# Patient Record
Sex: FEMALE | Race: BLACK OR AFRICAN AMERICAN | URBAN - METROPOLITAN AREA
[De-identification: names, ages, dates, MRNs, and addresses within clinical notes are randomized per-mention and may not be internally consistent; named-entity substitution may affect disease eponyms.]

---

## 2019-08-17 ENCOUNTER — EMERGENCY (EMERGENCY)
Facility: HOSPITAL | Age: 64
LOS: 1 days | Discharge: HOME | End: 2019-08-17
Attending: EMERGENCY MEDICINE | Admitting: EMERGENCY MEDICINE
Payer: COMMERCIAL

## 2019-08-17 VITALS
RESPIRATION RATE: 16 BRPM | OXYGEN SATURATION: 100 % | TEMPERATURE: 98 F | SYSTOLIC BLOOD PRESSURE: 159 MMHG | HEART RATE: 81 BPM | DIASTOLIC BLOOD PRESSURE: 76 MMHG

## 2019-08-17 PROCEDURE — 99283 EMERGENCY DEPT VISIT LOW MDM: CPT

## 2019-08-17 RX ORDER — MECLIZINE HCL 12.5 MG
25 TABLET ORAL ONCE
Refills: 0 | Status: COMPLETED | OUTPATIENT
Start: 2019-08-17 | End: 2019-08-17

## 2019-08-17 RX ORDER — MECLIZINE HCL 12.5 MG
1 TABLET ORAL
Qty: 5 | Refills: 0
Start: 2019-08-17 | End: 2019-08-21

## 2019-08-17 RX ADMIN — Medication 25 MILLIGRAM(S): at 09:19

## 2019-08-17 NOTE — ED PROVIDER NOTE - PHYSICAL EXAMINATION
GEN - NAD; well appearing; A+Ox3   HEAD - NC/AT, No visible Ecchymosis, No Abrasions, No Lacerations/Skin Tears     EYES - EOMI, PERRL, no conjunctival pallor, no scleral icterus  ENT -   (-) Left Tragal Tenderness, (-) Evidence of Discharge/Swelling/Inflammation in Left or Right Ears, (+) Decreased Hearing Acuity in Left Ear.   NECK - Neck supple, No LAD, No Swelling  PULM - CTA B/L,  symmetric breath sounds  COR -  RRR, S1 S2, no murmurs  EXTREMS - 0+ edema, no gross deformity, warm and well perfused    SKIN - no rash or bruising      NEUROLOGIC - alert, without focal neuro deficits, sensation nl, motor 5/5 RUE/LUE/RLE/LLE, Gait Grossly Stable GEN - NAD; well appearing; A+Ox3   HEAD - NC/AT, No visible Ecchymosis, No Abrasions, No Lacerations/Skin Tears     EYES - EOMI, PERRL, no conjunctival pallor, no scleral icterus  ENT -   (-) Left Tragal Tenderness, (-) Evidence of Discharge/Swelling/Inflammation in Left or Right Ears, (+) Decreased Hearing Acuity in Left Ear.   NECK - Neck supple, No LAD, No Swelling  PULM - CTA B/L,  symmetric breath sounds  COR -  RRR, S1 S2, no murmurs  EXTREMS - 0+ edema, no gross deformity, warm and well perfused    SKIN - no rash or bruising      NEUROLOGIC - alert, without focal neuro deficits, sensation nl, motor 5/5 RUE/LUE/RLE/LLE, Gait Grossly Stable; Epley's Maneuver did not re-elicit symptoms.

## 2019-08-17 NOTE — ED PROVIDER NOTE - ATTENDING CONTRIBUTION TO CARE
I performed a history and physical exam of the patient and discussed their management with the resident and /or advanced care provider. I reviewed the resident and /or ACP's note and agree with the documented findings and plan of care. My medical decison making and observations are found above.  No cerumen, has hearing but decreased.

## 2019-08-17 NOTE — ED PROVIDER NOTE - NS ED ROS FT
Constitution: No Fever or chills, No Weight Loss,   Eyes: No visual changes  HEENT: No cough, No Discharge, No Rhinorrhea, No URI symptoms  Ears: No Ear Pain, (+) Left Ear Fullness, (-) Discharge Reported from Left Ear  Cardio: No Chest pain, No Palpitations, No Dyspnea  Resp: No SOB, No Wheezing  GI: No abdominal pain, (+) Nausea, No Vomiting, No Constipation, No Diarrhea  : No burning upon urination, trouble urinating, no foul odor from urine  MSK: No Back pain, No Numbness, No Tingling, No Weakness  Neuro: (+) Dizziness, (+) changes to Hearing on Left Side, Stable Gait, No Headache, No changes to Vision,   Skin: No rashes, No Bruising, No Swelling

## 2019-08-17 NOTE — ED PROVIDER NOTE - NSFOLLOWUPCLINICS_GEN_ALL_ED_FT
[facility]  ENT  [address]  [place] [address]  Phone: [phone]  Fax:   Follow Up Time:

## 2019-08-17 NOTE — ED PROVIDER NOTE - PROGRESS NOTE DETAILS
Patient reports benefit derived from meclizine, gait is stable. Dizziness is resolved. Will provide close ENT follow up, patient to make appointment within 1 week.

## 2019-08-17 NOTE — ED PROVIDER NOTE - OBJECTIVE STATEMENT
63 yof, PMHx: DM, HTN. Presents with cc: dizziness, hearing loss in Left ear, and nausea vomiting. Pt reports on Thursday she developed an acute episode of vomiting, NB/NB, that subsequently resolved. It was associated with a feeling of being "unbalanced." When the feeling of dizziness set on, they last for a hours at a time. There is also an associated feeling of "I have to pop my left ear."     Vomiting, 1 episode NB/NB, since resolved, able to tolerate PO intake without issues.    Pt denies any CP, SOB, Syncope, Head Trauma, LOC. Otherwise ROS is unremarkable.

## 2019-08-17 NOTE — ED ADULT TRIAGE NOTE - CHIEF COMPLAINT QUOTE
Pt arrives to ED c/o dizziness starting Thursday along with N/V.  Vomiting stopped on Friday but dizziness has continued along with the feeling that her left ear is clogged with reduced hearing in affected ear.  Pt states she feels "wobbly" when walking.

## 2019-08-17 NOTE — ED PROVIDER NOTE - CLINICAL SUMMARY MEDICAL DECISION MAKING FREE TEXT BOX
Elderly female, presents with dizziness, nausea, and feeling of left hearing loss. Non-focal neurologic exam, gait presently stable, able to tolerate PO intake. Neuro exam and history non-concerning for any central causes of vertigo. Likely peripheral vertigo, will treat with Meclizine, re-evaluate. Provide close neurology referral, and advise close follow up. Elderly female, presents with dizziness, nausea, and feeling of left hearing loss. Non-focal neurologic exam, gait presently stable, able to tolerate PO intake. Neuro exam and history non-concerning for any central causes of vertigo. Likely peripheral vertigo, will treat with Meclizine, re-evaluate. Provide close neurology referral, and advise close follow up.  Yesi: dizziness with head motion, lt hearing decrease, some vomitting. no bleeding. Patient with +increase in sx with head motion. has some sinus congestion. TM nl BL. + able to hear out of lt ear.

## 2019-08-17 NOTE — ED PROVIDER NOTE - NSFOLLOWUPINSTRUCTIONS_ED_ALL_ED_FT
You were and evaluated in the Emergency Department for your dizziness. You were evaluated clinically to have a peripheral vertigo.     You can continue taking meclizine 25 mg once daily, up to twice a day for your dizziness. Please call to make an appointment with ENT for further evaluation of your symptoms.     Furthermore, we advise you to visit your Primary Care Doctor within 48-72 hours, for a comprehensive evaluation of your health.

## 2019-11-12 ENCOUNTER — APPOINTMENT (OUTPATIENT)
Dept: OTOLARYNGOLOGY | Facility: CLINIC | Age: 64
End: 2019-11-12

## 2021-01-01 ENCOUNTER — INPATIENT (INPATIENT)
Facility: HOSPITAL | Age: 66
LOS: 25 days | End: 2021-04-18
Attending: STUDENT IN AN ORGANIZED HEALTH CARE EDUCATION/TRAINING PROGRAM | Admitting: STUDENT IN AN ORGANIZED HEALTH CARE EDUCATION/TRAINING PROGRAM
Payer: COMMERCIAL

## 2021-01-01 VITALS — OXYGEN SATURATION: 94 %

## 2021-01-01 VITALS
HEART RATE: 89 BPM | WEIGHT: 169.98 LBS | OXYGEN SATURATION: 89 % | SYSTOLIC BLOOD PRESSURE: 169 MMHG | RESPIRATION RATE: 17 BRPM | HEIGHT: 64 IN | TEMPERATURE: 99 F | DIASTOLIC BLOOD PRESSURE: 84 MMHG

## 2021-01-01 DIAGNOSIS — E11.65 TYPE 2 DIABETES MELLITUS WITH HYPERGLYCEMIA: ICD-10-CM

## 2021-01-01 DIAGNOSIS — E08.10 DIABETES MELLITUS DUE TO UNDERLYING CONDITION WITH KETOACIDOSIS WITHOUT COMA: ICD-10-CM

## 2021-01-01 DIAGNOSIS — I10 ESSENTIAL (PRIMARY) HYPERTENSION: ICD-10-CM

## 2021-01-01 DIAGNOSIS — U07.1 COVID-19: ICD-10-CM

## 2021-01-01 DIAGNOSIS — Z29.9 ENCOUNTER FOR PROPHYLACTIC MEASURES, UNSPECIFIED: ICD-10-CM

## 2021-01-01 DIAGNOSIS — R06.03 ACUTE RESPIRATORY DISTRESS: ICD-10-CM

## 2021-01-01 DIAGNOSIS — J96.00 ACUTE RESPIRATORY FAILURE, UNSPECIFIED WHETHER WITH HYPOXIA OR HYPERCAPNIA: ICD-10-CM

## 2021-01-01 DIAGNOSIS — E78.5 HYPERLIPIDEMIA, UNSPECIFIED: ICD-10-CM

## 2021-01-01 DIAGNOSIS — E11.9 TYPE 2 DIABETES MELLITUS WITHOUT COMPLICATIONS: ICD-10-CM

## 2021-01-01 LAB
-  STAPHYLOCOCCUS EPIDERMIDIS, METHICILLIN RESISTANT: SIGNIFICANT CHANGE UP
24R-OH-CALCIDIOL SERPL-MCNC: 26.3 NG/ML — LOW (ref 30–80)
A1C WITH ESTIMATED AVERAGE GLUCOSE RESULT: 12.2 % — HIGH (ref 4–5.6)
A1C WITH ESTIMATED AVERAGE GLUCOSE RESULT: 12.2 % — HIGH (ref 4–5.6)
A1C WITH ESTIMATED AVERAGE GLUCOSE RESULT: 12.5 % — HIGH (ref 4–5.6)
ALBUMIN SERPL ELPH-MCNC: 1 G/DL — LOW (ref 3.3–5)
ALBUMIN SERPL ELPH-MCNC: 1.3 G/DL — LOW (ref 3.3–5)
ALBUMIN SERPL ELPH-MCNC: 1.3 G/DL — LOW (ref 3.3–5)
ALBUMIN SERPL ELPH-MCNC: 1.4 G/DL — LOW (ref 3.3–5)
ALBUMIN SERPL ELPH-MCNC: 1.5 G/DL — LOW (ref 3.3–5)
ALBUMIN SERPL ELPH-MCNC: 1.6 G/DL — LOW (ref 3.3–5)
ALBUMIN SERPL ELPH-MCNC: 1.7 G/DL — LOW (ref 3.3–5)
ALBUMIN SERPL ELPH-MCNC: 1.7 G/DL — LOW (ref 3.3–5)
ALBUMIN SERPL ELPH-MCNC: 1.8 G/DL — LOW (ref 3.3–5)
ALBUMIN SERPL ELPH-MCNC: 2.2 G/DL — LOW (ref 3.3–5)
ALBUMIN SERPL ELPH-MCNC: 2.3 G/DL — LOW (ref 3.3–5)
ALBUMIN SERPL ELPH-MCNC: 2.3 G/DL — LOW (ref 3.3–5)
ALBUMIN SERPL ELPH-MCNC: 2.5 G/DL — LOW (ref 3.3–5)
ALP SERPL-CCNC: 103 U/L — SIGNIFICANT CHANGE UP (ref 40–120)
ALP SERPL-CCNC: 142 U/L — HIGH (ref 40–120)
ALP SERPL-CCNC: 144 U/L — HIGH (ref 40–120)
ALP SERPL-CCNC: 152 U/L — HIGH (ref 40–120)
ALP SERPL-CCNC: 163 U/L — HIGH (ref 40–120)
ALP SERPL-CCNC: 200 U/L — HIGH (ref 40–120)
ALP SERPL-CCNC: 211 U/L — HIGH (ref 40–120)
ALP SERPL-CCNC: 213 U/L — HIGH (ref 40–120)
ALP SERPL-CCNC: 215 U/L — HIGH (ref 40–120)
ALP SERPL-CCNC: 217 U/L — HIGH (ref 40–120)
ALP SERPL-CCNC: 225 U/L — HIGH (ref 40–120)
ALP SERPL-CCNC: 232 U/L — HIGH (ref 40–120)
ALP SERPL-CCNC: 244 U/L — HIGH (ref 40–120)
ALP SERPL-CCNC: 245 U/L — HIGH (ref 40–120)
ALP SERPL-CCNC: 248 U/L — HIGH (ref 40–120)
ALP SERPL-CCNC: 252 U/L — HIGH (ref 40–120)
ALP SERPL-CCNC: 272 U/L — HIGH (ref 40–120)
ALP SERPL-CCNC: 306 U/L — HIGH (ref 40–120)
ALP SERPL-CCNC: 312 U/L — HIGH (ref 40–120)
ALP SERPL-CCNC: 330 U/L — HIGH (ref 40–120)
ALP SERPL-CCNC: 417 U/L — HIGH (ref 40–120)
ALP SERPL-CCNC: 77 U/L — SIGNIFICANT CHANGE UP (ref 40–120)
ALP SERPL-CCNC: 84 U/L — SIGNIFICANT CHANGE UP (ref 40–120)
ALP SERPL-CCNC: 88 U/L — SIGNIFICANT CHANGE UP (ref 40–120)
ALP SERPL-CCNC: 93 U/L — SIGNIFICANT CHANGE UP (ref 40–120)
ALP SERPL-CCNC: 99 U/L — SIGNIFICANT CHANGE UP (ref 40–120)
ALT FLD-CCNC: 109 U/L — HIGH (ref 12–78)
ALT FLD-CCNC: 1259 U/L — HIGH (ref 12–78)
ALT FLD-CCNC: 141 U/L — HIGH (ref 12–78)
ALT FLD-CCNC: 1492 U/L — HIGH (ref 12–78)
ALT FLD-CCNC: 18 U/L — SIGNIFICANT CHANGE UP (ref 12–78)
ALT FLD-CCNC: 199 U/L — HIGH (ref 12–78)
ALT FLD-CCNC: 212 U/L — HIGH (ref 12–78)
ALT FLD-CCNC: 24 U/L — SIGNIFICANT CHANGE UP (ref 12–78)
ALT FLD-CCNC: 25 U/L — SIGNIFICANT CHANGE UP (ref 12–78)
ALT FLD-CCNC: 25 U/L — SIGNIFICANT CHANGE UP (ref 12–78)
ALT FLD-CCNC: 26 U/L — SIGNIFICANT CHANGE UP (ref 12–78)
ALT FLD-CCNC: 26 U/L — SIGNIFICANT CHANGE UP (ref 12–78)
ALT FLD-CCNC: 265 U/L — HIGH (ref 12–78)
ALT FLD-CCNC: 372 U/L — HIGH (ref 12–78)
ALT FLD-CCNC: 501 U/L — HIGH (ref 12–78)
ALT FLD-CCNC: 587 U/L — HIGH (ref 12–78)
ALT FLD-CCNC: 59 U/L — SIGNIFICANT CHANGE UP (ref 12–78)
ALT FLD-CCNC: 638 U/L — HIGH (ref 12–78)
ALT FLD-CCNC: 6711 U/L — HIGH (ref 12–78)
ALT FLD-CCNC: 730 U/L — HIGH (ref 12–78)
ALT FLD-CCNC: 868 U/L — HIGH (ref 12–78)
ALT FLD-CCNC: 88 U/L — HIGH (ref 12–78)
ALT FLD-CCNC: 91 U/L — HIGH (ref 12–78)
ALT FLD-CCNC: 91 U/L — HIGH (ref 12–78)
ALT FLD-CCNC: 930 U/L — HIGH (ref 12–78)
ALT FLD-CCNC: 964 U/L — HIGH (ref 12–78)
ANION GAP SERPL CALC-SCNC: 11 MMOL/L — SIGNIFICANT CHANGE UP (ref 5–17)
ANION GAP SERPL CALC-SCNC: 11 MMOL/L — SIGNIFICANT CHANGE UP (ref 5–17)
ANION GAP SERPL CALC-SCNC: 14 MMOL/L — SIGNIFICANT CHANGE UP (ref 5–17)
ANION GAP SERPL CALC-SCNC: 18 MMOL/L — HIGH (ref 5–17)
ANION GAP SERPL CALC-SCNC: 2 MMOL/L — LOW (ref 5–17)
ANION GAP SERPL CALC-SCNC: 2 MMOL/L — LOW (ref 5–17)
ANION GAP SERPL CALC-SCNC: 26 MMOL/L — HIGH (ref 5–17)
ANION GAP SERPL CALC-SCNC: 29 MMOL/L — HIGH (ref 5–17)
ANION GAP SERPL CALC-SCNC: 3 MMOL/L — LOW (ref 5–17)
ANION GAP SERPL CALC-SCNC: 4 MMOL/L — LOW (ref 5–17)
ANION GAP SERPL CALC-SCNC: 5 MMOL/L — SIGNIFICANT CHANGE UP (ref 5–17)
ANION GAP SERPL CALC-SCNC: 6 MMOL/L — SIGNIFICANT CHANGE UP (ref 5–17)
ANION GAP SERPL CALC-SCNC: 7 MMOL/L — SIGNIFICANT CHANGE UP (ref 5–17)
ANION GAP SERPL CALC-SCNC: 8 MMOL/L — SIGNIFICANT CHANGE UP (ref 5–17)
ANION GAP SERPL CALC-SCNC: 9 MMOL/L — SIGNIFICANT CHANGE UP (ref 5–17)
ANION GAP SERPL CALC-SCNC: 9 MMOL/L — SIGNIFICANT CHANGE UP (ref 5–17)
ANISOCYTOSIS BLD QL: SLIGHT — SIGNIFICANT CHANGE UP
APPEARANCE UR: CLEAR — SIGNIFICANT CHANGE UP
APTT BLD: 27.2 SEC — LOW (ref 27.5–35.5)
APTT BLD: 27.6 SEC — SIGNIFICANT CHANGE UP (ref 27.5–35.5)
APTT BLD: 30.9 SEC — SIGNIFICANT CHANGE UP (ref 27.5–35.5)
APTT BLD: 36.6 SEC — HIGH (ref 27.5–35.5)
AST SERPL-CCNC: 114 U/L — HIGH (ref 15–37)
AST SERPL-CCNC: 1366 U/L — HIGH (ref 15–37)
AST SERPL-CCNC: 1393 U/L — HIGH (ref 15–37)
AST SERPL-CCNC: 145 U/L — HIGH (ref 15–37)
AST SERPL-CCNC: 1955 U/L — HIGH (ref 15–37)
AST SERPL-CCNC: 222 U/L — HIGH (ref 15–37)
AST SERPL-CCNC: 23 U/L — SIGNIFICANT CHANGE UP (ref 15–37)
AST SERPL-CCNC: 273 U/L — HIGH (ref 15–37)
AST SERPL-CCNC: 277 U/L — HIGH (ref 15–37)
AST SERPL-CCNC: 290 U/L — HIGH (ref 15–37)
AST SERPL-CCNC: 34 U/L — SIGNIFICANT CHANGE UP (ref 15–37)
AST SERPL-CCNC: 370 U/L — HIGH (ref 15–37)
AST SERPL-CCNC: 39 U/L — HIGH (ref 15–37)
AST SERPL-CCNC: 41 U/L — HIGH (ref 15–37)
AST SERPL-CCNC: 41 U/L — HIGH (ref 15–37)
AST SERPL-CCNC: 42 U/L — HIGH (ref 15–37)
AST SERPL-CCNC: 43 U/L — HIGH (ref 15–37)
AST SERPL-CCNC: 47 U/L — HIGH (ref 15–37)
AST SERPL-CCNC: 52 U/L — HIGH (ref 15–37)
AST SERPL-CCNC: 56 U/L — HIGH (ref 15–37)
AST SERPL-CCNC: 61 U/L — HIGH (ref 15–37)
AST SERPL-CCNC: 64 U/L — HIGH (ref 15–37)
AST SERPL-CCNC: 65 U/L — HIGH (ref 15–37)
AST SERPL-CCNC: 655 U/L — HIGH (ref 15–37)
AST SERPL-CCNC: 940 U/L — HIGH (ref 15–37)
AST SERPL-CCNC: <3 U/L — LOW (ref 15–37)
B-OH-BUTYR SERPL-SCNC: 0.8 MMOL/L — HIGH
BACTERIA # UR AUTO: ABNORMAL
BASE EXCESS BLDA CALC-SCNC: -0.2 MMOL/L — SIGNIFICANT CHANGE UP (ref -2–2)
BASE EXCESS BLDA CALC-SCNC: -0.3 MMOL/L — SIGNIFICANT CHANGE UP (ref -2–2)
BASE EXCESS BLDA CALC-SCNC: -0.4 MMOL/L — SIGNIFICANT CHANGE UP (ref -2–2)
BASE EXCESS BLDA CALC-SCNC: -0.9 MMOL/L — SIGNIFICANT CHANGE UP (ref -2–2)
BASE EXCESS BLDA CALC-SCNC: -11 MMOL/L — LOW (ref -2–2)
BASE EXCESS BLDA CALC-SCNC: -15.2 MMOL/L — LOW (ref -2–2)
BASE EXCESS BLDA CALC-SCNC: -18.7 MMOL/L — LOW (ref -2–2)
BASE EXCESS BLDA CALC-SCNC: -2.2 MMOL/L — LOW (ref -2–2)
BASE EXCESS BLDA CALC-SCNC: -3 MMOL/L — LOW (ref -2–2)
BASE EXCESS BLDA CALC-SCNC: -8 MMOL/L — LOW (ref -2–2)
BASE EXCESS BLDA CALC-SCNC: 0 MMOL/L — SIGNIFICANT CHANGE UP (ref -2–2)
BASE EXCESS BLDA CALC-SCNC: 0.9 MMOL/L — SIGNIFICANT CHANGE UP (ref -2–2)
BASE EXCESS BLDA CALC-SCNC: 1 MMOL/L — SIGNIFICANT CHANGE UP (ref -2–2)
BASE EXCESS BLDA CALC-SCNC: 1.1 MMOL/L — SIGNIFICANT CHANGE UP (ref -2–2)
BASE EXCESS BLDA CALC-SCNC: 1.3 MMOL/L — SIGNIFICANT CHANGE UP (ref -2–2)
BASE EXCESS BLDA CALC-SCNC: 1.5 MMOL/L — SIGNIFICANT CHANGE UP (ref -2–2)
BASE EXCESS BLDA CALC-SCNC: 1.8 MMOL/L — SIGNIFICANT CHANGE UP (ref -2–2)
BASE EXCESS BLDA CALC-SCNC: 10.1 MMOL/L — HIGH (ref -2–2)
BASE EXCESS BLDA CALC-SCNC: 2.1 MMOL/L — HIGH (ref -2–2)
BASE EXCESS BLDA CALC-SCNC: 2.2 MMOL/L — HIGH (ref -2–2)
BASE EXCESS BLDA CALC-SCNC: 2.4 MMOL/L — HIGH (ref -2–2)
BASE EXCESS BLDA CALC-SCNC: 2.4 MMOL/L — HIGH (ref -2–2)
BASE EXCESS BLDA CALC-SCNC: 3.4 MMOL/L — HIGH (ref -2–2)
BASE EXCESS BLDA CALC-SCNC: 3.7 MMOL/L — HIGH (ref -2–2)
BASE EXCESS BLDA CALC-SCNC: 3.7 MMOL/L — HIGH (ref -2–2)
BASE EXCESS BLDA CALC-SCNC: 6.5 MMOL/L — HIGH (ref -2–2)
BASE EXCESS BLDA CALC-SCNC: 7.2 MMOL/L — HIGH (ref -2–2)
BASE EXCESS BLDA CALC-SCNC: 8.2 MMOL/L — HIGH (ref -2–2)
BASE EXCESS BLDA CALC-SCNC: 9.3 MMOL/L — HIGH (ref -2–2)
BASE EXCESS BLDA CALC-SCNC: SIGNIFICANT CHANGE UP MMOL/L (ref -2–2)
BASOPHILS # BLD AUTO: 0 K/UL — SIGNIFICANT CHANGE UP (ref 0–0.2)
BASOPHILS # BLD AUTO: 0 K/UL — SIGNIFICANT CHANGE UP (ref 0–0.2)
BASOPHILS # BLD AUTO: 0.01 K/UL — SIGNIFICANT CHANGE UP (ref 0–0.2)
BASOPHILS # BLD AUTO: 0.02 K/UL — SIGNIFICANT CHANGE UP (ref 0–0.2)
BASOPHILS # BLD AUTO: 0.05 K/UL — SIGNIFICANT CHANGE UP (ref 0–0.2)
BASOPHILS # BLD AUTO: 0.06 K/UL — SIGNIFICANT CHANGE UP (ref 0–0.2)
BASOPHILS # BLD AUTO: 0.19 K/UL — SIGNIFICANT CHANGE UP (ref 0–0.2)
BASOPHILS NFR BLD AUTO: 0 % — SIGNIFICANT CHANGE UP (ref 0–2)
BASOPHILS NFR BLD AUTO: 0 % — SIGNIFICANT CHANGE UP (ref 0–2)
BASOPHILS NFR BLD AUTO: 0.1 % — SIGNIFICANT CHANGE UP (ref 0–2)
BASOPHILS NFR BLD AUTO: 0.1 % — SIGNIFICANT CHANGE UP (ref 0–2)
BASOPHILS NFR BLD AUTO: 0.2 % — SIGNIFICANT CHANGE UP (ref 0–2)
BASOPHILS NFR BLD AUTO: 0.3 % — SIGNIFICANT CHANGE UP (ref 0–2)
BASOPHILS NFR BLD AUTO: 1.1 % — SIGNIFICANT CHANGE UP (ref 0–2)
BILIRUB DIRECT SERPL-MCNC: 0.28 MG/DL — HIGH (ref 0.05–0.2)
BILIRUB INDIRECT FLD-MCNC: 0.1 MG/DL — LOW (ref 0.2–1)
BILIRUB SERPL-MCNC: 0.3 MG/DL — SIGNIFICANT CHANGE UP (ref 0.2–1.2)
BILIRUB SERPL-MCNC: 0.4 MG/DL — SIGNIFICANT CHANGE UP (ref 0.2–1.2)
BILIRUB SERPL-MCNC: 0.4 MG/DL — SIGNIFICANT CHANGE UP (ref 0.2–1.2)
BILIRUB SERPL-MCNC: 0.5 MG/DL — SIGNIFICANT CHANGE UP (ref 0.2–1.2)
BILIRUB SERPL-MCNC: 0.5 MG/DL — SIGNIFICANT CHANGE UP (ref 0.2–1.2)
BILIRUB SERPL-MCNC: 0.6 MG/DL — SIGNIFICANT CHANGE UP (ref 0.2–1.2)
BILIRUB SERPL-MCNC: 0.7 MG/DL — SIGNIFICANT CHANGE UP (ref 0.2–1.2)
BILIRUB SERPL-MCNC: 0.8 MG/DL — SIGNIFICANT CHANGE UP (ref 0.2–1.2)
BILIRUB SERPL-MCNC: 1 MG/DL — SIGNIFICANT CHANGE UP (ref 0.2–1.2)
BILIRUB SERPL-MCNC: 1.1 MG/DL — SIGNIFICANT CHANGE UP (ref 0.2–1.2)
BILIRUB SERPL-MCNC: 1.3 MG/DL — HIGH (ref 0.2–1.2)
BILIRUB SERPL-MCNC: 1.3 MG/DL — HIGH (ref 0.2–1.2)
BILIRUB SERPL-MCNC: 1.9 MG/DL — HIGH (ref 0.2–1.2)
BILIRUB UR-MCNC: NEGATIVE — SIGNIFICANT CHANGE UP
BLOOD GAS COMMENTS: SIGNIFICANT CHANGE UP
BLOOD GAS SOURCE: SIGNIFICANT CHANGE UP
BUN SERPL-MCNC: 10 MG/DL — SIGNIFICANT CHANGE UP (ref 7–23)
BUN SERPL-MCNC: 11 MG/DL — SIGNIFICANT CHANGE UP (ref 7–23)
BUN SERPL-MCNC: 11 MG/DL — SIGNIFICANT CHANGE UP (ref 7–23)
BUN SERPL-MCNC: 13 MG/DL — SIGNIFICANT CHANGE UP (ref 7–23)
BUN SERPL-MCNC: 16 MG/DL — SIGNIFICANT CHANGE UP (ref 7–23)
BUN SERPL-MCNC: 16 MG/DL — SIGNIFICANT CHANGE UP (ref 7–23)
BUN SERPL-MCNC: 17 MG/DL — SIGNIFICANT CHANGE UP (ref 7–23)
BUN SERPL-MCNC: 18 MG/DL — SIGNIFICANT CHANGE UP (ref 7–23)
BUN SERPL-MCNC: 19 MG/DL — SIGNIFICANT CHANGE UP (ref 7–23)
BUN SERPL-MCNC: 19 MG/DL — SIGNIFICANT CHANGE UP (ref 7–23)
BUN SERPL-MCNC: 23 MG/DL — SIGNIFICANT CHANGE UP (ref 7–23)
BUN SERPL-MCNC: 24 MG/DL — HIGH (ref 7–23)
BUN SERPL-MCNC: 25 MG/DL — HIGH (ref 7–23)
BUN SERPL-MCNC: 26 MG/DL — HIGH (ref 7–23)
BUN SERPL-MCNC: 27 MG/DL — HIGH (ref 7–23)
BUN SERPL-MCNC: 28 MG/DL — HIGH (ref 7–23)
BUN SERPL-MCNC: 29 MG/DL — HIGH (ref 7–23)
BUN SERPL-MCNC: 31 MG/DL — HIGH (ref 7–23)
BUN SERPL-MCNC: 34 MG/DL — HIGH (ref 7–23)
BUN SERPL-MCNC: 35 MG/DL — HIGH (ref 7–23)
BUN SERPL-MCNC: 35 MG/DL — HIGH (ref 7–23)
BUN SERPL-MCNC: 38 MG/DL — HIGH (ref 7–23)
BUN SERPL-MCNC: 42 MG/DL — HIGH (ref 7–23)
CALCIUM SERPL-MCNC: 7.2 MG/DL — LOW (ref 8.5–10.1)
CALCIUM SERPL-MCNC: 7.5 MG/DL — LOW (ref 8.5–10.1)
CALCIUM SERPL-MCNC: 7.6 MG/DL — LOW (ref 8.5–10.1)
CALCIUM SERPL-MCNC: 7.7 MG/DL — LOW (ref 8.5–10.1)
CALCIUM SERPL-MCNC: 7.8 MG/DL — LOW (ref 8.5–10.1)
CALCIUM SERPL-MCNC: 7.9 MG/DL — LOW (ref 8.5–10.1)
CALCIUM SERPL-MCNC: 8 MG/DL — LOW (ref 8.5–10.1)
CALCIUM SERPL-MCNC: 8.1 MG/DL — LOW (ref 8.5–10.1)
CALCIUM SERPL-MCNC: 8.2 MG/DL — LOW (ref 8.5–10.1)
CALCIUM SERPL-MCNC: 8.3 MG/DL — LOW (ref 8.5–10.1)
CALCIUM SERPL-MCNC: 8.4 MG/DL — LOW (ref 8.5–10.1)
CALCIUM SERPL-MCNC: 8.4 MG/DL — LOW (ref 8.5–10.1)
CALCIUM SERPL-MCNC: 8.5 MG/DL — SIGNIFICANT CHANGE UP (ref 8.5–10.1)
CALCIUM SERPL-MCNC: 8.6 MG/DL — SIGNIFICANT CHANGE UP (ref 8.5–10.1)
CALCIUM SERPL-MCNC: 8.7 MG/DL — SIGNIFICANT CHANGE UP (ref 8.5–10.1)
CALCIUM SERPL-MCNC: 8.7 MG/DL — SIGNIFICANT CHANGE UP (ref 8.5–10.1)
CALCIUM SERPL-MCNC: 8.8 MG/DL — SIGNIFICANT CHANGE UP (ref 8.5–10.1)
CALCIUM SERPL-MCNC: 8.8 MG/DL — SIGNIFICANT CHANGE UP (ref 8.5–10.1)
CALCIUM SERPL-MCNC: 8.9 MG/DL — SIGNIFICANT CHANGE UP (ref 8.5–10.1)
CHLORIDE SERPL-SCNC: 101 MMOL/L — SIGNIFICANT CHANGE UP (ref 96–108)
CHLORIDE SERPL-SCNC: 102 MMOL/L — SIGNIFICANT CHANGE UP (ref 96–108)
CHLORIDE SERPL-SCNC: 103 MMOL/L — SIGNIFICANT CHANGE UP (ref 96–108)
CHLORIDE SERPL-SCNC: 104 MMOL/L — SIGNIFICANT CHANGE UP (ref 96–108)
CHLORIDE SERPL-SCNC: 105 MMOL/L — SIGNIFICANT CHANGE UP (ref 96–108)
CHLORIDE SERPL-SCNC: 105 MMOL/L — SIGNIFICANT CHANGE UP (ref 96–108)
CHLORIDE SERPL-SCNC: 106 MMOL/L — SIGNIFICANT CHANGE UP (ref 96–108)
CHLORIDE SERPL-SCNC: 90 MMOL/L — LOW (ref 96–108)
CHLORIDE SERPL-SCNC: 91 MMOL/L — LOW (ref 96–108)
CHLORIDE SERPL-SCNC: 94 MMOL/L — LOW (ref 96–108)
CHLORIDE SERPL-SCNC: 96 MMOL/L — SIGNIFICANT CHANGE UP (ref 96–108)
CHLORIDE SERPL-SCNC: 97 MMOL/L — SIGNIFICANT CHANGE UP (ref 96–108)
CHLORIDE SERPL-SCNC: 98 MMOL/L — SIGNIFICANT CHANGE UP (ref 96–108)
CHLORIDE SERPL-SCNC: 98 MMOL/L — SIGNIFICANT CHANGE UP (ref 96–108)
CHLORIDE SERPL-SCNC: 99 MMOL/L — SIGNIFICANT CHANGE UP (ref 96–108)
CHOLEST SERPL-MCNC: 173 MG/DL — SIGNIFICANT CHANGE UP
CO2 SERPL-SCNC: 10 MMOL/L — CRITICAL LOW (ref 22–31)
CO2 SERPL-SCNC: 13 MMOL/L — LOW (ref 22–31)
CO2 SERPL-SCNC: 17 MMOL/L — LOW (ref 22–31)
CO2 SERPL-SCNC: 19 MMOL/L — LOW (ref 22–31)
CO2 SERPL-SCNC: 22 MMOL/L — SIGNIFICANT CHANGE UP (ref 22–31)
CO2 SERPL-SCNC: 25 MMOL/L — SIGNIFICANT CHANGE UP (ref 22–31)
CO2 SERPL-SCNC: 26 MMOL/L — SIGNIFICANT CHANGE UP (ref 22–31)
CO2 SERPL-SCNC: 26 MMOL/L — SIGNIFICANT CHANGE UP (ref 22–31)
CO2 SERPL-SCNC: 27 MMOL/L — SIGNIFICANT CHANGE UP (ref 22–31)
CO2 SERPL-SCNC: 27 MMOL/L — SIGNIFICANT CHANGE UP (ref 22–31)
CO2 SERPL-SCNC: 28 MMOL/L — SIGNIFICANT CHANGE UP (ref 22–31)
CO2 SERPL-SCNC: 29 MMOL/L — SIGNIFICANT CHANGE UP (ref 22–31)
CO2 SERPL-SCNC: 30 MMOL/L — SIGNIFICANT CHANGE UP (ref 22–31)
CO2 SERPL-SCNC: 31 MMOL/L — SIGNIFICANT CHANGE UP (ref 22–31)
CO2 SERPL-SCNC: 31 MMOL/L — SIGNIFICANT CHANGE UP (ref 22–31)
CO2 SERPL-SCNC: 32 MMOL/L — HIGH (ref 22–31)
CO2 SERPL-SCNC: 33 MMOL/L — HIGH (ref 22–31)
CO2 SERPL-SCNC: 34 MMOL/L — HIGH (ref 22–31)
COLOR SPEC: YELLOW — SIGNIFICANT CHANGE UP
COMMENT - URINE: SIGNIFICANT CHANGE UP
COVID-19 SPIKE DOMAIN AB INTERP: POSITIVE
COVID-19 SPIKE DOMAIN ANTIBODY RESULT: 3.97 U/ML — HIGH
CREAT SERPL-MCNC: 0.31 MG/DL — LOW (ref 0.5–1.3)
CREAT SERPL-MCNC: 0.34 MG/DL — LOW (ref 0.5–1.3)
CREAT SERPL-MCNC: 0.35 MG/DL — LOW (ref 0.5–1.3)
CREAT SERPL-MCNC: 0.4 MG/DL — LOW (ref 0.5–1.3)
CREAT SERPL-MCNC: 0.4 MG/DL — LOW (ref 0.5–1.3)
CREAT SERPL-MCNC: 0.41 MG/DL — LOW (ref 0.5–1.3)
CREAT SERPL-MCNC: 0.43 MG/DL — LOW (ref 0.5–1.3)
CREAT SERPL-MCNC: 0.44 MG/DL — LOW (ref 0.5–1.3)
CREAT SERPL-MCNC: 0.45 MG/DL — LOW (ref 0.5–1.3)
CREAT SERPL-MCNC: 0.45 MG/DL — LOW (ref 0.5–1.3)
CREAT SERPL-MCNC: 0.46 MG/DL — LOW (ref 0.5–1.3)
CREAT SERPL-MCNC: 0.46 MG/DL — LOW (ref 0.5–1.3)
CREAT SERPL-MCNC: 0.49 MG/DL — LOW (ref 0.5–1.3)
CREAT SERPL-MCNC: 0.52 MG/DL — SIGNIFICANT CHANGE UP (ref 0.5–1.3)
CREAT SERPL-MCNC: 0.53 MG/DL — SIGNIFICANT CHANGE UP (ref 0.5–1.3)
CREAT SERPL-MCNC: 0.54 MG/DL — SIGNIFICANT CHANGE UP (ref 0.5–1.3)
CREAT SERPL-MCNC: 0.54 MG/DL — SIGNIFICANT CHANGE UP (ref 0.5–1.3)
CREAT SERPL-MCNC: 0.58 MG/DL — SIGNIFICANT CHANGE UP (ref 0.5–1.3)
CREAT SERPL-MCNC: 0.6 MG/DL — SIGNIFICANT CHANGE UP (ref 0.5–1.3)
CREAT SERPL-MCNC: 0.61 MG/DL — SIGNIFICANT CHANGE UP (ref 0.5–1.3)
CREAT SERPL-MCNC: 0.62 MG/DL — SIGNIFICANT CHANGE UP (ref 0.5–1.3)
CREAT SERPL-MCNC: 0.65 MG/DL — SIGNIFICANT CHANGE UP (ref 0.5–1.3)
CREAT SERPL-MCNC: 0.65 MG/DL — SIGNIFICANT CHANGE UP (ref 0.5–1.3)
CREAT SERPL-MCNC: 0.66 MG/DL — SIGNIFICANT CHANGE UP (ref 0.5–1.3)
CREAT SERPL-MCNC: 0.67 MG/DL — SIGNIFICANT CHANGE UP (ref 0.5–1.3)
CREAT SERPL-MCNC: 0.76 MG/DL — SIGNIFICANT CHANGE UP (ref 0.5–1.3)
CREAT SERPL-MCNC: 0.85 MG/DL — SIGNIFICANT CHANGE UP (ref 0.5–1.3)
CREAT SERPL-MCNC: 0.87 MG/DL — SIGNIFICANT CHANGE UP (ref 0.5–1.3)
CREAT SERPL-MCNC: 0.9 MG/DL — SIGNIFICANT CHANGE UP (ref 0.5–1.3)
CREAT SERPL-MCNC: 0.92 MG/DL — SIGNIFICANT CHANGE UP (ref 0.5–1.3)
CREAT SERPL-MCNC: 1.05 MG/DL — SIGNIFICANT CHANGE UP (ref 0.5–1.3)
CREAT SERPL-MCNC: 1.15 MG/DL — SIGNIFICANT CHANGE UP (ref 0.5–1.3)
CRP SERPL-MCNC: 110 MG/L — HIGH
CRP SERPL-MCNC: 113 MG/L — HIGH
CRP SERPL-MCNC: 114 MG/L — HIGH
CRP SERPL-MCNC: 157 MG/L — HIGH
CRP SERPL-MCNC: 168 MG/L — HIGH
CRP SERPL-MCNC: 184 MG/L — HIGH
CRP SERPL-MCNC: 194 MG/L — HIGH
CRP SERPL-MCNC: <3 MG/L — SIGNIFICANT CHANGE UP
CULTURE RESULTS: NO GROWTH — SIGNIFICANT CHANGE UP
CULTURE RESULTS: SIGNIFICANT CHANGE UP
D DIMER BLD IA.RAPID-MCNC: 1026 NG/ML DDU — HIGH
D DIMER BLD IA.RAPID-MCNC: 1096 NG/ML DDU — HIGH
D DIMER BLD IA.RAPID-MCNC: 1344 NG/ML DDU — HIGH
D DIMER BLD IA.RAPID-MCNC: 1498 NG/ML DDU — HIGH
D DIMER BLD IA.RAPID-MCNC: 2091 NG/ML DDU — HIGH
D DIMER BLD IA.RAPID-MCNC: 734 NG/ML DDU — HIGH
D DIMER BLD IA.RAPID-MCNC: 7615 NG/ML DDU — HIGH
D DIMER BLD IA.RAPID-MCNC: 938 NG/ML DDU — HIGH
DACRYOCYTES BLD QL SMEAR: SLIGHT — SIGNIFICANT CHANGE UP
DIFF PNL FLD: ABNORMAL
EOSINOPHIL # BLD AUTO: 0 K/UL — SIGNIFICANT CHANGE UP (ref 0–0.5)
EOSINOPHIL # BLD AUTO: 0.01 K/UL — SIGNIFICANT CHANGE UP (ref 0–0.5)
EOSINOPHIL # BLD AUTO: 0.01 K/UL — SIGNIFICANT CHANGE UP (ref 0–0.5)
EOSINOPHIL # BLD AUTO: 0.02 K/UL — SIGNIFICANT CHANGE UP (ref 0–0.5)
EOSINOPHIL # BLD AUTO: 0.15 K/UL — SIGNIFICANT CHANGE UP (ref 0–0.5)
EOSINOPHIL # BLD AUTO: 0.17 K/UL — SIGNIFICANT CHANGE UP (ref 0–0.5)
EOSINOPHIL # BLD AUTO: 0.28 K/UL — SIGNIFICANT CHANGE UP (ref 0–0.5)
EOSINOPHIL NFR BLD AUTO: 0 % — SIGNIFICANT CHANGE UP (ref 0–6)
EOSINOPHIL NFR BLD AUTO: 0.1 % — SIGNIFICANT CHANGE UP (ref 0–6)
EOSINOPHIL NFR BLD AUTO: 0.1 % — SIGNIFICANT CHANGE UP (ref 0–6)
EOSINOPHIL NFR BLD AUTO: 1.3 % — SIGNIFICANT CHANGE UP (ref 0–6)
EOSINOPHIL NFR BLD AUTO: 2.1 % — SIGNIFICANT CHANGE UP (ref 0–6)
EOSINOPHIL NFR BLD AUTO: 2.7 % — SIGNIFICANT CHANGE UP (ref 0–6)
EPI CELLS # UR: SIGNIFICANT CHANGE UP
ERYTHROCYTE [SEDIMENTATION RATE] IN BLOOD: 77 MM/HR — HIGH (ref 0–20)
ESTIMATED AVERAGE GLUCOSE: 303 MG/DL — HIGH (ref 68–114)
ESTIMATED AVERAGE GLUCOSE: 303 MG/DL — HIGH (ref 68–114)
ESTIMATED AVERAGE GLUCOSE: 312 MG/DL — HIGH (ref 68–114)
FERRITIN SERPL-MCNC: 1426 NG/ML — HIGH (ref 15–150)
FERRITIN SERPL-MCNC: 1558 NG/ML — HIGH (ref 15–150)
FERRITIN SERPL-MCNC: 1738 NG/ML — HIGH (ref 15–150)
FERRITIN SERPL-MCNC: 2181 NG/ML — HIGH (ref 15–150)
FERRITIN SERPL-MCNC: 2873 NG/ML — HIGH (ref 15–150)
FERRITIN SERPL-MCNC: 5050 NG/ML — HIGH (ref 15–150)
FERRITIN SERPL-MCNC: 7382 NG/ML — HIGH (ref 15–150)
FERRITIN SERPL-MCNC: HIGH NG/ML (ref 15–150)
FERRITIN SERPL-MCNC: HIGH NG/ML (ref 15–150)
FIBRINOGEN PPP-MCNC: 1241 MG/DL — HIGH (ref 290–520)
FIBRINOGEN PPP-MCNC: 959 MG/DL — HIGH (ref 290–520)
FLUAV AG NPH QL: SIGNIFICANT CHANGE UP
FLUBV AG NPH QL: SIGNIFICANT CHANGE UP
GLUCOSE BLDC GLUCOMTR-MCNC: 105 MG/DL — HIGH (ref 70–99)
GLUCOSE BLDC GLUCOMTR-MCNC: 109 MG/DL — HIGH (ref 70–99)
GLUCOSE BLDC GLUCOMTR-MCNC: 114 MG/DL — HIGH (ref 70–99)
GLUCOSE BLDC GLUCOMTR-MCNC: 119 MG/DL — HIGH (ref 70–99)
GLUCOSE BLDC GLUCOMTR-MCNC: 121 MG/DL — HIGH (ref 70–99)
GLUCOSE BLDC GLUCOMTR-MCNC: 121 MG/DL — HIGH (ref 70–99)
GLUCOSE BLDC GLUCOMTR-MCNC: 123 MG/DL — HIGH (ref 70–99)
GLUCOSE BLDC GLUCOMTR-MCNC: 125 MG/DL — HIGH (ref 70–99)
GLUCOSE BLDC GLUCOMTR-MCNC: 126 MG/DL — HIGH (ref 70–99)
GLUCOSE BLDC GLUCOMTR-MCNC: 127 MG/DL — HIGH (ref 70–99)
GLUCOSE BLDC GLUCOMTR-MCNC: 127 MG/DL — HIGH (ref 70–99)
GLUCOSE BLDC GLUCOMTR-MCNC: 129 MG/DL — HIGH (ref 70–99)
GLUCOSE BLDC GLUCOMTR-MCNC: 129 MG/DL — HIGH (ref 70–99)
GLUCOSE BLDC GLUCOMTR-MCNC: 130 MG/DL — HIGH (ref 70–99)
GLUCOSE BLDC GLUCOMTR-MCNC: 130 MG/DL — HIGH (ref 70–99)
GLUCOSE BLDC GLUCOMTR-MCNC: 131 MG/DL — HIGH (ref 70–99)
GLUCOSE BLDC GLUCOMTR-MCNC: 133 MG/DL — HIGH (ref 70–99)
GLUCOSE BLDC GLUCOMTR-MCNC: 134 MG/DL — HIGH (ref 70–99)
GLUCOSE BLDC GLUCOMTR-MCNC: 136 MG/DL — HIGH (ref 70–99)
GLUCOSE BLDC GLUCOMTR-MCNC: 138 MG/DL — HIGH (ref 70–99)
GLUCOSE BLDC GLUCOMTR-MCNC: 138 MG/DL — HIGH (ref 70–99)
GLUCOSE BLDC GLUCOMTR-MCNC: 140 MG/DL — HIGH (ref 70–99)
GLUCOSE BLDC GLUCOMTR-MCNC: 141 MG/DL — HIGH (ref 70–99)
GLUCOSE BLDC GLUCOMTR-MCNC: 144 MG/DL — HIGH (ref 70–99)
GLUCOSE BLDC GLUCOMTR-MCNC: 145 MG/DL — HIGH (ref 70–99)
GLUCOSE BLDC GLUCOMTR-MCNC: 149 MG/DL — HIGH (ref 70–99)
GLUCOSE BLDC GLUCOMTR-MCNC: 150 MG/DL — HIGH (ref 70–99)
GLUCOSE BLDC GLUCOMTR-MCNC: 151 MG/DL — HIGH (ref 70–99)
GLUCOSE BLDC GLUCOMTR-MCNC: 153 MG/DL — HIGH (ref 70–99)
GLUCOSE BLDC GLUCOMTR-MCNC: 154 MG/DL — HIGH (ref 70–99)
GLUCOSE BLDC GLUCOMTR-MCNC: 156 MG/DL — HIGH (ref 70–99)
GLUCOSE BLDC GLUCOMTR-MCNC: 157 MG/DL — HIGH (ref 70–99)
GLUCOSE BLDC GLUCOMTR-MCNC: 158 MG/DL — HIGH (ref 70–99)
GLUCOSE BLDC GLUCOMTR-MCNC: 161 MG/DL — HIGH (ref 70–99)
GLUCOSE BLDC GLUCOMTR-MCNC: 163 MG/DL — HIGH (ref 70–99)
GLUCOSE BLDC GLUCOMTR-MCNC: 164 MG/DL — HIGH (ref 70–99)
GLUCOSE BLDC GLUCOMTR-MCNC: 166 MG/DL — HIGH (ref 70–99)
GLUCOSE BLDC GLUCOMTR-MCNC: 168 MG/DL — HIGH (ref 70–99)
GLUCOSE BLDC GLUCOMTR-MCNC: 169 MG/DL — HIGH (ref 70–99)
GLUCOSE BLDC GLUCOMTR-MCNC: 172 MG/DL — HIGH (ref 70–99)
GLUCOSE BLDC GLUCOMTR-MCNC: 172 MG/DL — HIGH (ref 70–99)
GLUCOSE BLDC GLUCOMTR-MCNC: 177 MG/DL — HIGH (ref 70–99)
GLUCOSE BLDC GLUCOMTR-MCNC: 179 MG/DL — HIGH (ref 70–99)
GLUCOSE BLDC GLUCOMTR-MCNC: 180 MG/DL — HIGH (ref 70–99)
GLUCOSE BLDC GLUCOMTR-MCNC: 181 MG/DL — HIGH (ref 70–99)
GLUCOSE BLDC GLUCOMTR-MCNC: 182 MG/DL — HIGH (ref 70–99)
GLUCOSE BLDC GLUCOMTR-MCNC: 183 MG/DL — HIGH (ref 70–99)
GLUCOSE BLDC GLUCOMTR-MCNC: 184 MG/DL — HIGH (ref 70–99)
GLUCOSE BLDC GLUCOMTR-MCNC: 187 MG/DL — HIGH (ref 70–99)
GLUCOSE BLDC GLUCOMTR-MCNC: 188 MG/DL — HIGH (ref 70–99)
GLUCOSE BLDC GLUCOMTR-MCNC: 188 MG/DL — HIGH (ref 70–99)
GLUCOSE BLDC GLUCOMTR-MCNC: 189 MG/DL — HIGH (ref 70–99)
GLUCOSE BLDC GLUCOMTR-MCNC: 191 MG/DL — HIGH (ref 70–99)
GLUCOSE BLDC GLUCOMTR-MCNC: 194 MG/DL — HIGH (ref 70–99)
GLUCOSE BLDC GLUCOMTR-MCNC: 196 MG/DL — HIGH (ref 70–99)
GLUCOSE BLDC GLUCOMTR-MCNC: 199 MG/DL — HIGH (ref 70–99)
GLUCOSE BLDC GLUCOMTR-MCNC: 199 MG/DL — HIGH (ref 70–99)
GLUCOSE BLDC GLUCOMTR-MCNC: 204 MG/DL — HIGH (ref 70–99)
GLUCOSE BLDC GLUCOMTR-MCNC: 207 MG/DL — HIGH (ref 70–99)
GLUCOSE BLDC GLUCOMTR-MCNC: 208 MG/DL — HIGH (ref 70–99)
GLUCOSE BLDC GLUCOMTR-MCNC: 208 MG/DL — HIGH (ref 70–99)
GLUCOSE BLDC GLUCOMTR-MCNC: 215 MG/DL — HIGH (ref 70–99)
GLUCOSE BLDC GLUCOMTR-MCNC: 215 MG/DL — HIGH (ref 70–99)
GLUCOSE BLDC GLUCOMTR-MCNC: 216 MG/DL — HIGH (ref 70–99)
GLUCOSE BLDC GLUCOMTR-MCNC: 219 MG/DL — HIGH (ref 70–99)
GLUCOSE BLDC GLUCOMTR-MCNC: 221 MG/DL — HIGH (ref 70–99)
GLUCOSE BLDC GLUCOMTR-MCNC: 222 MG/DL — HIGH (ref 70–99)
GLUCOSE BLDC GLUCOMTR-MCNC: 223 MG/DL — HIGH (ref 70–99)
GLUCOSE BLDC GLUCOMTR-MCNC: 224 MG/DL — HIGH (ref 70–99)
GLUCOSE BLDC GLUCOMTR-MCNC: 226 MG/DL — HIGH (ref 70–99)
GLUCOSE BLDC GLUCOMTR-MCNC: 226 MG/DL — HIGH (ref 70–99)
GLUCOSE BLDC GLUCOMTR-MCNC: 227 MG/DL — HIGH (ref 70–99)
GLUCOSE BLDC GLUCOMTR-MCNC: 233 MG/DL — HIGH (ref 70–99)
GLUCOSE BLDC GLUCOMTR-MCNC: 233 MG/DL — HIGH (ref 70–99)
GLUCOSE BLDC GLUCOMTR-MCNC: 235 MG/DL — HIGH (ref 70–99)
GLUCOSE BLDC GLUCOMTR-MCNC: 236 MG/DL — HIGH (ref 70–99)
GLUCOSE BLDC GLUCOMTR-MCNC: 239 MG/DL — HIGH (ref 70–99)
GLUCOSE BLDC GLUCOMTR-MCNC: 247 MG/DL — HIGH (ref 70–99)
GLUCOSE BLDC GLUCOMTR-MCNC: 250 MG/DL — HIGH (ref 70–99)
GLUCOSE BLDC GLUCOMTR-MCNC: 252 MG/DL — HIGH (ref 70–99)
GLUCOSE BLDC GLUCOMTR-MCNC: 253 MG/DL — HIGH (ref 70–99)
GLUCOSE BLDC GLUCOMTR-MCNC: 253 MG/DL — HIGH (ref 70–99)
GLUCOSE BLDC GLUCOMTR-MCNC: 254 MG/DL — HIGH (ref 70–99)
GLUCOSE BLDC GLUCOMTR-MCNC: 256 MG/DL — HIGH (ref 70–99)
GLUCOSE BLDC GLUCOMTR-MCNC: 260 MG/DL — HIGH (ref 70–99)
GLUCOSE BLDC GLUCOMTR-MCNC: 263 MG/DL — HIGH (ref 70–99)
GLUCOSE BLDC GLUCOMTR-MCNC: 264 MG/DL — HIGH (ref 70–99)
GLUCOSE BLDC GLUCOMTR-MCNC: 269 MG/DL — HIGH (ref 70–99)
GLUCOSE BLDC GLUCOMTR-MCNC: 276 MG/DL — HIGH (ref 70–99)
GLUCOSE BLDC GLUCOMTR-MCNC: 283 MG/DL — HIGH (ref 70–99)
GLUCOSE BLDC GLUCOMTR-MCNC: 289 MG/DL — HIGH (ref 70–99)
GLUCOSE BLDC GLUCOMTR-MCNC: 289 MG/DL — HIGH (ref 70–99)
GLUCOSE BLDC GLUCOMTR-MCNC: 294 MG/DL — HIGH (ref 70–99)
GLUCOSE BLDC GLUCOMTR-MCNC: 297 MG/DL — HIGH (ref 70–99)
GLUCOSE BLDC GLUCOMTR-MCNC: 301 MG/DL — HIGH (ref 70–99)
GLUCOSE BLDC GLUCOMTR-MCNC: 303 MG/DL — HIGH (ref 70–99)
GLUCOSE BLDC GLUCOMTR-MCNC: 306 MG/DL — HIGH (ref 70–99)
GLUCOSE BLDC GLUCOMTR-MCNC: 309 MG/DL — HIGH (ref 70–99)
GLUCOSE BLDC GLUCOMTR-MCNC: 311 MG/DL — HIGH (ref 70–99)
GLUCOSE BLDC GLUCOMTR-MCNC: 322 MG/DL — HIGH (ref 70–99)
GLUCOSE BLDC GLUCOMTR-MCNC: 323 MG/DL — HIGH (ref 70–99)
GLUCOSE BLDC GLUCOMTR-MCNC: 329 MG/DL — HIGH (ref 70–99)
GLUCOSE BLDC GLUCOMTR-MCNC: 333 MG/DL — HIGH (ref 70–99)
GLUCOSE BLDC GLUCOMTR-MCNC: 334 MG/DL — HIGH (ref 70–99)
GLUCOSE BLDC GLUCOMTR-MCNC: 349 MG/DL — HIGH (ref 70–99)
GLUCOSE BLDC GLUCOMTR-MCNC: 352 MG/DL — HIGH (ref 70–99)
GLUCOSE BLDC GLUCOMTR-MCNC: 372 MG/DL — HIGH (ref 70–99)
GLUCOSE BLDC GLUCOMTR-MCNC: 404 MG/DL — HIGH (ref 70–99)
GLUCOSE BLDC GLUCOMTR-MCNC: 409 MG/DL — HIGH (ref 70–99)
GLUCOSE BLDC GLUCOMTR-MCNC: 410 MG/DL — HIGH (ref 70–99)
GLUCOSE BLDC GLUCOMTR-MCNC: 415 MG/DL — HIGH (ref 70–99)
GLUCOSE BLDC GLUCOMTR-MCNC: 442 MG/DL — HIGH (ref 70–99)
GLUCOSE BLDC GLUCOMTR-MCNC: 457 MG/DL — CRITICAL HIGH (ref 70–99)
GLUCOSE BLDC GLUCOMTR-MCNC: 472 MG/DL — CRITICAL HIGH (ref 70–99)
GLUCOSE BLDC GLUCOMTR-MCNC: 75 MG/DL — SIGNIFICANT CHANGE UP (ref 70–99)
GLUCOSE BLDC GLUCOMTR-MCNC: 85 MG/DL — SIGNIFICANT CHANGE UP (ref 70–99)
GLUCOSE BLDC GLUCOMTR-MCNC: 85 MG/DL — SIGNIFICANT CHANGE UP (ref 70–99)
GLUCOSE BLDC GLUCOMTR-MCNC: 95 MG/DL — SIGNIFICANT CHANGE UP (ref 70–99)
GLUCOSE BLDC GLUCOMTR-MCNC: 98 MG/DL — SIGNIFICANT CHANGE UP (ref 70–99)
GLUCOSE SERPL-MCNC: 107 MG/DL — HIGH (ref 70–99)
GLUCOSE SERPL-MCNC: 113 MG/DL — HIGH (ref 70–99)
GLUCOSE SERPL-MCNC: 122 MG/DL — HIGH (ref 70–99)
GLUCOSE SERPL-MCNC: 126 MG/DL — HIGH (ref 70–99)
GLUCOSE SERPL-MCNC: 141 MG/DL — HIGH (ref 70–99)
GLUCOSE SERPL-MCNC: 142 MG/DL — HIGH (ref 70–99)
GLUCOSE SERPL-MCNC: 144 MG/DL — HIGH (ref 70–99)
GLUCOSE SERPL-MCNC: 149 MG/DL — HIGH (ref 70–99)
GLUCOSE SERPL-MCNC: 154 MG/DL — HIGH (ref 70–99)
GLUCOSE SERPL-MCNC: 164 MG/DL — HIGH (ref 70–99)
GLUCOSE SERPL-MCNC: 178 MG/DL — HIGH (ref 70–99)
GLUCOSE SERPL-MCNC: 185 MG/DL — HIGH (ref 70–99)
GLUCOSE SERPL-MCNC: 195 MG/DL — HIGH (ref 70–99)
GLUCOSE SERPL-MCNC: 196 MG/DL — HIGH (ref 70–99)
GLUCOSE SERPL-MCNC: 203 MG/DL — HIGH (ref 70–99)
GLUCOSE SERPL-MCNC: 210 MG/DL — HIGH (ref 70–99)
GLUCOSE SERPL-MCNC: 213 MG/DL — HIGH (ref 70–99)
GLUCOSE SERPL-MCNC: 228 MG/DL — HIGH (ref 70–99)
GLUCOSE SERPL-MCNC: 232 MG/DL — HIGH (ref 70–99)
GLUCOSE SERPL-MCNC: 242 MG/DL — HIGH (ref 70–99)
GLUCOSE SERPL-MCNC: 249 MG/DL — HIGH (ref 70–99)
GLUCOSE SERPL-MCNC: 253 MG/DL — HIGH (ref 70–99)
GLUCOSE SERPL-MCNC: 257 MG/DL — HIGH (ref 70–99)
GLUCOSE SERPL-MCNC: 260 MG/DL — HIGH (ref 70–99)
GLUCOSE SERPL-MCNC: 287 MG/DL — HIGH (ref 70–99)
GLUCOSE SERPL-MCNC: 292 MG/DL — HIGH (ref 70–99)
GLUCOSE SERPL-MCNC: 332 MG/DL — HIGH (ref 70–99)
GLUCOSE SERPL-MCNC: 361 MG/DL — HIGH (ref 70–99)
GLUCOSE SERPL-MCNC: 416 MG/DL — HIGH (ref 70–99)
GLUCOSE SERPL-MCNC: 75 MG/DL — SIGNIFICANT CHANGE UP (ref 70–99)
GLUCOSE SERPL-MCNC: 80 MG/DL — SIGNIFICANT CHANGE UP (ref 70–99)
GLUCOSE SERPL-MCNC: 93 MG/DL — SIGNIFICANT CHANGE UP (ref 70–99)
GLUCOSE UR QL: 1000 MG/DL
GRAM STN FLD: SIGNIFICANT CHANGE UP
HCO3 BLDA-SCNC: 14 MMOL/L — LOW (ref 21–29)
HCO3 BLDA-SCNC: 17 MMOL/L — LOW (ref 21–29)
HCO3 BLDA-SCNC: 20 MMOL/L — LOW (ref 21–29)
HCO3 BLDA-SCNC: 24 MMOL/L — SIGNIFICANT CHANGE UP (ref 21–29)
HCO3 BLDA-SCNC: 25 MMOL/L — SIGNIFICANT CHANGE UP (ref 21–29)
HCO3 BLDA-SCNC: 27 MMOL/L — SIGNIFICANT CHANGE UP (ref 21–29)
HCO3 BLDA-SCNC: 28 MMOL/L — SIGNIFICANT CHANGE UP (ref 21–29)
HCO3 BLDA-SCNC: 29 MMOL/L — SIGNIFICANT CHANGE UP (ref 21–29)
HCO3 BLDA-SCNC: 30 MMOL/L — HIGH (ref 21–29)
HCO3 BLDA-SCNC: 30 MMOL/L — HIGH (ref 21–29)
HCO3 BLDA-SCNC: 31 MMOL/L — HIGH (ref 21–29)
HCO3 BLDA-SCNC: 33 MMOL/L — HIGH (ref 21–29)
HCO3 BLDA-SCNC: 34 MMOL/L — HIGH (ref 21–29)
HCO3 BLDA-SCNC: 6 MMOL/L — LOW (ref 21–29)
HCO3 BLDA-SCNC: SIGNIFICANT CHANGE UP MMOL/L (ref 21–29)
HCT VFR BLD CALC: 25.7 % — LOW (ref 34.5–45)
HCT VFR BLD CALC: 26 % — LOW (ref 34.5–45)
HCT VFR BLD CALC: 26.1 % — LOW (ref 34.5–45)
HCT VFR BLD CALC: 26.2 % — LOW (ref 34.5–45)
HCT VFR BLD CALC: 26.4 % — LOW (ref 34.5–45)
HCT VFR BLD CALC: 27.3 % — LOW (ref 34.5–45)
HCT VFR BLD CALC: 27.3 % — LOW (ref 34.5–45)
HCT VFR BLD CALC: 27.5 % — LOW (ref 34.5–45)
HCT VFR BLD CALC: 28 % — LOW (ref 34.5–45)
HCT VFR BLD CALC: 28.2 % — LOW (ref 34.5–45)
HCT VFR BLD CALC: 28.2 % — LOW (ref 34.5–45)
HCT VFR BLD CALC: 28.4 % — LOW (ref 34.5–45)
HCT VFR BLD CALC: 28.4 % — LOW (ref 34.5–45)
HCT VFR BLD CALC: 29.2 % — LOW (ref 34.5–45)
HCT VFR BLD CALC: 29.4 % — LOW (ref 34.5–45)
HCT VFR BLD CALC: 32.1 % — LOW (ref 34.5–45)
HCT VFR BLD CALC: 34.1 % — LOW (ref 34.5–45)
HCT VFR BLD CALC: 34.5 % — SIGNIFICANT CHANGE UP (ref 34.5–45)
HCT VFR BLD CALC: 35.4 % — SIGNIFICANT CHANGE UP (ref 34.5–45)
HCT VFR BLD CALC: 35.5 % — SIGNIFICANT CHANGE UP (ref 34.5–45)
HCT VFR BLD CALC: 35.6 % — SIGNIFICANT CHANGE UP (ref 34.5–45)
HCT VFR BLD CALC: 35.6 % — SIGNIFICANT CHANGE UP (ref 34.5–45)
HCT VFR BLD CALC: 36.6 % — SIGNIFICANT CHANGE UP (ref 34.5–45)
HCT VFR BLD CALC: 37.5 % — SIGNIFICANT CHANGE UP (ref 34.5–45)
HCT VFR BLD CALC: 39.5 % — SIGNIFICANT CHANGE UP (ref 34.5–45)
HCT VFR BLD CALC: 40.5 % — SIGNIFICANT CHANGE UP (ref 34.5–45)
HCT VFR BLD CALC: 47.2 % — HIGH (ref 34.5–45)
HCV AB S/CO SERPL IA: 0.08 S/CO — SIGNIFICANT CHANGE UP (ref 0–0.99)
HCV AB SERPL-IMP: SIGNIFICANT CHANGE UP
HDLC SERPL-MCNC: 43 MG/DL — LOW
HGB BLD-MCNC: 10.1 G/DL — LOW (ref 11.5–15.5)
HGB BLD-MCNC: 10.6 G/DL — LOW (ref 11.5–15.5)
HGB BLD-MCNC: 10.7 G/DL — LOW (ref 11.5–15.5)
HGB BLD-MCNC: 10.9 G/DL — LOW (ref 11.5–15.5)
HGB BLD-MCNC: 11.4 G/DL — LOW (ref 11.5–15.5)
HGB BLD-MCNC: 11.5 G/DL — SIGNIFICANT CHANGE UP (ref 11.5–15.5)
HGB BLD-MCNC: 11.8 G/DL — SIGNIFICANT CHANGE UP (ref 11.5–15.5)
HGB BLD-MCNC: 12 G/DL — SIGNIFICANT CHANGE UP (ref 11.5–15.5)
HGB BLD-MCNC: 12.1 G/DL — SIGNIFICANT CHANGE UP (ref 11.5–15.5)
HGB BLD-MCNC: 12.6 G/DL — SIGNIFICANT CHANGE UP (ref 11.5–15.5)
HGB BLD-MCNC: 14.2 G/DL — SIGNIFICANT CHANGE UP (ref 11.5–15.5)
HGB BLD-MCNC: 7.4 G/DL — LOW (ref 11.5–15.5)
HGB BLD-MCNC: 7.5 G/DL — LOW (ref 11.5–15.5)
HGB BLD-MCNC: 7.5 G/DL — LOW (ref 11.5–15.5)
HGB BLD-MCNC: 7.6 G/DL — LOW (ref 11.5–15.5)
HGB BLD-MCNC: 7.8 G/DL — LOW (ref 11.5–15.5)
HGB BLD-MCNC: 7.9 G/DL — LOW (ref 11.5–15.5)
HGB BLD-MCNC: 8 G/DL — LOW (ref 11.5–15.5)
HGB BLD-MCNC: 8.1 G/DL — LOW (ref 11.5–15.5)
HGB BLD-MCNC: 8.7 G/DL — LOW (ref 11.5–15.5)
HGB BLD-MCNC: 8.7 G/DL — LOW (ref 11.5–15.5)
HGB BLD-MCNC: 8.8 G/DL — LOW (ref 11.5–15.5)
HGB BLD-MCNC: 9.7 G/DL — LOW (ref 11.5–15.5)
HOROWITZ INDEX BLDA+IHG-RTO: 0.1 — SIGNIFICANT CHANGE UP
HOROWITZ INDEX BLDA+IHG-RTO: 0.7 — SIGNIFICANT CHANGE UP
HOROWITZ INDEX BLDA+IHG-RTO: 0.9 — SIGNIFICANT CHANGE UP
HOROWITZ INDEX BLDA+IHG-RTO: 100 — SIGNIFICANT CHANGE UP
HOROWITZ INDEX BLDA+IHG-RTO: 32 — SIGNIFICANT CHANGE UP
HOROWITZ INDEX BLDA+IHG-RTO: 60 — SIGNIFICANT CHANGE UP
HOROWITZ INDEX BLDA+IHG-RTO: 60 — SIGNIFICANT CHANGE UP
HOROWITZ INDEX BLDA+IHG-RTO: 70 — SIGNIFICANT CHANGE UP
HOROWITZ INDEX BLDA+IHG-RTO: 75 — SIGNIFICANT CHANGE UP
HOROWITZ INDEX BLDA+IHG-RTO: 80 — SIGNIFICANT CHANGE UP
HOROWITZ INDEX BLDA+IHG-RTO: 80 — SIGNIFICANT CHANGE UP
IMM GRANULOCYTES NFR BLD AUTO: 1 % — SIGNIFICANT CHANGE UP (ref 0–1.5)
IMM GRANULOCYTES NFR BLD AUTO: 1.4 % — SIGNIFICANT CHANGE UP (ref 0–1.5)
IMM GRANULOCYTES NFR BLD AUTO: 1.6 % — HIGH (ref 0–1.5)
IMM GRANULOCYTES NFR BLD AUTO: 1.6 % — HIGH (ref 0–1.5)
IMM GRANULOCYTES NFR BLD AUTO: 1.7 % — HIGH (ref 0–1.5)
IMM GRANULOCYTES NFR BLD AUTO: 1.7 % — HIGH (ref 0–1.5)
IMM GRANULOCYTES NFR BLD AUTO: 2 % — HIGH (ref 0–1.5)
IMM GRANULOCYTES NFR BLD AUTO: 2.9 % — HIGH (ref 0–1.5)
IMM GRANULOCYTES NFR BLD AUTO: 9.3 % — HIGH (ref 0–1.5)
INR BLD: 1.09 RATIO — SIGNIFICANT CHANGE UP (ref 0.88–1.16)
INR BLD: 1.11 RATIO — SIGNIFICANT CHANGE UP (ref 0.88–1.16)
INR BLD: 1.47 RATIO — HIGH (ref 0.88–1.16)
INR BLD: 1.48 RATIO — HIGH (ref 0.88–1.16)
KETONES UR-MCNC: ABNORMAL
LACTATE SERPL-SCNC: 2 MMOL/L — SIGNIFICANT CHANGE UP (ref 0.7–2)
LACTATE SERPL-SCNC: 3.3 MMOL/L — HIGH (ref 0.7–2)
LACTATE SERPL-SCNC: 5.3 MMOL/L — CRITICAL HIGH (ref 0.7–2)
LACTATE SERPL-SCNC: 6.3 MMOL/L — CRITICAL HIGH (ref 0.7–2)
LDH SERPL L TO P-CCNC: 1151 U/L — HIGH (ref 50–242)
LDH SERPL L TO P-CCNC: 1614 U/L — HIGH (ref 50–242)
LDH SERPL L TO P-CCNC: 1764 U/L — HIGH (ref 50–242)
LDH SERPL L TO P-CCNC: 676 U/L — HIGH (ref 50–242)
LDH SERPL L TO P-CCNC: 718 U/L — HIGH (ref 50–242)
LDH SERPL L TO P-CCNC: 720 U/L — HIGH (ref 50–242)
LDH SERPL L TO P-CCNC: 948 U/L — HIGH (ref 50–242)
LEUKOCYTE ESTERASE UR-ACNC: ABNORMAL
LIPID PNL WITH DIRECT LDL SERPL: 109 MG/DL — HIGH
LMWH PPP CHRO-ACNC: 0.4 IU/ML — LOW (ref 0.5–1.1)
LYMPHOCYTES # BLD AUTO: 1.06 K/UL — SIGNIFICANT CHANGE UP (ref 1–3.3)
LYMPHOCYTES # BLD AUTO: 1.38 K/UL — SIGNIFICANT CHANGE UP (ref 1–3.3)
LYMPHOCYTES # BLD AUTO: 1.54 K/UL — SIGNIFICANT CHANGE UP (ref 1–3.3)
LYMPHOCYTES # BLD AUTO: 1.72 K/UL — SIGNIFICANT CHANGE UP (ref 1–3.3)
LYMPHOCYTES # BLD AUTO: 1.73 K/UL — SIGNIFICANT CHANGE UP (ref 1–3.3)
LYMPHOCYTES # BLD AUTO: 1.85 K/UL — SIGNIFICANT CHANGE UP (ref 1–3.3)
LYMPHOCYTES # BLD AUTO: 1.86 K/UL — SIGNIFICANT CHANGE UP (ref 1–3.3)
LYMPHOCYTES # BLD AUTO: 11.8 % — LOW (ref 13–44)
LYMPHOCYTES # BLD AUTO: 12 % — LOW (ref 13–44)
LYMPHOCYTES # BLD AUTO: 15 % — SIGNIFICANT CHANGE UP (ref 13–44)
LYMPHOCYTES # BLD AUTO: 16 % — SIGNIFICANT CHANGE UP (ref 13–44)
LYMPHOCYTES # BLD AUTO: 17 % — SIGNIFICANT CHANGE UP (ref 13–44)
LYMPHOCYTES # BLD AUTO: 19.7 % — SIGNIFICANT CHANGE UP (ref 13–44)
LYMPHOCYTES # BLD AUTO: 19.8 % — SIGNIFICANT CHANGE UP (ref 13–44)
LYMPHOCYTES # BLD AUTO: 2.08 K/UL — SIGNIFICANT CHANGE UP (ref 1–3.3)
LYMPHOCYTES # BLD AUTO: 2.18 K/UL — SIGNIFICANT CHANGE UP (ref 1–3.3)
LYMPHOCYTES # BLD AUTO: 2.21 K/UL — SIGNIFICANT CHANGE UP (ref 1–3.3)
LYMPHOCYTES # BLD AUTO: 2.35 K/UL — SIGNIFICANT CHANGE UP (ref 1–3.3)
LYMPHOCYTES # BLD AUTO: 20.1 % — SIGNIFICANT CHANGE UP (ref 13–44)
LYMPHOCYTES # BLD AUTO: 5.3 % — LOW (ref 13–44)
LYMPHOCYTES # BLD AUTO: 6.2 % — LOW (ref 13–44)
LYMPHOCYTES # BLD AUTO: 8.8 % — LOW (ref 13–44)
MACROCYTES BLD QL: SLIGHT — SIGNIFICANT CHANGE UP
MAGNESIUM SERPL-MCNC: 2.2 MG/DL — SIGNIFICANT CHANGE UP (ref 1.6–2.6)
MAGNESIUM SERPL-MCNC: 2.3 MG/DL — SIGNIFICANT CHANGE UP (ref 1.6–2.6)
MAGNESIUM SERPL-MCNC: 2.4 MG/DL — SIGNIFICANT CHANGE UP (ref 1.6–2.6)
MAGNESIUM SERPL-MCNC: 2.5 MG/DL — SIGNIFICANT CHANGE UP (ref 1.6–2.6)
MAGNESIUM SERPL-MCNC: 2.6 MG/DL — SIGNIFICANT CHANGE UP (ref 1.6–2.6)
MAGNESIUM SERPL-MCNC: 2.7 MG/DL — HIGH (ref 1.6–2.6)
MAGNESIUM SERPL-MCNC: 2.7 MG/DL — HIGH (ref 1.6–2.6)
MAGNESIUM SERPL-MCNC: 2.8 MG/DL — HIGH (ref 1.6–2.6)
MANUAL SMEAR VERIFICATION: SIGNIFICANT CHANGE UP
MANUAL SMEAR VERIFICATION: YES — SIGNIFICANT CHANGE UP
MCHC RBC-ENTMCNC: 26.2 PG — LOW (ref 27–34)
MCHC RBC-ENTMCNC: 26.4 PG — LOW (ref 27–34)
MCHC RBC-ENTMCNC: 26.5 PG — LOW (ref 27–34)
MCHC RBC-ENTMCNC: 26.6 PG — LOW (ref 27–34)
MCHC RBC-ENTMCNC: 26.6 PG — LOW (ref 27–34)
MCHC RBC-ENTMCNC: 26.7 PG — LOW (ref 27–34)
MCHC RBC-ENTMCNC: 26.8 PG — LOW (ref 27–34)
MCHC RBC-ENTMCNC: 26.9 PG — LOW (ref 27–34)
MCHC RBC-ENTMCNC: 27 PG — SIGNIFICANT CHANGE UP (ref 27–34)
MCHC RBC-ENTMCNC: 27.1 PG — SIGNIFICANT CHANGE UP (ref 27–34)
MCHC RBC-ENTMCNC: 27.2 PG — SIGNIFICANT CHANGE UP (ref 27–34)
MCHC RBC-ENTMCNC: 27.2 PG — SIGNIFICANT CHANGE UP (ref 27–34)
MCHC RBC-ENTMCNC: 27.4 PG — SIGNIFICANT CHANGE UP (ref 27–34)
MCHC RBC-ENTMCNC: 27.5 PG — SIGNIFICANT CHANGE UP (ref 27–34)
MCHC RBC-ENTMCNC: 27.6 PG — SIGNIFICANT CHANGE UP (ref 27–34)
MCHC RBC-ENTMCNC: 27.7 GM/DL — LOW (ref 32–36)
MCHC RBC-ENTMCNC: 27.9 GM/DL — LOW (ref 32–36)
MCHC RBC-ENTMCNC: 28 PG — SIGNIFICANT CHANGE UP (ref 27–34)
MCHC RBC-ENTMCNC: 28.2 GM/DL — LOW (ref 32–36)
MCHC RBC-ENTMCNC: 28.6 GM/DL — LOW (ref 32–36)
MCHC RBC-ENTMCNC: 28.7 GM/DL — LOW (ref 32–36)
MCHC RBC-ENTMCNC: 28.7 GM/DL — LOW (ref 32–36)
MCHC RBC-ENTMCNC: 28.8 GM/DL — LOW (ref 32–36)
MCHC RBC-ENTMCNC: 28.8 GM/DL — LOW (ref 32–36)
MCHC RBC-ENTMCNC: 28.9 GM/DL — LOW (ref 32–36)
MCHC RBC-ENTMCNC: 29.1 GM/DL — LOW (ref 32–36)
MCHC RBC-ENTMCNC: 29.1 PG — SIGNIFICANT CHANGE UP (ref 27–34)
MCHC RBC-ENTMCNC: 29.3 GM/DL — LOW (ref 32–36)
MCHC RBC-ENTMCNC: 29.6 GM/DL — LOW (ref 32–36)
MCHC RBC-ENTMCNC: 29.6 GM/DL — LOW (ref 32–36)
MCHC RBC-ENTMCNC: 29.9 GM/DL — LOW (ref 32–36)
MCHC RBC-ENTMCNC: 29.9 GM/DL — LOW (ref 32–36)
MCHC RBC-ENTMCNC: 30 GM/DL — LOW (ref 32–36)
MCHC RBC-ENTMCNC: 30.1 GM/DL — LOW (ref 32–36)
MCHC RBC-ENTMCNC: 30.1 GM/DL — LOW (ref 32–36)
MCHC RBC-ENTMCNC: 30.2 GM/DL — LOW (ref 32–36)
MCHC RBC-ENTMCNC: 30.6 GM/DL — LOW (ref 32–36)
MCHC RBC-ENTMCNC: 30.7 GM/DL — LOW (ref 32–36)
MCHC RBC-ENTMCNC: 31 GM/DL — LOW (ref 32–36)
MCHC RBC-ENTMCNC: 31.1 GM/DL — LOW (ref 32–36)
MCHC RBC-ENTMCNC: 31.9 GM/DL — LOW (ref 32–36)
MCHC RBC-ENTMCNC: 32 GM/DL — SIGNIFICANT CHANGE UP (ref 32–36)
MCHC RBC-ENTMCNC: 32.3 GM/DL — SIGNIFICANT CHANGE UP (ref 32–36)
MCHC RBC-ENTMCNC: 33.1 GM/DL — SIGNIFICANT CHANGE UP (ref 32–36)
MCV RBC AUTO: 105.2 FL — HIGH (ref 80–100)
MCV RBC AUTO: 81.8 FL — SIGNIFICANT CHANGE UP (ref 80–100)
MCV RBC AUTO: 82.4 FL — SIGNIFICANT CHANGE UP (ref 80–100)
MCV RBC AUTO: 82.6 FL — SIGNIFICANT CHANGE UP (ref 80–100)
MCV RBC AUTO: 83.3 FL — SIGNIFICANT CHANGE UP (ref 80–100)
MCV RBC AUTO: 84.6 FL — SIGNIFICANT CHANGE UP (ref 80–100)
MCV RBC AUTO: 85.9 FL — SIGNIFICANT CHANGE UP (ref 80–100)
MCV RBC AUTO: 86.4 FL — SIGNIFICANT CHANGE UP (ref 80–100)
MCV RBC AUTO: 87.7 FL — SIGNIFICANT CHANGE UP (ref 80–100)
MCV RBC AUTO: 87.7 FL — SIGNIFICANT CHANGE UP (ref 80–100)
MCV RBC AUTO: 87.9 FL — SIGNIFICANT CHANGE UP (ref 80–100)
MCV RBC AUTO: 88.1 FL — SIGNIFICANT CHANGE UP (ref 80–100)
MCV RBC AUTO: 88.8 FL — SIGNIFICANT CHANGE UP (ref 80–100)
MCV RBC AUTO: 90 FL — SIGNIFICANT CHANGE UP (ref 80–100)
MCV RBC AUTO: 90.1 FL — SIGNIFICANT CHANGE UP (ref 80–100)
MCV RBC AUTO: 91.3 FL — SIGNIFICANT CHANGE UP (ref 80–100)
MCV RBC AUTO: 91.6 FL — SIGNIFICANT CHANGE UP (ref 80–100)
MCV RBC AUTO: 92.9 FL — SIGNIFICANT CHANGE UP (ref 80–100)
MCV RBC AUTO: 93.2 FL — SIGNIFICANT CHANGE UP (ref 80–100)
MCV RBC AUTO: 93.8 FL — SIGNIFICANT CHANGE UP (ref 80–100)
MCV RBC AUTO: 94 FL — SIGNIFICANT CHANGE UP (ref 80–100)
MCV RBC AUTO: 94.5 FL — SIGNIFICANT CHANGE UP (ref 80–100)
MCV RBC AUTO: 94.6 FL — SIGNIFICANT CHANGE UP (ref 80–100)
MCV RBC AUTO: 95.3 FL — SIGNIFICANT CHANGE UP (ref 80–100)
MCV RBC AUTO: 96 FL — SIGNIFICANT CHANGE UP (ref 80–100)
MCV RBC AUTO: 96.3 FL — SIGNIFICANT CHANGE UP (ref 80–100)
MCV RBC AUTO: 96.9 FL — SIGNIFICANT CHANGE UP (ref 80–100)
METHOD TYPE: SIGNIFICANT CHANGE UP
MICROCYTES BLD QL: SLIGHT — SIGNIFICANT CHANGE UP
MONOCYTES # BLD AUTO: 0.44 K/UL — SIGNIFICANT CHANGE UP (ref 0–0.9)
MONOCYTES # BLD AUTO: 0.54 K/UL — SIGNIFICANT CHANGE UP (ref 0–0.9)
MONOCYTES # BLD AUTO: 0.6 K/UL — SIGNIFICANT CHANGE UP (ref 0–0.9)
MONOCYTES # BLD AUTO: 0.64 K/UL — SIGNIFICANT CHANGE UP (ref 0–0.9)
MONOCYTES # BLD AUTO: 0.7 K/UL — SIGNIFICANT CHANGE UP (ref 0–0.9)
MONOCYTES # BLD AUTO: 0.81 K/UL — SIGNIFICANT CHANGE UP (ref 0–0.9)
MONOCYTES # BLD AUTO: 0.95 K/UL — HIGH (ref 0–0.9)
MONOCYTES # BLD AUTO: 1.1 K/UL — HIGH (ref 0–0.9)
MONOCYTES # BLD AUTO: 1.12 K/UL — HIGH (ref 0–0.9)
MONOCYTES # BLD AUTO: 1.58 K/UL — HIGH (ref 0–0.9)
MONOCYTES # BLD AUTO: 2.33 K/UL — HIGH (ref 0–0.9)
MONOCYTES NFR BLD AUTO: 11 % — SIGNIFICANT CHANGE UP (ref 2–14)
MONOCYTES NFR BLD AUTO: 13.3 % — SIGNIFICANT CHANGE UP (ref 2–14)
MONOCYTES NFR BLD AUTO: 2 % — SIGNIFICANT CHANGE UP (ref 2–14)
MONOCYTES NFR BLD AUTO: 3.2 % — SIGNIFICANT CHANGE UP (ref 2–14)
MONOCYTES NFR BLD AUTO: 3.7 % — SIGNIFICANT CHANGE UP (ref 2–14)
MONOCYTES NFR BLD AUTO: 5.9 % — SIGNIFICANT CHANGE UP (ref 2–14)
MONOCYTES NFR BLD AUTO: 6 % — SIGNIFICANT CHANGE UP (ref 2–14)
MONOCYTES NFR BLD AUTO: 7.4 % — SIGNIFICANT CHANGE UP (ref 2–14)
MONOCYTES NFR BLD AUTO: 7.7 % — SIGNIFICANT CHANGE UP (ref 2–14)
MONOCYTES NFR BLD AUTO: 8.6 % — SIGNIFICANT CHANGE UP (ref 2–14)
MONOCYTES NFR BLD AUTO: 9.1 % — SIGNIFICANT CHANGE UP (ref 2–14)
MRSA PCR RESULT.: SIGNIFICANT CHANGE UP
NEUTROPHILS # BLD AUTO: 11.06 K/UL — HIGH (ref 1.8–7.4)
NEUTROPHILS # BLD AUTO: 11.84 K/UL — HIGH (ref 1.8–7.4)
NEUTROPHILS # BLD AUTO: 14.91 K/UL — HIGH (ref 1.8–7.4)
NEUTROPHILS # BLD AUTO: 15.21 K/UL — HIGH (ref 1.8–7.4)
NEUTROPHILS # BLD AUTO: 29.54 K/UL — HIGH (ref 1.8–7.4)
NEUTROPHILS # BLD AUTO: 5.82 K/UL — SIGNIFICANT CHANGE UP (ref 1.8–7.4)
NEUTROPHILS # BLD AUTO: 7.15 K/UL — SIGNIFICANT CHANGE UP (ref 1.8–7.4)
NEUTROPHILS # BLD AUTO: 7.52 K/UL — HIGH (ref 1.8–7.4)
NEUTROPHILS # BLD AUTO: 8.87 K/UL — HIGH (ref 1.8–7.4)
NEUTROPHILS # BLD AUTO: 9.09 K/UL — HIGH (ref 1.8–7.4)
NEUTROPHILS # BLD AUTO: 9.14 K/UL — HIGH (ref 1.8–7.4)
NEUTROPHILS NFR BLD AUTO: 67.4 % — SIGNIFICANT CHANGE UP (ref 43–77)
NEUTROPHILS NFR BLD AUTO: 68 % — SIGNIFICANT CHANGE UP (ref 43–77)
NEUTROPHILS NFR BLD AUTO: 68.2 % — SIGNIFICANT CHANGE UP (ref 43–77)
NEUTROPHILS NFR BLD AUTO: 69 % — SIGNIFICANT CHANGE UP (ref 43–77)
NEUTROPHILS NFR BLD AUTO: 71.9 % — SIGNIFICANT CHANGE UP (ref 43–77)
NEUTROPHILS NFR BLD AUTO: 74 % — SIGNIFICANT CHANGE UP (ref 43–77)
NEUTROPHILS NFR BLD AUTO: 74.2 % — SIGNIFICANT CHANGE UP (ref 43–77)
NEUTROPHILS NFR BLD AUTO: 75 % — SIGNIFICANT CHANGE UP (ref 43–77)
NEUTROPHILS NFR BLD AUTO: 80.3 % — HIGH (ref 43–77)
NEUTROPHILS NFR BLD AUTO: 88.8 % — HIGH (ref 43–77)
NEUTROPHILS NFR BLD AUTO: 90.5 % — HIGH (ref 43–77)
NEUTS BAND # BLD: 2 % — SIGNIFICANT CHANGE UP (ref 0–8)
NEUTS BAND # BLD: 9 % — HIGH (ref 0–8)
NITRITE UR-MCNC: NEGATIVE — SIGNIFICANT CHANGE UP
NON HDL CHOLESTEROL: 130 MG/DL — HIGH
NRBC # BLD: 0 /100 WBCS — SIGNIFICANT CHANGE UP (ref 0–0)
NRBC # BLD: 0 /100 — SIGNIFICANT CHANGE UP (ref 0–0)
NRBC # BLD: 0 /100 — SIGNIFICANT CHANGE UP (ref 0–0)
NRBC # BLD: 10 /100 WBCS — HIGH (ref 0–0)
NRBC # BLD: 12 /100 WBCS — HIGH (ref 0–0)
NRBC # BLD: 2 /100 WBCS — HIGH (ref 0–0)
NRBC # BLD: 2 /100 WBCS — HIGH (ref 0–0)
NRBC # BLD: 3 /100 WBCS — HIGH (ref 0–0)
NRBC # BLD: 30 /100 WBCS — HIGH (ref 0–0)
NRBC # BLD: 4 /100 WBCS — HIGH (ref 0–0)
NRBC # BLD: 5 /100 WBCS — HIGH (ref 0–0)
NRBC # BLD: 5 /100 WBCS — HIGH (ref 0–0)
NRBC # BLD: 6 /100 WBCS — HIGH (ref 0–0)
NRBC # BLD: 6 /100 WBCS — HIGH (ref 0–0)
NRBC # BLD: 7 /100 WBCS — HIGH (ref 0–0)
NRBC # BLD: 8 /100 WBCS — HIGH (ref 0–0)
NRBC # BLD: 9 /100 WBCS — HIGH (ref 0–0)
NRBC # BLD: SIGNIFICANT CHANGE UP /100 WBCS (ref 0–0)
NRBC # BLD: SIGNIFICANT CHANGE UP /100 WBCS (ref 0–0)
NT-PROBNP SERPL-SCNC: 84 PG/ML — SIGNIFICANT CHANGE UP (ref 0–125)
ORGANISM # SPEC MICROSCOPIC CNT: SIGNIFICANT CHANGE UP
ORGANISM # SPEC MICROSCOPIC CNT: SIGNIFICANT CHANGE UP
PCO2 BLDA: 14 MMHG — LOW (ref 32–46)
PCO2 BLDA: 31 MMHG — LOW (ref 32–46)
PCO2 BLDA: 33 MMHG — SIGNIFICANT CHANGE UP (ref 32–46)
PCO2 BLDA: 35 MMHG — SIGNIFICANT CHANGE UP (ref 32–46)
PCO2 BLDA: 36 MMHG — SIGNIFICANT CHANGE UP (ref 32–46)
PCO2 BLDA: 39 MMHG — SIGNIFICANT CHANGE UP (ref 32–46)
PCO2 BLDA: 40 MMHG — SIGNIFICANT CHANGE UP (ref 32–46)
PCO2 BLDA: 44 MMHG — SIGNIFICANT CHANGE UP (ref 32–46)
PCO2 BLDA: 46 MMHG — SIGNIFICANT CHANGE UP (ref 32–46)
PCO2 BLDA: 46 MMHG — SIGNIFICANT CHANGE UP (ref 32–46)
PCO2 BLDA: 47 MMHG — HIGH (ref 32–46)
PCO2 BLDA: 47 MMHG — HIGH (ref 32–46)
PCO2 BLDA: 48 MMHG — HIGH (ref 32–46)
PCO2 BLDA: 49 MMHG — HIGH (ref 32–46)
PCO2 BLDA: 49 MMHG — HIGH (ref 32–46)
PCO2 BLDA: 50 MMHG — HIGH (ref 32–46)
PCO2 BLDA: 50 MMHG — HIGH (ref 32–46)
PCO2 BLDA: 51 MMHG — HIGH (ref 32–46)
PCO2 BLDA: 51 MMHG — HIGH (ref 32–46)
PCO2 BLDA: 53 MMHG — HIGH (ref 32–46)
PCO2 BLDA: 54 MMHG — HIGH (ref 32–46)
PCO2 BLDA: 55 MMHG — HIGH (ref 32–46)
PCO2 BLDA: 57 MMHG — HIGH (ref 32–46)
PCO2 BLDA: 59 MMHG — HIGH (ref 32–46)
PCO2 BLDA: 61 MMHG — HIGH (ref 32–46)
PCO2 BLDA: 62 MMHG — HIGH (ref 32–46)
PCO2 BLDA: 62 MMHG — HIGH (ref 32–46)
PCO2 BLDA: 63 MMHG — HIGH (ref 32–46)
PCO2 BLDA: 64 MMHG — HIGH (ref 32–46)
PCO2 BLDA: 81 MMHG — CRITICAL HIGH (ref 32–46)
PCO2 BLDA: 90 MMHG — CRITICAL HIGH (ref 32–46)
PH BLD: 7.08 — CRITICAL LOW (ref 7.35–7.45)
PH BLD: 7.15 — CRITICAL LOW (ref 7.35–7.45)
PH BLD: 7.17 — CRITICAL LOW (ref 7.35–7.45)
PH BLD: 7.17 — CRITICAL LOW (ref 7.35–7.45)
PH BLD: 7.21 — LOW (ref 7.35–7.45)
PH BLD: 7.23 — LOW (ref 7.35–7.45)
PH BLD: 7.27 — LOW (ref 7.35–7.45)
PH BLD: 7.27 — LOW (ref 7.35–7.45)
PH BLD: 7.29 — LOW (ref 7.35–7.45)
PH BLD: 7.29 — LOW (ref 7.35–7.45)
PH BLD: 7.31 — LOW (ref 7.35–7.45)
PH BLD: 7.31 — LOW (ref 7.35–7.45)
PH BLD: 7.33 — LOW (ref 7.35–7.45)
PH BLD: 7.34 — LOW (ref 7.35–7.45)
PH BLD: 7.34 — LOW (ref 7.35–7.45)
PH BLD: 7.35 — SIGNIFICANT CHANGE UP (ref 7.35–7.45)
PH BLD: 7.36 — SIGNIFICANT CHANGE UP (ref 7.35–7.45)
PH BLD: 7.37 — SIGNIFICANT CHANGE UP (ref 7.35–7.45)
PH BLD: 7.38 — SIGNIFICANT CHANGE UP (ref 7.35–7.45)
PH BLD: 7.42 — SIGNIFICANT CHANGE UP (ref 7.35–7.45)
PH BLD: 7.46 — HIGH (ref 7.35–7.45)
PH BLD: 7.48 — HIGH (ref 7.35–7.45)
PH BLD: 7.51 — HIGH (ref 7.35–7.45)
PH BLD: 7.53 — HIGH (ref 7.35–7.45)
PH BLD: 7.54 — HIGH (ref 7.35–7.45)
PH BLD: <6.8 — CRITICAL LOW (ref 7.35–7.45)
PH BLD: <6.82 — SIGNIFICANT CHANGE UP (ref 7.35–7.45)
PH UR: 6 — SIGNIFICANT CHANGE UP (ref 5–8)
PHOSPHATE SERPL-MCNC: 1.7 MG/DL — LOW (ref 2.5–4.5)
PHOSPHATE SERPL-MCNC: 1.8 MG/DL — LOW (ref 2.5–4.5)
PHOSPHATE SERPL-MCNC: 2 MG/DL — LOW (ref 2.5–4.5)
PHOSPHATE SERPL-MCNC: 2.1 MG/DL — LOW (ref 2.5–4.5)
PHOSPHATE SERPL-MCNC: 2.2 MG/DL — LOW (ref 2.5–4.5)
PHOSPHATE SERPL-MCNC: 2.2 MG/DL — LOW (ref 2.5–4.5)
PHOSPHATE SERPL-MCNC: 2.3 MG/DL — LOW (ref 2.5–4.5)
PHOSPHATE SERPL-MCNC: 2.3 MG/DL — LOW (ref 2.5–4.5)
PHOSPHATE SERPL-MCNC: 2.4 MG/DL — LOW (ref 2.5–4.5)
PHOSPHATE SERPL-MCNC: 2.5 MG/DL — SIGNIFICANT CHANGE UP (ref 2.5–4.5)
PHOSPHATE SERPL-MCNC: 2.5 MG/DL — SIGNIFICANT CHANGE UP (ref 2.5–4.5)
PHOSPHATE SERPL-MCNC: 2.6 MG/DL — SIGNIFICANT CHANGE UP (ref 2.5–4.5)
PHOSPHATE SERPL-MCNC: 2.6 MG/DL — SIGNIFICANT CHANGE UP (ref 2.5–4.5)
PHOSPHATE SERPL-MCNC: 2.8 MG/DL — SIGNIFICANT CHANGE UP (ref 2.5–4.5)
PHOSPHATE SERPL-MCNC: 3 MG/DL — SIGNIFICANT CHANGE UP (ref 2.5–4.5)
PHOSPHATE SERPL-MCNC: 3.2 MG/DL — SIGNIFICANT CHANGE UP (ref 2.5–4.5)
PHOSPHATE SERPL-MCNC: 3.2 MG/DL — SIGNIFICANT CHANGE UP (ref 2.5–4.5)
PHOSPHATE SERPL-MCNC: 3.3 MG/DL — SIGNIFICANT CHANGE UP (ref 2.5–4.5)
PHOSPHATE SERPL-MCNC: 3.5 MG/DL — SIGNIFICANT CHANGE UP (ref 2.5–4.5)
PHOSPHATE SERPL-MCNC: 3.9 MG/DL — SIGNIFICANT CHANGE UP (ref 2.5–4.5)
PHOSPHATE SERPL-MCNC: 5.2 MG/DL — HIGH (ref 2.5–4.5)
PHOSPHATE SERPL-MCNC: 5.3 MG/DL — HIGH (ref 2.5–4.5)
PHOSPHATE SERPL-MCNC: 8.4 MG/DL — HIGH (ref 2.5–4.5)
PLAT MORPH BLD: NORMAL — SIGNIFICANT CHANGE UP
PLAT MORPH BLD: NORMAL — SIGNIFICANT CHANGE UP
PLATELET # BLD AUTO: 166 K/UL — SIGNIFICANT CHANGE UP (ref 150–400)
PLATELET # BLD AUTO: 167 K/UL — SIGNIFICANT CHANGE UP (ref 150–400)
PLATELET # BLD AUTO: 167 K/UL — SIGNIFICANT CHANGE UP (ref 150–400)
PLATELET # BLD AUTO: 181 K/UL — SIGNIFICANT CHANGE UP (ref 150–400)
PLATELET # BLD AUTO: 195 K/UL — SIGNIFICANT CHANGE UP (ref 150–400)
PLATELET # BLD AUTO: 199 K/UL — SIGNIFICANT CHANGE UP (ref 150–400)
PLATELET # BLD AUTO: 202 K/UL — SIGNIFICANT CHANGE UP (ref 150–400)
PLATELET # BLD AUTO: 208 K/UL — SIGNIFICANT CHANGE UP (ref 150–400)
PLATELET # BLD AUTO: 210 K/UL — SIGNIFICANT CHANGE UP (ref 150–400)
PLATELET # BLD AUTO: 220 K/UL — SIGNIFICANT CHANGE UP (ref 150–400)
PLATELET # BLD AUTO: 247 K/UL — SIGNIFICANT CHANGE UP (ref 150–400)
PLATELET # BLD AUTO: 250 K/UL — SIGNIFICANT CHANGE UP (ref 150–400)
PLATELET # BLD AUTO: 251 K/UL — SIGNIFICANT CHANGE UP (ref 150–400)
PLATELET # BLD AUTO: 258 K/UL — SIGNIFICANT CHANGE UP (ref 150–400)
PLATELET # BLD AUTO: 263 K/UL — SIGNIFICANT CHANGE UP (ref 150–400)
PLATELET # BLD AUTO: 279 K/UL — SIGNIFICANT CHANGE UP (ref 150–400)
PLATELET # BLD AUTO: 283 K/UL — SIGNIFICANT CHANGE UP (ref 150–400)
PLATELET # BLD AUTO: 287 K/UL — SIGNIFICANT CHANGE UP (ref 150–400)
PLATELET # BLD AUTO: 292 K/UL — SIGNIFICANT CHANGE UP (ref 150–400)
PLATELET # BLD AUTO: 333 K/UL — SIGNIFICANT CHANGE UP (ref 150–400)
PLATELET # BLD AUTO: 338 K/UL — SIGNIFICANT CHANGE UP (ref 150–400)
PLATELET # BLD AUTO: 343 K/UL — SIGNIFICANT CHANGE UP (ref 150–400)
PLATELET # BLD AUTO: 347 K/UL — SIGNIFICANT CHANGE UP (ref 150–400)
PLATELET # BLD AUTO: 348 K/UL — SIGNIFICANT CHANGE UP (ref 150–400)
PLATELET # BLD AUTO: 352 K/UL — SIGNIFICANT CHANGE UP (ref 150–400)
PLATELET # BLD AUTO: 365 K/UL — SIGNIFICANT CHANGE UP (ref 150–400)
PLATELET # BLD AUTO: 378 K/UL — SIGNIFICANT CHANGE UP (ref 150–400)
PO2 BLDA: 106 MMHG — SIGNIFICANT CHANGE UP (ref 74–108)
PO2 BLDA: 106 MMHG — SIGNIFICANT CHANGE UP (ref 74–108)
PO2 BLDA: 48 MMHG — CRITICAL LOW (ref 74–108)
PO2 BLDA: 51 MMHG — LOW (ref 74–108)
PO2 BLDA: 52 MMHG — LOW (ref 74–108)
PO2 BLDA: 52 MMHG — LOW (ref 74–108)
PO2 BLDA: 54 MMHG — LOW (ref 74–108)
PO2 BLDA: 57 MMHG — LOW (ref 74–108)
PO2 BLDA: 58 MMHG — LOW (ref 74–108)
PO2 BLDA: 58 MMHG — LOW (ref 74–108)
PO2 BLDA: 61 MMHG — LOW (ref 74–108)
PO2 BLDA: 65 MMHG — LOW (ref 74–108)
PO2 BLDA: 66 MMHG — LOW (ref 74–108)
PO2 BLDA: 67 MMHG — LOW (ref 74–108)
PO2 BLDA: 70 MMHG — LOW (ref 74–108)
PO2 BLDA: 71 MMHG — LOW (ref 74–108)
PO2 BLDA: 71 MMHG — LOW (ref 74–108)
PO2 BLDA: 73 MMHG — LOW (ref 74–108)
PO2 BLDA: 77 MMHG — SIGNIFICANT CHANGE UP (ref 74–108)
PO2 BLDA: 80 MMHG — SIGNIFICANT CHANGE UP (ref 74–108)
PO2 BLDA: 81 MMHG — SIGNIFICANT CHANGE UP (ref 74–108)
PO2 BLDA: 81 MMHG — SIGNIFICANT CHANGE UP (ref 74–108)
PO2 BLDA: 83 MMHG — SIGNIFICANT CHANGE UP (ref 74–108)
PO2 BLDA: 94 MMHG — SIGNIFICANT CHANGE UP (ref 74–108)
PO2 BLDA: 95 MMHG — SIGNIFICANT CHANGE UP (ref 74–108)
PO2 BLDA: <42 MMHG — CRITICAL LOW (ref 74–108)
PO2 BLDA: <47 MMHG — CRITICAL LOW (ref 74–108)
POTASSIUM SERPL-MCNC: 3.2 MMOL/L — LOW (ref 3.5–5.3)
POTASSIUM SERPL-MCNC: 3.8 MMOL/L — SIGNIFICANT CHANGE UP (ref 3.5–5.3)
POTASSIUM SERPL-MCNC: 3.9 MMOL/L — SIGNIFICANT CHANGE UP (ref 3.5–5.3)
POTASSIUM SERPL-MCNC: 4 MMOL/L — SIGNIFICANT CHANGE UP (ref 3.5–5.3)
POTASSIUM SERPL-MCNC: 4 MMOL/L — SIGNIFICANT CHANGE UP (ref 3.5–5.3)
POTASSIUM SERPL-MCNC: 4.1 MMOL/L — SIGNIFICANT CHANGE UP (ref 3.5–5.3)
POTASSIUM SERPL-MCNC: 4.2 MMOL/L — SIGNIFICANT CHANGE UP (ref 3.5–5.3)
POTASSIUM SERPL-MCNC: 4.2 MMOL/L — SIGNIFICANT CHANGE UP (ref 3.5–5.3)
POTASSIUM SERPL-MCNC: 4.3 MMOL/L — SIGNIFICANT CHANGE UP (ref 3.5–5.3)
POTASSIUM SERPL-MCNC: 4.5 MMOL/L — SIGNIFICANT CHANGE UP (ref 3.5–5.3)
POTASSIUM SERPL-MCNC: 4.7 MMOL/L — SIGNIFICANT CHANGE UP (ref 3.5–5.3)
POTASSIUM SERPL-MCNC: 4.8 MMOL/L — SIGNIFICANT CHANGE UP (ref 3.5–5.3)
POTASSIUM SERPL-MCNC: 4.8 MMOL/L — SIGNIFICANT CHANGE UP (ref 3.5–5.3)
POTASSIUM SERPL-MCNC: 4.9 MMOL/L — SIGNIFICANT CHANGE UP (ref 3.5–5.3)
POTASSIUM SERPL-MCNC: 4.9 MMOL/L — SIGNIFICANT CHANGE UP (ref 3.5–5.3)
POTASSIUM SERPL-MCNC: 5 MMOL/L — SIGNIFICANT CHANGE UP (ref 3.5–5.3)
POTASSIUM SERPL-MCNC: 5 MMOL/L — SIGNIFICANT CHANGE UP (ref 3.5–5.3)
POTASSIUM SERPL-MCNC: 5.1 MMOL/L — SIGNIFICANT CHANGE UP (ref 3.5–5.3)
POTASSIUM SERPL-MCNC: 5.2 MMOL/L — SIGNIFICANT CHANGE UP (ref 3.5–5.3)
POTASSIUM SERPL-MCNC: 5.3 MMOL/L — SIGNIFICANT CHANGE UP (ref 3.5–5.3)
POTASSIUM SERPL-MCNC: 5.5 MMOL/L — HIGH (ref 3.5–5.3)
POTASSIUM SERPL-MCNC: 5.6 MMOL/L — HIGH (ref 3.5–5.3)
POTASSIUM SERPL-MCNC: 5.6 MMOL/L — HIGH (ref 3.5–5.3)
POTASSIUM SERPL-MCNC: 6.3 MMOL/L — CRITICAL HIGH (ref 3.5–5.3)
POTASSIUM SERPL-SCNC: 3.2 MMOL/L — LOW (ref 3.5–5.3)
POTASSIUM SERPL-SCNC: 3.8 MMOL/L — SIGNIFICANT CHANGE UP (ref 3.5–5.3)
POTASSIUM SERPL-SCNC: 3.9 MMOL/L — SIGNIFICANT CHANGE UP (ref 3.5–5.3)
POTASSIUM SERPL-SCNC: 4 MMOL/L — SIGNIFICANT CHANGE UP (ref 3.5–5.3)
POTASSIUM SERPL-SCNC: 4 MMOL/L — SIGNIFICANT CHANGE UP (ref 3.5–5.3)
POTASSIUM SERPL-SCNC: 4.1 MMOL/L — SIGNIFICANT CHANGE UP (ref 3.5–5.3)
POTASSIUM SERPL-SCNC: 4.2 MMOL/L — SIGNIFICANT CHANGE UP (ref 3.5–5.3)
POTASSIUM SERPL-SCNC: 4.2 MMOL/L — SIGNIFICANT CHANGE UP (ref 3.5–5.3)
POTASSIUM SERPL-SCNC: 4.3 MMOL/L — SIGNIFICANT CHANGE UP (ref 3.5–5.3)
POTASSIUM SERPL-SCNC: 4.5 MMOL/L — SIGNIFICANT CHANGE UP (ref 3.5–5.3)
POTASSIUM SERPL-SCNC: 4.7 MMOL/L — SIGNIFICANT CHANGE UP (ref 3.5–5.3)
POTASSIUM SERPL-SCNC: 4.8 MMOL/L — SIGNIFICANT CHANGE UP (ref 3.5–5.3)
POTASSIUM SERPL-SCNC: 4.8 MMOL/L — SIGNIFICANT CHANGE UP (ref 3.5–5.3)
POTASSIUM SERPL-SCNC: 4.9 MMOL/L — SIGNIFICANT CHANGE UP (ref 3.5–5.3)
POTASSIUM SERPL-SCNC: 4.9 MMOL/L — SIGNIFICANT CHANGE UP (ref 3.5–5.3)
POTASSIUM SERPL-SCNC: 5 MMOL/L — SIGNIFICANT CHANGE UP (ref 3.5–5.3)
POTASSIUM SERPL-SCNC: 5 MMOL/L — SIGNIFICANT CHANGE UP (ref 3.5–5.3)
POTASSIUM SERPL-SCNC: 5.1 MMOL/L — SIGNIFICANT CHANGE UP (ref 3.5–5.3)
POTASSIUM SERPL-SCNC: 5.2 MMOL/L — SIGNIFICANT CHANGE UP (ref 3.5–5.3)
POTASSIUM SERPL-SCNC: 5.3 MMOL/L — SIGNIFICANT CHANGE UP (ref 3.5–5.3)
POTASSIUM SERPL-SCNC: 5.5 MMOL/L — HIGH (ref 3.5–5.3)
POTASSIUM SERPL-SCNC: 5.6 MMOL/L — HIGH (ref 3.5–5.3)
POTASSIUM SERPL-SCNC: 5.6 MMOL/L — HIGH (ref 3.5–5.3)
POTASSIUM SERPL-SCNC: 6.3 MMOL/L — CRITICAL HIGH (ref 3.5–5.3)
PROCALCITONIN SERPL-MCNC: 0.16 NG/ML — HIGH (ref 0.02–0.1)
PROCALCITONIN SERPL-MCNC: 0.25 NG/ML — HIGH (ref 0.02–0.1)
PROCALCITONIN SERPL-MCNC: 0.96 NG/ML — HIGH (ref 0.02–0.1)
PROT SERPL-MCNC: 5 GM/DL — LOW (ref 6–8.3)
PROT SERPL-MCNC: 5.3 GM/DL — LOW (ref 6–8.3)
PROT SERPL-MCNC: 5.4 GM/DL — LOW (ref 6–8.3)
PROT SERPL-MCNC: 5.6 GM/DL — LOW (ref 6–8.3)
PROT SERPL-MCNC: 5.6 GM/DL — LOW (ref 6–8.3)
PROT SERPL-MCNC: 5.7 GM/DL — LOW (ref 6–8.3)
PROT SERPL-MCNC: 5.8 GM/DL — LOW (ref 6–8.3)
PROT SERPL-MCNC: 5.8 GM/DL — LOW (ref 6–8.3)
PROT SERPL-MCNC: 5.9 GM/DL — LOW (ref 6–8.3)
PROT SERPL-MCNC: 6 GM/DL — SIGNIFICANT CHANGE UP (ref 6–8.3)
PROT SERPL-MCNC: 6 GM/DL — SIGNIFICANT CHANGE UP (ref 6–8.3)
PROT SERPL-MCNC: 6.1 GM/DL — SIGNIFICANT CHANGE UP (ref 6–8.3)
PROT SERPL-MCNC: 6.1 GM/DL — SIGNIFICANT CHANGE UP (ref 6–8.3)
PROT SERPL-MCNC: 6.2 GM/DL — SIGNIFICANT CHANGE UP (ref 6–8.3)
PROT SERPL-MCNC: 6.2 GM/DL — SIGNIFICANT CHANGE UP (ref 6–8.3)
PROT SERPL-MCNC: 6.3 GM/DL — SIGNIFICANT CHANGE UP (ref 6–8.3)
PROT SERPL-MCNC: 6.4 GM/DL — SIGNIFICANT CHANGE UP (ref 6–8.3)
PROT SERPL-MCNC: 6.4 GM/DL — SIGNIFICANT CHANGE UP (ref 6–8.3)
PROT SERPL-MCNC: 6.6 GM/DL — SIGNIFICANT CHANGE UP (ref 6–8.3)
PROT SERPL-MCNC: 7 GM/DL — SIGNIFICANT CHANGE UP (ref 6–8.3)
PROT SERPL-MCNC: 7 GM/DL — SIGNIFICANT CHANGE UP (ref 6–8.3)
PROT SERPL-MCNC: 7.1 GM/DL — SIGNIFICANT CHANGE UP (ref 6–8.3)
PROT UR-MCNC: 100 MG/DL
PROTHROM AB SERPL-ACNC: 12.6 SEC — SIGNIFICANT CHANGE UP (ref 10.6–13.6)
PROTHROM AB SERPL-ACNC: 12.8 SEC — SIGNIFICANT CHANGE UP (ref 10.6–13.6)
PROTHROM AB SERPL-ACNC: 16.7 SEC — HIGH (ref 10.6–13.6)
PROTHROM AB SERPL-ACNC: 16.9 SEC — HIGH (ref 10.6–13.6)
RBC # BLD: 2.68 M/UL — LOW (ref 3.8–5.2)
RBC # BLD: 2.72 M/UL — LOW (ref 3.8–5.2)
RBC # BLD: 2.74 M/UL — LOW (ref 3.8–5.2)
RBC # BLD: 2.75 M/UL — LOW (ref 3.8–5.2)
RBC # BLD: 2.77 M/UL — LOW (ref 3.8–5.2)
RBC # BLD: 2.79 M/UL — LOW (ref 3.8–5.2)
RBC # BLD: 2.89 M/UL — LOW (ref 3.8–5.2)
RBC # BLD: 2.91 M/UL — LOW (ref 3.8–5.2)
RBC # BLD: 2.95 M/UL — LOW (ref 3.8–5.2)
RBC # BLD: 2.96 M/UL — LOW (ref 3.8–5.2)
RBC # BLD: 2.98 M/UL — LOW (ref 3.8–5.2)
RBC # BLD: 3 M/UL — LOW (ref 3.8–5.2)
RBC # BLD: 3.22 M/UL — LOW (ref 3.8–5.2)
RBC # BLD: 3.23 M/UL — LOW (ref 3.8–5.2)
RBC # BLD: 3.24 M/UL — LOW (ref 3.8–5.2)
RBC # BLD: 3.66 M/UL — LOW (ref 3.8–5.2)
RBC # BLD: 3.79 M/UL — LOW (ref 3.8–5.2)
RBC # BLD: 4.02 M/UL — SIGNIFICANT CHANGE UP (ref 3.8–5.2)
RBC # BLD: 4.08 M/UL — SIGNIFICANT CHANGE UP (ref 3.8–5.2)
RBC # BLD: 4.11 M/UL — SIGNIFICANT CHANGE UP (ref 3.8–5.2)
RBC # BLD: 4.26 M/UL — SIGNIFICANT CHANGE UP (ref 3.8–5.2)
RBC # BLD: 4.31 M/UL — SIGNIFICANT CHANGE UP (ref 3.8–5.2)
RBC # BLD: 4.35 M/UL — SIGNIFICANT CHANGE UP (ref 3.8–5.2)
RBC # BLD: 4.55 M/UL — SIGNIFICANT CHANGE UP (ref 3.8–5.2)
RBC # BLD: 4.56 M/UL — SIGNIFICANT CHANGE UP (ref 3.8–5.2)
RBC # BLD: 4.74 M/UL — SIGNIFICANT CHANGE UP (ref 3.8–5.2)
RBC # BLD: 5.38 M/UL — HIGH (ref 3.8–5.2)
RBC # FLD: 13.6 % — SIGNIFICANT CHANGE UP (ref 10.3–14.5)
RBC # FLD: 13.8 % — SIGNIFICANT CHANGE UP (ref 10.3–14.5)
RBC # FLD: 13.8 % — SIGNIFICANT CHANGE UP (ref 10.3–14.5)
RBC # FLD: 13.9 % — SIGNIFICANT CHANGE UP (ref 10.3–14.5)
RBC # FLD: 14 % — SIGNIFICANT CHANGE UP (ref 10.3–14.5)
RBC # FLD: 14.3 % — SIGNIFICANT CHANGE UP (ref 10.3–14.5)
RBC # FLD: 14.4 % — SIGNIFICANT CHANGE UP (ref 10.3–14.5)
RBC # FLD: 14.4 % — SIGNIFICANT CHANGE UP (ref 10.3–14.5)
RBC # FLD: 14.5 % — SIGNIFICANT CHANGE UP (ref 10.3–14.5)
RBC # FLD: 14.5 % — SIGNIFICANT CHANGE UP (ref 10.3–14.5)
RBC # FLD: 14.7 % — HIGH (ref 10.3–14.5)
RBC # FLD: 14.8 % — HIGH (ref 10.3–14.5)
RBC # FLD: 14.9 % — HIGH (ref 10.3–14.5)
RBC # FLD: 15.7 % — HIGH (ref 10.3–14.5)
RBC # FLD: 15.9 % — HIGH (ref 10.3–14.5)
RBC # FLD: 16.4 % — HIGH (ref 10.3–14.5)
RBC # FLD: 17.1 % — HIGH (ref 10.3–14.5)
RBC # FLD: 17.5 % — HIGH (ref 10.3–14.5)
RBC # FLD: 18.5 % — HIGH (ref 10.3–14.5)
RBC # FLD: 19.3 % — HIGH (ref 10.3–14.5)
RBC # FLD: 19.7 % — HIGH (ref 10.3–14.5)
RBC # FLD: 19.9 % — HIGH (ref 10.3–14.5)
RBC # FLD: 20.1 % — HIGH (ref 10.3–14.5)
RBC # FLD: 20.3 % — HIGH (ref 10.3–14.5)
RBC # FLD: 20.3 % — HIGH (ref 10.3–14.5)
RBC # FLD: 20.4 % — HIGH (ref 10.3–14.5)
RBC # FLD: 20.4 % — HIGH (ref 10.3–14.5)
RBC BLD AUTO: ABNORMAL
RBC BLD AUTO: NORMAL — SIGNIFICANT CHANGE UP
RBC CASTS # UR COMP ASSIST: SIGNIFICANT CHANGE UP /HPF (ref 0–4)
S AUREUS DNA NOSE QL NAA+PROBE: SIGNIFICANT CHANGE UP
SAO2 % BLDA: 40 % — LOW (ref 92–96)
SAO2 % BLDA: 42 % — LOW (ref 92–96)
SAO2 % BLDA: 58 % — LOW (ref 92–96)
SAO2 % BLDA: 63 % — LOW (ref 92–96)
SAO2 % BLDA: 66 % — LOW (ref 92–96)
SAO2 % BLDA: 79 % — LOW (ref 92–96)
SAO2 % BLDA: 79 % — LOW (ref 92–96)
SAO2 % BLDA: 81 % — LOW (ref 92–96)
SAO2 % BLDA: 82 % — LOW (ref 92–96)
SAO2 % BLDA: 82 % — LOW (ref 92–96)
SAO2 % BLDA: 85 % — LOW (ref 92–96)
SAO2 % BLDA: 86 % — LOW (ref 92–96)
SAO2 % BLDA: 87 % — LOW (ref 92–96)
SAO2 % BLDA: 87 % — LOW (ref 92–96)
SAO2 % BLDA: 88 % — LOW (ref 92–96)
SAO2 % BLDA: 88 % — LOW (ref 92–96)
SAO2 % BLDA: 92 % — SIGNIFICANT CHANGE UP (ref 92–96)
SAO2 % BLDA: 93 % — SIGNIFICANT CHANGE UP (ref 92–96)
SAO2 % BLDA: 94 % — SIGNIFICANT CHANGE UP (ref 92–96)
SAO2 % BLDA: 95 % — SIGNIFICANT CHANGE UP (ref 92–96)
SAO2 % BLDA: 96 % — SIGNIFICANT CHANGE UP (ref 92–96)
SAO2 % BLDA: 97 % — HIGH (ref 92–96)
SARS-COV-2 IGG+IGM SERPL QL IA: 3.97 U/ML — HIGH
SARS-COV-2 IGG+IGM SERPL QL IA: POSITIVE
SARS-COV-2 RNA SPEC QL NAA+PROBE: DETECTED
SCHISTOCYTES BLD QL AUTO: SLIGHT — SIGNIFICANT CHANGE UP
SMUDGE CELLS # BLD: PRESENT — SIGNIFICANT CHANGE UP
SODIUM SERPL-SCNC: 129 MMOL/L — LOW (ref 135–145)
SODIUM SERPL-SCNC: 129 MMOL/L — LOW (ref 135–145)
SODIUM SERPL-SCNC: 130 MMOL/L — LOW (ref 135–145)
SODIUM SERPL-SCNC: 131 MMOL/L — LOW (ref 135–145)
SODIUM SERPL-SCNC: 131 MMOL/L — LOW (ref 135–145)
SODIUM SERPL-SCNC: 132 MMOL/L — LOW (ref 135–145)
SODIUM SERPL-SCNC: 134 MMOL/L — LOW (ref 135–145)
SODIUM SERPL-SCNC: 135 MMOL/L — SIGNIFICANT CHANGE UP (ref 135–145)
SODIUM SERPL-SCNC: 136 MMOL/L — SIGNIFICANT CHANGE UP (ref 135–145)
SODIUM SERPL-SCNC: 137 MMOL/L — SIGNIFICANT CHANGE UP (ref 135–145)
SODIUM SERPL-SCNC: 138 MMOL/L — SIGNIFICANT CHANGE UP (ref 135–145)
SODIUM SERPL-SCNC: 139 MMOL/L — SIGNIFICANT CHANGE UP (ref 135–145)
SODIUM SERPL-SCNC: 140 MMOL/L — SIGNIFICANT CHANGE UP (ref 135–145)
SODIUM SERPL-SCNC: 140 MMOL/L — SIGNIFICANT CHANGE UP (ref 135–145)
SODIUM SERPL-SCNC: 141 MMOL/L — SIGNIFICANT CHANGE UP (ref 135–145)
SODIUM SERPL-SCNC: 142 MMOL/L — SIGNIFICANT CHANGE UP (ref 135–145)
SP GR SPEC: 1.01 — SIGNIFICANT CHANGE UP (ref 1.01–1.02)
SPECIMEN SOURCE: SIGNIFICANT CHANGE UP
TARGETS BLD QL SMEAR: SLIGHT — SIGNIFICANT CHANGE UP
TOXIC GRANULES BLD QL SMEAR: PRESENT — SIGNIFICANT CHANGE UP
TRIGL SERPL-MCNC: 104 MG/DL — SIGNIFICANT CHANGE UP
TROPONIN I SERPL-MCNC: 0.19 NG/ML — HIGH (ref 0.01–0.04)
TROPONIN I SERPL-MCNC: <.015 NG/ML — SIGNIFICANT CHANGE UP (ref 0.01–0.04)
TSH SERPL-MCNC: 2.77 UIU/ML — SIGNIFICANT CHANGE UP (ref 0.36–3.74)
UROBILINOGEN FLD QL: 4 MG/DL
VANCOMYCIN FLD-MCNC: 15.1 UG/ML — SIGNIFICANT CHANGE UP
VANCOMYCIN TROUGH SERPL-MCNC: 14 UG/ML — SIGNIFICANT CHANGE UP (ref 10–20)
VANCOMYCIN TROUGH SERPL-MCNC: 18.6 UG/ML — SIGNIFICANT CHANGE UP (ref 10–20)
VANCOMYCIN TROUGH SERPL-MCNC: 5.7 UG/ML — LOW (ref 10–20)
WBC # BLD: 10.28 K/UL — SIGNIFICANT CHANGE UP (ref 3.8–10.5)
WBC # BLD: 10.35 K/UL — SIGNIFICANT CHANGE UP (ref 3.8–10.5)
WBC # BLD: 10.51 K/UL — HIGH (ref 3.8–10.5)
WBC # BLD: 10.6 K/UL — HIGH (ref 3.8–10.5)
WBC # BLD: 11.02 K/UL — HIGH (ref 3.8–10.5)
WBC # BLD: 11.05 K/UL — HIGH (ref 3.8–10.5)
WBC # BLD: 11.3 K/UL — HIGH (ref 3.8–10.5)
WBC # BLD: 11.65 K/UL — HIGH (ref 3.8–10.5)
WBC # BLD: 11.94 K/UL — HIGH (ref 3.8–10.5)
WBC # BLD: 12.17 K/UL — HIGH (ref 3.8–10.5)
WBC # BLD: 12.34 K/UL — HIGH (ref 3.8–10.5)
WBC # BLD: 12.52 K/UL — HIGH (ref 3.8–10.5)
WBC # BLD: 13.47 K/UL — HIGH (ref 3.8–10.5)
WBC # BLD: 14.36 K/UL — HIGH (ref 3.8–10.5)
WBC # BLD: 14.9 K/UL — HIGH (ref 3.8–10.5)
WBC # BLD: 17.12 K/UL — HIGH (ref 3.8–10.5)
WBC # BLD: 17.54 K/UL — HIGH (ref 3.8–10.5)
WBC # BLD: 17.82 K/UL — HIGH (ref 3.8–10.5)
WBC # BLD: 18.47 K/UL — HIGH (ref 3.8–10.5)
WBC # BLD: 18.55 K/UL — HIGH (ref 3.8–10.5)
WBC # BLD: 19.66 K/UL — HIGH (ref 3.8–10.5)
WBC # BLD: 32.16 K/UL — HIGH (ref 3.8–10.5)
WBC # BLD: 7.08 K/UL — SIGNIFICANT CHANGE UP (ref 3.8–10.5)
WBC # BLD: 7.57 K/UL — SIGNIFICANT CHANGE UP (ref 3.8–10.5)
WBC # BLD: 8.1 K/UL — SIGNIFICANT CHANGE UP (ref 3.8–10.5)
WBC # BLD: 8.72 K/UL — SIGNIFICANT CHANGE UP (ref 3.8–10.5)
WBC # BLD: 9.06 K/UL — SIGNIFICANT CHANGE UP (ref 3.8–10.5)
WBC # FLD AUTO: 10.28 K/UL — SIGNIFICANT CHANGE UP (ref 3.8–10.5)
WBC # FLD AUTO: 10.35 K/UL — SIGNIFICANT CHANGE UP (ref 3.8–10.5)
WBC # FLD AUTO: 10.51 K/UL — HIGH (ref 3.8–10.5)
WBC # FLD AUTO: 10.6 K/UL — HIGH (ref 3.8–10.5)
WBC # FLD AUTO: 11.02 K/UL — HIGH (ref 3.8–10.5)
WBC # FLD AUTO: 11.05 K/UL — HIGH (ref 3.8–10.5)
WBC # FLD AUTO: 11.3 K/UL — HIGH (ref 3.8–10.5)
WBC # FLD AUTO: 11.65 K/UL — HIGH (ref 3.8–10.5)
WBC # FLD AUTO: 11.94 K/UL — HIGH (ref 3.8–10.5)
WBC # FLD AUTO: 12.17 K/UL — HIGH (ref 3.8–10.5)
WBC # FLD AUTO: 12.34 K/UL — HIGH (ref 3.8–10.5)
WBC # FLD AUTO: 12.52 K/UL — HIGH (ref 3.8–10.5)
WBC # FLD AUTO: 13.47 K/UL — HIGH (ref 3.8–10.5)
WBC # FLD AUTO: 14.36 K/UL — HIGH (ref 3.8–10.5)
WBC # FLD AUTO: 14.9 K/UL — HIGH (ref 3.8–10.5)
WBC # FLD AUTO: 17.12 K/UL — HIGH (ref 3.8–10.5)
WBC # FLD AUTO: 17.54 K/UL — HIGH (ref 3.8–10.5)
WBC # FLD AUTO: 17.82 K/UL — HIGH (ref 3.8–10.5)
WBC # FLD AUTO: 18.47 K/UL — HIGH (ref 3.8–10.5)
WBC # FLD AUTO: 18.55 K/UL — HIGH (ref 3.8–10.5)
WBC # FLD AUTO: 19.66 K/UL — HIGH (ref 3.8–10.5)
WBC # FLD AUTO: 32.16 K/UL — HIGH (ref 3.8–10.5)
WBC # FLD AUTO: 7.08 K/UL — SIGNIFICANT CHANGE UP (ref 3.8–10.5)
WBC # FLD AUTO: 7.57 K/UL — SIGNIFICANT CHANGE UP (ref 3.8–10.5)
WBC # FLD AUTO: 8.1 K/UL — SIGNIFICANT CHANGE UP (ref 3.8–10.5)
WBC # FLD AUTO: 8.72 K/UL — SIGNIFICANT CHANGE UP (ref 3.8–10.5)
WBC # FLD AUTO: 9.06 K/UL — SIGNIFICANT CHANGE UP (ref 3.8–10.5)
WBC UR QL: SIGNIFICANT CHANGE UP

## 2021-01-01 PROCEDURE — 99291 CRITICAL CARE FIRST HOUR: CPT

## 2021-01-01 PROCEDURE — 36556 INSERT NON-TUNNEL CV CATH: CPT

## 2021-01-01 PROCEDURE — 99232 SBSQ HOSP IP/OBS MODERATE 35: CPT

## 2021-01-01 PROCEDURE — 76700 US EXAM ABDOM COMPLETE: CPT | Mod: 26

## 2021-01-01 PROCEDURE — 99233 SBSQ HOSP IP/OBS HIGH 50: CPT

## 2021-01-01 PROCEDURE — 71045 X-RAY EXAM CHEST 1 VIEW: CPT | Mod: 26,77

## 2021-01-01 PROCEDURE — 71045 X-RAY EXAM CHEST 1 VIEW: CPT | Mod: 26

## 2021-01-01 PROCEDURE — 99497 ADVNCD CARE PLAN 30 MIN: CPT | Mod: 25

## 2021-01-01 PROCEDURE — 36620 INSERTION CATHETER ARTERY: CPT

## 2021-01-01 PROCEDURE — 99291 CRITICAL CARE FIRST HOUR: CPT | Mod: 25

## 2021-01-01 PROCEDURE — 99292 CRITICAL CARE ADDL 30 MIN: CPT | Mod: 25

## 2021-01-01 PROCEDURE — 93970 EXTREMITY STUDY: CPT | Mod: 26

## 2021-01-01 PROCEDURE — 36000 PLACE NEEDLE IN VEIN: CPT | Mod: 59

## 2021-01-01 PROCEDURE — 71275 CT ANGIOGRAPHY CHEST: CPT | Mod: 26,MA

## 2021-01-01 PROCEDURE — 99223 1ST HOSP IP/OBS HIGH 75: CPT

## 2021-01-01 PROCEDURE — 31500 INSERT EMERGENCY AIRWAY: CPT

## 2021-01-01 PROCEDURE — 93010 ELECTROCARDIOGRAM REPORT: CPT

## 2021-01-01 PROCEDURE — 76705 ECHO EXAM OF ABDOMEN: CPT | Mod: 26

## 2021-01-01 PROCEDURE — 76937 US GUIDE VASCULAR ACCESS: CPT | Mod: 26

## 2021-01-01 PROCEDURE — 99222 1ST HOSP IP/OBS MODERATE 55: CPT

## 2021-01-01 RX ORDER — MIDODRINE HYDROCHLORIDE 2.5 MG/1
20 TABLET ORAL EVERY 8 HOURS
Refills: 0 | Status: DISCONTINUED | OUTPATIENT
Start: 2021-01-01 | End: 2021-01-01

## 2021-01-01 RX ORDER — POTASSIUM PHOSPHATE, MONOBASIC POTASSIUM PHOSPHATE, DIBASIC 236; 224 MG/ML; MG/ML
15 INJECTION, SOLUTION INTRAVENOUS ONCE
Refills: 0 | Status: COMPLETED | OUTPATIENT
Start: 2021-01-01 | End: 2021-01-01

## 2021-01-01 RX ORDER — FUROSEMIDE 40 MG
20 TABLET ORAL ONCE
Refills: 0 | Status: COMPLETED | OUTPATIENT
Start: 2021-01-01 | End: 2021-01-01

## 2021-01-01 RX ORDER — MEROPENEM 1 G/30ML
1000 INJECTION INTRAVENOUS EVERY 8 HOURS
Refills: 0 | Status: DISCONTINUED | OUTPATIENT
Start: 2021-01-01 | End: 2021-01-01

## 2021-01-01 RX ORDER — SODIUM CHLORIDE 9 MG/ML
1000 INJECTION, SOLUTION INTRAVENOUS ONCE
Refills: 0 | Status: COMPLETED | OUTPATIENT
Start: 2021-01-01 | End: 2021-01-01

## 2021-01-01 RX ORDER — INSULIN LISPRO 100/ML
VIAL (ML) SUBCUTANEOUS
Refills: 0 | Status: DISCONTINUED | OUTPATIENT
Start: 2021-01-01 | End: 2021-01-01

## 2021-01-01 RX ORDER — KETAMINE HYDROCHLORIDE 100 MG/ML
50 INJECTION INTRAMUSCULAR; INTRAVENOUS ONCE
Refills: 0 | Status: DISCONTINUED | OUTPATIENT
Start: 2021-01-01 | End: 2021-01-01

## 2021-01-01 RX ORDER — DEXMEDETOMIDINE HYDROCHLORIDE IN 0.9% SODIUM CHLORIDE 4 UG/ML
0.2 INJECTION INTRAVENOUS
Qty: 200 | Refills: 0 | Status: DISCONTINUED | OUTPATIENT
Start: 2021-01-01 | End: 2021-01-01

## 2021-01-01 RX ORDER — INSULIN HUMAN 100 [IU]/ML
10 INJECTION, SOLUTION SUBCUTANEOUS ONCE
Refills: 0 | Status: COMPLETED | OUTPATIENT
Start: 2021-01-01 | End: 2021-01-01

## 2021-01-01 RX ORDER — NOREPINEPHRINE BITARTRATE/D5W 8 MG/250ML
0.05 PLASTIC BAG, INJECTION (ML) INTRAVENOUS
Qty: 16 | Refills: 0 | Status: DISCONTINUED | OUTPATIENT
Start: 2021-01-01 | End: 2021-01-01

## 2021-01-01 RX ORDER — SODIUM CHLORIDE 9 MG/ML
1000 INJECTION, SOLUTION INTRAVENOUS
Refills: 0 | Status: DISCONTINUED | OUTPATIENT
Start: 2021-01-01 | End: 2021-01-01

## 2021-01-01 RX ORDER — INSULIN GLARGINE 100 [IU]/ML
8 INJECTION, SOLUTION SUBCUTANEOUS AT BEDTIME
Refills: 0 | Status: DISCONTINUED | OUTPATIENT
Start: 2021-01-01 | End: 2021-01-01

## 2021-01-01 RX ORDER — REMDESIVIR 5 MG/ML
100 INJECTION INTRAVENOUS EVERY 24 HOURS
Refills: 0 | Status: COMPLETED | OUTPATIENT
Start: 2021-01-01 | End: 2021-01-01

## 2021-01-01 RX ORDER — SODIUM BICARBONATE 1 MEQ/ML
100 SYRINGE (ML) INTRAVENOUS ONCE
Refills: 0 | Status: COMPLETED | OUTPATIENT
Start: 2021-01-01 | End: 2021-01-01

## 2021-01-01 RX ORDER — POTASSIUM CHLORIDE 20 MEQ
10 PACKET (EA) ORAL
Refills: 0 | Status: DISCONTINUED | OUTPATIENT
Start: 2021-01-01 | End: 2021-01-01

## 2021-01-01 RX ORDER — INSULIN LISPRO 100/ML
VIAL (ML) SUBCUTANEOUS EVERY 6 HOURS
Refills: 0 | Status: DISCONTINUED | OUTPATIENT
Start: 2021-01-01 | End: 2021-01-01

## 2021-01-01 RX ORDER — CISATRACURIUM BESYLATE 2 MG/ML
3 INJECTION INTRAVENOUS
Qty: 200 | Refills: 0 | Status: DISCONTINUED | OUTPATIENT
Start: 2021-01-01 | End: 2021-01-01

## 2021-01-01 RX ORDER — ENOXAPARIN SODIUM 100 MG/ML
40 INJECTION SUBCUTANEOUS
Refills: 0 | Status: DISCONTINUED | OUTPATIENT
Start: 2021-01-01 | End: 2021-01-01

## 2021-01-01 RX ORDER — INSULIN HUMAN 100 [IU]/ML
6 INJECTION, SOLUTION SUBCUTANEOUS EVERY 6 HOURS
Refills: 0 | Status: DISCONTINUED | OUTPATIENT
Start: 2021-01-01 | End: 2021-01-01

## 2021-01-01 RX ORDER — NOREPINEPHRINE BITARTRATE/D5W 8 MG/250ML
0.05 PLASTIC BAG, INJECTION (ML) INTRAVENOUS
Qty: 8 | Refills: 0 | Status: DISCONTINUED | OUTPATIENT
Start: 2021-01-01 | End: 2021-01-01

## 2021-01-01 RX ORDER — REMDESIVIR 5 MG/ML
INJECTION INTRAVENOUS
Refills: 0 | Status: COMPLETED | OUTPATIENT
Start: 2021-01-01 | End: 2021-01-01

## 2021-01-01 RX ORDER — GLUCAGON INJECTION, SOLUTION 0.5 MG/.1ML
1 INJECTION, SOLUTION SUBCUTANEOUS ONCE
Refills: 0 | Status: DISCONTINUED | OUTPATIENT
Start: 2021-01-01 | End: 2021-01-01

## 2021-01-01 RX ORDER — LOSARTAN POTASSIUM 100 MG/1
1 TABLET, FILM COATED ORAL
Qty: 0 | Refills: 0 | DISCHARGE

## 2021-01-01 RX ORDER — CHLORHEXIDINE GLUCONATE 213 G/1000ML
1 SOLUTION TOPICAL DAILY
Refills: 0 | Status: DISCONTINUED | OUTPATIENT
Start: 2021-01-01 | End: 2021-01-01

## 2021-01-01 RX ORDER — TOCILIZUMAB 20 MG/ML
600 INJECTION, SOLUTION, CONCENTRATE INTRAVENOUS ONCE
Refills: 0 | Status: DISCONTINUED | OUTPATIENT
Start: 2021-01-01 | End: 2021-01-01

## 2021-01-01 RX ORDER — ENOXAPARIN SODIUM 100 MG/ML
40 INJECTION SUBCUTANEOUS DAILY
Refills: 0 | Status: DISCONTINUED | OUTPATIENT
Start: 2021-01-01 | End: 2021-01-01

## 2021-01-01 RX ORDER — KETAMINE HYDROCHLORIDE 100 MG/ML
0.25 INJECTION INTRAMUSCULAR; INTRAVENOUS
Qty: 1000 | Refills: 0 | Status: DISCONTINUED | OUTPATIENT
Start: 2021-01-01 | End: 2021-01-01

## 2021-01-01 RX ORDER — CALCIUM GLUCONATE 100 MG/ML
2 VIAL (ML) INTRAVENOUS ONCE
Refills: 0 | Status: COMPLETED | OUTPATIENT
Start: 2021-01-01 | End: 2021-01-01

## 2021-01-01 RX ORDER — INSULIN GLARGINE 100 [IU]/ML
25 INJECTION, SOLUTION SUBCUTANEOUS AT BEDTIME
Refills: 0 | Status: DISCONTINUED | OUTPATIENT
Start: 2021-01-01 | End: 2021-01-01

## 2021-01-01 RX ORDER — DEXTROSE 50 % IN WATER 50 %
25 SYRINGE (ML) INTRAVENOUS ONCE
Refills: 0 | Status: COMPLETED | OUTPATIENT
Start: 2021-01-01 | End: 2021-01-01

## 2021-01-01 RX ORDER — SODIUM ZIRCONIUM CYCLOSILICATE 10 G/10G
10 POWDER, FOR SUSPENSION ORAL EVERY 8 HOURS
Refills: 0 | Status: COMPLETED | OUTPATIENT
Start: 2021-01-01 | End: 2021-01-01

## 2021-01-01 RX ORDER — PANTOPRAZOLE SODIUM 20 MG/1
40 TABLET, DELAYED RELEASE ORAL DAILY
Refills: 0 | Status: DISCONTINUED | OUTPATIENT
Start: 2021-01-01 | End: 2021-01-01

## 2021-01-01 RX ORDER — METOPROLOL TARTRATE 50 MG
1 TABLET ORAL
Qty: 0 | Refills: 0 | DISCHARGE

## 2021-01-01 RX ORDER — ACETAMINOPHEN 500 MG
1000 TABLET ORAL ONCE
Refills: 0 | Status: COMPLETED | OUTPATIENT
Start: 2021-01-01 | End: 2021-01-01

## 2021-01-01 RX ORDER — VANCOMYCIN HCL 1 G
1000 VIAL (EA) INTRAVENOUS ONCE
Refills: 0 | Status: COMPLETED | OUTPATIENT
Start: 2021-01-01 | End: 2021-01-01

## 2021-01-01 RX ORDER — MEROPENEM 1 G/30ML
INJECTION INTRAVENOUS
Refills: 0 | Status: DISCONTINUED | OUTPATIENT
Start: 2021-01-01 | End: 2021-01-01

## 2021-01-01 RX ORDER — POTASSIUM PHOSPHATE, MONOBASIC POTASSIUM PHOSPHATE, DIBASIC 236; 224 MG/ML; MG/ML
30 INJECTION, SOLUTION INTRAVENOUS ONCE
Refills: 0 | Status: COMPLETED | OUTPATIENT
Start: 2021-01-01 | End: 2021-01-01

## 2021-01-01 RX ORDER — SODIUM BICARBONATE 1 MEQ/ML
50 SYRINGE (ML) INTRAVENOUS ONCE
Refills: 0 | Status: COMPLETED | OUTPATIENT
Start: 2021-01-01 | End: 2021-01-01

## 2021-01-01 RX ORDER — INSULIN GLARGINE 100 [IU]/ML
16 INJECTION, SOLUTION SUBCUTANEOUS AT BEDTIME
Refills: 0 | Status: DISCONTINUED | OUTPATIENT
Start: 2021-01-01 | End: 2021-01-01

## 2021-01-01 RX ORDER — INSULIN GLARGINE 100 [IU]/ML
20 INJECTION, SOLUTION SUBCUTANEOUS AT BEDTIME
Refills: 0 | Status: DISCONTINUED | OUTPATIENT
Start: 2021-01-01 | End: 2021-01-01

## 2021-01-01 RX ORDER — AZITHROMYCIN 500 MG/1
TABLET, FILM COATED ORAL
Refills: 0 | Status: COMPLETED | OUTPATIENT
Start: 2021-01-01 | End: 2021-01-01

## 2021-01-01 RX ORDER — FENTANYL CITRATE 50 UG/ML
0.5 INJECTION INTRAVENOUS
Qty: 2500 | Refills: 0 | Status: DISCONTINUED | OUTPATIENT
Start: 2021-01-01 | End: 2021-01-01

## 2021-01-01 RX ORDER — ACETAMINOPHEN 500 MG
650 TABLET ORAL ONCE
Refills: 0 | Status: COMPLETED | OUTPATIENT
Start: 2021-01-01 | End: 2021-01-01

## 2021-01-01 RX ORDER — SODIUM ZIRCONIUM CYCLOSILICATE 10 G/10G
5 POWDER, FOR SUSPENSION ORAL ONCE
Refills: 0 | Status: COMPLETED | OUTPATIENT
Start: 2021-01-01 | End: 2021-01-01

## 2021-01-01 RX ORDER — IBUPROFEN 200 MG
400 TABLET ORAL ONCE
Refills: 0 | Status: COMPLETED | OUTPATIENT
Start: 2021-01-01 | End: 2021-01-01

## 2021-01-01 RX ORDER — POTASSIUM CHLORIDE 20 MEQ
40 PACKET (EA) ORAL ONCE
Refills: 0 | Status: COMPLETED | OUTPATIENT
Start: 2021-01-01 | End: 2021-01-01

## 2021-01-01 RX ORDER — ERGOCALCIFEROL 1.25 MG/1
50000 CAPSULE ORAL
Refills: 0 | Status: DISCONTINUED | OUTPATIENT
Start: 2021-01-01 | End: 2021-01-01

## 2021-01-01 RX ORDER — VASOPRESSIN 20 [USP'U]/ML
0.04 INJECTION INTRAVENOUS
Qty: 50 | Refills: 0 | Status: DISCONTINUED | OUTPATIENT
Start: 2021-01-01 | End: 2021-01-01

## 2021-01-01 RX ORDER — DEXTROSE 50 % IN WATER 50 %
50 SYRINGE (ML) INTRAVENOUS ONCE
Refills: 0 | Status: COMPLETED | OUTPATIENT
Start: 2021-01-01 | End: 2021-01-01

## 2021-01-01 RX ORDER — VANCOMYCIN HCL 1 G
1000 VIAL (EA) INTRAVENOUS EVERY 12 HOURS
Refills: 0 | Status: DISCONTINUED | OUTPATIENT
Start: 2021-01-01 | End: 2021-01-01

## 2021-01-01 RX ORDER — SODIUM BICARBONATE 1 MEQ/ML
0.29 SYRINGE (ML) INTRAVENOUS
Qty: 150 | Refills: 0 | Status: DISCONTINUED | OUTPATIENT
Start: 2021-01-01 | End: 2021-01-01

## 2021-01-01 RX ORDER — CEFTRIAXONE 500 MG/1
2000 INJECTION, POWDER, FOR SOLUTION INTRAMUSCULAR; INTRAVENOUS ONCE
Refills: 0 | Status: COMPLETED | OUTPATIENT
Start: 2021-01-01 | End: 2021-01-01

## 2021-01-01 RX ORDER — MIDAZOLAM HYDROCHLORIDE 1 MG/ML
4 INJECTION, SOLUTION INTRAMUSCULAR; INTRAVENOUS ONCE
Refills: 0 | Status: DISCONTINUED | OUTPATIENT
Start: 2021-01-01 | End: 2021-01-01

## 2021-01-01 RX ORDER — IBUPROFEN 200 MG
600 TABLET ORAL ONCE
Refills: 0 | Status: COMPLETED | OUTPATIENT
Start: 2021-01-01 | End: 2021-01-01

## 2021-01-01 RX ORDER — POTASSIUM CHLORIDE 20 MEQ
10 PACKET (EA) ORAL ONCE
Refills: 0 | Status: COMPLETED | OUTPATIENT
Start: 2021-01-01 | End: 2021-01-01

## 2021-01-01 RX ORDER — SODIUM CHLORIDE 9 MG/ML
2000 INJECTION, SOLUTION INTRAVENOUS ONCE
Refills: 0 | Status: COMPLETED | OUTPATIENT
Start: 2021-01-01 | End: 2021-01-01

## 2021-01-01 RX ORDER — MIDAZOLAM HYDROCHLORIDE 1 MG/ML
2 INJECTION, SOLUTION INTRAMUSCULAR; INTRAVENOUS ONCE
Refills: 0 | Status: DISCONTINUED | OUTPATIENT
Start: 2021-01-01 | End: 2021-01-01

## 2021-01-01 RX ORDER — INSULIN HUMAN 100 [IU]/ML
10 INJECTION, SOLUTION SUBCUTANEOUS
Qty: 100 | Refills: 0 | Status: DISCONTINUED | OUTPATIENT
Start: 2021-01-01 | End: 2021-01-01

## 2021-01-01 RX ORDER — REMDESIVIR 5 MG/ML
200 INJECTION INTRAVENOUS EVERY 24 HOURS
Refills: 0 | Status: COMPLETED | OUTPATIENT
Start: 2021-01-01 | End: 2021-01-01

## 2021-01-01 RX ORDER — DEXAMETHASONE 0.5 MG/5ML
6 ELIXIR ORAL DAILY
Refills: 0 | Status: COMPLETED | OUTPATIENT
Start: 2021-01-01 | End: 2021-01-01

## 2021-01-01 RX ORDER — SODIUM ZIRCONIUM CYCLOSILICATE 10 G/10G
10 POWDER, FOR SUSPENSION ORAL DAILY
Refills: 0 | Status: DISCONTINUED | OUTPATIENT
Start: 2021-01-01 | End: 2021-01-01

## 2021-01-01 RX ORDER — SODIUM,POTASSIUM PHOSPHATES 278-250MG
1 POWDER IN PACKET (EA) ORAL THREE TIMES A DAY
Refills: 0 | Status: COMPLETED | OUTPATIENT
Start: 2021-01-01 | End: 2021-01-01

## 2021-01-01 RX ORDER — MIDODRINE HYDROCHLORIDE 2.5 MG/1
10 TABLET ORAL EVERY 8 HOURS
Refills: 0 | Status: DISCONTINUED | OUTPATIENT
Start: 2021-01-01 | End: 2021-01-01

## 2021-01-01 RX ORDER — LOSARTAN POTASSIUM 100 MG/1
25 TABLET, FILM COATED ORAL DAILY
Refills: 0 | Status: DISCONTINUED | OUTPATIENT
Start: 2021-01-01 | End: 2021-01-01

## 2021-01-01 RX ORDER — DEXAMETHASONE 0.5 MG/5ML
6 ELIXIR ORAL ONCE
Refills: 0 | Status: COMPLETED | OUTPATIENT
Start: 2021-01-01 | End: 2021-01-01

## 2021-01-01 RX ORDER — BUDESONIDE AND FORMOTEROL FUMARATE DIHYDRATE 160; 4.5 UG/1; UG/1
2 AEROSOL RESPIRATORY (INHALATION)
Qty: 0 | Refills: 0 | DISCHARGE

## 2021-01-01 RX ORDER — METOPROLOL TARTRATE 50 MG
12.5 TABLET ORAL
Refills: 0 | Status: DISCONTINUED | OUTPATIENT
Start: 2021-01-01 | End: 2021-01-01

## 2021-01-01 RX ORDER — CEFTRIAXONE 500 MG/1
1000 INJECTION, POWDER, FOR SOLUTION INTRAMUSCULAR; INTRAVENOUS EVERY 24 HOURS
Refills: 0 | Status: DISCONTINUED | OUTPATIENT
Start: 2021-01-01 | End: 2021-01-01

## 2021-01-01 RX ORDER — HYDRALAZINE HCL 50 MG
10 TABLET ORAL ONCE
Refills: 0 | Status: COMPLETED | OUTPATIENT
Start: 2021-01-01 | End: 2021-01-01

## 2021-01-01 RX ORDER — FUROSEMIDE 40 MG
40 TABLET ORAL ONCE
Refills: 0 | Status: COMPLETED | OUTPATIENT
Start: 2021-01-01 | End: 2021-01-01

## 2021-01-01 RX ORDER — METFORMIN HYDROCHLORIDE 850 MG/1
0 TABLET ORAL
Qty: 0 | Refills: 0 | DISCHARGE

## 2021-01-01 RX ORDER — PROPOFOL 10 MG/ML
10 INJECTION, EMULSION INTRAVENOUS
Qty: 1000 | Refills: 0 | Status: DISCONTINUED | OUTPATIENT
Start: 2021-01-01 | End: 2021-01-01

## 2021-01-01 RX ORDER — CHLORHEXIDINE GLUCONATE 213 G/1000ML
15 SOLUTION TOPICAL
Refills: 0 | Status: DISCONTINUED | OUTPATIENT
Start: 2021-01-01 | End: 2021-01-01

## 2021-01-01 RX ORDER — AZITHROMYCIN 500 MG/1
500 TABLET, FILM COATED ORAL ONCE
Refills: 0 | Status: COMPLETED | OUTPATIENT
Start: 2021-01-01 | End: 2021-01-01

## 2021-01-01 RX ORDER — ZINC OXIDE 200 MG/G
1 OINTMENT TOPICAL THREE TIMES A DAY
Refills: 0 | Status: DISCONTINUED | OUTPATIENT
Start: 2021-01-01 | End: 2021-01-01

## 2021-01-01 RX ORDER — SODIUM CHLORIDE 9 MG/ML
1000 INJECTION INTRAMUSCULAR; INTRAVENOUS; SUBCUTANEOUS ONCE
Refills: 0 | Status: COMPLETED | OUTPATIENT
Start: 2021-01-01 | End: 2021-01-01

## 2021-01-01 RX ORDER — CHLORHEXIDINE GLUCONATE 213 G/1000ML
15 SOLUTION TOPICAL EVERY 12 HOURS
Refills: 0 | Status: DISCONTINUED | OUTPATIENT
Start: 2021-01-01 | End: 2021-01-01

## 2021-01-01 RX ORDER — PROPOFOL 10 MG/ML
10.01 INJECTION, EMULSION INTRAVENOUS
Qty: 1000 | Refills: 0 | Status: DISCONTINUED | OUTPATIENT
Start: 2021-01-01 | End: 2021-01-01

## 2021-01-01 RX ORDER — INSULIN GLARGINE 100 [IU]/ML
15 INJECTION, SOLUTION SUBCUTANEOUS AT BEDTIME
Refills: 0 | Status: DISCONTINUED | OUTPATIENT
Start: 2021-01-01 | End: 2021-01-01

## 2021-01-01 RX ORDER — LOSARTAN POTASSIUM 100 MG/1
0 TABLET, FILM COATED ORAL
Qty: 0 | Refills: 0 | DISCHARGE

## 2021-01-01 RX ORDER — LANOLIN ALCOHOL/MO/W.PET/CERES
3 CREAM (GRAM) TOPICAL ONCE
Refills: 0 | Status: COMPLETED | OUTPATIENT
Start: 2021-01-01 | End: 2021-01-01

## 2021-01-01 RX ORDER — METOPROLOL TARTRATE 50 MG
5 TABLET ORAL ONCE
Refills: 0 | Status: COMPLETED | OUTPATIENT
Start: 2021-01-01 | End: 2021-01-01

## 2021-01-01 RX ORDER — INSULIN HUMAN 100 [IU]/ML
10 INJECTION, SOLUTION SUBCUTANEOUS ONCE
Refills: 0 | Status: DISCONTINUED | OUTPATIENT
Start: 2021-01-01 | End: 2021-01-01

## 2021-01-01 RX ORDER — SENNA PLUS 8.6 MG/1
10 TABLET ORAL AT BEDTIME
Refills: 0 | Status: DISCONTINUED | OUTPATIENT
Start: 2021-01-01 | End: 2021-01-01

## 2021-01-01 RX ORDER — POTASSIUM PHOSPHATE, MONOBASIC POTASSIUM PHOSPHATE, DIBASIC 236; 224 MG/ML; MG/ML
15 INJECTION, SOLUTION INTRAVENOUS ONCE
Refills: 0 | Status: DISCONTINUED | OUTPATIENT
Start: 2021-01-01 | End: 2021-01-01

## 2021-01-01 RX ORDER — CHLORHEXIDINE GLUCONATE 213 G/1000ML
1 SOLUTION TOPICAL
Refills: 0 | Status: DISCONTINUED | OUTPATIENT
Start: 2021-01-01 | End: 2021-01-01

## 2021-01-01 RX ORDER — DEXTROSE 50 % IN WATER 50 %
15 SYRINGE (ML) INTRAVENOUS ONCE
Refills: 0 | Status: DISCONTINUED | OUTPATIENT
Start: 2021-01-01 | End: 2021-01-01

## 2021-01-01 RX ORDER — DEXTROSE 50 % IN WATER 50 %
25 SYRINGE (ML) INTRAVENOUS ONCE
Refills: 0 | Status: DISCONTINUED | OUTPATIENT
Start: 2021-01-01 | End: 2021-01-01

## 2021-01-01 RX ORDER — CISATRACURIUM BESYLATE 2 MG/ML
20 INJECTION INTRAVENOUS ONCE
Refills: 0 | Status: COMPLETED | OUTPATIENT
Start: 2021-01-01 | End: 2021-01-01

## 2021-01-01 RX ORDER — MIDODRINE HYDROCHLORIDE 2.5 MG/1
5 TABLET ORAL EVERY 8 HOURS
Refills: 0 | Status: DISCONTINUED | OUTPATIENT
Start: 2021-01-01 | End: 2021-01-01

## 2021-01-01 RX ORDER — AZITHROMYCIN 500 MG/1
500 TABLET, FILM COATED ORAL EVERY 24 HOURS
Refills: 0 | Status: COMPLETED | OUTPATIENT
Start: 2021-01-01 | End: 2021-01-01

## 2021-01-01 RX ORDER — INSULIN HUMAN 100 [IU]/ML
8 INJECTION, SOLUTION SUBCUTANEOUS ONCE
Refills: 0 | Status: COMPLETED | OUTPATIENT
Start: 2021-01-01 | End: 2021-01-01

## 2021-01-01 RX ORDER — SODIUM CHLORIDE 9 MG/ML
10 INJECTION INTRAMUSCULAR; INTRAVENOUS; SUBCUTANEOUS
Refills: 0 | Status: DISCONTINUED | OUTPATIENT
Start: 2021-01-01 | End: 2021-01-01

## 2021-01-01 RX ORDER — MEROPENEM 1 G/30ML
1000 INJECTION INTRAVENOUS ONCE
Refills: 0 | Status: COMPLETED | OUTPATIENT
Start: 2021-01-01 | End: 2021-01-01

## 2021-01-01 RX ORDER — CALCIUM ACETATE 667 MG
667 TABLET ORAL EVERY 8 HOURS
Refills: 0 | Status: DISCONTINUED | OUTPATIENT
Start: 2021-01-01 | End: 2021-01-01

## 2021-01-01 RX ORDER — DEXTROSE MONOHYDRATE, SODIUM CHLORIDE, AND POTASSIUM CHLORIDE 50; .745; 4.5 G/1000ML; G/1000ML; G/1000ML
1000 INJECTION, SOLUTION INTRAVENOUS
Refills: 0 | Status: DISCONTINUED | OUTPATIENT
Start: 2021-01-01 | End: 2021-01-01

## 2021-01-01 RX ORDER — POTASSIUM CHLORIDE 20 MEQ
10 PACKET (EA) ORAL
Refills: 0 | Status: COMPLETED | OUTPATIENT
Start: 2021-01-01 | End: 2021-01-01

## 2021-01-01 RX ORDER — INSULIN GLARGINE 100 [IU]/ML
10 INJECTION, SOLUTION SUBCUTANEOUS ONCE
Refills: 0 | Status: COMPLETED | OUTPATIENT
Start: 2021-01-01 | End: 2021-01-01

## 2021-01-01 RX ORDER — EMPAGLIFLOZIN, METFORMIN HYDROCHLORIDE 10; 1000 MG/1; MG/1
1 TABLET, EXTENDED RELEASE ORAL
Qty: 0 | Refills: 0 | DISCHARGE

## 2021-01-01 RX ORDER — INSULIN GLARGINE 100 [IU]/ML
30 INJECTION, SOLUTION SUBCUTANEOUS AT BEDTIME
Refills: 0 | Status: DISCONTINUED | OUTPATIENT
Start: 2021-01-01 | End: 2021-01-01

## 2021-01-01 RX ORDER — INSULIN GLARGINE 100 [IU]/ML
12 INJECTION, SOLUTION SUBCUTANEOUS AT BEDTIME
Refills: 0 | Status: DISCONTINUED | OUTPATIENT
Start: 2021-01-01 | End: 2021-01-01

## 2021-01-01 RX ORDER — FLUTICASONE PROPIONATE 50 MCG
1 SPRAY, SUSPENSION NASAL
Qty: 0 | Refills: 0 | DISCHARGE

## 2021-01-01 RX ORDER — SODIUM CHLORIDE 9 MG/ML
1000 INJECTION INTRAMUSCULAR; INTRAVENOUS; SUBCUTANEOUS
Refills: 0 | Status: DISCONTINUED | OUTPATIENT
Start: 2021-01-01 | End: 2021-01-01

## 2021-01-01 RX ORDER — ALBUTEROL 90 UG/1
2 AEROSOL, METERED ORAL
Qty: 0 | Refills: 0 | DISCHARGE

## 2021-01-01 RX ORDER — POLYETHYLENE GLYCOL 3350 17 G/17G
17 POWDER, FOR SOLUTION ORAL DAILY
Refills: 0 | Status: DISCONTINUED | OUTPATIENT
Start: 2021-01-01 | End: 2021-01-01

## 2021-01-01 RX ORDER — VANCOMYCIN HCL 1 G
VIAL (EA) INTRAVENOUS
Refills: 0 | Status: DISCONTINUED | OUTPATIENT
Start: 2021-01-01 | End: 2021-01-01

## 2021-01-01 RX ORDER — DEXTROSE 50 % IN WATER 50 %
12.5 SYRINGE (ML) INTRAVENOUS ONCE
Refills: 0 | Status: DISCONTINUED | OUTPATIENT
Start: 2021-01-01 | End: 2021-01-01

## 2021-01-01 RX ORDER — PHENYLEPHRINE HYDROCHLORIDE 10 MG/ML
0.2 INJECTION INTRAVENOUS
Qty: 160 | Refills: 0 | Status: DISCONTINUED | OUTPATIENT
Start: 2021-01-01 | End: 2021-01-01

## 2021-01-01 RX ORDER — AZITHROMYCIN 500 MG/1
0 TABLET, FILM COATED ORAL
Qty: 0 | Refills: 0 | DISCHARGE
Start: 2021-01-01 | End: 2021-01-01

## 2021-01-01 RX ORDER — HEPARIN SODIUM 5000 [USP'U]/ML
5000 INJECTION INTRAVENOUS; SUBCUTANEOUS EVERY 8 HOURS
Refills: 0 | Status: DISCONTINUED | OUTPATIENT
Start: 2021-01-01 | End: 2021-01-01

## 2021-01-01 RX ADMIN — KETAMINE HYDROCHLORIDE 1.93 MG/KG/HR: 100 INJECTION INTRAMUSCULAR; INTRAVENOUS at 07:00

## 2021-01-01 RX ADMIN — INSULIN HUMAN 6 UNIT(S): 100 INJECTION, SOLUTION SUBCUTANEOUS at 17:48

## 2021-01-01 RX ADMIN — ZINC OXIDE 1 APPLICATION(S): 200 OINTMENT TOPICAL at 22:04

## 2021-01-01 RX ADMIN — MIDODRINE HYDROCHLORIDE 20 MILLIGRAM(S): 2.5 TABLET ORAL at 13:22

## 2021-01-01 RX ADMIN — MIDODRINE HYDROCHLORIDE 5 MILLIGRAM(S): 2.5 TABLET ORAL at 05:18

## 2021-01-01 RX ADMIN — HEPARIN SODIUM 5000 UNIT(S): 5000 INJECTION INTRAVENOUS; SUBCUTANEOUS at 21:46

## 2021-01-01 RX ADMIN — CHLORHEXIDINE GLUCONATE 15 MILLILITER(S): 213 SOLUTION TOPICAL at 17:28

## 2021-01-01 RX ADMIN — KETAMINE HYDROCHLORIDE 1.93 MG/KG/HR: 100 INJECTION INTRAMUSCULAR; INTRAVENOUS at 13:30

## 2021-01-01 RX ADMIN — SODIUM CHLORIDE 1000 MILLILITER(S): 9 INJECTION, SOLUTION INTRAVENOUS at 11:04

## 2021-01-01 RX ADMIN — PROPOFOL 4.63 MICROGRAM(S)/KG/MIN: 10 INJECTION, EMULSION INTRAVENOUS at 04:47

## 2021-01-01 RX ADMIN — CHLORHEXIDINE GLUCONATE 15 MILLILITER(S): 213 SOLUTION TOPICAL at 06:09

## 2021-01-01 RX ADMIN — Medication 8: at 23:44

## 2021-01-01 RX ADMIN — INSULIN HUMAN 6 UNIT(S): 100 INJECTION, SOLUTION SUBCUTANEOUS at 05:23

## 2021-01-01 RX ADMIN — MIDODRINE HYDROCHLORIDE 20 MILLIGRAM(S): 2.5 TABLET ORAL at 22:08

## 2021-01-01 RX ADMIN — Medication 6 MILLIGRAM(S): at 05:19

## 2021-01-01 RX ADMIN — HEPARIN SODIUM 5000 UNIT(S): 5000 INJECTION INTRAVENOUS; SUBCUTANEOUS at 21:28

## 2021-01-01 RX ADMIN — Medication 650 MILLIGRAM(S): at 23:44

## 2021-01-01 RX ADMIN — Medication 250 MILLIGRAM(S): at 00:45

## 2021-01-01 RX ADMIN — SENNA PLUS 10 MILLILITER(S): 8.6 TABLET ORAL at 22:15

## 2021-01-01 RX ADMIN — POTASSIUM PHOSPHATE, MONOBASIC POTASSIUM PHOSPHATE, DIBASIC 83.33 MILLIMOLE(S): 236; 224 INJECTION, SOLUTION INTRAVENOUS at 05:35

## 2021-01-01 RX ADMIN — Medication 2: at 00:14

## 2021-01-01 RX ADMIN — KETAMINE HYDROCHLORIDE 1.93 MG/KG/HR: 100 INJECTION INTRAMUSCULAR; INTRAVENOUS at 13:45

## 2021-01-01 RX ADMIN — PROPOFOL 4.63 MICROGRAM(S)/KG/MIN: 10 INJECTION, EMULSION INTRAVENOUS at 22:14

## 2021-01-01 RX ADMIN — Medication 400 MILLIGRAM(S): at 05:06

## 2021-01-01 RX ADMIN — PHENYLEPHRINE HYDROCHLORIDE 2.89 MICROGRAM(S)/KG/MIN: 10 INJECTION INTRAVENOUS at 20:42

## 2021-01-01 RX ADMIN — CHLORHEXIDINE GLUCONATE 15 MILLILITER(S): 213 SOLUTION TOPICAL at 05:03

## 2021-01-01 RX ADMIN — MIDODRINE HYDROCHLORIDE 5 MILLIGRAM(S): 2.5 TABLET ORAL at 21:10

## 2021-01-01 RX ADMIN — Medication 100 MILLIEQUIVALENT(S): at 11:00

## 2021-01-01 RX ADMIN — KETAMINE HYDROCHLORIDE 1.93 MG/KG/HR: 100 INJECTION INTRAMUSCULAR; INTRAVENOUS at 03:14

## 2021-01-01 RX ADMIN — Medication 4: at 17:41

## 2021-01-01 RX ADMIN — HEPARIN SODIUM 5000 UNIT(S): 5000 INJECTION INTRAVENOUS; SUBCUTANEOUS at 16:40

## 2021-01-01 RX ADMIN — CISATRACURIUM BESYLATE 13.9 MICROGRAM(S)/KG/MIN: 2 INJECTION INTRAVENOUS at 04:40

## 2021-01-01 RX ADMIN — FENTANYL CITRATE 3.86 MICROGRAM(S)/KG/HR: 50 INJECTION INTRAVENOUS at 11:55

## 2021-01-01 RX ADMIN — PROPOFOL 4.63 MICROGRAM(S)/KG/MIN: 10 INJECTION, EMULSION INTRAVENOUS at 08:16

## 2021-01-01 RX ADMIN — KETAMINE HYDROCHLORIDE 1.93 MG/KG/HR: 100 INJECTION INTRAMUSCULAR; INTRAVENOUS at 17:24

## 2021-01-01 RX ADMIN — FENTANYL CITRATE 3.86 MICROGRAM(S)/KG/HR: 50 INJECTION INTRAVENOUS at 19:37

## 2021-01-01 RX ADMIN — CHLORHEXIDINE GLUCONATE 1 APPLICATION(S): 213 SOLUTION TOPICAL at 05:09

## 2021-01-01 RX ADMIN — PANTOPRAZOLE SODIUM 40 MILLIGRAM(S): 20 TABLET, DELAYED RELEASE ORAL at 11:02

## 2021-01-01 RX ADMIN — DEXMEDETOMIDINE HYDROCHLORIDE IN 0.9% SODIUM CHLORIDE 3.86 MICROGRAM(S)/KG/HR: 4 INJECTION INTRAVENOUS at 00:20

## 2021-01-01 RX ADMIN — CHLORHEXIDINE GLUCONATE 15 MILLILITER(S): 213 SOLUTION TOPICAL at 05:04

## 2021-01-01 RX ADMIN — FENTANYL CITRATE 3.86 MICROGRAM(S)/KG/HR: 50 INJECTION INTRAVENOUS at 14:29

## 2021-01-01 RX ADMIN — Medication 400 MILLIGRAM(S): at 16:17

## 2021-01-01 RX ADMIN — INSULIN HUMAN 6 UNIT(S): 100 INJECTION, SOLUTION SUBCUTANEOUS at 23:25

## 2021-01-01 RX ADMIN — Medication 4: at 11:02

## 2021-01-01 RX ADMIN — SODIUM ZIRCONIUM CYCLOSILICATE 10 GRAM(S): 10 POWDER, FOR SUSPENSION ORAL at 13:14

## 2021-01-01 RX ADMIN — Medication 1 PACKET(S): at 05:30

## 2021-01-01 RX ADMIN — FENTANYL CITRATE 3.86 MICROGRAM(S)/KG/HR: 50 INJECTION INTRAVENOUS at 07:23

## 2021-01-01 RX ADMIN — PROPOFOL 4.63 MICROGRAM(S)/KG/MIN: 10 INJECTION, EMULSION INTRAVENOUS at 15:28

## 2021-01-01 RX ADMIN — HEPARIN SODIUM 5000 UNIT(S): 5000 INJECTION INTRAVENOUS; SUBCUTANEOUS at 13:08

## 2021-01-01 RX ADMIN — HEPARIN SODIUM 5000 UNIT(S): 5000 INJECTION INTRAVENOUS; SUBCUTANEOUS at 05:01

## 2021-01-01 RX ADMIN — MIDODRINE HYDROCHLORIDE 5 MILLIGRAM(S): 2.5 TABLET ORAL at 17:34

## 2021-01-01 RX ADMIN — Medication 2: at 17:07

## 2021-01-01 RX ADMIN — DEXMEDETOMIDINE HYDROCHLORIDE IN 0.9% SODIUM CHLORIDE 3.86 MICROGRAM(S)/KG/HR: 4 INJECTION INTRAVENOUS at 04:46

## 2021-01-01 RX ADMIN — HEPARIN SODIUM 5000 UNIT(S): 5000 INJECTION INTRAVENOUS; SUBCUTANEOUS at 13:54

## 2021-01-01 RX ADMIN — Medication 10 MILLIGRAM(S): at 03:05

## 2021-01-01 RX ADMIN — FENTANYL CITRATE 3.86 MICROGRAM(S)/KG/HR: 50 INJECTION INTRAVENOUS at 21:03

## 2021-01-01 RX ADMIN — INSULIN GLARGINE 30 UNIT(S): 100 INJECTION, SOLUTION SUBCUTANEOUS at 21:55

## 2021-01-01 RX ADMIN — ZINC OXIDE 1 APPLICATION(S): 200 OINTMENT TOPICAL at 05:51

## 2021-01-01 RX ADMIN — Medication 6: at 16:29

## 2021-01-01 RX ADMIN — SODIUM ZIRCONIUM CYCLOSILICATE 5 GRAM(S): 10 POWDER, FOR SUSPENSION ORAL at 06:13

## 2021-01-01 RX ADMIN — PANTOPRAZOLE SODIUM 40 MILLIGRAM(S): 20 TABLET, DELAYED RELEASE ORAL at 11:20

## 2021-01-01 RX ADMIN — DEXMEDETOMIDINE HYDROCHLORIDE IN 0.9% SODIUM CHLORIDE 3.86 MICROGRAM(S)/KG/HR: 4 INJECTION INTRAVENOUS at 10:30

## 2021-01-01 RX ADMIN — ENOXAPARIN SODIUM 40 MILLIGRAM(S): 100 INJECTION SUBCUTANEOUS at 17:21

## 2021-01-01 RX ADMIN — Medication 4: at 00:20

## 2021-01-01 RX ADMIN — MIDODRINE HYDROCHLORIDE 20 MILLIGRAM(S): 2.5 TABLET ORAL at 05:11

## 2021-01-01 RX ADMIN — CHLORHEXIDINE GLUCONATE 15 MILLILITER(S): 213 SOLUTION TOPICAL at 17:14

## 2021-01-01 RX ADMIN — INSULIN HUMAN 6 UNIT(S): 100 INJECTION, SOLUTION SUBCUTANEOUS at 11:14

## 2021-01-01 RX ADMIN — DEXMEDETOMIDINE HYDROCHLORIDE IN 0.9% SODIUM CHLORIDE 3.86 MICROGRAM(S)/KG/HR: 4 INJECTION INTRAVENOUS at 05:07

## 2021-01-01 RX ADMIN — Medication 2: at 06:13

## 2021-01-01 RX ADMIN — Medication 6: at 05:38

## 2021-01-01 RX ADMIN — REMDESIVIR 500 MILLIGRAM(S): 5 INJECTION INTRAVENOUS at 08:44

## 2021-01-01 RX ADMIN — Medication 2: at 17:51

## 2021-01-01 RX ADMIN — ZINC OXIDE 1 APPLICATION(S): 200 OINTMENT TOPICAL at 05:46

## 2021-01-01 RX ADMIN — DEXMEDETOMIDINE HYDROCHLORIDE IN 0.9% SODIUM CHLORIDE 3.86 MICROGRAM(S)/KG/HR: 4 INJECTION INTRAVENOUS at 01:55

## 2021-01-01 RX ADMIN — CISATRACURIUM BESYLATE 13.9 MICROGRAM(S)/KG/MIN: 2 INJECTION INTRAVENOUS at 05:13

## 2021-01-01 RX ADMIN — INSULIN GLARGINE 12 UNIT(S): 100 INJECTION, SOLUTION SUBCUTANEOUS at 21:37

## 2021-01-01 RX ADMIN — FENTANYL CITRATE 3.86 MICROGRAM(S)/KG/HR: 50 INJECTION INTRAVENOUS at 21:28

## 2021-01-01 RX ADMIN — PROPOFOL 4.63 MICROGRAM(S)/KG/MIN: 10 INJECTION, EMULSION INTRAVENOUS at 01:10

## 2021-01-01 RX ADMIN — PROPOFOL 4.63 MICROGRAM(S)/KG/MIN: 10 INJECTION, EMULSION INTRAVENOUS at 13:20

## 2021-01-01 RX ADMIN — PROPOFOL 4.63 MICROGRAM(S)/KG/MIN: 10 INJECTION, EMULSION INTRAVENOUS at 18:11

## 2021-01-01 RX ADMIN — PANTOPRAZOLE SODIUM 40 MILLIGRAM(S): 20 TABLET, DELAYED RELEASE ORAL at 12:26

## 2021-01-01 RX ADMIN — FENTANYL CITRATE 3.86 MICROGRAM(S)/KG/HR: 50 INJECTION INTRAVENOUS at 05:20

## 2021-01-01 RX ADMIN — Medication 40 MILLIEQUIVALENT(S): at 07:26

## 2021-01-01 RX ADMIN — CISATRACURIUM BESYLATE 13.9 MICROGRAM(S)/KG/MIN: 2 INJECTION INTRAVENOUS at 12:23

## 2021-01-01 RX ADMIN — ZINC OXIDE 1 APPLICATION(S): 200 OINTMENT TOPICAL at 21:13

## 2021-01-01 RX ADMIN — Medication 50 MILLIEQUIVALENT(S): at 03:53

## 2021-01-01 RX ADMIN — Medication 6: at 06:13

## 2021-01-01 RX ADMIN — MIDODRINE HYDROCHLORIDE 20 MILLIGRAM(S): 2.5 TABLET ORAL at 05:29

## 2021-01-01 RX ADMIN — ERGOCALCIFEROL 50000 UNIT(S): 1.25 CAPSULE ORAL at 08:55

## 2021-01-01 RX ADMIN — CISATRACURIUM BESYLATE 13.9 MICROGRAM(S)/KG/MIN: 2 INJECTION INTRAVENOUS at 08:00

## 2021-01-01 RX ADMIN — PANTOPRAZOLE SODIUM 40 MILLIGRAM(S): 20 TABLET, DELAYED RELEASE ORAL at 12:01

## 2021-01-01 RX ADMIN — Medication 667 MILLIGRAM(S): at 06:12

## 2021-01-01 RX ADMIN — MIDODRINE HYDROCHLORIDE 20 MILLIGRAM(S): 2.5 TABLET ORAL at 13:06

## 2021-01-01 RX ADMIN — MIDODRINE HYDROCHLORIDE 10 MILLIGRAM(S): 2.5 TABLET ORAL at 13:30

## 2021-01-01 RX ADMIN — Medication 6 MILLIGRAM(S): at 05:34

## 2021-01-01 RX ADMIN — CHLORHEXIDINE GLUCONATE 1 APPLICATION(S): 213 SOLUTION TOPICAL at 05:30

## 2021-01-01 RX ADMIN — PROPOFOL 4.63 MICROGRAM(S)/KG/MIN: 10 INJECTION, EMULSION INTRAVENOUS at 02:35

## 2021-01-01 RX ADMIN — FENTANYL CITRATE 3.86 MICROGRAM(S)/KG/HR: 50 INJECTION INTRAVENOUS at 16:05

## 2021-01-01 RX ADMIN — PROPOFOL 4.63 MICROGRAM(S)/KG/MIN: 10 INJECTION, EMULSION INTRAVENOUS at 05:58

## 2021-01-01 RX ADMIN — PROPOFOL 4.63 MICROGRAM(S)/KG/MIN: 10 INJECTION, EMULSION INTRAVENOUS at 12:04

## 2021-01-01 RX ADMIN — INSULIN GLARGINE 8 UNIT(S): 100 INJECTION, SOLUTION SUBCUTANEOUS at 23:44

## 2021-01-01 RX ADMIN — Medication 3.61 MICROGRAM(S)/KG/MIN: at 04:59

## 2021-01-01 RX ADMIN — CHLORHEXIDINE GLUCONATE 15 MILLILITER(S): 213 SOLUTION TOPICAL at 17:56

## 2021-01-01 RX ADMIN — PROPOFOL 4.63 MICROGRAM(S)/KG/MIN: 10 INJECTION, EMULSION INTRAVENOUS at 03:27

## 2021-01-01 RX ADMIN — INSULIN HUMAN 6 UNIT(S): 100 INJECTION, SOLUTION SUBCUTANEOUS at 11:52

## 2021-01-01 RX ADMIN — Medication 4: at 16:42

## 2021-01-01 RX ADMIN — MEROPENEM 100 MILLIGRAM(S): 1 INJECTION INTRAVENOUS at 05:13

## 2021-01-01 RX ADMIN — ZINC OXIDE 1 APPLICATION(S): 200 OINTMENT TOPICAL at 21:27

## 2021-01-01 RX ADMIN — Medication 2: at 05:19

## 2021-01-01 RX ADMIN — ZINC OXIDE 1 APPLICATION(S): 200 OINTMENT TOPICAL at 14:03

## 2021-01-01 RX ADMIN — CISATRACURIUM BESYLATE 13.9 MICROGRAM(S)/KG/MIN: 2 INJECTION INTRAVENOUS at 08:17

## 2021-01-01 RX ADMIN — PROPOFOL 4.63 MICROGRAM(S)/KG/MIN: 10 INJECTION, EMULSION INTRAVENOUS at 08:00

## 2021-01-01 RX ADMIN — REMDESIVIR 500 MILLIGRAM(S): 5 INJECTION INTRAVENOUS at 08:08

## 2021-01-01 RX ADMIN — Medication 3.61 MICROGRAM(S)/KG/MIN: at 07:47

## 2021-01-01 RX ADMIN — CHLORHEXIDINE GLUCONATE 15 MILLILITER(S): 213 SOLUTION TOPICAL at 17:49

## 2021-01-01 RX ADMIN — PROPOFOL 4.63 MICROGRAM(S)/KG/MIN: 10 INJECTION, EMULSION INTRAVENOUS at 17:50

## 2021-01-01 RX ADMIN — KETAMINE HYDROCHLORIDE 1.93 MG/KG/HR: 100 INJECTION INTRAMUSCULAR; INTRAVENOUS at 16:44

## 2021-01-01 RX ADMIN — ENOXAPARIN SODIUM 40 MILLIGRAM(S): 100 INJECTION SUBCUTANEOUS at 06:09

## 2021-01-01 RX ADMIN — Medication 8: at 06:37

## 2021-01-01 RX ADMIN — MIDODRINE HYDROCHLORIDE 20 MILLIGRAM(S): 2.5 TABLET ORAL at 14:43

## 2021-01-01 RX ADMIN — Medication 400 MILLIGRAM(S): at 08:31

## 2021-01-01 RX ADMIN — CHLORHEXIDINE GLUCONATE 15 MILLILITER(S): 213 SOLUTION TOPICAL at 17:33

## 2021-01-01 RX ADMIN — Medication 3.61 MICROGRAM(S)/KG/MIN: at 08:00

## 2021-01-01 RX ADMIN — PANTOPRAZOLE SODIUM 40 MILLIGRAM(S): 20 TABLET, DELAYED RELEASE ORAL at 11:51

## 2021-01-01 RX ADMIN — DEXMEDETOMIDINE HYDROCHLORIDE IN 0.9% SODIUM CHLORIDE 3.86 MICROGRAM(S)/KG/HR: 4 INJECTION INTRAVENOUS at 09:51

## 2021-01-01 RX ADMIN — CHLORHEXIDINE GLUCONATE 15 MILLILITER(S): 213 SOLUTION TOPICAL at 06:21

## 2021-01-01 RX ADMIN — MIDODRINE HYDROCHLORIDE 5 MILLIGRAM(S): 2.5 TABLET ORAL at 22:46

## 2021-01-01 RX ADMIN — INSULIN GLARGINE 30 UNIT(S): 100 INJECTION, SOLUTION SUBCUTANEOUS at 21:14

## 2021-01-01 RX ADMIN — Medication 3.61 MICROGRAM(S)/KG/MIN: at 06:04

## 2021-01-01 RX ADMIN — CHLORHEXIDINE GLUCONATE 1 APPLICATION(S): 213 SOLUTION TOPICAL at 05:36

## 2021-01-01 RX ADMIN — Medication 5 MILLIGRAM(S): at 20:16

## 2021-01-01 RX ADMIN — Medication 4: at 23:26

## 2021-01-01 RX ADMIN — Medication 2: at 06:08

## 2021-01-01 RX ADMIN — FENTANYL CITRATE 3.86 MICROGRAM(S)/KG/HR: 50 INJECTION INTRAVENOUS at 11:02

## 2021-01-01 RX ADMIN — MIDODRINE HYDROCHLORIDE 5 MILLIGRAM(S): 2.5 TABLET ORAL at 13:25

## 2021-01-01 RX ADMIN — MIDODRINE HYDROCHLORIDE 20 MILLIGRAM(S): 2.5 TABLET ORAL at 06:09

## 2021-01-01 RX ADMIN — ENOXAPARIN SODIUM 40 MILLIGRAM(S): 100 INJECTION SUBCUTANEOUS at 05:30

## 2021-01-01 RX ADMIN — INSULIN HUMAN 6 UNIT(S): 100 INJECTION, SOLUTION SUBCUTANEOUS at 05:10

## 2021-01-01 RX ADMIN — HEPARIN SODIUM 5000 UNIT(S): 5000 INJECTION INTRAVENOUS; SUBCUTANEOUS at 05:23

## 2021-01-01 RX ADMIN — POTASSIUM PHOSPHATE, MONOBASIC POTASSIUM PHOSPHATE, DIBASIC 62.5 MILLIMOLE(S): 236; 224 INJECTION, SOLUTION INTRAVENOUS at 07:47

## 2021-01-01 RX ADMIN — PROPOFOL 4.63 MICROGRAM(S)/KG/MIN: 10 INJECTION, EMULSION INTRAVENOUS at 02:33

## 2021-01-01 RX ADMIN — CHLORHEXIDINE GLUCONATE 15 MILLILITER(S): 213 SOLUTION TOPICAL at 05:13

## 2021-01-01 RX ADMIN — Medication 20 MILLIGRAM(S): at 17:58

## 2021-01-01 RX ADMIN — MIDODRINE HYDROCHLORIDE 5 MILLIGRAM(S): 2.5 TABLET ORAL at 14:32

## 2021-01-01 RX ADMIN — KETAMINE HYDROCHLORIDE 1.93 MG/KG/HR: 100 INJECTION INTRAMUSCULAR; INTRAVENOUS at 09:30

## 2021-01-01 RX ADMIN — ENOXAPARIN SODIUM 40 MILLIGRAM(S): 100 INJECTION SUBCUTANEOUS at 17:33

## 2021-01-01 RX ADMIN — INSULIN HUMAN 10 UNIT(S): 100 INJECTION, SOLUTION SUBCUTANEOUS at 04:28

## 2021-01-01 RX ADMIN — CHLORHEXIDINE GLUCONATE 15 MILLILITER(S): 213 SOLUTION TOPICAL at 05:18

## 2021-01-01 RX ADMIN — Medication 1000 MILLIGRAM(S): at 09:00

## 2021-01-01 RX ADMIN — PANTOPRAZOLE SODIUM 40 MILLIGRAM(S): 20 TABLET, DELAYED RELEASE ORAL at 12:14

## 2021-01-01 RX ADMIN — POLYETHYLENE GLYCOL 3350 17 GRAM(S): 17 POWDER, FOR SOLUTION ORAL at 11:17

## 2021-01-01 RX ADMIN — CHLORHEXIDINE GLUCONATE 15 MILLILITER(S): 213 SOLUTION TOPICAL at 17:04

## 2021-01-01 RX ADMIN — SODIUM ZIRCONIUM CYCLOSILICATE 10 GRAM(S): 10 POWDER, FOR SUSPENSION ORAL at 21:15

## 2021-01-01 RX ADMIN — MEROPENEM 100 MILLIGRAM(S): 1 INJECTION INTRAVENOUS at 21:26

## 2021-01-01 RX ADMIN — MIDODRINE HYDROCHLORIDE 10 MILLIGRAM(S): 2.5 TABLET ORAL at 21:42

## 2021-01-01 RX ADMIN — MIDODRINE HYDROCHLORIDE 20 MILLIGRAM(S): 2.5 TABLET ORAL at 22:03

## 2021-01-01 RX ADMIN — ENOXAPARIN SODIUM 40 MILLIGRAM(S): 100 INJECTION SUBCUTANEOUS at 17:15

## 2021-01-01 RX ADMIN — POLYETHYLENE GLYCOL 3350 17 GRAM(S): 17 POWDER, FOR SOLUTION ORAL at 12:24

## 2021-01-01 RX ADMIN — FENTANYL CITRATE 3.86 MICROGRAM(S)/KG/HR: 50 INJECTION INTRAVENOUS at 18:00

## 2021-01-01 RX ADMIN — MIDODRINE HYDROCHLORIDE 10 MILLIGRAM(S): 2.5 TABLET ORAL at 13:20

## 2021-01-01 RX ADMIN — PROPOFOL 4.63 MICROGRAM(S)/KG/MIN: 10 INJECTION, EMULSION INTRAVENOUS at 09:54

## 2021-01-01 RX ADMIN — ENOXAPARIN SODIUM 40 MILLIGRAM(S): 100 INJECTION SUBCUTANEOUS at 17:24

## 2021-01-01 RX ADMIN — PROPOFOL 4.63 MICROGRAM(S)/KG/MIN: 10 INJECTION, EMULSION INTRAVENOUS at 19:37

## 2021-01-01 RX ADMIN — Medication 250 MILLIGRAM(S): at 05:29

## 2021-01-01 RX ADMIN — ZINC OXIDE 1 APPLICATION(S): 200 OINTMENT TOPICAL at 22:38

## 2021-01-01 RX ADMIN — ENOXAPARIN SODIUM 40 MILLIGRAM(S): 100 INJECTION SUBCUTANEOUS at 17:49

## 2021-01-01 RX ADMIN — INSULIN GLARGINE 12 UNIT(S): 100 INJECTION, SOLUTION SUBCUTANEOUS at 21:19

## 2021-01-01 RX ADMIN — ENOXAPARIN SODIUM 40 MILLIGRAM(S): 100 INJECTION SUBCUTANEOUS at 05:18

## 2021-01-01 RX ADMIN — Medication 200 GRAM(S): at 03:53

## 2021-01-01 RX ADMIN — DEXMEDETOMIDINE HYDROCHLORIDE IN 0.9% SODIUM CHLORIDE 3.86 MICROGRAM(S)/KG/HR: 4 INJECTION INTRAVENOUS at 22:36

## 2021-01-01 RX ADMIN — Medication 250 MILLIGRAM(S): at 05:21

## 2021-01-01 RX ADMIN — PROPOFOL 4.63 MICROGRAM(S)/KG/MIN: 10 INJECTION, EMULSION INTRAVENOUS at 21:57

## 2021-01-01 RX ADMIN — Medication 4: at 12:22

## 2021-01-01 RX ADMIN — LOSARTAN POTASSIUM 25 MILLIGRAM(S): 100 TABLET, FILM COATED ORAL at 08:38

## 2021-01-01 RX ADMIN — Medication 6 MILLIGRAM(S): at 05:30

## 2021-01-01 RX ADMIN — INSULIN GLARGINE 8 UNIT(S): 100 INJECTION, SOLUTION SUBCUTANEOUS at 21:33

## 2021-01-01 RX ADMIN — KETAMINE HYDROCHLORIDE 1.93 MG/KG/HR: 100 INJECTION INTRAMUSCULAR; INTRAVENOUS at 08:17

## 2021-01-01 RX ADMIN — MIDODRINE HYDROCHLORIDE 10 MILLIGRAM(S): 2.5 TABLET ORAL at 21:55

## 2021-01-01 RX ADMIN — Medication 3 MILLIGRAM(S): at 01:02

## 2021-01-01 RX ADMIN — ENOXAPARIN SODIUM 40 MILLIGRAM(S): 100 INJECTION SUBCUTANEOUS at 17:56

## 2021-01-01 RX ADMIN — Medication 1 PACKET(S): at 05:01

## 2021-01-01 RX ADMIN — FENTANYL CITRATE 3.86 MICROGRAM(S)/KG/HR: 50 INJECTION INTRAVENOUS at 01:03

## 2021-01-01 RX ADMIN — PROPOFOL 4.63 MICROGRAM(S)/KG/MIN: 10 INJECTION, EMULSION INTRAVENOUS at 21:55

## 2021-01-01 RX ADMIN — PANTOPRAZOLE SODIUM 40 MILLIGRAM(S): 20 TABLET, DELAYED RELEASE ORAL at 12:41

## 2021-01-01 RX ADMIN — FENTANYL CITRATE 3.86 MICROGRAM(S)/KG/HR: 50 INJECTION INTRAVENOUS at 09:16

## 2021-01-01 RX ADMIN — MIDODRINE HYDROCHLORIDE 10 MILLIGRAM(S): 2.5 TABLET ORAL at 05:58

## 2021-01-01 RX ADMIN — ERGOCALCIFEROL 50000 UNIT(S): 1.25 CAPSULE ORAL at 10:06

## 2021-01-01 RX ADMIN — Medication 1 PACKET(S): at 13:50

## 2021-01-01 RX ADMIN — FENTANYL CITRATE 3.86 MICROGRAM(S)/KG/HR: 50 INJECTION INTRAVENOUS at 03:11

## 2021-01-01 RX ADMIN — KETAMINE HYDROCHLORIDE 1.93 MG/KG/HR: 100 INJECTION INTRAMUSCULAR; INTRAVENOUS at 03:12

## 2021-01-01 RX ADMIN — FENTANYL CITRATE 3.86 MICROGRAM(S)/KG/HR: 50 INJECTION INTRAVENOUS at 03:19

## 2021-01-01 RX ADMIN — Medication 10: at 12:36

## 2021-01-01 RX ADMIN — ENOXAPARIN SODIUM 40 MILLIGRAM(S): 100 INJECTION SUBCUTANEOUS at 05:21

## 2021-01-01 RX ADMIN — PROPOFOL 4.63 MICROGRAM(S)/KG/MIN: 10 INJECTION, EMULSION INTRAVENOUS at 04:48

## 2021-01-01 RX ADMIN — PROPOFOL 4.63 MICROGRAM(S)/KG/MIN: 10 INJECTION, EMULSION INTRAVENOUS at 01:54

## 2021-01-01 RX ADMIN — CISATRACURIUM BESYLATE 13.9 MICROGRAM(S)/KG/MIN: 2 INJECTION INTRAVENOUS at 04:47

## 2021-01-01 RX ADMIN — Medication 4: at 17:12

## 2021-01-01 RX ADMIN — PROPOFOL 4.63 MICROGRAM(S)/KG/MIN: 10 INJECTION, EMULSION INTRAVENOUS at 22:03

## 2021-01-01 RX ADMIN — FENTANYL CITRATE 3.86 MICROGRAM(S)/KG/HR: 50 INJECTION INTRAVENOUS at 18:47

## 2021-01-01 RX ADMIN — DEXMEDETOMIDINE HYDROCHLORIDE IN 0.9% SODIUM CHLORIDE 3.86 MICROGRAM(S)/KG/HR: 4 INJECTION INTRAVENOUS at 06:33

## 2021-01-01 RX ADMIN — CHLORHEXIDINE GLUCONATE 1 APPLICATION(S): 213 SOLUTION TOPICAL at 11:30

## 2021-01-01 RX ADMIN — Medication 250 MILLIGRAM(S): at 17:14

## 2021-01-01 RX ADMIN — MIDODRINE HYDROCHLORIDE 10 MILLIGRAM(S): 2.5 TABLET ORAL at 05:10

## 2021-01-01 RX ADMIN — KETAMINE HYDROCHLORIDE 1.93 MG/KG/HR: 100 INJECTION INTRAMUSCULAR; INTRAVENOUS at 18:29

## 2021-01-01 RX ADMIN — HEPARIN SODIUM 5000 UNIT(S): 5000 INJECTION INTRAVENOUS; SUBCUTANEOUS at 21:37

## 2021-01-01 RX ADMIN — INSULIN HUMAN 6 UNIT(S): 100 INJECTION, SOLUTION SUBCUTANEOUS at 17:58

## 2021-01-01 RX ADMIN — SODIUM CHLORIDE 100 MILLILITER(S): 9 INJECTION, SOLUTION INTRAVENOUS at 10:29

## 2021-01-01 RX ADMIN — Medication 4: at 00:33

## 2021-01-01 RX ADMIN — Medication 667 MILLIGRAM(S): at 21:26

## 2021-01-01 RX ADMIN — Medication 4: at 11:53

## 2021-01-01 RX ADMIN — Medication 6 MILLIGRAM(S): at 05:01

## 2021-01-01 RX ADMIN — INSULIN HUMAN 6 UNIT(S): 100 INJECTION, SOLUTION SUBCUTANEOUS at 05:19

## 2021-01-01 RX ADMIN — Medication 6: at 23:12

## 2021-01-01 RX ADMIN — MIDODRINE HYDROCHLORIDE 20 MILLIGRAM(S): 2.5 TABLET ORAL at 22:38

## 2021-01-01 RX ADMIN — PROPOFOL 4.63 MICROGRAM(S)/KG/MIN: 10 INJECTION, EMULSION INTRAVENOUS at 10:45

## 2021-01-01 RX ADMIN — INSULIN GLARGINE 25 UNIT(S): 100 INJECTION, SOLUTION SUBCUTANEOUS at 21:30

## 2021-01-01 RX ADMIN — CHLORHEXIDINE GLUCONATE 1 APPLICATION(S): 213 SOLUTION TOPICAL at 05:58

## 2021-01-01 RX ADMIN — PROPOFOL 4.63 MICROGRAM(S)/KG/MIN: 10 INJECTION, EMULSION INTRAVENOUS at 16:44

## 2021-01-01 RX ADMIN — CHLORHEXIDINE GLUCONATE 15 MILLILITER(S): 213 SOLUTION TOPICAL at 05:45

## 2021-01-01 RX ADMIN — ZINC OXIDE 1 APPLICATION(S): 200 OINTMENT TOPICAL at 21:30

## 2021-01-01 RX ADMIN — CISATRACURIUM BESYLATE 13.9 MICROGRAM(S)/KG/MIN: 2 INJECTION INTRAVENOUS at 05:07

## 2021-01-01 RX ADMIN — KETAMINE HYDROCHLORIDE 1.93 MG/KG/HR: 100 INJECTION INTRAMUSCULAR; INTRAVENOUS at 00:10

## 2021-01-01 RX ADMIN — FENTANYL CITRATE 3.86 MICROGRAM(S)/KG/HR: 50 INJECTION INTRAVENOUS at 16:10

## 2021-01-01 RX ADMIN — HEPARIN SODIUM 5000 UNIT(S): 5000 INJECTION INTRAVENOUS; SUBCUTANEOUS at 05:29

## 2021-01-01 RX ADMIN — PROPOFOL 4.63 MICROGRAM(S)/KG/MIN: 10 INJECTION, EMULSION INTRAVENOUS at 19:17

## 2021-01-01 RX ADMIN — Medication 250 MILLIGRAM(S): at 14:32

## 2021-01-01 RX ADMIN — CISATRACURIUM BESYLATE 13.9 MICROGRAM(S)/KG/MIN: 2 INJECTION INTRAVENOUS at 08:57

## 2021-01-01 RX ADMIN — MIDODRINE HYDROCHLORIDE 10 MILLIGRAM(S): 2.5 TABLET ORAL at 21:03

## 2021-01-01 RX ADMIN — DEXMEDETOMIDINE HYDROCHLORIDE IN 0.9% SODIUM CHLORIDE 3.86 MICROGRAM(S)/KG/HR: 4 INJECTION INTRAVENOUS at 01:53

## 2021-01-01 RX ADMIN — DEXMEDETOMIDINE HYDROCHLORIDE IN 0.9% SODIUM CHLORIDE 3.86 MICROGRAM(S)/KG/HR: 4 INJECTION INTRAVENOUS at 08:31

## 2021-01-01 RX ADMIN — PROPOFOL 4.63 MICROGRAM(S)/KG/MIN: 10 INJECTION, EMULSION INTRAVENOUS at 18:10

## 2021-01-01 RX ADMIN — INSULIN HUMAN 10 UNIT(S): 100 INJECTION, SOLUTION SUBCUTANEOUS at 05:22

## 2021-01-01 RX ADMIN — ENOXAPARIN SODIUM 40 MILLIGRAM(S): 100 INJECTION SUBCUTANEOUS at 17:10

## 2021-01-01 RX ADMIN — MIDODRINE HYDROCHLORIDE 10 MILLIGRAM(S): 2.5 TABLET ORAL at 21:12

## 2021-01-01 RX ADMIN — Medication 250 MILLIGRAM(S): at 13:27

## 2021-01-01 RX ADMIN — KETAMINE HYDROCHLORIDE 1.93 MG/KG/HR: 100 INJECTION INTRAMUSCULAR; INTRAVENOUS at 12:04

## 2021-01-01 RX ADMIN — PANTOPRAZOLE SODIUM 40 MILLIGRAM(S): 20 TABLET, DELAYED RELEASE ORAL at 12:24

## 2021-01-01 RX ADMIN — PROPOFOL 4.63 MICROGRAM(S)/KG/MIN: 10 INJECTION, EMULSION INTRAVENOUS at 11:17

## 2021-01-01 RX ADMIN — Medication 6: at 11:34

## 2021-01-01 RX ADMIN — CISATRACURIUM BESYLATE 13.9 MICROGRAM(S)/KG/MIN: 2 INJECTION INTRAVENOUS at 17:03

## 2021-01-01 RX ADMIN — Medication 8: at 00:04

## 2021-01-01 RX ADMIN — LOSARTAN POTASSIUM 25 MILLIGRAM(S): 100 TABLET, FILM COATED ORAL at 06:52

## 2021-01-01 RX ADMIN — ENOXAPARIN SODIUM 40 MILLIGRAM(S): 100 INJECTION SUBCUTANEOUS at 17:07

## 2021-01-01 RX ADMIN — INSULIN HUMAN 6 UNIT(S): 100 INJECTION, SOLUTION SUBCUTANEOUS at 23:34

## 2021-01-01 RX ADMIN — ENOXAPARIN SODIUM 40 MILLIGRAM(S): 100 INJECTION SUBCUTANEOUS at 05:58

## 2021-01-01 RX ADMIN — ENOXAPARIN SODIUM 40 MILLIGRAM(S): 100 INJECTION SUBCUTANEOUS at 06:30

## 2021-01-01 RX ADMIN — FENTANYL CITRATE 3.86 MICROGRAM(S)/KG/HR: 50 INJECTION INTRAVENOUS at 02:32

## 2021-01-01 RX ADMIN — CISATRACURIUM BESYLATE 13.9 MICROGRAM(S)/KG/MIN: 2 INJECTION INTRAVENOUS at 14:56

## 2021-01-01 RX ADMIN — INSULIN GLARGINE 8 UNIT(S): 100 INJECTION, SOLUTION SUBCUTANEOUS at 22:38

## 2021-01-01 RX ADMIN — PANTOPRAZOLE SODIUM 40 MILLIGRAM(S): 20 TABLET, DELAYED RELEASE ORAL at 11:14

## 2021-01-01 RX ADMIN — PROPOFOL 4.63 MICROGRAM(S)/KG/MIN: 10 INJECTION, EMULSION INTRAVENOUS at 23:39

## 2021-01-01 RX ADMIN — FENTANYL CITRATE 3.86 MICROGRAM(S)/KG/HR: 50 INJECTION INTRAVENOUS at 08:17

## 2021-01-01 RX ADMIN — PROPOFOL 4.63 MICROGRAM(S)/KG/MIN: 10 INJECTION, EMULSION INTRAVENOUS at 17:14

## 2021-01-01 RX ADMIN — INSULIN GLARGINE 8 UNIT(S): 100 INJECTION, SOLUTION SUBCUTANEOUS at 21:04

## 2021-01-01 RX ADMIN — CHLORHEXIDINE GLUCONATE 1 APPLICATION(S): 213 SOLUTION TOPICAL at 05:10

## 2021-01-01 RX ADMIN — Medication 3.61 MICROGRAM(S)/KG/MIN: at 00:25

## 2021-01-01 RX ADMIN — SODIUM CHLORIDE 1000 MILLILITER(S): 9 INJECTION, SOLUTION INTRAVENOUS at 06:21

## 2021-01-01 RX ADMIN — FENTANYL CITRATE 3.86 MICROGRAM(S)/KG/HR: 50 INJECTION INTRAVENOUS at 07:47

## 2021-01-01 RX ADMIN — CHLORHEXIDINE GLUCONATE 15 MILLILITER(S): 213 SOLUTION TOPICAL at 17:03

## 2021-01-01 RX ADMIN — PROPOFOL 4.63 MICROGRAM(S)/KG/MIN: 10 INJECTION, EMULSION INTRAVENOUS at 01:43

## 2021-01-01 RX ADMIN — Medication 6 MILLIGRAM(S): at 06:52

## 2021-01-01 RX ADMIN — Medication 1 PACKET(S): at 21:40

## 2021-01-01 RX ADMIN — DEXMEDETOMIDINE HYDROCHLORIDE IN 0.9% SODIUM CHLORIDE 3.86 MICROGRAM(S)/KG/HR: 4 INJECTION INTRAVENOUS at 10:16

## 2021-01-01 RX ADMIN — HEPARIN SODIUM 5000 UNIT(S): 5000 INJECTION INTRAVENOUS; SUBCUTANEOUS at 21:24

## 2021-01-01 RX ADMIN — INSULIN GLARGINE 15 UNIT(S): 100 INJECTION, SOLUTION SUBCUTANEOUS at 18:18

## 2021-01-01 RX ADMIN — INSULIN HUMAN 6 UNIT(S): 100 INJECTION, SOLUTION SUBCUTANEOUS at 05:54

## 2021-01-01 RX ADMIN — Medication 2: at 06:16

## 2021-01-01 RX ADMIN — VASOPRESSIN 2.4 UNIT(S)/MIN: 20 INJECTION INTRAVENOUS at 08:17

## 2021-01-01 RX ADMIN — ENOXAPARIN SODIUM 40 MILLIGRAM(S): 100 INJECTION SUBCUTANEOUS at 11:52

## 2021-01-01 RX ADMIN — ENOXAPARIN SODIUM 40 MILLIGRAM(S): 100 INJECTION SUBCUTANEOUS at 05:11

## 2021-01-01 RX ADMIN — INSULIN HUMAN 8 UNIT(S): 100 INJECTION, SOLUTION SUBCUTANEOUS at 03:37

## 2021-01-01 RX ADMIN — Medication 400 MILLIGRAM(S): at 09:00

## 2021-01-01 RX ADMIN — KETAMINE HYDROCHLORIDE 1.93 MG/KG/HR: 100 INJECTION INTRAMUSCULAR; INTRAVENOUS at 19:15

## 2021-01-01 RX ADMIN — ENOXAPARIN SODIUM 40 MILLIGRAM(S): 100 INJECTION SUBCUTANEOUS at 05:07

## 2021-01-01 RX ADMIN — PROPOFOL 4.63 MICROGRAM(S)/KG/MIN: 10 INJECTION, EMULSION INTRAVENOUS at 23:18

## 2021-01-01 RX ADMIN — PANTOPRAZOLE SODIUM 40 MILLIGRAM(S): 20 TABLET, DELAYED RELEASE ORAL at 12:35

## 2021-01-01 RX ADMIN — CISATRACURIUM BESYLATE 13.9 MICROGRAM(S)/KG/MIN: 2 INJECTION INTRAVENOUS at 13:30

## 2021-01-01 RX ADMIN — MIDODRINE HYDROCHLORIDE 10 MILLIGRAM(S): 2.5 TABLET ORAL at 13:41

## 2021-01-01 RX ADMIN — SODIUM ZIRCONIUM CYCLOSILICATE 10 GRAM(S): 10 POWDER, FOR SUSPENSION ORAL at 13:56

## 2021-01-01 RX ADMIN — MEROPENEM 100 MILLIGRAM(S): 1 INJECTION INTRAVENOUS at 22:47

## 2021-01-01 RX ADMIN — ZINC OXIDE 1 APPLICATION(S): 200 OINTMENT TOPICAL at 08:07

## 2021-01-01 RX ADMIN — Medication 50 MILLILITER(S): at 03:53

## 2021-01-01 RX ADMIN — VASOPRESSIN 2.4 UNIT(S)/MIN: 20 INJECTION INTRAVENOUS at 21:57

## 2021-01-01 RX ADMIN — INSULIN GLARGINE 8 UNIT(S): 100 INJECTION, SOLUTION SUBCUTANEOUS at 22:08

## 2021-01-01 RX ADMIN — PANTOPRAZOLE SODIUM 40 MILLIGRAM(S): 20 TABLET, DELAYED RELEASE ORAL at 11:05

## 2021-01-01 RX ADMIN — POLYETHYLENE GLYCOL 3350 17 GRAM(S): 17 POWDER, FOR SOLUTION ORAL at 11:52

## 2021-01-01 RX ADMIN — DEXMEDETOMIDINE HYDROCHLORIDE IN 0.9% SODIUM CHLORIDE 3.86 MICROGRAM(S)/KG/HR: 4 INJECTION INTRAVENOUS at 16:10

## 2021-01-01 RX ADMIN — ZINC OXIDE 1 APPLICATION(S): 200 OINTMENT TOPICAL at 05:24

## 2021-01-01 RX ADMIN — SODIUM ZIRCONIUM CYCLOSILICATE 10 GRAM(S): 10 POWDER, FOR SUSPENSION ORAL at 05:45

## 2021-01-01 RX ADMIN — Medication 8: at 23:21

## 2021-01-01 RX ADMIN — FENTANYL CITRATE 3.86 MICROGRAM(S)/KG/HR: 50 INJECTION INTRAVENOUS at 20:06

## 2021-01-01 RX ADMIN — SENNA PLUS 10 MILLILITER(S): 8.6 TABLET ORAL at 21:18

## 2021-01-01 RX ADMIN — HEPARIN SODIUM 5000 UNIT(S): 5000 INJECTION INTRAVENOUS; SUBCUTANEOUS at 13:50

## 2021-01-01 RX ADMIN — MIDODRINE HYDROCHLORIDE 20 MILLIGRAM(S): 2.5 TABLET ORAL at 05:50

## 2021-01-01 RX ADMIN — Medication 250 MILLIGRAM(S): at 17:13

## 2021-01-01 RX ADMIN — Medication 8: at 05:10

## 2021-01-01 RX ADMIN — MIDODRINE HYDROCHLORIDE 5 MILLIGRAM(S): 2.5 TABLET ORAL at 21:55

## 2021-01-01 RX ADMIN — Medication 2: at 00:20

## 2021-01-01 RX ADMIN — Medication 250 MILLIGRAM(S): at 05:10

## 2021-01-01 RX ADMIN — Medication 6 MILLIGRAM(S): at 05:25

## 2021-01-01 RX ADMIN — MIDODRINE HYDROCHLORIDE 5 MILLIGRAM(S): 2.5 TABLET ORAL at 21:14

## 2021-01-01 RX ADMIN — CHLORHEXIDINE GLUCONATE 1 APPLICATION(S): 213 SOLUTION TOPICAL at 05:23

## 2021-01-01 RX ADMIN — INSULIN GLARGINE 25 UNIT(S): 100 INJECTION, SOLUTION SUBCUTANEOUS at 21:40

## 2021-01-01 RX ADMIN — INSULIN HUMAN 6 UNIT(S): 100 INJECTION, SOLUTION SUBCUTANEOUS at 17:07

## 2021-01-01 RX ADMIN — KETAMINE HYDROCHLORIDE 1.93 MG/KG/HR: 100 INJECTION INTRAMUSCULAR; INTRAVENOUS at 14:56

## 2021-01-01 RX ADMIN — CISATRACURIUM BESYLATE 13.9 MICROGRAM(S)/KG/MIN: 2 INJECTION INTRAVENOUS at 00:21

## 2021-01-01 RX ADMIN — ZINC OXIDE 1 APPLICATION(S): 200 OINTMENT TOPICAL at 05:14

## 2021-01-01 RX ADMIN — CISATRACURIUM BESYLATE 13.9 MICROGRAM(S)/KG/MIN: 2 INJECTION INTRAVENOUS at 04:59

## 2021-01-01 RX ADMIN — Medication 20 MILLIGRAM(S): at 10:29

## 2021-01-01 RX ADMIN — KETAMINE HYDROCHLORIDE 1.93 MG/KG/HR: 100 INJECTION INTRAMUSCULAR; INTRAVENOUS at 19:51

## 2021-01-01 RX ADMIN — Medication 3.61 MICROGRAM(S)/KG/MIN: at 08:32

## 2021-01-01 RX ADMIN — INSULIN GLARGINE 30 UNIT(S): 100 INJECTION, SOLUTION SUBCUTANEOUS at 21:26

## 2021-01-01 RX ADMIN — MEROPENEM 100 MILLIGRAM(S): 1 INJECTION INTRAVENOUS at 14:32

## 2021-01-01 RX ADMIN — ZINC OXIDE 1 APPLICATION(S): 200 OINTMENT TOPICAL at 13:56

## 2021-01-01 RX ADMIN — KETAMINE HYDROCHLORIDE 1.93 MG/KG/HR: 100 INJECTION INTRAMUSCULAR; INTRAVENOUS at 23:43

## 2021-01-01 RX ADMIN — DEXMEDETOMIDINE HYDROCHLORIDE IN 0.9% SODIUM CHLORIDE 3.86 MICROGRAM(S)/KG/HR: 4 INJECTION INTRAVENOUS at 19:22

## 2021-01-01 RX ADMIN — DEXMEDETOMIDINE HYDROCHLORIDE IN 0.9% SODIUM CHLORIDE 3.86 MICROGRAM(S)/KG/HR: 4 INJECTION INTRAVENOUS at 23:03

## 2021-01-01 RX ADMIN — DEXMEDETOMIDINE HYDROCHLORIDE IN 0.9% SODIUM CHLORIDE 3.86 MICROGRAM(S)/KG/HR: 4 INJECTION INTRAVENOUS at 19:17

## 2021-01-01 RX ADMIN — LOSARTAN POTASSIUM 25 MILLIGRAM(S): 100 TABLET, FILM COATED ORAL at 18:02

## 2021-01-01 RX ADMIN — SODIUM CHLORIDE 1000 MILLILITER(S): 9 INJECTION INTRAMUSCULAR; INTRAVENOUS; SUBCUTANEOUS at 03:53

## 2021-01-01 RX ADMIN — Medication 6: at 05:10

## 2021-01-01 RX ADMIN — ENOXAPARIN SODIUM 40 MILLIGRAM(S): 100 INJECTION SUBCUTANEOUS at 05:10

## 2021-01-01 RX ADMIN — Medication 150 MEQ/KG/HR: at 05:14

## 2021-01-01 RX ADMIN — Medication 25 MILLILITER(S): at 05:22

## 2021-01-01 RX ADMIN — POLYETHYLENE GLYCOL 3350 17 GRAM(S): 17 POWDER, FOR SOLUTION ORAL at 11:13

## 2021-01-01 RX ADMIN — CISATRACURIUM BESYLATE 13.9 MICROGRAM(S)/KG/MIN: 2 INJECTION INTRAVENOUS at 04:52

## 2021-01-01 RX ADMIN — Medication 20 MILLIGRAM(S): at 04:28

## 2021-01-01 RX ADMIN — HEPARIN SODIUM 5000 UNIT(S): 5000 INJECTION INTRAVENOUS; SUBCUTANEOUS at 14:39

## 2021-01-01 RX ADMIN — Medication 600 MILLIGRAM(S): at 06:00

## 2021-01-01 RX ADMIN — FENTANYL CITRATE 3.86 MICROGRAM(S)/KG/HR: 50 INJECTION INTRAVENOUS at 23:28

## 2021-01-01 RX ADMIN — SODIUM CHLORIDE 30 MILLILITER(S): 9 INJECTION, SOLUTION INTRAVENOUS at 05:37

## 2021-01-01 RX ADMIN — CISATRACURIUM BESYLATE 13.9 MICROGRAM(S)/KG/MIN: 2 INJECTION INTRAVENOUS at 16:10

## 2021-01-01 RX ADMIN — Medication 250 MILLIGRAM(S): at 17:49

## 2021-01-01 RX ADMIN — FENTANYL CITRATE 3.86 MICROGRAM(S)/KG/HR: 50 INJECTION INTRAVENOUS at 08:00

## 2021-01-01 RX ADMIN — CHLORHEXIDINE GLUCONATE 1 APPLICATION(S): 213 SOLUTION TOPICAL at 05:04

## 2021-01-01 RX ADMIN — Medication 400 MILLIGRAM(S): at 21:09

## 2021-01-01 RX ADMIN — MIDODRINE HYDROCHLORIDE 5 MILLIGRAM(S): 2.5 TABLET ORAL at 05:24

## 2021-01-01 RX ADMIN — Medication 2: at 11:56

## 2021-01-01 RX ADMIN — VASOPRESSIN 2.4 UNIT(S)/MIN: 20 INJECTION INTRAVENOUS at 06:09

## 2021-01-01 RX ADMIN — Medication 6: at 11:23

## 2021-01-01 RX ADMIN — Medication 12.5 MILLIGRAM(S): at 05:34

## 2021-01-01 RX ADMIN — MEROPENEM 100 MILLIGRAM(S): 1 INJECTION INTRAVENOUS at 13:25

## 2021-01-01 RX ADMIN — CHLORHEXIDINE GLUCONATE 15 MILLILITER(S): 213 SOLUTION TOPICAL at 05:36

## 2021-01-01 RX ADMIN — Medication 3.61 MICROGRAM(S)/KG/MIN: at 21:28

## 2021-01-01 RX ADMIN — PROPOFOL 4.63 MICROGRAM(S)/KG/MIN: 10 INJECTION, EMULSION INTRAVENOUS at 04:38

## 2021-01-01 RX ADMIN — INSULIN GLARGINE 25 UNIT(S): 100 INJECTION, SOLUTION SUBCUTANEOUS at 21:51

## 2021-01-01 RX ADMIN — Medication 100 MILLIEQUIVALENT(S): at 07:26

## 2021-01-01 RX ADMIN — CISATRACURIUM BESYLATE 13.9 MICROGRAM(S)/KG/MIN: 2 INJECTION INTRAVENOUS at 18:10

## 2021-01-01 RX ADMIN — Medication 50 MILLIEQUIVALENT(S): at 19:03

## 2021-01-01 RX ADMIN — INSULIN GLARGINE 10 UNIT(S): 100 INJECTION, SOLUTION SUBCUTANEOUS at 13:32

## 2021-01-01 RX ADMIN — ENOXAPARIN SODIUM 40 MILLIGRAM(S): 100 INJECTION SUBCUTANEOUS at 05:29

## 2021-01-01 RX ADMIN — CHLORHEXIDINE GLUCONATE 15 MILLILITER(S): 213 SOLUTION TOPICAL at 05:30

## 2021-01-01 RX ADMIN — MIDODRINE HYDROCHLORIDE 10 MILLIGRAM(S): 2.5 TABLET ORAL at 15:58

## 2021-01-01 RX ADMIN — ENOXAPARIN SODIUM 40 MILLIGRAM(S): 100 INJECTION SUBCUTANEOUS at 05:03

## 2021-01-01 RX ADMIN — ZINC OXIDE 1 APPLICATION(S): 200 OINTMENT TOPICAL at 13:28

## 2021-01-01 RX ADMIN — FENTANYL CITRATE 3.86 MICROGRAM(S)/KG/HR: 50 INJECTION INTRAVENOUS at 11:03

## 2021-01-01 RX ADMIN — CHLORHEXIDINE GLUCONATE 1 APPLICATION(S): 213 SOLUTION TOPICAL at 05:46

## 2021-01-01 RX ADMIN — Medication 3.61 MICROGRAM(S)/KG/MIN: at 20:11

## 2021-01-01 RX ADMIN — INSULIN HUMAN 6 UNIT(S): 100 INJECTION, SOLUTION SUBCUTANEOUS at 05:36

## 2021-01-01 RX ADMIN — SENNA PLUS 10 MILLILITER(S): 8.6 TABLET ORAL at 21:14

## 2021-01-01 RX ADMIN — Medication 4: at 07:33

## 2021-01-01 RX ADMIN — Medication 7.23 MICROGRAM(S)/KG/MIN: at 23:43

## 2021-01-01 RX ADMIN — DEXMEDETOMIDINE HYDROCHLORIDE IN 0.9% SODIUM CHLORIDE 3.86 MICROGRAM(S)/KG/HR: 4 INJECTION INTRAVENOUS at 18:14

## 2021-01-01 RX ADMIN — SODIUM CHLORIDE 2000 MILLILITER(S): 9 INJECTION, SOLUTION INTRAVENOUS at 03:35

## 2021-01-01 RX ADMIN — MIDODRINE HYDROCHLORIDE 5 MILLIGRAM(S): 2.5 TABLET ORAL at 21:26

## 2021-01-01 RX ADMIN — Medication 40 MILLIGRAM(S): at 05:30

## 2021-01-01 RX ADMIN — Medication 20 MILLIGRAM(S): at 11:05

## 2021-01-01 RX ADMIN — Medication 2: at 17:49

## 2021-01-01 RX ADMIN — VASOPRESSIN 2.4 UNIT(S)/MIN: 20 INJECTION INTRAVENOUS at 20:41

## 2021-01-01 RX ADMIN — ENOXAPARIN SODIUM 40 MILLIGRAM(S): 100 INJECTION SUBCUTANEOUS at 17:28

## 2021-01-01 RX ADMIN — POTASSIUM PHOSPHATE, MONOBASIC POTASSIUM PHOSPHATE, DIBASIC 62.5 MILLIMOLE(S): 236; 224 INJECTION, SOLUTION INTRAVENOUS at 08:20

## 2021-01-01 RX ADMIN — CISATRACURIUM BESYLATE 13.9 MICROGRAM(S)/KG/MIN: 2 INJECTION INTRAVENOUS at 19:41

## 2021-01-01 RX ADMIN — ZINC OXIDE 1 APPLICATION(S): 200 OINTMENT TOPICAL at 05:04

## 2021-01-01 RX ADMIN — Medication 3.61 MICROGRAM(S)/KG/MIN: at 13:19

## 2021-01-01 RX ADMIN — MEROPENEM 100 MILLIGRAM(S): 1 INJECTION INTRAVENOUS at 05:23

## 2021-01-01 RX ADMIN — ZINC OXIDE 1 APPLICATION(S): 200 OINTMENT TOPICAL at 05:10

## 2021-01-01 RX ADMIN — INSULIN HUMAN 6 UNIT(S): 100 INJECTION, SOLUTION SUBCUTANEOUS at 17:31

## 2021-01-01 RX ADMIN — PROPOFOL 4.63 MICROGRAM(S)/KG/MIN: 10 INJECTION, EMULSION INTRAVENOUS at 16:20

## 2021-01-01 RX ADMIN — PROPOFOL 4.63 MICROGRAM(S)/KG/MIN: 10 INJECTION, EMULSION INTRAVENOUS at 00:24

## 2021-01-01 RX ADMIN — CHLORHEXIDINE GLUCONATE 15 MILLILITER(S): 213 SOLUTION TOPICAL at 05:50

## 2021-01-01 RX ADMIN — Medication 2: at 17:32

## 2021-01-01 RX ADMIN — POLYETHYLENE GLYCOL 3350 17 GRAM(S): 17 POWDER, FOR SOLUTION ORAL at 12:41

## 2021-01-01 RX ADMIN — CHLORHEXIDINE GLUCONATE 15 MILLILITER(S): 213 SOLUTION TOPICAL at 05:09

## 2021-01-01 RX ADMIN — Medication 6 MILLIGRAM(S): at 08:41

## 2021-01-01 RX ADMIN — PROPOFOL 4.63 MICROGRAM(S)/KG/MIN: 10 INJECTION, EMULSION INTRAVENOUS at 06:05

## 2021-01-01 RX ADMIN — CISATRACURIUM BESYLATE 20 MILLIGRAM(S): 2 INJECTION INTRAVENOUS at 18:20

## 2021-01-01 RX ADMIN — Medication 6 MILLIGRAM(S): at 05:23

## 2021-01-01 RX ADMIN — PROPOFOL 4.63 MICROGRAM(S)/KG/MIN: 10 INJECTION, EMULSION INTRAVENOUS at 20:14

## 2021-01-01 RX ADMIN — CISATRACURIUM BESYLATE 13.9 MICROGRAM(S)/KG/MIN: 2 INJECTION INTRAVENOUS at 09:50

## 2021-01-01 RX ADMIN — KETAMINE HYDROCHLORIDE 1.93 MG/KG/HR: 100 INJECTION INTRAMUSCULAR; INTRAVENOUS at 12:00

## 2021-01-01 RX ADMIN — Medication 25 GRAM(S): at 05:30

## 2021-01-01 RX ADMIN — KETAMINE HYDROCHLORIDE 1.93 MG/KG/HR: 100 INJECTION INTRAMUSCULAR; INTRAVENOUS at 11:00

## 2021-01-01 RX ADMIN — ZINC OXIDE 1 APPLICATION(S): 200 OINTMENT TOPICAL at 14:33

## 2021-01-01 RX ADMIN — PROPOFOL 4.63 MICROGRAM(S)/KG/MIN: 10 INJECTION, EMULSION INTRAVENOUS at 11:00

## 2021-01-01 RX ADMIN — CHLORHEXIDINE GLUCONATE 1 APPLICATION(S): 213 SOLUTION TOPICAL at 11:49

## 2021-01-01 RX ADMIN — MIDODRINE HYDROCHLORIDE 5 MILLIGRAM(S): 2.5 TABLET ORAL at 13:55

## 2021-01-01 RX ADMIN — DEXMEDETOMIDINE HYDROCHLORIDE IN 0.9% SODIUM CHLORIDE 3.86 MICROGRAM(S)/KG/HR: 4 INJECTION INTRAVENOUS at 03:27

## 2021-01-01 RX ADMIN — CHLORHEXIDINE GLUCONATE 15 MILLILITER(S): 213 SOLUTION TOPICAL at 05:00

## 2021-01-01 RX ADMIN — CHLORHEXIDINE GLUCONATE 1 APPLICATION(S): 213 SOLUTION TOPICAL at 05:15

## 2021-01-01 RX ADMIN — INSULIN GLARGINE 30 UNIT(S): 100 INJECTION, SOLUTION SUBCUTANEOUS at 21:10

## 2021-01-01 RX ADMIN — PANTOPRAZOLE SODIUM 40 MILLIGRAM(S): 20 TABLET, DELAYED RELEASE ORAL at 12:23

## 2021-01-01 RX ADMIN — MIDODRINE HYDROCHLORIDE 20 MILLIGRAM(S): 2.5 TABLET ORAL at 21:29

## 2021-01-01 RX ADMIN — CHLORHEXIDINE GLUCONATE 1 APPLICATION(S): 213 SOLUTION TOPICAL at 05:55

## 2021-01-01 RX ADMIN — MIDODRINE HYDROCHLORIDE 20 MILLIGRAM(S): 2.5 TABLET ORAL at 21:57

## 2021-01-01 RX ADMIN — PANTOPRAZOLE SODIUM 40 MILLIGRAM(S): 20 TABLET, DELAYED RELEASE ORAL at 11:17

## 2021-01-01 RX ADMIN — INSULIN GLARGINE 12 UNIT(S): 100 INJECTION, SOLUTION SUBCUTANEOUS at 21:36

## 2021-01-01 RX ADMIN — KETAMINE HYDROCHLORIDE 1.93 MG/KG/HR: 100 INJECTION INTRAMUSCULAR; INTRAVENOUS at 21:01

## 2021-01-01 RX ADMIN — FENTANYL CITRATE 3.86 MICROGRAM(S)/KG/HR: 50 INJECTION INTRAVENOUS at 23:03

## 2021-01-01 RX ADMIN — Medication 6: at 12:01

## 2021-01-01 RX ADMIN — DEXMEDETOMIDINE HYDROCHLORIDE IN 0.9% SODIUM CHLORIDE 3.86 MICROGRAM(S)/KG/HR: 4 INJECTION INTRAVENOUS at 23:20

## 2021-01-01 RX ADMIN — Medication 667 MILLIGRAM(S): at 13:25

## 2021-01-01 RX ADMIN — Medication 62.5 MILLIMOLE(S): at 04:52

## 2021-01-01 RX ADMIN — INSULIN GLARGINE 30 UNIT(S): 100 INJECTION, SOLUTION SUBCUTANEOUS at 21:47

## 2021-01-01 RX ADMIN — INSULIN HUMAN 6 UNIT(S): 100 INJECTION, SOLUTION SUBCUTANEOUS at 12:21

## 2021-01-01 RX ADMIN — Medication 400 MILLIGRAM(S): at 18:09

## 2021-01-01 RX ADMIN — Medication 12.5 MILLIGRAM(S): at 17:51

## 2021-01-01 RX ADMIN — Medication 250 MILLIGRAM(S): at 17:31

## 2021-01-01 RX ADMIN — INSULIN GLARGINE 20 UNIT(S): 100 INJECTION, SOLUTION SUBCUTANEOUS at 22:01

## 2021-01-01 RX ADMIN — Medication 75 MEQ/KG/HR: at 11:44

## 2021-01-01 RX ADMIN — INSULIN HUMAN 6 UNIT(S): 100 INJECTION, SOLUTION SUBCUTANEOUS at 11:13

## 2021-01-01 RX ADMIN — PROPOFOL 4.63 MICROGRAM(S)/KG/MIN: 10 INJECTION, EMULSION INTRAVENOUS at 06:50

## 2021-01-01 RX ADMIN — Medication 10: at 11:06

## 2021-01-01 RX ADMIN — ENOXAPARIN SODIUM 40 MILLIGRAM(S): 100 INJECTION SUBCUTANEOUS at 05:13

## 2021-01-01 RX ADMIN — CHLORHEXIDINE GLUCONATE 15 MILLILITER(S): 213 SOLUTION TOPICAL at 05:21

## 2021-01-01 RX ADMIN — Medication 250 MILLIGRAM(S): at 06:13

## 2021-01-01 RX ADMIN — Medication 250 MILLIGRAM(S): at 15:51

## 2021-01-01 RX ADMIN — Medication 100 MILLIEQUIVALENT(S): at 06:21

## 2021-01-01 RX ADMIN — ENOXAPARIN SODIUM 40 MILLIGRAM(S): 100 INJECTION SUBCUTANEOUS at 06:46

## 2021-01-01 RX ADMIN — PANTOPRAZOLE SODIUM 40 MILLIGRAM(S): 20 TABLET, DELAYED RELEASE ORAL at 12:16

## 2021-01-01 RX ADMIN — PHENYLEPHRINE HYDROCHLORIDE 2.89 MICROGRAM(S)/KG/MIN: 10 INJECTION INTRAVENOUS at 16:40

## 2021-01-01 RX ADMIN — Medication 650 MILLIGRAM(S): at 00:44

## 2021-01-01 RX ADMIN — PHENYLEPHRINE HYDROCHLORIDE 2.89 MICROGRAM(S)/KG/MIN: 10 INJECTION INTRAVENOUS at 03:17

## 2021-01-01 RX ADMIN — Medication 2: at 12:16

## 2021-01-01 RX ADMIN — PROPOFOL 4.63 MICROGRAM(S)/KG/MIN: 10 INJECTION, EMULSION INTRAVENOUS at 09:52

## 2021-01-01 RX ADMIN — HEPARIN SODIUM 5000 UNIT(S): 5000 INJECTION INTRAVENOUS; SUBCUTANEOUS at 05:31

## 2021-01-01 RX ADMIN — PANTOPRAZOLE SODIUM 40 MILLIGRAM(S): 20 TABLET, DELAYED RELEASE ORAL at 12:36

## 2021-01-01 RX ADMIN — CISATRACURIUM BESYLATE 13.9 MICROGRAM(S)/KG/MIN: 2 INJECTION INTRAVENOUS at 03:17

## 2021-01-01 RX ADMIN — Medication 12: at 17:25

## 2021-01-01 RX ADMIN — AZITHROMYCIN 255 MILLIGRAM(S): 500 TABLET, FILM COATED ORAL at 06:15

## 2021-01-01 RX ADMIN — HEPARIN SODIUM 5000 UNIT(S): 5000 INJECTION INTRAVENOUS; SUBCUTANEOUS at 13:51

## 2021-01-01 RX ADMIN — PANTOPRAZOLE SODIUM 40 MILLIGRAM(S): 20 TABLET, DELAYED RELEASE ORAL at 12:04

## 2021-01-01 RX ADMIN — ZINC OXIDE 1 APPLICATION(S): 200 OINTMENT TOPICAL at 06:33

## 2021-01-01 RX ADMIN — KETAMINE HYDROCHLORIDE 1.93 MG/KG/HR: 100 INJECTION INTRAMUSCULAR; INTRAVENOUS at 06:50

## 2021-01-01 RX ADMIN — MIDAZOLAM HYDROCHLORIDE 2 MILLIGRAM(S): 1 INJECTION, SOLUTION INTRAMUSCULAR; INTRAVENOUS at 07:38

## 2021-01-01 RX ADMIN — Medication 4: at 12:21

## 2021-01-01 RX ADMIN — PROPOFOL 4.63 MICROGRAM(S)/KG/MIN: 10 INJECTION, EMULSION INTRAVENOUS at 10:50

## 2021-01-01 RX ADMIN — CISATRACURIUM BESYLATE 13.9 MICROGRAM(S)/KG/MIN: 2 INJECTION INTRAVENOUS at 13:20

## 2021-01-01 RX ADMIN — CISATRACURIUM BESYLATE 13.9 MICROGRAM(S)/KG/MIN: 2 INJECTION INTRAVENOUS at 17:23

## 2021-01-01 RX ADMIN — ZINC OXIDE 1 APPLICATION(S): 200 OINTMENT TOPICAL at 05:20

## 2021-01-01 RX ADMIN — ZINC OXIDE 1 APPLICATION(S): 200 OINTMENT TOPICAL at 13:14

## 2021-01-01 RX ADMIN — PROPOFOL 4.63 MICROGRAM(S)/KG/MIN: 10 INJECTION, EMULSION INTRAVENOUS at 21:14

## 2021-01-01 RX ADMIN — SENNA PLUS 10 MILLILITER(S): 8.6 TABLET ORAL at 22:04

## 2021-01-01 RX ADMIN — SODIUM ZIRCONIUM CYCLOSILICATE 10 GRAM(S): 10 POWDER, FOR SUSPENSION ORAL at 22:08

## 2021-01-01 RX ADMIN — Medication 6: at 05:27

## 2021-01-01 RX ADMIN — CHLORHEXIDINE GLUCONATE 15 MILLILITER(S): 213 SOLUTION TOPICAL at 17:21

## 2021-01-01 RX ADMIN — CHLORHEXIDINE GLUCONATE 15 MILLILITER(S): 213 SOLUTION TOPICAL at 05:10

## 2021-01-01 RX ADMIN — MIDODRINE HYDROCHLORIDE 20 MILLIGRAM(S): 2.5 TABLET ORAL at 14:00

## 2021-01-01 RX ADMIN — PANTOPRAZOLE SODIUM 40 MILLIGRAM(S): 20 TABLET, DELAYED RELEASE ORAL at 11:49

## 2021-01-01 RX ADMIN — KETAMINE HYDROCHLORIDE 1.93 MG/KG/HR: 100 INJECTION INTRAMUSCULAR; INTRAVENOUS at 05:03

## 2021-01-01 RX ADMIN — Medication 20 MILLIGRAM(S): at 16:24

## 2021-01-01 RX ADMIN — SENNA PLUS 10 MILLILITER(S): 8.6 TABLET ORAL at 21:55

## 2021-01-01 RX ADMIN — MIDODRINE HYDROCHLORIDE 20 MILLIGRAM(S): 2.5 TABLET ORAL at 06:46

## 2021-01-01 RX ADMIN — PROPOFOL 4.63 MICROGRAM(S)/KG/MIN: 10 INJECTION, EMULSION INTRAVENOUS at 17:13

## 2021-01-01 RX ADMIN — Medication 250 MILLIGRAM(S): at 18:29

## 2021-01-01 RX ADMIN — Medication 62.5 MILLIMOLE(S): at 06:43

## 2021-01-01 RX ADMIN — CEFTRIAXONE 100 MILLIGRAM(S): 500 INJECTION, POWDER, FOR SOLUTION INTRAMUSCULAR; INTRAVENOUS at 04:13

## 2021-01-01 RX ADMIN — PROPOFOL 4.63 MICROGRAM(S)/KG/MIN: 10 INJECTION, EMULSION INTRAVENOUS at 09:41

## 2021-01-01 RX ADMIN — CISATRACURIUM BESYLATE 13.9 MICROGRAM(S)/KG/MIN: 2 INJECTION INTRAVENOUS at 22:08

## 2021-01-01 RX ADMIN — Medication 400 MILLIGRAM(S): at 08:08

## 2021-01-01 RX ADMIN — CHLORHEXIDINE GLUCONATE 15 MILLILITER(S): 213 SOLUTION TOPICAL at 06:46

## 2021-01-01 RX ADMIN — HEPARIN SODIUM 5000 UNIT(S): 5000 INJECTION INTRAVENOUS; SUBCUTANEOUS at 06:51

## 2021-01-01 RX ADMIN — Medication 250 MILLIGRAM(S): at 05:58

## 2021-01-01 RX ADMIN — INSULIN HUMAN 10 UNIT(S): 100 INJECTION, SOLUTION SUBCUTANEOUS at 03:53

## 2021-01-01 RX ADMIN — INSULIN HUMAN 6 UNIT(S): 100 INJECTION, SOLUTION SUBCUTANEOUS at 05:27

## 2021-01-01 RX ADMIN — CHLORHEXIDINE GLUCONATE 15 MILLILITER(S): 213 SOLUTION TOPICAL at 17:12

## 2021-01-01 RX ADMIN — ZINC OXIDE 1 APPLICATION(S): 200 OINTMENT TOPICAL at 22:00

## 2021-01-01 RX ADMIN — PROPOFOL 4.63 MICROGRAM(S)/KG/MIN: 10 INJECTION, EMULSION INTRAVENOUS at 21:01

## 2021-01-01 RX ADMIN — AZITHROMYCIN 255 MILLIGRAM(S): 500 TABLET, FILM COATED ORAL at 05:23

## 2021-01-01 RX ADMIN — Medication 8: at 17:31

## 2021-01-01 RX ADMIN — PROPOFOL 4.63 MICROGRAM(S)/KG/MIN: 10 INJECTION, EMULSION INTRAVENOUS at 11:59

## 2021-01-01 RX ADMIN — CHLORHEXIDINE GLUCONATE 1 APPLICATION(S): 213 SOLUTION TOPICAL at 11:33

## 2021-01-01 RX ADMIN — FENTANYL CITRATE 3.86 MICROGRAM(S)/KG/HR: 50 INJECTION INTRAVENOUS at 07:37

## 2021-01-01 RX ADMIN — CISATRACURIUM BESYLATE 13.9 MICROGRAM(S)/KG/MIN: 2 INJECTION INTRAVENOUS at 14:08

## 2021-01-01 RX ADMIN — Medication 2: at 16:48

## 2021-01-01 RX ADMIN — KETAMINE HYDROCHLORIDE 1.93 MG/KG/HR: 100 INJECTION INTRAMUSCULAR; INTRAVENOUS at 19:31

## 2021-01-01 RX ADMIN — Medication 12.5 MILLIGRAM(S): at 20:26

## 2021-01-01 RX ADMIN — PANTOPRAZOLE SODIUM 40 MILLIGRAM(S): 20 TABLET, DELAYED RELEASE ORAL at 13:32

## 2021-01-01 RX ADMIN — Medication 2: at 11:50

## 2021-01-01 RX ADMIN — ENOXAPARIN SODIUM 40 MILLIGRAM(S): 100 INJECTION SUBCUTANEOUS at 17:06

## 2021-01-01 RX ADMIN — Medication 2: at 05:37

## 2021-01-01 RX ADMIN — HEPARIN SODIUM 5000 UNIT(S): 5000 INJECTION INTRAVENOUS; SUBCUTANEOUS at 21:40

## 2021-01-01 RX ADMIN — Medication 6: at 18:42

## 2021-01-01 RX ADMIN — FENTANYL CITRATE 3.86 MICROGRAM(S)/KG/HR: 50 INJECTION INTRAVENOUS at 20:54

## 2021-01-01 RX ADMIN — MIDODRINE HYDROCHLORIDE 10 MILLIGRAM(S): 2.5 TABLET ORAL at 05:05

## 2021-01-01 RX ADMIN — CHLORHEXIDINE GLUCONATE 1 APPLICATION(S): 213 SOLUTION TOPICAL at 05:18

## 2021-01-01 RX ADMIN — DEXMEDETOMIDINE HYDROCHLORIDE IN 0.9% SODIUM CHLORIDE 3.86 MICROGRAM(S)/KG/HR: 4 INJECTION INTRAVENOUS at 11:03

## 2021-01-01 RX ADMIN — CHLORHEXIDINE GLUCONATE 15 MILLILITER(S): 213 SOLUTION TOPICAL at 17:06

## 2021-01-01 RX ADMIN — FENTANYL CITRATE 3.86 MICROGRAM(S)/KG/HR: 50 INJECTION INTRAVENOUS at 11:07

## 2021-01-01 RX ADMIN — KETAMINE HYDROCHLORIDE 1.93 MG/KG/HR: 100 INJECTION INTRAMUSCULAR; INTRAVENOUS at 02:50

## 2021-01-01 RX ADMIN — SODIUM ZIRCONIUM CYCLOSILICATE 10 GRAM(S): 10 POWDER, FOR SUSPENSION ORAL at 03:54

## 2021-01-01 RX ADMIN — INSULIN HUMAN 10 UNIT(S)/HR: 100 INJECTION, SOLUTION SUBCUTANEOUS at 09:14

## 2021-01-01 RX ADMIN — FENTANYL CITRATE 3.86 MICROGRAM(S)/KG/HR: 50 INJECTION INTRAVENOUS at 20:32

## 2021-01-01 RX ADMIN — Medication 8: at 11:49

## 2021-01-01 RX ADMIN — Medication 250 MILLIGRAM(S): at 06:43

## 2021-01-01 RX ADMIN — Medication 250 MILLIGRAM(S): at 17:58

## 2021-01-01 RX ADMIN — Medication 4: at 12:13

## 2021-01-01 RX ADMIN — Medication 4: at 13:31

## 2021-01-01 RX ADMIN — SENNA PLUS 10 MILLILITER(S): 8.6 TABLET ORAL at 23:27

## 2021-01-01 RX ADMIN — SENNA PLUS 10 MILLILITER(S): 8.6 TABLET ORAL at 22:08

## 2021-01-01 RX ADMIN — REMDESIVIR 500 MILLIGRAM(S): 5 INJECTION INTRAVENOUS at 08:42

## 2021-01-01 RX ADMIN — Medication 250 MILLIGRAM(S): at 01:35

## 2021-01-01 RX ADMIN — Medication 85 MILLIMOLE(S): at 12:26

## 2021-01-01 RX ADMIN — Medication 6 MILLIGRAM(S): at 06:30

## 2021-01-01 RX ADMIN — CISATRACURIUM BESYLATE 13.9 MICROGRAM(S)/KG/MIN: 2 INJECTION INTRAVENOUS at 23:02

## 2021-01-01 RX ADMIN — CEFTRIAXONE 100 MILLIGRAM(S): 500 INJECTION, POWDER, FOR SOLUTION INTRAMUSCULAR; INTRAVENOUS at 07:36

## 2021-01-01 RX ADMIN — CHLORHEXIDINE GLUCONATE 15 MILLILITER(S): 213 SOLUTION TOPICAL at 06:08

## 2021-01-01 RX ADMIN — Medication 6: at 23:54

## 2021-01-01 RX ADMIN — ZINC OXIDE 1 APPLICATION(S): 200 OINTMENT TOPICAL at 21:57

## 2021-01-01 RX ADMIN — Medication 50 MILLILITER(S): at 04:29

## 2021-01-01 RX ADMIN — Medication 1 PACKET(S): at 21:28

## 2021-01-01 RX ADMIN — CISATRACURIUM BESYLATE 13.9 MICROGRAM(S)/KG/MIN: 2 INJECTION INTRAVENOUS at 13:02

## 2021-01-01 RX ADMIN — Medication 2: at 16:54

## 2021-01-01 RX ADMIN — MIDODRINE HYDROCHLORIDE 5 MILLIGRAM(S): 2.5 TABLET ORAL at 13:13

## 2021-01-01 RX ADMIN — INSULIN HUMAN 6 UNIT(S): 100 INJECTION, SOLUTION SUBCUTANEOUS at 00:14

## 2021-01-01 RX ADMIN — LOSARTAN POTASSIUM 25 MILLIGRAM(S): 100 TABLET, FILM COATED ORAL at 05:34

## 2021-01-01 RX ADMIN — MIDODRINE HYDROCHLORIDE 20 MILLIGRAM(S): 2.5 TABLET ORAL at 05:09

## 2021-01-01 RX ADMIN — MIDODRINE HYDROCHLORIDE 10 MILLIGRAM(S): 2.5 TABLET ORAL at 05:04

## 2021-01-01 RX ADMIN — CHLORHEXIDINE GLUCONATE 15 MILLILITER(S): 213 SOLUTION TOPICAL at 17:10

## 2021-01-01 RX ADMIN — INSULIN GLARGINE 16 UNIT(S): 100 INJECTION, SOLUTION SUBCUTANEOUS at 22:37

## 2021-01-01 RX ADMIN — PROPOFOL 4.63 MICROGRAM(S)/KG/MIN: 10 INJECTION, EMULSION INTRAVENOUS at 15:25

## 2021-01-01 RX ADMIN — CHLORHEXIDINE GLUCONATE 15 MILLILITER(S): 213 SOLUTION TOPICAL at 17:24

## 2021-01-01 RX ADMIN — DEXMEDETOMIDINE HYDROCHLORIDE IN 0.9% SODIUM CHLORIDE 3.86 MICROGRAM(S)/KG/HR: 4 INJECTION INTRAVENOUS at 08:00

## 2021-01-01 RX ADMIN — CISATRACURIUM BESYLATE 13.9 MICROGRAM(S)/KG/MIN: 2 INJECTION INTRAVENOUS at 17:07

## 2021-01-01 RX ADMIN — FENTANYL CITRATE 3.86 MICROGRAM(S)/KG/HR: 50 INJECTION INTRAVENOUS at 00:41

## 2021-01-01 RX ADMIN — ZINC OXIDE 1 APPLICATION(S): 200 OINTMENT TOPICAL at 21:12

## 2021-01-01 RX ADMIN — PANTOPRAZOLE SODIUM 40 MILLIGRAM(S): 20 TABLET, DELAYED RELEASE ORAL at 11:56

## 2021-01-01 RX ADMIN — PHENYLEPHRINE HYDROCHLORIDE 2.89 MICROGRAM(S)/KG/MIN: 10 INJECTION INTRAVENOUS at 23:02

## 2021-01-01 RX ADMIN — FENTANYL CITRATE 3.86 MICROGRAM(S)/KG/HR: 50 INJECTION INTRAVENOUS at 22:48

## 2021-01-01 RX ADMIN — Medication 2: at 11:21

## 2021-01-01 RX ADMIN — INSULIN HUMAN 6 UNIT(S): 100 INJECTION, SOLUTION SUBCUTANEOUS at 17:34

## 2021-01-01 RX ADMIN — PROPOFOL 4.63 MICROGRAM(S)/KG/MIN: 10 INJECTION, EMULSION INTRAVENOUS at 03:30

## 2021-01-01 RX ADMIN — DEXMEDETOMIDINE HYDROCHLORIDE IN 0.9% SODIUM CHLORIDE 3.86 MICROGRAM(S)/KG/HR: 4 INJECTION INTRAVENOUS at 15:25

## 2021-01-01 RX ADMIN — PANTOPRAZOLE SODIUM 40 MILLIGRAM(S): 20 TABLET, DELAYED RELEASE ORAL at 11:52

## 2021-01-01 RX ADMIN — Medication 100 MILLIEQUIVALENT(S): at 06:15

## 2021-01-01 RX ADMIN — CISATRACURIUM BESYLATE 13.9 MICROGRAM(S)/KG/MIN: 2 INJECTION INTRAVENOUS at 04:19

## 2021-01-01 RX ADMIN — KETAMINE HYDROCHLORIDE 1.93 MG/KG/HR: 100 INJECTION INTRAMUSCULAR; INTRAVENOUS at 09:09

## 2021-01-01 RX ADMIN — Medication 2: at 23:50

## 2021-01-01 RX ADMIN — POLYETHYLENE GLYCOL 3350 17 GRAM(S): 17 POWDER, FOR SOLUTION ORAL at 11:14

## 2021-01-01 RX ADMIN — CISATRACURIUM BESYLATE 13.9 MICROGRAM(S)/KG/MIN: 2 INJECTION INTRAVENOUS at 02:50

## 2021-01-01 RX ADMIN — Medication 6: at 17:48

## 2021-01-01 RX ADMIN — PROPOFOL 4.63 MICROGRAM(S)/KG/MIN: 10 INJECTION, EMULSION INTRAVENOUS at 06:30

## 2021-01-01 RX ADMIN — Medication 2: at 23:24

## 2021-01-01 RX ADMIN — VASOPRESSIN 2.4 UNIT(S)/MIN: 20 INJECTION INTRAVENOUS at 05:13

## 2021-01-01 RX ADMIN — HEPARIN SODIUM 5000 UNIT(S): 5000 INJECTION INTRAVENOUS; SUBCUTANEOUS at 05:20

## 2021-01-01 RX ADMIN — Medication 2: at 17:50

## 2021-01-01 RX ADMIN — REMDESIVIR 500 MILLIGRAM(S): 5 INJECTION INTRAVENOUS at 08:39

## 2021-01-01 RX ADMIN — MIDAZOLAM HYDROCHLORIDE 4 MILLIGRAM(S): 1 INJECTION, SOLUTION INTRAMUSCULAR; INTRAVENOUS at 23:44

## 2021-01-01 RX ADMIN — CHLORHEXIDINE GLUCONATE 1 APPLICATION(S): 213 SOLUTION TOPICAL at 06:13

## 2021-01-01 RX ADMIN — Medication 10: at 12:33

## 2021-01-01 RX ADMIN — CHLORHEXIDINE GLUCONATE 1 APPLICATION(S): 213 SOLUTION TOPICAL at 06:21

## 2021-01-01 RX ADMIN — FENTANYL CITRATE 3.86 MICROGRAM(S)/KG/HR: 50 INJECTION INTRAVENOUS at 03:00

## 2021-01-01 RX ADMIN — INSULIN GLARGINE 20 UNIT(S): 100 INJECTION, SOLUTION SUBCUTANEOUS at 21:28

## 2021-01-01 RX ADMIN — CISATRACURIUM BESYLATE 13.9 MICROGRAM(S)/KG/MIN: 2 INJECTION INTRAVENOUS at 21:11

## 2021-01-01 RX ADMIN — CHLORHEXIDINE GLUCONATE 15 MILLILITER(S): 213 SOLUTION TOPICAL at 17:23

## 2021-01-01 RX ADMIN — ENOXAPARIN SODIUM 40 MILLIGRAM(S): 100 INJECTION SUBCUTANEOUS at 17:14

## 2021-01-01 RX ADMIN — Medication 2: at 05:50

## 2021-01-01 RX ADMIN — CHLORHEXIDINE GLUCONATE 1 APPLICATION(S): 213 SOLUTION TOPICAL at 06:15

## 2021-01-01 RX ADMIN — DEXMEDETOMIDINE HYDROCHLORIDE IN 0.9% SODIUM CHLORIDE 3.86 MICROGRAM(S)/KG/HR: 4 INJECTION INTRAVENOUS at 02:33

## 2021-01-01 RX ADMIN — INSULIN HUMAN 6 UNIT(S): 100 INJECTION, SOLUTION SUBCUTANEOUS at 13:32

## 2021-01-01 RX ADMIN — DEXMEDETOMIDINE HYDROCHLORIDE IN 0.9% SODIUM CHLORIDE 3.86 MICROGRAM(S)/KG/HR: 4 INJECTION INTRAVENOUS at 06:03

## 2021-01-01 RX ADMIN — ENOXAPARIN SODIUM 40 MILLIGRAM(S): 100 INJECTION SUBCUTANEOUS at 17:57

## 2021-01-01 RX ADMIN — INSULIN HUMAN 6 UNIT(S): 100 INJECTION, SOLUTION SUBCUTANEOUS at 11:18

## 2021-01-01 RX ADMIN — Medication 2: at 07:00

## 2021-01-01 RX ADMIN — POTASSIUM PHOSPHATE, MONOBASIC POTASSIUM PHOSPHATE, DIBASIC 62.5 MILLIMOLE(S): 236; 224 INJECTION, SOLUTION INTRAVENOUS at 05:21

## 2021-01-01 RX ADMIN — PHENYLEPHRINE HYDROCHLORIDE 2.89 MICROGRAM(S)/KG/MIN: 10 INJECTION INTRAVENOUS at 13:30

## 2021-01-01 RX ADMIN — ENOXAPARIN SODIUM 40 MILLIGRAM(S): 100 INJECTION SUBCUTANEOUS at 05:45

## 2021-01-01 RX ADMIN — DEXMEDETOMIDINE HYDROCHLORIDE IN 0.9% SODIUM CHLORIDE 3.86 MICROGRAM(S)/KG/HR: 4 INJECTION INTRAVENOUS at 21:57

## 2021-01-01 RX ADMIN — Medication 2: at 05:23

## 2021-01-01 RX ADMIN — ZINC OXIDE 1 APPLICATION(S): 200 OINTMENT TOPICAL at 17:19

## 2021-01-01 RX ADMIN — Medication 667 MILLIGRAM(S): at 05:14

## 2021-01-01 RX ADMIN — FENTANYL CITRATE 3.86 MICROGRAM(S)/KG/HR: 50 INJECTION INTRAVENOUS at 20:48

## 2021-01-01 RX ADMIN — HEPARIN SODIUM 5000 UNIT(S): 5000 INJECTION INTRAVENOUS; SUBCUTANEOUS at 13:35

## 2021-01-01 RX ADMIN — POTASSIUM PHOSPHATE, MONOBASIC POTASSIUM PHOSPHATE, DIBASIC 62.5 MILLIMOLE(S): 236; 224 INJECTION, SOLUTION INTRAVENOUS at 05:09

## 2021-01-01 RX ADMIN — PANTOPRAZOLE SODIUM 40 MILLIGRAM(S): 20 TABLET, DELAYED RELEASE ORAL at 11:33

## 2021-01-01 RX ADMIN — POLYETHYLENE GLYCOL 3350 17 GRAM(S): 17 POWDER, FOR SOLUTION ORAL at 11:05

## 2021-01-01 RX ADMIN — POTASSIUM PHOSPHATE, MONOBASIC POTASSIUM PHOSPHATE, DIBASIC 62.5 MILLIMOLE(S): 236; 224 INJECTION, SOLUTION INTRAVENOUS at 05:03

## 2021-01-01 RX ADMIN — INSULIN HUMAN 6 UNIT(S): 100 INJECTION, SOLUTION SUBCUTANEOUS at 05:12

## 2021-01-01 RX ADMIN — ENOXAPARIN SODIUM 40 MILLIGRAM(S): 100 INJECTION SUBCUTANEOUS at 05:23

## 2021-01-01 RX ADMIN — INSULIN GLARGINE 12 UNIT(S): 100 INJECTION, SOLUTION SUBCUTANEOUS at 22:18

## 2021-01-01 RX ADMIN — FENTANYL CITRATE 3.86 MICROGRAM(S)/KG/HR: 50 INJECTION INTRAVENOUS at 06:50

## 2021-01-01 RX ADMIN — Medication 1 PACKET(S): at 13:35

## 2021-01-01 RX ADMIN — SODIUM CHLORIDE 75 MILLILITER(S): 9 INJECTION INTRAMUSCULAR; INTRAVENOUS; SUBCUTANEOUS at 06:55

## 2021-01-01 RX ADMIN — SODIUM ZIRCONIUM CYCLOSILICATE 10 GRAM(S): 10 POWDER, FOR SUSPENSION ORAL at 05:38

## 2021-01-01 RX ADMIN — ZINC OXIDE 1 APPLICATION(S): 200 OINTMENT TOPICAL at 17:33

## 2021-01-01 RX ADMIN — CHLORHEXIDINE GLUCONATE 15 MILLILITER(S): 213 SOLUTION TOPICAL at 05:22

## 2021-01-01 RX ADMIN — ENOXAPARIN SODIUM 40 MILLIGRAM(S): 100 INJECTION SUBCUTANEOUS at 05:05

## 2021-01-01 RX ADMIN — CHLORHEXIDINE GLUCONATE 1 APPLICATION(S): 213 SOLUTION TOPICAL at 08:40

## 2021-01-01 RX ADMIN — ENOXAPARIN SODIUM 40 MILLIGRAM(S): 100 INJECTION SUBCUTANEOUS at 17:12

## 2021-01-01 RX ADMIN — Medication 2: at 12:05

## 2021-01-01 RX ADMIN — Medication 4: at 17:23

## 2021-01-01 RX ADMIN — ENOXAPARIN SODIUM 40 MILLIGRAM(S): 100 INJECTION SUBCUTANEOUS at 12:01

## 2021-01-01 RX ADMIN — INSULIN HUMAN 6 UNIT(S): 100 INJECTION, SOLUTION SUBCUTANEOUS at 17:20

## 2021-01-01 RX ADMIN — SENNA PLUS 10 MILLILITER(S): 8.6 TABLET ORAL at 21:29

## 2021-01-01 RX ADMIN — ZINC OXIDE 1 APPLICATION(S): 200 OINTMENT TOPICAL at 15:02

## 2021-01-01 RX ADMIN — Medication 1000 MILLIGRAM(S): at 16:24

## 2021-01-01 RX ADMIN — INSULIN GLARGINE 8 UNIT(S): 100 INJECTION, SOLUTION SUBCUTANEOUS at 21:53

## 2021-01-01 RX ADMIN — Medication 100 MILLIEQUIVALENT(S): at 09:13

## 2021-01-01 RX ADMIN — DEXMEDETOMIDINE HYDROCHLORIDE IN 0.9% SODIUM CHLORIDE 3.86 MICROGRAM(S)/KG/HR: 4 INJECTION INTRAVENOUS at 13:13

## 2021-01-01 RX ADMIN — PHENYLEPHRINE HYDROCHLORIDE 2.89 MICROGRAM(S)/KG/MIN: 10 INJECTION INTRAVENOUS at 11:04

## 2021-01-01 RX ADMIN — Medication 4: at 16:40

## 2021-01-01 RX ADMIN — Medication 2: at 17:20

## 2021-01-01 RX ADMIN — ENOXAPARIN SODIUM 40 MILLIGRAM(S): 100 INJECTION SUBCUTANEOUS at 17:23

## 2021-01-01 RX ADMIN — INSULIN HUMAN 6 UNIT(S): 100 INJECTION, SOLUTION SUBCUTANEOUS at 23:54

## 2021-01-01 RX ADMIN — Medication 1000 MILLIGRAM(S): at 05:36

## 2021-01-01 RX ADMIN — INSULIN GLARGINE 8 UNIT(S): 100 INJECTION, SOLUTION SUBCUTANEOUS at 21:42

## 2021-01-01 RX ADMIN — CHLORHEXIDINE GLUCONATE 1 APPLICATION(S): 213 SOLUTION TOPICAL at 05:00

## 2021-01-01 RX ADMIN — Medication 8: at 17:03

## 2021-01-01 RX ADMIN — Medication 150 MEQ/KG/HR: at 21:26

## 2021-01-01 RX ADMIN — ENOXAPARIN SODIUM 40 MILLIGRAM(S): 100 INJECTION SUBCUTANEOUS at 05:09

## 2021-01-01 RX ADMIN — PROPOFOL 4.63 MICROGRAM(S)/KG/MIN: 10 INJECTION, EMULSION INTRAVENOUS at 01:33

## 2021-01-01 RX ADMIN — ZINC OXIDE 1 APPLICATION(S): 200 OINTMENT TOPICAL at 22:46

## 2021-01-01 RX ADMIN — Medication 4: at 12:31

## 2021-01-01 RX ADMIN — Medication 4: at 16:29

## 2021-01-01 RX ADMIN — ENOXAPARIN SODIUM 40 MILLIGRAM(S): 100 INJECTION SUBCUTANEOUS at 17:04

## 2021-01-01 RX ADMIN — SENNA PLUS 10 MILLILITER(S): 8.6 TABLET ORAL at 22:46

## 2021-01-01 RX ADMIN — PROPOFOL 4.63 MICROGRAM(S)/KG/MIN: 10 INJECTION, EMULSION INTRAVENOUS at 05:25

## 2021-01-01 RX ADMIN — CHLORHEXIDINE GLUCONATE 15 MILLILITER(S): 213 SOLUTION TOPICAL at 05:07

## 2021-01-01 RX ADMIN — MIDODRINE HYDROCHLORIDE 5 MILLIGRAM(S): 2.5 TABLET ORAL at 05:13

## 2021-01-01 RX ADMIN — ZINC OXIDE 1 APPLICATION(S): 200 OINTMENT TOPICAL at 05:38

## 2021-01-01 RX ADMIN — PHENYLEPHRINE HYDROCHLORIDE 2.89 MICROGRAM(S)/KG/MIN: 10 INJECTION INTRAVENOUS at 05:13

## 2021-01-01 RX ADMIN — PROPOFOL 4.63 MICROGRAM(S)/KG/MIN: 10 INJECTION, EMULSION INTRAVENOUS at 11:05

## 2021-01-01 RX ADMIN — ENOXAPARIN SODIUM 40 MILLIGRAM(S): 100 INJECTION SUBCUTANEOUS at 05:50

## 2021-01-01 RX ADMIN — HEPARIN SODIUM 5000 UNIT(S): 5000 INJECTION INTRAVENOUS; SUBCUTANEOUS at 07:27

## 2021-01-01 RX ADMIN — Medication 3.61 MICROGRAM(S)/KG/MIN: at 06:21

## 2021-01-01 RX ADMIN — Medication 3.61 MICROGRAM(S)/KG/MIN: at 12:16

## 2021-01-01 RX ADMIN — PROPOFOL 4.63 MICROGRAM(S)/KG/MIN: 10 INJECTION, EMULSION INTRAVENOUS at 05:54

## 2021-01-01 RX ADMIN — CHLORHEXIDINE GLUCONATE 15 MILLILITER(S): 213 SOLUTION TOPICAL at 17:57

## 2021-01-01 RX ADMIN — HEPARIN SODIUM 5000 UNIT(S): 5000 INJECTION INTRAVENOUS; SUBCUTANEOUS at 05:35

## 2021-01-01 RX ADMIN — FENTANYL CITRATE 3.86 MICROGRAM(S)/KG/HR: 50 INJECTION INTRAVENOUS at 17:10

## 2021-01-01 RX ADMIN — INSULIN HUMAN 6 UNIT(S): 100 INJECTION, SOLUTION SUBCUTANEOUS at 00:01

## 2021-01-01 RX ADMIN — Medication 2: at 17:58

## 2021-01-01 RX ADMIN — INSULIN GLARGINE 12 UNIT(S): 100 INJECTION, SOLUTION SUBCUTANEOUS at 21:46

## 2021-01-01 RX ADMIN — POLYETHYLENE GLYCOL 3350 17 GRAM(S): 17 POWDER, FOR SOLUTION ORAL at 11:46

## 2021-01-01 RX ADMIN — PROPOFOL 4.63 MICROGRAM(S)/KG/MIN: 10 INJECTION, EMULSION INTRAVENOUS at 07:58

## 2021-01-01 RX ADMIN — PHENYLEPHRINE HYDROCHLORIDE 2.89 MICROGRAM(S)/KG/MIN: 10 INJECTION INTRAVENOUS at 11:44

## 2021-01-01 RX ADMIN — PROPOFOL 4.63 MICROGRAM(S)/KG/MIN: 10 INJECTION, EMULSION INTRAVENOUS at 23:42

## 2021-01-01 RX ADMIN — FENTANYL CITRATE 3.86 MICROGRAM(S)/KG/HR: 50 INJECTION INTRAVENOUS at 09:04

## 2021-01-01 RX ADMIN — FENTANYL CITRATE 3.86 MICROGRAM(S)/KG/HR: 50 INJECTION INTRAVENOUS at 10:46

## 2021-01-01 RX ADMIN — INSULIN HUMAN 6 UNIT(S): 100 INJECTION, SOLUTION SUBCUTANEOUS at 12:35

## 2021-01-01 RX ADMIN — ZINC OXIDE 1 APPLICATION(S): 200 OINTMENT TOPICAL at 13:31

## 2021-01-01 RX ADMIN — PROPOFOL 4.63 MICROGRAM(S)/KG/MIN: 10 INJECTION, EMULSION INTRAVENOUS at 23:43

## 2021-01-01 RX ADMIN — POLYETHYLENE GLYCOL 3350 17 GRAM(S): 17 POWDER, FOR SOLUTION ORAL at 13:27

## 2021-01-01 RX ADMIN — PROPOFOL 4.63 MICROGRAM(S)/KG/MIN: 10 INJECTION, EMULSION INTRAVENOUS at 05:06

## 2021-01-01 RX ADMIN — CHLORHEXIDINE GLUCONATE 1 APPLICATION(S): 213 SOLUTION TOPICAL at 05:50

## 2021-01-01 RX ADMIN — FENTANYL CITRATE 3.86 MICROGRAM(S)/KG/HR: 50 INJECTION INTRAVENOUS at 00:31

## 2021-01-01 RX ADMIN — PANTOPRAZOLE SODIUM 40 MILLIGRAM(S): 20 TABLET, DELAYED RELEASE ORAL at 11:13

## 2021-01-01 RX ADMIN — INSULIN HUMAN 6 UNIT(S): 100 INJECTION, SOLUTION SUBCUTANEOUS at 00:21

## 2021-01-01 RX ADMIN — Medication 3.61 MICROGRAM(S)/KG/MIN: at 08:17

## 2021-01-01 RX ADMIN — MIDODRINE HYDROCHLORIDE 20 MILLIGRAM(S): 2.5 TABLET ORAL at 01:13

## 2021-01-01 RX ADMIN — Medication 12: at 07:42

## 2021-01-01 RX ADMIN — PANTOPRAZOLE SODIUM 40 MILLIGRAM(S): 20 TABLET, DELAYED RELEASE ORAL at 11:46

## 2021-01-01 RX ADMIN — REMDESIVIR 500 MILLIGRAM(S): 5 INJECTION INTRAVENOUS at 08:56

## 2021-01-01 RX ADMIN — INSULIN HUMAN 10 UNIT(S): 100 INJECTION, SOLUTION SUBCUTANEOUS at 05:31

## 2021-01-01 RX ADMIN — ZINC OXIDE 1 APPLICATION(S): 200 OINTMENT TOPICAL at 22:08

## 2021-01-01 RX ADMIN — DEXMEDETOMIDINE HYDROCHLORIDE IN 0.9% SODIUM CHLORIDE 3.86 MICROGRAM(S)/KG/HR: 4 INJECTION INTRAVENOUS at 02:41

## 2021-01-01 RX ADMIN — MIDODRINE HYDROCHLORIDE 5 MILLIGRAM(S): 2.5 TABLET ORAL at 05:07

## 2021-01-01 RX ADMIN — FENTANYL CITRATE 3.86 MICROGRAM(S)/KG/HR: 50 INJECTION INTRAVENOUS at 08:39

## 2021-01-01 RX ADMIN — HEPARIN SODIUM 5000 UNIT(S): 5000 INJECTION INTRAVENOUS; SUBCUTANEOUS at 21:53

## 2021-01-01 RX ADMIN — Medication 600 MILLIGRAM(S): at 05:26

## 2021-01-01 RX ADMIN — Medication 2: at 11:53

## 2021-03-23 NOTE — H&P ADULT - ATTENDING COMMENTS
Pt is a 66 yo F with h/o HTN, HLD and DM2 who presented with progressively worsening SOB associated with productive cough for past several days much worse the past 3 days.  Pt was started on levaquin, prednisone and azithromycin  on 3/19 outpatient with no improvement.  In the ER CTA chest done to r/o PE findings consistent with COVID-19 PNA. Labs remarkable for WBC 18.55, Na 130, K 3.2, COs 10, AG 29, Glucose 416, alb 2.5.  Placed on High flow for dyspnea and hypoxemia. ICU admitting dx : 1) Acute hypoxic resp failure 2 to Covid-19 PNA 2) DKA.      Resp: Titrate down HFnc O2 as tolerated  ID: Finish course of outpt Zithromax/ Cont Remdesivir + Decadron  CVS: Hold pt's ARB for now  Heme: DVT prophylaxis with sq Heparin  FEN: NPO for now/ If Glu increases change to D5LR to LR/ Replace Phos; pt hypophosphatemic   Endo: Overlap Lantus with Insulin drip then adjust Lantus + Lispro to FS    CCT: 40 min not in conjunction with the NP

## 2021-03-23 NOTE — ED PROVIDER NOTE - CLINICAL SUMMARY MEDICAL DECISION MAKING FREE TEXT BOX
pt pw hypoxia, likely covid. rule out pe.. start oxygen.   I read ekg as sinus tach rate 122, narrow qrs, normal axis, lvh and left atrial enlargement, qtc 473. pt pw hypoxia, likely covid. rule out pe.. start oxygen.   I read ekg as sinus tach rate 122, narrow qrs, normal axis, lvh and left atrial enlargement, qtc 473.  needs fluids for dka. insulin gtt. dexamethasone for severe covid.   admit icu

## 2021-03-23 NOTE — ED PROVIDER NOTE - OBJECTIVE STATEMENT
65f hx htn and dm pw sob for several days. started levaquin, prednisone and azithromycin 3/19. received albuterol today, no help.

## 2021-03-23 NOTE — H&P ADULT - HISTORY OF PRESENT ILLNESS
66yo F w/ HTN (on losartan) and DM2 (on metformin) presents with progressively worsening SOB associated with productive cough for past several days much worse the past 3 days.  Started on abx (levaquin, prednisone and azithromycin 3/19) outpatient with no improvement. Denies any chest pain, fevers, dizziness, syncope, abd pain, back pain, N/V/D, recent sick contact, recent travel.     In ED: CTA chest done to r/o PE findings consistent w/ COVID-19 PNA. Labs remarkable for WBC 18.55, Na 130, K 3.2, COs 10, AG 29, Glucose 416, alb 2.5.  Placed on High flow for dyspnea and hypoxemia. Admitted to ICU for COVID pna and DKA.

## 2021-03-23 NOTE — ED ADULT NURSE NOTE - NSIMPLEMENTINTERV_GEN_ALL_ED
Implemented All Universal Safety Interventions:  [place] to call system. Call bell, personal items and telephone within reach. Instruct patient to call for assistance. Room bathroom lighting operational. Non-slip footwear when patient is off stretcher. Physically safe environment: no spills, clutter or unnecessary equipment. Stretcher in lowest position, wheels locked, appropriate side rails in place.

## 2021-03-23 NOTE — H&P ADULT - RS GEN PE MLT RESP DETAILS PC
airway patent/breath sounds equal/good air movement/no chest wall tenderness/no intercostal retractions/respirations labored/rhonchi

## 2021-03-23 NOTE — H&P ADULT - ASSESSMENT
64yo F w/ HTN and DM2 presents with acute respiratory distress 2/2 Covid-19 PNA with DKA.    Neuro: A&Ox3, no issues  Cardiovascular: Hx of HTN on losartan, hold home bp med for now and monitor  Vessels- Troponin negative  Pump- Will order pBNP, POCUS exam  Rhythm- EKG shows Sinus tach @ 115bpm,  Hemodynamics- hemodynamically stable /76  Respiratory:  COVID-19 +, Tachypnea w/ labored breathing and sPO2 90-94% on RA. Placed on High Flow in ED sPO2 100%. Will resume high Flow.  ABx started oupt. resume Rocephin and Azythro for now. Tx for Covid-19 Pna w/ Dex and remdesivir.  Obtain chest X-ray.  GI: Obese, PPI px, diabetic diet  : Currently no issues, recieved IV contrast w/ CTA chest. Monitor Urine output, BUN/Cr. avoid nephrotoxic agents.  Endocrine: DKA, insulin gtt, s/p IVF. Resume IVF resuscitation and replace lytes as needed K>4 Mg>2.   - F/u A1C  -c/w insulin drip and titrate per DKA protocol   -FSG q1hr with BMP, Mg, Phos, vbg q4h. Trend lactate and BHB to clearance   Heme:  heparin subQ for DVT px  ID: ABx started oupt. resume Rocephin and Azythro for now. f/u cultures. Tx for Covid-19 Pna w/ Dex and remdesivir.  f/u chest X-ray.  Skin: no issues  Lines/Tubes: PIV x 2  Ethics: full code   64yo F w/ HTN and DM2 presents with acute respiratory distress 2/2 Covid-19 PNA with DKA.    Neuro: A&Ox3, no issues  Cardiovascular: Hx of HTN on losartan, hold home bp med for now and monitor  Vessels- Troponin negative  Pump- Will order pBNP, POCUS exam  Rhythm- EKG shows Sinus tach @ 115bpm,  Hemodynamics- hemodynamically stable /76  Respiratory:  COVID-19 +, Tachypnea w/ labored breathing and sPO2 90-94% on RA. Placed on High Flow in ED sPO2 100%. Will resume high Flow.  ABx started oupt. resume Rocephin and Azythro for now. Tx for Covid-19 Pna w/ Dex and remdesivir.  Obtain chest X-ray.  GI: Obese, PPI px, diabetic diet  : Currently no issues, recieved IV contrast w/ CTA chest. Monitor Urine output, BUN/Cr. avoid nephrotoxic agents.  Endocrine: DKA, insulin gtt, s/p IVF. Resume IVF resuscitation and replace lytes as needed K>4 Mg>2.   - F/u A1C  -c/w insulin drip and titrate per DKA protocol   -FSG q1hr with BMP, Mg, Phos, vbg q4h. Trend lactate and BHB to clearance   Heme:  heparin subQ for DVT px  ID: ABx started oupt. resume Rocephin and Azythro for now. f/u cultures. Tx for Covid-19 Pna w/ Dex and remdesivir.  f/u chest X-ray.  Skin: no issues  Lines/Tubes: PIV x 2  Ethics: full code

## 2021-03-23 NOTE — ED PROVIDER NOTE - CARE PLAN
Principal Discharge DX:	Hypoxia   Principal Discharge DX:	Hypoxia  Secondary Diagnosis:	Diabetic ketoacidosis without coma associated with diabetes mellitus due to underlying condition  Secondary Diagnosis:	COVID-19

## 2021-03-23 NOTE — PROVIDER CONTACT NOTE (EICU) - RECOMMENDATIONS
Admit to MICU  Covid- start remdesivir and dexamethasone  HFNC  replace electrolytes  start DKA protocol and insulin gtt

## 2021-03-23 NOTE — PROVIDER CONTACT NOTE (EICU) - BACKGROUND
65f h/o htn and dm p/w sob for several days. started outpt levaquin, prednisone and azithromycin 3/19. No improvement.

## 2021-03-23 NOTE — PROVIDER CONTACT NOTE (EICU) - ASSESSMENT
WBC 18.55, Na 130, K 3.2, COs 10, Glucose 416, alb 2.5  Covid(+)  DKA WBC 18.55, Na 130, K 3.2, COs 10, AG 29, Glucose 416, alb 2.5  Covid(+)  DKA

## 2021-03-23 NOTE — ED PROVIDER NOTE - SECONDARY DIAGNOSIS.
Diabetic ketoacidosis without coma associated with diabetes mellitus due to underlying condition COVID-19

## 2021-03-23 NOTE — ED ADULT NURSE NOTE - NS_SISCREENINGSR_GEN_ALL_ED
[facility]  Chief Complaint   Patient presents with     Annual Comprehensive Nursing Home     [facility] [place] Medical Record Number:  [id number]  Place of Service where encounter took place:  [facility]  (S) [[id number]]    HPI:    Citlalli Villarreal  is a 75 year old  (1943), who is being seen today for an annual comprehensive visit. HPI information obtained from: facility chart records, facility staff and [facility] chart review.  Today's concerns are:     Cerebrovascular accident (CVA) due to embolism of left anterior cerebral artery (H)  Expressive aphasia  Chronic atrial fibrillation (H)  Long term current use of anticoagulant therapy  Dementia without behavioral disturbance, unspecified dementia type  Essential hypertension  Pain   Citlalli Villarreal has had a complicated last several months, now with new CVA with residual expressive aphasia limiting her ability to communicate.  She also has chronic afib,now on anticoagulation. Her BP has been difficult to control, on clonidine and very gradual increase in metoprolol .  She has lost weight, eating poorly and was found to be flushing her food down the toilet.       ALLERGIES: Patient has no known allergies.  PAST MEDICAL HISTORY:  has a past medical history of Atrial fibrillation (H), Cerebral infarction (H), Gastroesophageal reflux disease, Gastrointestinal hemorrhage, History of embolic stroke with hemorrhagic conversion 10/15/2018. (3/10/2019), Hyperlipemia, Major depressive disorder, Mild cognitive impairment, so stated, Neurologic neglect syndrome, Neuromuscular dysfunction of bladder, unspecified, Nontraumatic intracranial hemorrhage (H), Occlusion and stenosis of bilateral carotid arteries, Radial fracture, Repeated falls, and Right wrist fracture (10/2018). She also has no past medical history of Thyroid disease.  PAST SURGICAL HISTORY:  has a past surgical history that includes Forearm surgery  (10/2018).  IMMUNIZATIONS:  Immunization History   Administered Date(s) Administered     Flu, Unspecified 10/02/2018     Pneumo Conj 13-V (2010&after) 11/23/2018     Tdap (Adult) Unspecified Formulation 11/23/2018     Above immunizations pulled from [facility]. [facility] and facility records also reconciled. Outstanding information sent to  to update [facility] .  Future immunizations needed:  PPSV23 and should get this after 11/23/19    Current Outpatient Medications   Medication Sig Dispense Refill     acetaminophen (TYLENOL) 325 MG tablet Take 650 mg by mouth every 8 hours as needed        apixaban ANTICOAGULANT (ELIQUIS) 5 MG tablet Take 1 tablet (5 mg) by mouth 2 times daily       aspirin (ASA) 81 MG tablet Take 1 tablet (81 mg) by mouth daily  OTC     atorvastatin (LIPITOR) 40 MG tablet Take 20 mg by mouth daily        cloNIDine (CATAPRES) 0.1 MG tablet TAKE 1 TAB BY MOUTH TWICE DAILY AS NEEDED FOR DIASTOLIC 100  99     diltiazem ER (TIAZAC) 360 MG 24 hr ER beaded capsule Take 360 mg by mouth daily       gabapentin (NEURONTIN) 100 MG capsule Take 100 mg by mouth At Bedtime       metoprolol tartrate (LOPRESSOR) 25 MG tablet Take 1 tablet (25 mg) by mouth 2 times daily       MULTIPLE VITAMINS-MINERALS PO Take 1 tablet by mouth daily       omeprazole 20 MG tablet Take 20 mg by mouth daily       polyethylene glycol (MIRALAX/GLYCOLAX) packet Take 17 g by mouth daily       senna (SENOKOT) 8.6 MG tablet Take 1 tablet by mouth 2 times daily       sertraline (ZOLOFT) 25 MG tablet Take 50 mg by mouth daily          Case Management:  I have reviewed the facility/SNF care plan/MDS, including the falls risk, nutrition and pain screening. I also reviewed the current immunizations, and preventive care. .Future cancer screening is not clinically indicated secondary to age/goals of care Patient's desire to return to the community is not assessible due to cognitive impairment. Current Level of Care is  appropriate.    Advance Directive Discussion:    I reviewed the current advanced directives as reflected in EPIC, the POLST and the facility chart, and verified the congruency of orders . I contacted the first party and discussed the plan of Care.  I did not due to cognitive impairment review the advance directives with the resident.     Team Discussion:  I communicated with the appropriate disciplines involved with the Plan of Care:   Nursing    Patient's goal is pain control and comfort.  Information reviewed:  Medications, vital signs, orders, and nursing notes.    ROS:  Unobtainable secondary to aphasia.     Vitals:  /90   Pulse 86   Temp 98.1  F (36.7  C)   Resp 16   Wt 52.5 kg (115 lb 12.8 oz)   SpO2 92%   BMI 18.14 kg/m   Body mass index is 18.14 kg/m .  Exam:  GENERAL APPEARANCE:  Sleeping, in no distress, awakens easily  HEAD:  Normal, normocephalic, atraumatic  EYE EXAM: normal external eye, conjunctiva, lids, SHERICE  NECK EXAM: supple, no JVD  CHEST/RESP:  respiratory effort normal, lung sounds CTA , no respiratory distress  CV:  Rate reg, rhythm reg, no murmur, no peripheral edema   M/S:   extremities normal, gait normal-somewhat unsteady on feet-uses no device, normal muscle tone, and range of motion normal   SKIN EXAM: CDI, some bruising noted   NEUROLOGIC EXAM: Normal gross motor movement, tone and coordination. No tremor. Cranial nerves 2-12 are normal tested and grossly at patient's baseline  PSYCH:  Alert and oriented to unable to determine orientation due to expressive aphasia,  affect anxious at times, judgement impaired by cognitive losses       Lab/Diagnostic data:   Recent labs in [facility] reviewed by me today.     ASSESSMENT/PLAN  Cerebrovascular accident (CVA) due to embolism of left anterior cerebral artery (H)  Expressive aphasia  Without new symptoms, speech still impaired.     Chronic atrial fibrillation (H)  Long term current use of anticoagulant therapy  Continues with often  elevated heart rate, irregular in rhythm.  On apixaban.      Dementia without behavioral disturbance, unspecified dementia type  Difficult to determine level of dementia, with expressive aphasia.  Is confused.     Essential hypertension  BP goals are  <140/90 mm Hg.This is higher than ACC and AHA recommendations due to risk for hypotension, risk of dizziness and falls, risk of tissue/cerebral hypoperfusion and frailty. Patient is stable with current plan of care and routine assessment.     Pain  Often noted to have some discomfort, improved with tylenol   - acetaminophen (TYLENOL) 325 MG tablet; Take 2 tablets (650 mg) by mouth 3 times daily      transcribed by : Bubba Diaz  1. No new orders at this time    Electronically signed by:  JADA Us CNP            Negative

## 2021-03-23 NOTE — ED ADULT NURSE NOTE - OBJECTIVE STATEMENT
pt presents to the ED c/o SOB , stating covering primary RN. pt presents to the ED c/o SOB for several day, stating at 85-89% on room air. pt unable to talk in full sentences, and tachypneic. dr. esteban at bedside.

## 2021-03-23 NOTE — ED ADULT NURSE NOTE - ED STAT RN HANDOFF DETAILS
report given to primary RN bautista. Safety checks compld this shift/Safety rounds completed hourly.  IV sites checked Q2+remains WDL. Meds given as ord with no s/s of adverse RXNs. Fall +skin precs in place. Any issues endorsed to oncoming RN for follow up. dr. esteban made aware recent , pending new orders. iso precautions maintained.

## 2021-03-23 NOTE — ED PROVIDER NOTE - PHYSICAL EXAMINATION
gen - looks in distress  resp - bl basilar crackles  cv - tachycardia  gi - soft nt  skin - no rash  head - at nc  eyes - eomi, perrl  msk - no signs of injury

## 2021-03-24 NOTE — PROGRESS NOTE ADULT - ASSESSMENT
Pt is a 64 yo F with h/o HTN, HLD and DM2 who presented with progressively worsening SOB associated with productive cough for past several days much worse the past 3 days.  Pt was started on levaquin, prednisone and azithromycin  on 3/19 outpatient with no improvement.  In the ER CTA chest done to r/o PE findings consistent with COVID-19 PNA. Labs remarkable for WBC 18.55, Na 130, K 3.2, COs 10, AG 29, Glucose 416, alb 2.5.  Placed on High flow for dyspnea and hypoxemia. ICU admitting dx : 1) Acute hypoxic resp failure 2 to Covid-19 PNA 2) DKA.      Resp: Titrate down HFnc O2 as tolerated  ID:  Cont Remdesivir + Decadron  CVS: May need to start pt's ARB   Heme: DVT prophylaxis with sq Heparin  FEN: ADA po diet/ Replace Phos; pt hypophosphatemic   Endo: Adjust Lantus (dose increased) + Lispro to FS

## 2021-03-24 NOTE — PROGRESS NOTE ADULT - SUBJECTIVE AND OBJECTIVE BOX
HPI:  Pt is a 66 yo F with h/o HTN, HLD and DM2 who presented with progressively worsening SOB associated with productive cough for past several days much worse the past 3 days.  Pt was started on levaquin, prednisone and azithromycin  on 3/19 outpatient with no improvement.  In the ER CTA chest done to r/o PE findings consistent with COVID-19 PNA. Labs remarkable for WBC 18.55, Na 130, K 3.2, COs 10, AG 29, Glucose 416, alb 2.5.  Placed on High flow for dyspnea and hypoxemia. ICU admitting dx : 1) Acute hypoxic resp failure 2 to Covid-19 PNA 2) DKA.        ## Labs:  CBC:                        11.8   12.34 )-----------( 287      ( 24 Mar 2021 02:33 )             35.6     Chem:  03-24    137  |  102  |  16  ----------------------------<  253<H>  3.9   |  26  |  0.65    Ca    8.4<L>      24 Mar 2021 02:33  Phos  2.0     03-24  Mg     2.2     03-24    TPro  6.4  /  Alb  2.2<L>  /  TBili  0.8  /  DBili  x   /  AST  34  /  ALT  25  /  AlkPhos  84  03-24    Coags:  PT/INR - ( 23 Mar 2021 13:17 )   PT: 12.8 sec;   INR: 1.11 ratio         PTT - ( 23 Mar 2021 13:17 )  PTT:27.6 sec  culture blood:  --  03-23 @ 08:12            culture sputum:     No growth to date.           culture urine:  -- 03-23 @ 08:12        ## Imaging:    ## Medications:  remdesivir  IVPB   IV Intermittent   remdesivir  IVPB 100 milliGRAM(s) IV Intermittent every 24 hours        dexAMETHasone  Injectable 6 milliGRAM(s) IV Push daily  dextrose 40% Gel 15 Gram(s) Oral once  dextrose 50% Injectable 25 Gram(s) IV Push once  dextrose 50% Injectable 12.5 Gram(s) IV Push once  dextrose 50% Injectable 25 Gram(s) IV Push once  glucagon  Injectable 1 milliGRAM(s) IntraMuscular once  insulin glargine Injectable (LANTUS) 25 Unit(s) SubCutaneous at bedtime  insulin lispro (ADMELOG) corrective regimen sliding scale   SubCutaneous three times a day before meals    heparin   Injectable 5000 Unit(s) SubCutaneous every 8 hours    pantoprazole  Injectable 40 milliGRAM(s) IV Push daily        ## Vitals:  T(C): 37.3 (03-24-21 @ 15:27), Max: 37.3 (03-24-21 @ 07:52)  HR: 91 (03-24-21 @ 16:56) (83 - 96)  BP: 157/78 (03-24-21 @ 16:00) (135/67 - 163/76)  BP(mean): 97 (03-24-21 @ 16:00) (80 - 97)  RR: 20 (03-24-21 @ 16:56) (16 - 35)  SpO2: 90% (03-24-21 @ 16:56) (86% - 100%)  Wt(kg): --  Vent:   ABG: ABG - ( 24 Mar 2021 08:08 )  pH, Arterial: x     pH, Blood: 7.48  /  pCO2: 33    /  pO2: 54    / HCO3: 24    / Base Excess: 1.5   /  SaO2: 88                    03-23 @ 07:01  -  03-24 @ 07:00  --------------------------------------------------------  IN: 1447 mL / OUT: 1455 mL / NET: -8 mL    03-24 @ 07:01  -  03-24 @ 17:38  --------------------------------------------------------  IN: 680 mL / OUT: 870 mL / NET: -190 mL          ## P/E:  Gen: lying comfortably in bed in no apparent distress  Face: HFnc O2  Lungs: CTA  Heart: RRR  Abd: Soft/+BS  Ext: No edema  Neuro: AAO x3    CENTRAL LINE: [ ] YES [ ] NO  LOCATION:   DATE INSERTED:  REMOVE: [ ] YES [ ] NO      RADHA: [ ] YES [ ] NO    DATE INSERTED:  REMOVE:  [ ] YES [ ] NO      A-LINE:  [ ] YES [ ] NO  LOCATION:   DATE INSERTED:  REMOVE:  [ ] YES [ ] NO  EXPLAIN:    CODE STATUS: [x ] full code  [ ] DNR  [ ] DNI  [ ] MOLST  Goals of care discussion: [ ] yes

## 2021-03-25 NOTE — DIETITIAN INITIAL EVALUATION ADULT. - ORAL INTAKE PTA/DIET HISTORY
Pt is alert, disorientation noted.  Pt was not able to provide detailed diet history, reported good appetite PTA.   Pt/family did the shopping/cooking.

## 2021-03-25 NOTE — DIETITIAN INITIAL EVALUATION ADULT. - OTHER CALCULATIONS
IBW used for calculation of estimated needs. %IBW: 141%           % UBW: ?, wt. gain of 3.7 kg since 3/23 on daily wt. noted, increase in edema noted, ? wt. of 77.1 kg( 3/23-pt information wt.)

## 2021-03-25 NOTE — PROGRESS NOTE ADULT - ASSESSMENT
Pt is a 64 yo F with h/o HTN, HLD and DM2 who presented with progressively worsening SOB associated with productive cough for past several days much worse the past 3 days.  Pt was started on Levaquin, Prednisone and Azithromycin  on 3/19 outpatient with no improvement.  In the ER CTA chest done to r/o PE findings consistent with COVID-19 PNA. Labs remarkable for WBC 18.55, Na 130, K 3.2, COs 10, AG 29, Glucose 416, alb 2.5.  Placed on High flow for dyspnea and hypoxemia. ICU admitting dx : 1) Acute hypoxic resp failure 2 to Covid-19 PNA with improvement in oxygenation  2) s/p DKA.       Resp: Titrate down HFnc O2 as tolerated  ID:  Cont Remdesivir + Decadron  CVS: May need to start pt's ARB   Heme: DVT prophylaxis with sq Heparin  FEN: ADA po diet/ Replace Phos; pt hypophosphatemic   Endo: Adjust Lantus (would increase dose) + Lispro to FS  Social: May transfer to [facility]

## 2021-03-25 NOTE — CHART NOTE - NSCHARTNOTEFT_GEN_A_CORE
Pt is a 64 yo F with h/o HTN, HLD and DM2 who presented with progressively worsening SOB associated with productive cough for past several days much worse the past 3 days.  Pt was started on Levaquin, Prednisone and Azithromycin  on 3/19 outpatient with no improvement.  In the ER CTA chest done to r/o PE findings consistent with COVID-19 PNA. Placed on High flow for dyspnea and hypoxemia. ICU admitting with Acute hypoxic respiratpry failure 2 to Covid-19 PNA with improvement in oxygenation Continue to titrate down HFnc O2 as tolerated. Continue Remdesivir + Decadron HTN - May need to start pt's ARB, DM -  DKA resolved - Adjust Lantus (would increase dose) + Lispro to FS, started oin  ADA po diet. DVT prophylaxis with sq Heparin. Patient seen and examined by ICU attending and stable for transfer to General medicine service.    Report sent to Dr. Trujillo, hospitalist service    Joelle Michelle NP_C  critical care

## 2021-03-25 NOTE — DIETITIAN INITIAL EVALUATION ADULT. - PHYSCIAL ASSESSMENT
BMI=29.2(03/23), 03/23, 1+ generalized edema noted, 03./25, 2+ generalized edema noted/well nourished/overweight/other (specify) BMI=29.2(03/23), 03/23, 1+ generalized edema noted, 03/25, 2+ generalized edema noted/well nourished/overweight/other (specify)

## 2021-03-25 NOTE — DIETITIAN INITIAL EVALUATION ADULT. - PERTINENT LABORATORY DATA
03-25 Na134 mmol/L<L> Glu 203 mg/dL<H> K+ 3.9 mmol/L Cr  0.49 mg/dL<L> BUN 16 mg/dL 03-25 Phos 2.3 mg/dL<L> 03-25 Alb 2.3 g/dL<L> 03-24 Chol 173 mg/dL LDL --    HDL 43 mg/dL<L> Trig 104 mg/dL03-25 ALT 24 U/L AST 23 U/L Alkaline Phosphatase 88 U/L  03-24-21 @ 09:30 a1c 12.5<H> estimated average wdcxvyh=936 mg/dL  03-23-21 @ 19:22 a1c 12.2<H> estimated average glucose=303 mg/dL

## 2021-03-25 NOTE — PROGRESS NOTE ADULT - SUBJECTIVE AND OBJECTIVE BOX
HPI:  Pt is a 64 yo F with h/o HTN, HLD and DM2 who presented with progressively worsening SOB associated with productive cough for past several days much worse the past 3 days.  Pt was started on Levaquin, Prednisone and Azithromycin  on 3/19 outpatient with no improvement.  In the ER CTA chest done to r/o PE findings consistent with COVID-19 PNA. Labs remarkable for WBC 18.55, Na 130, K 3.2, COs 10, AG 29, Glucose 416, alb 2.5.  Placed on High flow for dyspnea and hypoxemia. ICU admitting dx : 1) Acute hypoxic resp failure 2 to Covid-19 PNA with improvement in oxygenation  2) s/p DKA.        ## Labs:  CBC:                        12.0   11.02 )-----------( 348      ( 25 Mar 2021 03:03 )             37.5     Chem:  03-25    134<L>  |  96  |  16  ----------------------------<  203<H>  3.9   |  29  |  0.49<L>    Ca    8.9      25 Mar 2021 03:03  Phos  2.3     03-25  Mg     2.4     03-25    TPro  6.6  /  Alb  2.3<L>  /  TBili  0.8  /  DBili  x   /  AST  23  /  ALT  24  /  AlkPhos  88  03-25    Coags:    culture blood:  --  03-24 @ 08:29            culture sputum:     No growth           culture urine:  -- 03-24 @ 08:29        ## Imaging:    ## Medications:  remdesivir  IVPB   IV Intermittent   remdesivir  IVPB 100 milliGRAM(s) IV Intermittent every 24 hours        dexAMETHasone  Injectable 6 milliGRAM(s) IV Push daily  dextrose 40% Gel 15 Gram(s) Oral once  dextrose 50% Injectable 25 Gram(s) IV Push once  dextrose 50% Injectable 12.5 Gram(s) IV Push once  dextrose 50% Injectable 25 Gram(s) IV Push once  glucagon  Injectable 1 milliGRAM(s) IntraMuscular once  insulin glargine Injectable (LANTUS) 25 Unit(s) SubCutaneous at bedtime  insulin lispro (ADMELOG) corrective regimen sliding scale   SubCutaneous three times a day before meals    heparin   Injectable 5000 Unit(s) SubCutaneous every 8 hours    pantoprazole  Injectable 40 milliGRAM(s) IV Push daily        ## Vitals:  T(C): 37.1 (03-25-21 @ 16:08), Max: 38.1 (03-25-21 @ 07:30)  HR: 81 (03-25-21 @ 16:00) (81 - 104)  BP: 141/72 (03-25-21 @ 16:00) (89/66 - 171/77)  BP(mean): 90 (03-25-21 @ 16:00) (70 - 103)  RR: 24 (03-25-21 @ 16:00) (20 - 38)  SpO2: 100% (03-25-21 @ 16:00) (87% - 100%)  Wt(kg): --  Vent:   ABG: ABG - ( 24 Mar 2021 08:08 )  pH, Arterial: x     pH, Blood: 7.48  /  pCO2: 33    /  pO2: 54    / HCO3: 24    / Base Excess: 1.5   /  SaO2: 88                    03-24 @ 07:01  -  03-25 @ 07:00  --------------------------------------------------------  IN: 1640 mL / OUT: 1515 mL / NET: 125 mL    03-25 @ 07:01  -  03-25 @ 16:23  --------------------------------------------------------  IN: 930 mL / OUT: 550 mL / NET: 380 mL          ## P/E:  Gen: lying comfortably in bed in no apparent distress  Face: HFnc O2  Lungs: CTA  Heart: RRR  Abd: Soft/+BS  Ext: No edema  Neuro: AAO x3    CENTRAL LINE: [ ] YES [ ] NO  LOCATION:   DATE INSERTED:  REMOVE: [ ] YES [ ] NO      RADHA: [ ] YES [ ] NO    DATE INSERTED:  REMOVE:  [ ] YES [ ] NO      A-LINE:  [ ] YES [ ] NO  LOCATION:   DATE INSERTED:  REMOVE:  [ ] YES [ ] NO  EXPLAIN:    CODE STATUS: [x ] full code  [ ] DNR  [ ] DNI  [ ] MOLST  Goals of care discussion: [ ] yes

## 2021-03-25 NOTE — DIETITIAN INITIAL EVALUATION ADULT. - OTHER INFO
Pt on COVID-19 isolation, on high flow 02, seen c <50% consumption of breakfast.    Pt denied chewing/swallowing difficulty.  Pt reported lactose intolerance, likes salads, soups, tuna fish, diet g' estella, tea.  Pt declined offer for Glucerna Shakes due to c/o gas.  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    Pt stated that she told the doctors the medications that she was on and the hospital can't afford her medications.   As per pt., was self monitoring blood glucose 2 x day PTA.  As per home meds, pt was on metformin PTA.  Pt at present is not appropriate for nutrition education.  Food & Nutrition office made aware of pt's food preferences, pt made aware of food selection process and alternatives. Pt on COVID-19 isolation, on high flow 02, seen c <50% consumption of breakfast.    Pt denied chewing/swallowing difficulty.  Pt reported lactose intolerance, likes salads, soups, tuna fish, diet g' estella, tea.  Pt declined offer for Glucerna Shakes due to c/o gas.  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    Pt stated that she told the doctors of the diabetic medications which she was taking and that the hospital can't afford her medications.   As per pt., was self monitoring blood glucose 2 x day PTA.  As per home meds, pt was on metformin PTA.  Pt at present is not appropriate for nutrition education.  Food & Nutrition office made aware of pt's food preferences, pt made aware of food selection process and alternatives.

## 2021-03-26 NOTE — PROGRESS NOTE ADULT - SUBJECTIVE AND OBJECTIVE BOX
CHIEF COMPLAINT: low grade fever  + sob  + cough  no diarrhea  no active gross bleeding       PHYSICAL EXAM:    GENERAL: Overweight and on NRB oxygen.   CHEST/LUNG: Decreased air entry bibasally, no wheezing, no crackles   HEART: Regular rate and rhythm; No murmurs. Tachycardic  ABDOMEN: Soft, Nontender, Nondistended; Bowel sounds present  EXTREMITIES:  Moving all four extremities spontaneously, No clubbing, cyanosis, or edema  NERVOUS SYSTEM:  Grossly non focal.  Psychiatry: AAO x 3, mood is appropriate       OBJECTIVE DATA:   Vital Signs Last 24 Hrs  T(C): 36.3 (26 Mar 2021 13:00), Max: 38.1 (25 Mar 2021 23:39)  T(F): 97.4 (26 Mar 2021 13:00), Max: 100.5 (25 Mar 2021 23:39)  HR: 101 (26 Mar 2021 13:00) (81 - 112)  BP: 127/76 (26 Mar 2021 13:00) (89/66 - 163/89)  BP(mean): 90 (25 Mar 2021 16:00) (70 - 90)  RR: 18 (26 Mar 2021 13:00) (16 - 27)  SpO2: 93% (26 Mar 2021 13:00) (84% - 100%)           Daily     Daily Weight in k.5 (26 Mar 2021 05:24)  LABS:                        11.5   12.17 )-----------( 365      ( 26 Mar 2021 06:42 )             35.6             03-26    134<L>  |  98  |  13  ----------------------------<  149<H>  3.9   |  29  |  0.43<L>    Ca    8.6      26 Mar 2021 06:42  Phos  2.4     03-26  Mg     2.2     03-26    TPro  6.6  /  Alb  2.3<L>  /  TBili  0.8  /  DBili  x   /  AST  23  /  ALT  24  /  AlkPhos  88  03-25                Urinalysis Basic - ( 25 Mar 2021 03:03 )    Color: Yellow / Appearance: Clear / S.015 / pH: x  Gluc: x / Ketone: Trace  / Bili: Negative / Urobili: 4 mg/dL   Blood: x / Protein: 100 mg/dL / Nitrite: Negative   Leuk Esterase: Trace / RBC: 0-2 /HPF / WBC 0-2   Sq Epi: x / Non Sq Epi: Occasional / Bacteria: Occasional       ABG - ( 26 Mar 2021 03:16 )  pH, Arterial: x     pH, Blood: 7.54  /  pCO2: 35    /  pO2: 94    / HCO3: 30    / Base Excess: 7.2   /  SaO2: 66                   CAPILLARY BLOOD GLUCOSE      POCT Blood Glucose.: 215 mg/dL (26 Mar 2021 11:56)      Culture - Urine  Source: .Urine Clean Catch (Midstream)  Final Report ():    No growth    Culture - Blood  Source: .Blood Blood-Peripheral  Preliminary Report ():    No growth to date.    Culture - Blood  Source: .Blood Blood-Peripheral  Preliminary Report ():    No growth to date.         Interval Radiology studies: reviewed by me    < from: Xray Chest 1 View- PORTABLE-Urgent (Xray Chest 1 View- PORTABLE-Urgent .) (21 @ 07:32) >  Impression: Pulmonary infiltrate in the lower lung zones bilaterally, grossly stable on the right and slightly progressed on the left.    No evidence for pleural effusion, or pneumothorax.    Stable cardiac silhouette.    < end of copied text >    Tele reviewed by me showed intermittent sinus tachycardia     MEDICATIONS  (STANDING):  dexAMETHasone  Injectable 6 milliGRAM(s) IV Push daily  dextrose 40% Gel 15 Gram(s) Oral once  dextrose 5%. 1000 milliLiter(s) (100 mL/Hr) IV Continuous <Continuous>  dextrose 5%. 1000 milliLiter(s) (50 mL/Hr) IV Continuous <Continuous>  dextrose 50% Injectable 25 Gram(s) IV Push once  dextrose 50% Injectable 12.5 Gram(s) IV Push once  dextrose 50% Injectable 25 Gram(s) IV Push once  ergocalciferol 30584 Unit(s) Oral <User Schedule>  glucagon  Injectable 1 milliGRAM(s) IntraMuscular once  heparin   Injectable 5000 Unit(s) SubCutaneous every 8 hours  insulin glargine Injectable (LANTUS) 25 Unit(s) SubCutaneous at bedtime  insulin lispro (ADMELOG) corrective regimen sliding scale   SubCutaneous three times a day before meals  pantoprazole  Injectable 40 milliGRAM(s) IV Push daily  potassium phosphate / sodium phosphate Powder (PHOS-NaK) 1 Packet(s) Oral three times a day  remdesivir  IVPB   IV Intermittent   remdesivir  IVPB 100 milliGRAM(s) IV Intermittent every 24 hours    MEDICATIONS  (PRN):

## 2021-03-26 NOTE — PROGRESS NOTE ADULT - SUBJECTIVE AND OBJECTIVE BOX
Patient is a 65y old  Female who presents with a chief complaint of covid pna and dka (25 Mar 2021 20:03)      INTERVAL HPI/OVERNIGHT EVENTS:  no events overnight  NAD    MEDICATIONS  (STANDING):  dexAMETHasone  Injectable 6 milliGRAM(s) IV Push daily  dextrose 40% Gel 15 Gram(s) Oral once  dextrose 5%. 1000 milliLiter(s) (50 mL/Hr) IV Continuous <Continuous>  dextrose 5%. 1000 milliLiter(s) (100 mL/Hr) IV Continuous <Continuous>  dextrose 50% Injectable 25 Gram(s) IV Push once  dextrose 50% Injectable 12.5 Gram(s) IV Push once  dextrose 50% Injectable 25 Gram(s) IV Push once  ergocalciferol 24632 Unit(s) Oral <User Schedule>  glucagon  Injectable 1 milliGRAM(s) IntraMuscular once  heparin   Injectable 5000 Unit(s) SubCutaneous every 8 hours  insulin glargine Injectable (LANTUS) 25 Unit(s) SubCutaneous at bedtime  insulin lispro (ADMELOG) corrective regimen sliding scale   SubCutaneous three times a day before meals  pantoprazole  Injectable 40 milliGRAM(s) IV Push daily  potassium phosphate / sodium phosphate Powder (PHOS-NaK) 1 Packet(s) Oral three times a day  remdesivir  IVPB   IV Intermittent   remdesivir  IVPB 100 milliGRAM(s) IV Intermittent every 24 hours    MEDICATIONS  (PRN):      Vital Signs Last 24 Hrs  T(C): 37.2 (26 Mar 2021 05:24), Max: 38.1 (25 Mar 2021 23:39)  T(F): 98.9 (26 Mar 2021 05:24), Max: 100.5 (25 Mar 2021 23:39)  HR: 99 (26 Mar 2021 08:48) (81 - 112)  BP: 147/80 (26 Mar 2021 05:24) (89/66 - 163/89)  BP(mean): 90 (25 Mar 2021 16:00) (70 - 94)  RR: 16 (26 Mar 2021 05:24) (16 - 29)  SpO2: 98% (26 Mar 2021 08:48) (84% - 100%)    PHYSICAL EXAM:  GENERAL: NAD  deferred    LABS:                        11.5   12.17 )-----------( 365      ( 26 Mar 2021 06:42 )             35.6     03-26    134<L>  |  98  |  13  ----------------------------<  149<H>  3.9   |  29  |  0.43<L>    Ca    8.6      26 Mar 2021 06:42  Phos  2.4       Mg     2.2         TPro  6.6  /  Alb  2.3<L>  /  TBili  0.8  /  DBili  x   /  AST  23  /  ALT  24  /  AlkPhos  88        Urinalysis Basic - ( 25 Mar 2021 03:03 )    Color: Yellow / Appearance: Clear / S.015 / pH: x  Gluc: x / Ketone: Trace  / Bili: Negative / Urobili: 4 mg/dL   Blood: x / Protein: 100 mg/dL / Nitrite: Negative   Leuk Esterase: Trace / RBC: 0-2 /HPF / WBC 0-2   Sq Epi: x / Non Sq Epi: Occasional / Bacteria: Occasional      CAPILLARY BLOOD GLUCOSE      POCT Blood Glucose.: 131 mg/dL (26 Mar 2021 08:55)  POCT Blood Glucose.: 149 mg/dL (26 Mar 2021 07:53)  POCT Blood Glucose.: 222 mg/dL (25 Mar 2021 21:01)  POCT Blood Glucose.: 226 mg/dL (25 Mar 2021 16:24)  POCT Blood Glucose.: 263 mg/dL (25 Mar 2021 11:24)    Lipid panel:       ABG - ( 26 Mar 2021 03:16 )  pH, Arterial: x     pH, Blood: 7.54  /  pCO2: 35    /  pO2: 94    / HCO3: 30    / Base Excess: 7.2   /  SaO2: 66                      RADIOLOGY & ADDITIONAL TESTS:

## 2021-03-26 NOTE — PROGRESS NOTE ADULT - ASSESSMENT
ICU transfer summary: Pt is a 64 yo F with h/o HTN, HLD and DM2 who presented with progressively worsening SOB associated with productive cough for past several days much worse the past 3 days.  Pt was started on Levaquin, Prednisone and Azithromycin  on 3/19 outpatient with no improvement.  In the ER CTA chest done to r/o PE findings consistent with COVID-19 PNA. Placed on High flow for dyspnea and hypoxemia. ICU admitting with Acute hypoxic respiratpry failure 2 to Covid-19 PNA with improvement in oxygenation Continue to titrate down HFnc O2 as tolerated. Continue Remdesivir + Decadron HTN - May need to start pt's ARB, DM -  DKA resolved - Adjust Lantus (would increase dose) + Lispro to FS, started oin  ADA po diet. DVT prophylaxis with sq Heparin. Patient seen and examined by ICU attending and stable for transfer to General medicine service on 3/25/21.     Acute hypoxic respiratory failure with bilateral COVID pneumonia. reviewed ABG. cont NRB oxygen and taper down if possible. cont isolation and current meds. cont continuous pulse ox and tele. follow labs. Pending ECHO.  S/p DKA. DM type 2. controlled. cont current meds. follow finger sticks. Endo consult recs appreciated.   Overweight. nutritional counseling provided.     HSQ  Full code.

## 2021-03-27 NOTE — PROGRESS NOTE ADULT - ASSESSMENT
ICU transfer summary: Pt is a 66 yo F with h/o HTN, HLD and DM2 who presented with progressively worsening SOB associated with productive cough for past several days much worse the past 3 days.  Pt was started on Levaquin, Prednisone and Azithromycin  on 3/19 outpatient with no improvement.  In the ER CTA chest done to r/o PE findings consistent with COVID-19 PNA. Placed on High flow for dyspnea and hypoxemia. ICU admitting with Acute hypoxic respiratpry failure 2 to Covid-19 PNA with improvement in oxygenation Continue to titrate down HFnc O2 as tolerated. Continue Remdesivir + Decadron HTN - May need to start pt's ARB, DM -  DKA resolved - Adjust Lantus (would increase dose) + Lispro to FS, started oin  ADA po diet. DVT prophylaxis with sq Heparin. Patient seen and examined by ICU attending and stable for transfer to General medicine service on 3/25/21.     Acute hypoxic respiratory failure with bilateral COVID pneumonia. reviewed ABG. cont BiPAP. discussed with nurse and RT to evaluate for tapering BiPAP to high flow if possible. Patient told RRT last night that she does not want to be intubated. I discussed with her about code status and filled MOLST form. She decided to be DNR but afterwards she changed her mind and rescind the DNR.   S/p DKA. DM type 2. controlled. cont current meds. Follow finger sticks. Endo consult recs appreciated.   Overweight. nutritional counseling provided.     HSQ  Full code now.     ICU transfer summary: Pt is a 66 yo F with h/o HTN, HLD and DM2 who presented with progressively worsening SOB associated with productive cough for past several days much worse the past 3 days.  Pt was started on Levaquin, Prednisone and Azithromycin  on 3/19 outpatient with no improvement.  In the ER CTA chest done to r/o PE findings consistent with COVID-19 PNA. Placed on High flow for dyspnea and hypoxemia. ICU admitting with Acute hypoxic respiratpry failure 2 to Covid-19 PNA with improvement in oxygenation Continue to titrate down HFnc O2 as tolerated. Continue Remdesivir + Decadron HTN - May need to start pt's ARB, DM -  DKA resolved - Adjust Lantus (would increase dose) + Lispro to FS, started oin  ADA po diet. DVT prophylaxis with sq Heparin. Patient seen and examined by ICU attending and stable for transfer to General medicine service on 3/25/21.     Acute hypoxic respiratory failure with bilateral COVID pneumonia. reviewed ABG. cont BiPAP. discussed with nurse and RT to evaluate for tapering BiPAP to high flow if possible. Patient told RRT last night that she does not want to be intubated. I discussed with her about code status and filled MOLST form. She decided to be DNR but afterwards she changed her mind and rescind the DNR.   S/p DKA. DM type 2. controlled. cont current meds. Follow finger sticks. Endo consult recs appreciated.   Overweight. nutritional counseling provided.     HSQ  Full code now.        Discussed with Daughter Beau [phone]

## 2021-03-27 NOTE — PROVIDER CONTACT NOTE (CHANGE IN STATUS NOTIFICATION) - SITUATION
upon entering pt room, pt 02 sats 85%. pt in nad , rr 30-36 at this time other vss.
pt 02 sats 88-90% at this time, in nad on bipap 14&8 100%.

## 2021-03-27 NOTE — PROVIDER CONTACT NOTE (CHANGE IN STATUS NOTIFICATION) - BACKGROUND
on bipap throughout night, pt was proning tolerated well and started desatting to 87%. pt flipped back onto back at this time 02 sats 88-90% sitting upright in bed.
pt been on bipap throughout day, switching from bipap to NRB. pt switched back to bipap in mid afternoon today maintaining 02 sats low 90s , upon shift change pt desatting

## 2021-03-27 NOTE — GOALS OF CARE CONVERSATION - ADVANCED CARE PLANNING - CONVERSATION DETAILS
discussed with the patient about acute respiratory failure and possiblity of intubation.   discussed about CPR.   discussed about tube feeding, IVF and antibiotics.   Patient has capacity to make medical decisions and wanted to be DNR/DNI.   Patient could not sign the MOLST form because of her clinical condition.   but provided verbal consent

## 2021-03-27 NOTE — PROGRESS NOTE ADULT - SUBJECTIVE AND OBJECTIVE BOX
CHIEF COMPLAINT: desaturated to mid 80's requiring BiPAP.   + sob  + cough  no diarrhea  no active gross bleeding       PHYSICAL EXAM:    GENERAL: Overweight and on BiPAP oxygen.   CHEST/LUNG: Decreased air entry bibasally, no wheezing, no crackles   HEART: Regular rate and rhythm; No murmurs. Tachycardic  ABDOMEN: Soft, Nontender, Nondistended; Bowel sounds present  EXTREMITIES:  Moving all four extremities spontaneously, No clubbing, cyanosis, or edema  NERVOUS SYSTEM:  Grossly non focal.  Psychiatry: AAO x 3, mood is appropriate       OBJECTIVE DATA:     Vital Signs Last 24 Hrs  T(C): 37.3 (27 Mar 2021 08:44), Max: 37.3 (26 Mar 2021 19:54)  T(F): 99.1 (27 Mar 2021 08:44), Max: 99.2 (26 Mar 2021 19:54)  HR: 94 (27 Mar 2021 13:40) (83 - 107)  BP: 133/83 (27 Mar 2021 08:44) (125/75 - 175/91)  BP(mean): 112 (26 Mar 2021 16:37) (112 - 112)  RR: 16 (27 Mar 2021 08:44) (16 - 32)  SpO2: 94% (27 Mar 2021 13:40) (85% - 98%)           Daily     Daily Weight in k.8 (27 Mar 2021 06:00)  LABS:                        11.4   12.52 )-----------( 352      ( 27 Mar 2021 07:44 )             36.6             03-27    137  |  99  |  11  ----------------------------<  80  4.0   |  34<H>  |  0.44<L>    Ca    8.8      27 Mar 2021 07:44  Phos  3.0       Mg     2.2         TPro  6.3  /  Alb  1.8<L>  /  TBili  0.6  /  DBili  x   /  AST  39<H>  /  ALT  18  /  AlkPhos  77                    ABG - ( 26 Mar 2021 20:29 )  pH, Arterial: x     pH, Blood: 7.54  /  pCO2: 36    /  pO2: 51    / HCO3: 31    / Base Excess: 8.2   /  SaO2: 87                   CAPILLARY BLOOD GLUCOSE      POCT Blood Glucose.: 119 mg/dL (27 Mar 2021 12:11)      Culture - Urine (collected )  Source: .Urine Clean Catch (Midstream)  Final Report ():    No growth    Culture - Blood (collected )  Source: .Blood Blood-Peripheral  Preliminary Report ():    No growth to date.    Culture - Blood (collected )  Source: .Blood Blood-Peripheral  Preliminary Report ():    No growth to date.          Interval Radiology studies: reviewed   < from: Xray Chest 1 View- PORTABLE-Urgent (Xray Chest 1 View- PORTABLE-Urgent .) (21 @ 07:32) >  Impression: Pulmonary infiltrate in the lower lung zones bilaterally, grossly stable on the right and slightly progressed on the left.    No evidence for pleural effusion, or pneumothorax.    Stable cardiac silhouette.    < end of copied text >    MEDICATIONS  (STANDING):  dexAMETHasone  Injectable 6 milliGRAM(s) IV Push daily  dextrose 40% Gel 15 Gram(s) Oral once  dextrose 5%. 1000 milliLiter(s) (50 mL/Hr) IV Continuous <Continuous>  dextrose 5%. 1000 milliLiter(s) (100 mL/Hr) IV Continuous <Continuous>  dextrose 50% Injectable 25 Gram(s) IV Push once  dextrose 50% Injectable 12.5 Gram(s) IV Push once  dextrose 50% Injectable 25 Gram(s) IV Push once  ergocalciferol 16818 Unit(s) Oral <User Schedule>  glucagon  Injectable 1 milliGRAM(s) IntraMuscular once  heparin   Injectable 5000 Unit(s) SubCutaneous every 8 hours  insulin glargine Injectable (LANTUS) 20 Unit(s) SubCutaneous at bedtime  insulin lispro (ADMELOG) corrective regimen sliding scale   SubCutaneous three times a day before meals  pantoprazole  Injectable 40 milliGRAM(s) IV Push daily  potassium phosphate / sodium phosphate Powder (PHOS-NaK) 1 Packet(s) Oral three times a day    MEDICATIONS  (PRN):       CHIEF COMPLAINT: desaturated to mid 80's requiring BiPAP.   + sob  + cough  no diarrhea  no active gross bleeding       PHYSICAL EXAM:    GENERAL: Overweight and on BiPAP oxygen.   CHEST/LUNG: Decreased air entry bibasally, no wheezing, no crackles   HEART: Regular rate and rhythm; No murmurs. Tachycardic  ABDOMEN: Soft, Nontender, Nondistended; Bowel sounds present  EXTREMITIES:  Moving all four extremities spontaneously, No clubbing, cyanosis, or edema  NERVOUS SYSTEM:  Grossly non focal.  Psychiatry: AAO x 3, mood is appropriate       OBJECTIVE DATA:     Vital Signs Last 24 Hrs  T(C): 37.3 (27 Mar 2021 08:44), Max: 37.3 (26 Mar 2021 19:54)  T(F): 99.1 (27 Mar 2021 08:44), Max: 99.2 (26 Mar 2021 19:54)  HR: 94 (27 Mar 2021 13:40) (83 - 107)  BP: 133/83 (27 Mar 2021 08:44) (125/75 - 175/91)  BP(mean): 112 (26 Mar 2021 16:37) (112 - 112)  RR: 16 (27 Mar 2021 08:44) (16 - 32)  SpO2: 94% (27 Mar 2021 13:40) (85% - 98%)           Daily     Daily Weight in k.8 (27 Mar 2021 06:00)  LABS:                        11.4   12.52 )-----------( 352      ( 27 Mar 2021 07:44 )             36.6             03-27    137  |  99  |  11  ----------------------------<  80  4.0   |  34<H>  |  0.44<L>    Ca    8.8      27 Mar 2021 07:44  Phos  3.0       Mg     2.2         TPro  6.3  /  Alb  1.8<L>  /  TBili  0.6  /  DBili  x   /  AST  39<H>  /  ALT  18  /  AlkPhos  77                    ABG - ( 26 Mar 2021 20:29 )  pH, Arterial: x     pH, Blood: 7.54  /  pCO2: 36    /  pO2: 51    / HCO3: 31    / Base Excess: 8.2   /  SaO2: 87               CAPILLARY BLOOD GLUCOSE      POCT Blood Glucose.: 119 mg/dL (27 Mar 2021 12:11)      Culture - Urine (collected )  Source: .Urine Clean Catch (Midstream)  Final Report ():    No growth    Culture - Blood (collected )  Source: .Blood Blood-Peripheral  Preliminary Report ():    No growth to date.    Culture - Blood (collected )  Source: .Blood Blood-Peripheral  Preliminary Report ():    No growth to date.          Interval Radiology studies: reviewed   < from: Xray Chest 1 View- PORTABLE-Urgent (Xray Chest 1 View- PORTABLE-Urgent .) (21 @ 07:32) >  Impression: Pulmonary infiltrate in the lower lung zones bilaterally, grossly stable on the right and slightly progressed on the left.    No evidence for pleural effusion, or pneumothorax.    Stable cardiac silhouette.    < end of copied text >    MEDICATIONS  (STANDING):  dexAMETHasone  Injectable 6 milliGRAM(s) IV Push daily  dextrose 40% Gel 15 Gram(s) Oral once  dextrose 5%. 1000 milliLiter(s) (50 mL/Hr) IV Continuous <Continuous>  dextrose 5%. 1000 milliLiter(s) (100 mL/Hr) IV Continuous <Continuous>  dextrose 50% Injectable 25 Gram(s) IV Push once  dextrose 50% Injectable 12.5 Gram(s) IV Push once  dextrose 50% Injectable 25 Gram(s) IV Push once  ergocalciferol 58919 Unit(s) Oral <User Schedule>  glucagon  Injectable 1 milliGRAM(s) IntraMuscular once  heparin   Injectable 5000 Unit(s) SubCutaneous every 8 hours  insulin glargine Injectable (LANTUS) 20 Unit(s) SubCutaneous at bedtime  insulin lispro (ADMELOG) corrective regimen sliding scale   SubCutaneous three times a day before meals  pantoprazole  Injectable 40 milliGRAM(s) IV Push daily  potassium phosphate / sodium phosphate Powder (PHOS-NaK) 1 Packet(s) Oral three times a day

## 2021-03-27 NOTE — PROGRESS NOTE ADULT - SUBJECTIVE AND OBJECTIVE BOX
Patient is a 65y old  Female who presents with a chief complaint of covid pna and dka (26 Mar 2021 13:20)      INTERVAL HPI/OVERNIGHT EVENTS:  no events overnight    MEDICATIONS  (STANDING):  dexAMETHasone  Injectable 6 milliGRAM(s) IV Push daily  dextrose 40% Gel 15 Gram(s) Oral once  dextrose 5%. 1000 milliLiter(s) (50 mL/Hr) IV Continuous <Continuous>  dextrose 5%. 1000 milliLiter(s) (100 mL/Hr) IV Continuous <Continuous>  dextrose 50% Injectable 25 Gram(s) IV Push once  dextrose 50% Injectable 12.5 Gram(s) IV Push once  dextrose 50% Injectable 25 Gram(s) IV Push once  ergocalciferol 59004 Unit(s) Oral <User Schedule>  glucagon  Injectable 1 milliGRAM(s) IntraMuscular once  heparin   Injectable 5000 Unit(s) SubCutaneous every 8 hours  insulin glargine Injectable (LANTUS) 20 Unit(s) SubCutaneous at bedtime  insulin lispro (ADMELOG) corrective regimen sliding scale   SubCutaneous three times a day before meals  pantoprazole  Injectable 40 milliGRAM(s) IV Push daily  potassium phosphate / sodium phosphate Powder (PHOS-NaK) 1 Packet(s) Oral three times a day    MEDICATIONS  (PRN):      Vital Signs Last 24 Hrs  T(C): 37.3 (27 Mar 2021 08:44), Max: 37.3 (26 Mar 2021 19:54)  T(F): 99.1 (27 Mar 2021 08:44), Max: 99.2 (26 Mar 2021 19:54)  HR: 88 (27 Mar 2021 09:24) (83 - 107)  BP: 133/83 (27 Mar 2021 08:44) (125/75 - 175/91)  BP(mean): 112 (26 Mar 2021 16:37) (112 - 112)  RR: 16 (27 Mar 2021 08:44) (16 - 32)  SpO2: 93% (27 Mar 2021 09:24) (85% - 99%)    PHYSICAL EXAM:  GENERAL: NAD, well-groomed, well-developed        LABS:                        11.4   12.52 )-----------( 352      ( 27 Mar 2021 07:44 )             36.6     03-27    137  |  99  |  11  ----------------------------<  80  4.0   |  34<H>  |  0.44<L>    Ca    8.8      27 Mar 2021 07:44  Phos  3.0     03-27  Mg     2.2     03-27    TPro  6.3  /  Alb  1.8<L>  /  TBili  0.6  /  DBili  x   /  AST  39<H>  /  ALT  18  /  AlkPhos  77  03-27        CAPILLARY BLOOD GLUCOSE      POCT Blood Glucose.: 85 mg/dL (27 Mar 2021 08:42)  POCT Blood Glucose.: 133 mg/dL (26 Mar 2021 21:00)  POCT Blood Glucose.: 227 mg/dL (26 Mar 2021 16:23)  POCT Blood Glucose.: 215 mg/dL (26 Mar 2021 11:56)    Lipid panel:       ABG - ( 26 Mar 2021 20:29 )  pH, Arterial: x     pH, Blood: 7.54  /  pCO2: 36    /  pO2: 51    / HCO3: 31    / Base Excess: 8.2   /  SaO2: 87                      RADIOLOGY & ADDITIONAL TESTS:

## 2021-03-28 NOTE — PROGRESS NOTE ADULT - SUBJECTIVE AND OBJECTIVE BOX
Patient is a 65y old  Female who presents with a chief complaint of covid pna and dka (27 Mar 2021 14:24)      INTERVAL HPI/OVERNIGHT EVENTS:  pt NAD  glucose low this am    MEDICATIONS  (STANDING):  dexAMETHasone  Injectable 6 milliGRAM(s) IV Push daily  dextrose 40% Gel 15 Gram(s) Oral once  dextrose 5%. 1000 milliLiter(s) (50 mL/Hr) IV Continuous <Continuous>  dextrose 5%. 1000 milliLiter(s) (100 mL/Hr) IV Continuous <Continuous>  dextrose 50% Injectable 25 Gram(s) IV Push once  dextrose 50% Injectable 12.5 Gram(s) IV Push once  dextrose 50% Injectable 25 Gram(s) IV Push once  ergocalciferol 41522 Unit(s) Oral <User Schedule>  glucagon  Injectable 1 milliGRAM(s) IntraMuscular once  heparin   Injectable 5000 Unit(s) SubCutaneous every 8 hours  insulin glargine Injectable (LANTUS) 12 Unit(s) SubCutaneous at bedtime  insulin lispro (ADMELOG) corrective regimen sliding scale   SubCutaneous three times a day before meals  pantoprazole  Injectable 40 milliGRAM(s) IV Push daily    MEDICATIONS  (PRN):      Vital Signs Last 24 Hrs  T(C): 37.2 (28 Mar 2021 04:57), Max: 37.2 (28 Mar 2021 04:57)  T(F): 99 (28 Mar 2021 04:57), Max: 99 (28 Mar 2021 04:57)  HR: 87 (28 Mar 2021 09:17) (81 - 105)  BP: 148/83 (28 Mar 2021 04:57) (148/83 - 152/87)  BP(mean): 109 (27 Mar 2021 16:14) (109 - 109)  RR: 18 (28 Mar 2021 04:57) (16 - 18)  SpO2: 94% (28 Mar 2021 09:17) (92% - 96%)    PHYSICAL EXAM:  GENERAL: NAD, well-groomed, well-developed      LABS:                        11.4   12.52 )-----------( 352      ( 27 Mar 2021 07:44 )             36.6     03-27    137  |  99  |  11  ----------------------------<  80  4.0   |  34<H>  |  0.44<L>    Ca    8.8      27 Mar 2021 07:44  Phos  3.0     03-27  Mg     2.2     03-27    TPro  6.3  /  Alb  1.8<L>  /  TBili  0.6  /  DBili  x   /  AST  39<H>  /  ALT  18  /  AlkPhos  77  03-27        CAPILLARY BLOOD GLUCOSE      POCT Blood Glucose.: 75 mg/dL (28 Mar 2021 07:57)  POCT Blood Glucose.: 169 mg/dL (27 Mar 2021 21:06)  POCT Blood Glucose.: 179 mg/dL (27 Mar 2021 16:52)  POCT Blood Glucose.: 119 mg/dL (27 Mar 2021 12:11)    Lipid panel:       ABG - ( 26 Mar 2021 20:29 )  pH, Arterial: x     pH, Blood: 7.54  /  pCO2: 36    /  pO2: 51    / HCO3: 31    / Base Excess: 8.2   /  SaO2: 87                      RADIOLOGY & ADDITIONAL TESTS:

## 2021-03-28 NOTE — PROGRESS NOTE ADULT - SUBJECTIVE AND OBJECTIVE BOX
CHIEF COMPLAINT: maintained on BiPAP overnight.   low normal blood sugars.   + sob  + cough  no diarrhea  no active gross bleeding       PHYSICAL EXAM:    GENERAL: Overweight and on BiPAP oxygen.   CHEST/LUNG: Decreased air entry bibasally, no wheezing, no crackles   HEART: Regular rate and rhythm; No murmurs. Tachycardic  ABDOMEN: Soft, Nontender, Nondistended; Bowel sounds present  EXTREMITIES:  Moving all four extremities spontaneously, No clubbing, cyanosis, or edema  NERVOUS SYSTEM:  Grossly non focal.  Psychiatry: AAO x 3, mood is appropriate       OBJECTIVE DATA:       Vital Signs Last 24 Hrs  T(C): 36.6 (28 Mar 2021 11:36), Max: 37.2 (28 Mar 2021 04:57)  T(F): 97.9 (28 Mar 2021 11:36), Max: 99 (28 Mar 2021 04:57)  HR: 92 (28 Mar 2021 11:36) (81 - 105)  BP: 130/80 (28 Mar 2021 11:36) (130/80 - 152/87)  BP(mean): 109 (27 Mar 2021 16:14) (109 - 109)  RR: 19 (28 Mar 2021 11:36) (16 - 20)  SpO2: 94% (28 Mar 2021 11:36) (92% - 96%)           Daily     Daily Weight in k.5 (28 Mar 2021 05:35)  LABS:                        11.4   12.52 )-----------( 352      ( 27 Mar 2021 07:44 )             36.6             03-    137  |  99  |  11  ----------------------------<  80  4.0   |  34<H>  |  0.44<L>    Ca    8.8      27 Mar 2021 07:44  Phos  3.0       Mg     2.2         TPro  6.3  /  Alb  1.8<L>  /  TBili  0.6  /  DBili  x   /  AST  39<H>  /  ALT  18  /  AlkPhos  77                    ABG - ( 26 Mar 2021 20:29 )  pH, Arterial: x     pH, Blood: 7.54  /  pCO2: 36    /  pO2: 51    / HCO3: 31    / Base Excess: 8.2   /  SaO2: 87                   CAPILLARY BLOOD GLUCOSE      POCT Blood Glucose.: 75 mg/dL (28 Mar 2021 07:57)      Culture - Urine (collected )  Source: .Urine Clean Catch (Midstream)  Final Report ():    No growth      MEDICATIONS  (STANDING):  dexAMETHasone  Injectable 6 milliGRAM(s) IV Push daily  dextrose 40% Gel 15 Gram(s) Oral once  dextrose 5%. 1000 milliLiter(s) (50 mL/Hr) IV Continuous <Continuous>  dextrose 5%. 1000 milliLiter(s) (100 mL/Hr) IV Continuous <Continuous>  dextrose 50% Injectable 25 Gram(s) IV Push once  dextrose 50% Injectable 12.5 Gram(s) IV Push once  dextrose 50% Injectable 25 Gram(s) IV Push once  ergocalciferol 21046 Unit(s) Oral <User Schedule>  glucagon  Injectable 1 milliGRAM(s) IntraMuscular once  heparin   Injectable 5000 Unit(s) SubCutaneous every 8 hours  insulin glargine Injectable (LANTUS) 12 Unit(s) SubCutaneous at bedtime  insulin lispro (ADMELOG) corrective regimen sliding scale   SubCutaneous three times a day before meals  pantoprazole  Injectable 40 milliGRAM(s) IV Push daily    MEDICATIONS  (PRN):

## 2021-03-28 NOTE — PROGRESS NOTE ADULT - ASSESSMENT
ICU transfer summary: Pt is a 64 yo F with h/o HTN, HLD and DM2 who presented with progressively worsening SOB associated with productive cough for past several days much worse the past 3 days.  Pt was started on Levaquin, Prednisone and Azithromycin  on 3/19 outpatient with no improvement.  In the ER CTA chest done to r/o PE findings consistent with COVID-19 PNA. Placed on High flow for dyspnea and hypoxemia. ICU admitting with Acute hypoxic respiratpry failure 2 to Covid-19 PNA with improvement in oxygenation Continue to titrate down HFnc O2 as tolerated. Continue Remdesivir + Decadron HTN - May need to start pt's ARB, DM -  DKA resolved - Adjust Lantus (would increase dose) + Lispro to FS, started oin  ADA po diet. DVT prophylaxis with sq Heparin. Patient seen and examined by ICU attending and stable for transfer to General medicine service on 3/25/21.     Acute hypoxic respiratory failure with bilateral COVID pneumonia. reviewed ABG. discussed with RT to see if patient can tolerate high flow. incentive spirometry. isolation. cont current meds.   S/p DKA. DM type 2. low normal blood sugars. Decreased lantus. Follow finger sticks. Endo following  Overweight. nutritional counseling provided.     HSQ  Full code now.        Discussed with Daughter Beau [phone]

## 2021-03-29 NOTE — CONSULT NOTE ADULT - PROBLEM SELECTOR PROBLEM 1
Type 2 diabetes mellitus with hyperglycemia, without long-term current use of insulin
Acute respiratory failure, unspecified whether with hypoxia or hypercapnia

## 2021-03-29 NOTE — PROGRESS NOTE ADULT - SUBJECTIVE AND OBJECTIVE BOX
Patient is a 65y old  Female who presents with a chief complaint of covid pna and dka (29 Mar 2021 18:54)      Interval History: finger sticks are in 100's   on Lantus 12 units and Lispro sliding scale coverage with meals     MEDICATIONS  (STANDING):  dexAMETHasone  Injectable 6 milliGRAM(s) IV Push daily  dextrose 40% Gel 15 Gram(s) Oral once  dextrose 5%. 1000 milliLiter(s) (50 mL/Hr) IV Continuous <Continuous>  dextrose 5%. 1000 milliLiter(s) (100 mL/Hr) IV Continuous <Continuous>  dextrose 50% Injectable 25 Gram(s) IV Push once  dextrose 50% Injectable 12.5 Gram(s) IV Push once  dextrose 50% Injectable 25 Gram(s) IV Push once  ergocalciferol 49139 Unit(s) Oral <User Schedule>  glucagon  Injectable 1 milliGRAM(s) IntraMuscular once  heparin   Injectable 5000 Unit(s) SubCutaneous every 8 hours  insulin glargine Injectable (LANTUS) 12 Unit(s) SubCutaneous at bedtime  insulin lispro (ADMELOG) corrective regimen sliding scale   SubCutaneous three times a day before meals  losartan 25 milliGRAM(s) Oral daily  pantoprazole  Injectable 40 milliGRAM(s) IV Push daily    MEDICATIONS  (PRN):      Allergies    Carafate (Rash)  Levaquin (Rash)  Percocet 5/325 (Other)    Intolerances    lactose (Flatulence)      REVIEW OF SYSTEMS:  CONSTITUTIONAL: no changes      Vital Signs Last 24 Hrs  T(C): 36.6 (29 Mar 2021 16:49), Max: 37.9 (29 Mar 2021 08:00)  T(F): 97.8 (29 Mar 2021 16:49), Max: 100.3 (29 Mar 2021 08:00)  HR: 113 (29 Mar 2021 21:00) (96 - 114)  BP: 184/91 (29 Mar 2021 16:49) (158/96 - 184/91)  BP(mean): --  RR: 22 (29 Mar 2021 17:37) (18 - 22)  SpO2: 90% (29 Mar 2021 21:00) (64% - 97%)    PHYSICAL EXAM:  GENERAL:   HEAD: Atraumatic, Normocephalic  EYES: PERRLA, conjunctiva and sclera clear  ENMT: No tonsillar erythema, exudates, or enlargement; Moist mucous membranes, Good dentition, No lesions  NECK: Supple, No JVD, Normal thyroid  NERVOUS SYSTEM:  Alert & Oriented X3, Good concentration; Motor Strength 5/5 B/L upper and lower extremities  CHEST/LUNG: Clear to auscultaion bilaterally; No rales, rhonchi, wheezing, or rubs  HEART: Regular rate and rhythm; No murmurs, rubs, or gallops  ABDOMEN: Soft, Nontender, Nondistended; Bowel sounds present  EXTREMITIES:  2+ Peripheral Pulses, no edema  SKIN: No rashes or lesions    LABS:        CAPILLARY BLOOD GLUCOSE      POCT Blood Glucose.: 183 mg/dL (29 Mar 2021 21:30)  POCT Blood Glucose.: 233 mg/dL (29 Mar 2021 16:39)  POCT Blood Glucose.: 154 mg/dL (29 Mar 2021 10:54)  POCT Blood Glucose.: 127 mg/dL (29 Mar 2021 07:45)    Lipid panel:           Thyroid:  Diabetes Tests:  Parathyroid Panel:  Adrenals:  RADIOLOGY & ADDITIONAL TESTS:    Imaging Personally Reviewed:  [ ] YES  [ ] NO    Consultant(s) Notes Reviewed:  [ ] YES  [ ] NO    Care Discussed with Consultants/Other Providers [ ] YES  [ ] NO

## 2021-03-29 NOTE — CONSULT NOTE ADULT - PROBLEM SELECTOR RECOMMENDATION 9
steroid induced hyperglycemia   once steroids are decreased expect better finger sticks   Continue with the same regimen while inpatient   Check HbA1C   Thank You for the courtesy of this consultation !!!
see above

## 2021-03-29 NOTE — PROGRESS NOTE ADULT - ASSESSMENT
ICU transfer summary: Pt is a 66 yo F with h/o HTN, HLD and DM2 who presented with progressively worsening SOB associated with productive cough for past several days much worse the past 3 days.  Pt was started on Levaquin, Prednisone and Azithromycin  on 3/19 outpatient with no improvement.  In the ER CTA chest done to r/o PE findings consistent with COVID-19 PNA. Placed on High flow for dyspnea and hypoxemia. ICU admitting with Acute hypoxic respiratpry failure 2 to Covid-19 PNA with improvement in oxygenation Continue to titrate down HFnc O2 as tolerated. Continue Remdesivir + Decadron HTN - May need to start pt's ARB, DM -  DKA resolved - Adjust Lantus (would increase dose) + Lispro to FS, started oin  ADA po diet. DVT prophylaxis with sq Heparin. Patient seen and examined by ICU attending and stable for transfer to General medicine service on 3/25/21.     Acute hypoxic respiratory failure with bilateral COVID pneumonia. reviewed ABG. discussed with RT to see if patient can tolerate high flow again during daytime. Incentive spirometry. isolation. cont current meds.   S/p DKA. DM type 2. better blood sugars. cont lantus 12 unit. Follow finger sticks. Endo following  Overweight. nutritional counseling provided.   Hyponatremia. follow BMP     HSQ  Full code now.        Discussed with Daughter Beau [phone] again today.

## 2021-03-29 NOTE — PROGRESS NOTE ADULT - PROBLEM SELECTOR PLAN 1
Continue with the same regimen while inpatient   stable finger sticks   patient can be discharged on the same regimen

## 2021-03-29 NOTE — PROGRESS NOTE ADULT - SUBJECTIVE AND OBJECTIVE BOX
CHIEF COMPLAINT: Back on BiPAP   blood sugars better.   + sob  + cough  no diarrhea  no active gross bleeding       PHYSICAL EXAM:    GENERAL: Overweight and on BiPAP oxygen.   CHEST/LUNG: Decreased air entry bibasally, no wheezing, no crackles   HEART: Regular rate and rhythm; No murmurs. Tachycardic  ABDOMEN: Soft, Nontender, Nondistended; Bowel sounds present  EXTREMITIES:  Moving all four extremities spontaneously, No clubbing, cyanosis, or edema  NERVOUS SYSTEM:  Grossly non focal.  Psychiatry: AAO x 3, mood is appropriate       OBJECTIVE DATA:     Vital Signs Last 24 Hrs  T(C): 37.9 (29 Mar 2021 08:00), Max: 37.9 (29 Mar 2021 08:00)  T(F): 100.3 (29 Mar 2021 08:00), Max: 100.3 (29 Mar 2021 08:00)  HR: 105 (29 Mar 2021 09:01) (92 - 114)  BP: 165/84 (29 Mar 2021 05:20) (157/97 - 165/84)  BP(mean): 114 (28 Mar 2021 17:05) (114 - 114)  RR: 22 (29 Mar 2021 05:20) (18 - 22)  SpO2: 95% (29 Mar 2021 09:01) (91% - 95%)           Daily     Daily   LABS:                        10.9   13.47 )-----------( 343      ( 29 Mar 2021 06:55 )             35.5             03-29    134<L>  |  94<L>  |  11  ----------------------------<  113<H>  3.8   |  32<H>  |  0.46<L>    Ca    8.7      29 Mar 2021 06:55                         CAPILLARY BLOOD GLUCOSE      POCT Blood Glucose.: 154 mg/dL (29 Mar 2021 10:54)      MEDICATIONS  (STANDING):  dexAMETHasone  Injectable 6 milliGRAM(s) IV Push daily  dextrose 40% Gel 15 Gram(s) Oral once  dextrose 5%. 1000 milliLiter(s) (100 mL/Hr) IV Continuous <Continuous>  dextrose 5%. 1000 milliLiter(s) (50 mL/Hr) IV Continuous <Continuous>  dextrose 50% Injectable 25 Gram(s) IV Push once  dextrose 50% Injectable 12.5 Gram(s) IV Push once  dextrose 50% Injectable 25 Gram(s) IV Push once  ergocalciferol 58017 Unit(s) Oral <User Schedule>  glucagon  Injectable 1 milliGRAM(s) IntraMuscular once  heparin   Injectable 5000 Unit(s) SubCutaneous every 8 hours  insulin glargine Injectable (LANTUS) 12 Unit(s) SubCutaneous at bedtime  insulin lispro (ADMELOG) corrective regimen sliding scale   SubCutaneous three times a day before meals  losartan 25 milliGRAM(s) Oral daily  pantoprazole  Injectable 40 milliGRAM(s) IV Push daily

## 2021-03-30 NOTE — PROGRESS NOTE ADULT - SUBJECTIVE AND OBJECTIVE BOX
Patient is a 65y old  Female who presents with a chief complaint of covid pna and dka (30 Mar 2021 11:40)      Interval History: finger sticks are in high 100's   on Lantus 12 units and Lispro sliding scale coverage with meals     MEDICATIONS  (STANDING):  dexAMETHasone  Injectable 6 milliGRAM(s) IV Push daily  dextrose 40% Gel 15 Gram(s) Oral once  dextrose 5%. 1000 milliLiter(s) (50 mL/Hr) IV Continuous <Continuous>  dextrose 5%. 1000 milliLiter(s) (100 mL/Hr) IV Continuous <Continuous>  dextrose 50% Injectable 25 Gram(s) IV Push once  dextrose 50% Injectable 12.5 Gram(s) IV Push once  dextrose 50% Injectable 25 Gram(s) IV Push once  enoxaparin Injectable 40 milliGRAM(s) SubCutaneous daily  ergocalciferol 56555 Unit(s) Oral <User Schedule>  glucagon  Injectable 1 milliGRAM(s) IntraMuscular once  insulin glargine Injectable (LANTUS) 12 Unit(s) SubCutaneous at bedtime  insulin lispro (ADMELOG) corrective regimen sliding scale   SubCutaneous three times a day before meals  metoprolol tartrate 12.5 milliGRAM(s) Oral two times a day  pantoprazole  Injectable 40 milliGRAM(s) IV Push daily    MEDICATIONS  (PRN):      Allergies    Carafate (Rash)  Levaquin (Rash)  Percocet 5/325 (Other)    Intolerances    lactose (Flatulence)      REVIEW OF SYSTEMS:  CONSTITUTIONAL: no changes  Vital Signs Last 24 Hrs  T(C): 36.4 (30 Mar 2021 15:21), Max: 36.8 (30 Mar 2021 05:09)  T(F): 97.6 (30 Mar 2021 15:21), Max: 98.3 (30 Mar 2021 05:09)  HR: 102 (30 Mar 2021 20:55) (99 - 114)  BP: 165/97 (30 Mar 2021 20:00) (152/88 - 166/89)  BP(mean): --  RR: 20 (30 Mar 2021 20:00) (18 - 20)  SpO2: 100% (30 Mar 2021 20:55) (90% - 100%)    PHYSICAL EXAM:  GENERAL: no changes     LABS:    PT/INR - ( 30 Mar 2021 07:22 )   PT: 16.9 sec;   INR: 1.48 ratio             CAPILLARY BLOOD GLUCOSE      POCT Blood Glucose.: 136 mg/dL (30 Mar 2021 21:33)  POCT Blood Glucose.: 153 mg/dL (30 Mar 2021 15:51)  POCT Blood Glucose.: 134 mg/dL (30 Mar 2021 11:13)  POCT Blood Glucose.: 114 mg/dL (30 Mar 2021 07:50)    Lipid panel:       ABG - ( 30 Mar 2021 08:20 )  pH, Arterial: x     pH, Blood: 7.54  /  pCO2: 39    /  pO2: 52    / HCO3: 33    / Base Excess: 9.3   /  SaO2: 88                  Thyroid:  Diabetes Tests:  Parathyroid Panel:  Adrenals:  RADIOLOGY & ADDITIONAL TESTS:    Imaging Personally Reviewed:  [ ] YES  [ ] NO    Consultant(s) Notes Reviewed:  [ ] YES  [ ] NO    Care Discussed with Consultants/Other Providers [ ] YES  [ ] NO

## 2021-03-30 NOTE — PROGRESS NOTE ADULT - ASSESSMENT
FINESSE JERNIGAN 65 F 1955 3/23/2021 DR YANN UNDERWOOD           REVIEW OF SYMPTOMS      Able to give ROS  Yes     RELIABLE +/-   CONSTITUTIONAL Weakness Yes  Chills No   ENDOCRINE  No heat or cold intolerance    ALLERGY No hives  Sore throat No stridor  RESP Coughing blood no  Shortness of breath YES   NEURO No Headache  Confusion Pain neck No   CARDIAC No Chest pain No Palpitations   GI  Pain abdomen NO   Vomiting NO     PHYSICAL EXAM    HEENT Unremarkable  atraumatic   RESP Fair air entry EXP prolonged    Harsh breath sound Resp distres mild   CARDIAC S1 S2 No S3     NO JVD    ABDOMEN SOFT BS PRESENT NOT DISTENDED No hepatosplenomegaly PEDAL EDEMA present No calf tenderness  NO rash       PATIENT SUMMARY  66 yo F with h/o HTN, HLD and DM2 who presented with progressively worsening SOB associated with productive cough for past several days much worse the past 3 days.  Pt was started on Levaquin, Prednisone and Azithromycin  on 3/19 outpatient with no improvement.      In the ER CTA chest done to r/o PE findings consistent with COVID-19 PNA. Labs remarkable for WBC 18.55, Na 130, K 3.2, COs 10, AG 29, Glucose 416, alb 2.5.  Placed on High flow for dyspnea and hypoxemia. ICU admitting dx : 1) Acute hypoxic resp failure 2 to Covid-19 PNA with improvement in oxygenation  2) s/p DKA.  Pt was admitted 3/23 transferred out of ICU 3/26  Pulm consulted 3/29/2021       PROBLEMS.  COVID PNEUMONIA   ACUTE HYPOXEMIC RESP FAILURE   ELEVATED Ddimr    PATIENT DATA                          OXYGENATION.       3/29/2021 hf 50 l 100% O2 po 90%   ABG.     3/30/2021 100% 14/8 754/39/52     3/26/2021 100% 754/36/51     VITALS/IO/VENT/DRIPS.  3/30/2021 afeb 99 150/80   3/30/2021 14/8/100%     GLOBAL AND BEST PRACTICE ISSUES                     ALLERGY. carafate levaquin percocet      WEIGHT.      3/29/2021 77                           BMI.                 3/29/2021 29          ADVANCED DIRECTIVE.                               HEAD OF BED ELEVATION. Yes  DVT PROPHYLAXIS.   hpsc (3/23)                    MANUEL PROPHYLAXIS. PROTONIX 40 (3/23)   APA.                                                                    DYSPHAGIA RECOMM.   DIET.  CONS CARB (3/25)       ASSESSMENT/RECOMMENDATIONS.    INFECTION   3/29/2021 W 13  3/23 blod c negv   3/24 urine c neg  3/23 flab negv  Defer abio   Doubt bact infectn  ANEMIA   Hb 3/29/2021 Hb 10   Monitor   D-DIMR ELEVATED  3/26-3/29/2021 D-dimr 1344-930  3/23 cta ch no pe bl ggo  Trending down   is on dvt ppl    COVID PNEUMONIA AND HYPOXIC RESP FAILURE MANAGEMENT.   REMDESEVIR (3/23)   DEXA (3/23)   PRONE (3/26)   Monitor pulse oximetry Target PO 92-96%  Monitor inflammatory and thrombotic biomarkers  Monitor for  for progressively  worsening oxygenation failure   O2 to keep po 90-95%  Oxygenation borderline on 100% May need intubation if fails hfnc/bpap     TIME SPENT   Over 25 minutes aggregate care time spent on encounter; activities included   direct patient care, counseling and/or coordinating care reviewing notes, lab data/ imaging , discussion with multidisciplinary team/ patient  /family and explaining in detail risks, benefits, alternatives  of the recommendations     FINESSE JERNIGAN 65 F 1955 3/23/2021 DR YANN UNDERWOOD

## 2021-03-30 NOTE — PROGRESS NOTE ADULT - ASSESSMENT
ICU transfer summary: Pt is a 64 yo F with h/o HTN, HLD and DM2 who presented with progressively worsening SOB associated with productive cough for past several days much worse the past 3 days.  Pt was started on Levaquin, Prednisone and Azithromycin  on 3/19 outpatient with no improvement.  In the ER CTA chest done to r/o PE findings consistent with COVID-19 PNA. Placed on High flow for dyspnea and hypoxemia. ICU admitting with Acute hypoxic respiratpry failure 2 to Covid-19 PNA with improvement in oxygenation Continue to titrate down HFnc O2 as tolerated. Continue Remdesivir + Decadron HTN - May need to start pt's ARB, DM -  DKA resolved - Adjust Lantus (would increase dose) + Lispro to FS, started oin  ADA po diet. DVT prophylaxis with sq Heparin. Patient seen and examined by ICU attending and stable for transfer to General medicine service on 3/25/21.     Acute hypoxic respiratory failure with bilateral COVID pneumonia. intermittent Hypoxia on BiPAP. poor prognosis. cont current management. pulm following. recs appreciated.   S/p DKA. DM type 2. controlled. cont lantus 12 unit. Follow finger sticks. Endo on board.   Overweight. nutritional counseling provided.   Hyponatremia. stable.   leukocytosis. likely from steroids. no fever.     HSQ  Full code now.        Discussed with Daughter Beau [phone]

## 2021-03-30 NOTE — PROGRESS NOTE ADULT - SUBJECTIVE AND OBJECTIVE BOX
RERE KILPATRICK    S 2C 246 D    Patient is a 65y old  Female who presents with a chief complaint of covid pna and dka (29 Mar 2021 23:23)       Allergies    Carafate (Rash)  Levaquin (Rash)  Percocet 5/325 (Other)    Intolerances    lactose (Flatulence)      HPI:  64yo F w/ HTN (on losartan) and DM2 (on metformin) presents with progressively worsening SOB associated with productive cough for past several days much worse the past 3 days.  Started on abx (levaquin, prednisone and azithromycin 3/19) outpatient with no improvement. Denies any chest pain, fevers, dizziness, syncope, abd pain, back pain, N/V/D, recent sick contact, recent travel.     In ED: CTA chest done to r/o PE findings consistent w/ COVID-19 PNA. Labs remarkable for WBC 18.55, Na 130, K 3.2, COs 10, AG 29, Glucose 416, alb 2.5.  Placed on High flow for dyspnea and hypoxemia. Admitted to ICU for COVID pna and DKA.   (23 Mar 2021 05:08)      PAST MEDICAL & SURGICAL HISTORY:  HTN (hypertension)    DM (diabetes mellitus)    HLD (hyperlipidemia)    No significant past surgical history        FAMILY HISTORY:  No pertinent family history in first degree relatives          MEDICATIONS   dexAMETHasone  Injectable 6 milliGRAM(s) IV Push daily  dextrose 40% Gel 15 Gram(s) Oral once  dextrose 5%. 1000 milliLiter(s) IV Continuous <Continuous>  dextrose 5%. 1000 milliLiter(s) IV Continuous <Continuous>  dextrose 50% Injectable 25 Gram(s) IV Push once  dextrose 50% Injectable 12.5 Gram(s) IV Push once  dextrose 50% Injectable 25 Gram(s) IV Push once  ergocalciferol 45520 Unit(s) Oral <User Schedule>  glucagon  Injectable 1 milliGRAM(s) IntraMuscular once  heparin   Injectable 5000 Unit(s) SubCutaneous every 8 hours  insulin glargine Injectable (LANTUS) 12 Unit(s) SubCutaneous at bedtime  insulin lispro (ADMELOG) corrective regimen sliding scale   SubCutaneous three times a day before meals  losartan 25 milliGRAM(s) Oral daily  pantoprazole  Injectable 40 milliGRAM(s) IV Push daily      Vital Signs Last 24 Hrs  T(C): 36.8 (30 Mar 2021 05:09), Max: 37.8 (29 Mar 2021 11:15)  T(F): 98.3 (30 Mar 2021 05:09), Max: 100.1 (29 Mar 2021 11:15)  HR: 108 (30 Mar 2021 05:45) (102 - 114)  BP: 166/89 (30 Mar 2021 05:09) (164/85 - 184/91)  BP(mean): --  RR: 18 (30 Mar 2021 05:09) (18 - 22)  SpO2: 92% (30 Mar 2021 05:45) (64% - 97%)      03-29-21 @ 07:01  -  03-30-21 @ 07:00  --------------------------------------------------------  IN: 218 mL / OUT: 1300 mL / NET: -1082 mL            LABS:                        10.7   17.12 )-----------( 333      ( 30 Mar 2021 07:22 )             34.5     03-30    137  |  97  |  10  ----------------------------<  107<H>  3.8   |  33<H>  |  0.46<L>    Ca    8.7      30 Mar 2021 07:22    TPro  6.4  /  Alb  1.7<L>  /  TBili  0.5  /  DBili  x   /  AST  42<H>  /  ALT  26  /  AlkPhos  103  03-30    PT/INR - ( 30 Mar 2021 07:22 )   PT: 16.9 sec;   INR: 1.48 ratio               ABG - ( 30 Mar 2021 08:20 )  pH, Arterial: x     pH, Blood: 7.54  /  pCO2: 39    /  pO2: 52    / HCO3: 33    / Base Excess: 9.3   /  SaO2: 88                  WBC:  WBC Count: 17.12 K/uL (03-30 @ 07:22)  WBC Count: 13.47 K/uL (03-29 @ 06:55)  WBC Count: 12.52 K/uL (03-27 @ 07:44)      MICROBIOLOGY:  RECENT CULTURES:  03-24 .Urine Clean Catch (Midstream) XXXX XXXX   No growth                PT/INR - ( 30 Mar 2021 07:22 )   PT: 16.9 sec;   INR: 1.48 ratio             Sodium:  Sodium, Serum: 137 mmol/L (03-30 @ 07:22)  Sodium, Serum: 134 mmol/L (03-29 @ 06:55)  Sodium, Serum: 137 mmol/L (03-27 @ 07:44)      0.46 mg/dL 03-30 @ 07:22  0.46 mg/dL 03-29 @ 06:55  0.44 mg/dL 03-27 @ 07:44      Hemoglobin:  Hemoglobin: 10.7 g/dL (03-30 @ 07:22)  Hemoglobin: 10.9 g/dL (03-29 @ 06:55)  Hemoglobin: 11.4 g/dL (03-27 @ 07:44)      Platelets: Platelet Count - Automated: 333 K/uL (03-30 @ 07:22)  Platelet Count - Automated: 343 K/uL (03-29 @ 06:55)  Platelet Count - Automated: 352 K/uL (03-27 @ 07:44)      LIVER FUNCTIONS - ( 30 Mar 2021 07:22 )  Alb: 1.7 g/dL / Pro: 6.4 gm/dL / ALK PHOS: 103 U/L / ALT: 26 U/L / AST: 42 U/L / GGT: x                 RADIOLOGY & ADDITIONAL STUDIES:

## 2021-03-30 NOTE — PROGRESS NOTE ADULT - PROBLEM SELECTOR PLAN 1
Infection driven Hyperglycemia   Continue with the same regimen while inpatient   finger sticks  are controlled   Patient can resume  home regimen upon discharge

## 2021-03-30 NOTE — PROGRESS NOTE ADULT - SUBJECTIVE AND OBJECTIVE BOX
CHIEF COMPLAINT: desaturating on BiPAP high 80's intermittently.   + sob  no diarrhea  no active gross bleeding   no chest pain or leg pains.       PHYSICAL EXAM:    GENERAL: obese and on BiPAP oxygen.   CHEST/LUNG: Decreased air entry bibasally, no wheezing, no crackles   HEART: Regular rate and rhythm; No murmurs.   ABDOMEN: Soft, Nontender, Nondistended; Bowel sounds present  EXTREMITIES:  Moving all four extremities spontaneously, No clubbing, cyanosis, or edema  NERVOUS SYSTEM:  Grossly non focal.  Psychiatry: AAO x 3, mood is appropriate       OBJECTIVE DATA:     Vital Signs Last 24 Hrs  T(C): 36.2 (30 Mar 2021 10:26), Max: 36.8 (30 Mar 2021 05:09)  T(F): 97.2 (30 Mar 2021 10:26), Max: 98.3 (30 Mar 2021 05:09)  HR: 102 (30 Mar 2021 10:26) (102 - 114)  BP: 152/88 (30 Mar 2021 10:26) (152/88 - 184/91)  BP(mean): --  RR: 18 (30 Mar 2021 10:26) (18 - 22)  SpO2: 96% (30 Mar 2021 10:26) (64% - 96%)           Daily     Daily Weight in k.5 (30 Mar 2021 05:09)  LABS:                        10.7   17.12 )-----------( 333      ( 30 Mar 2021 07:22 )             34.5             03-30    137  |  97  |  10  ----------------------------<  107<H>  3.8   |  33<H>  |  0.46<L>    Ca    8.7      30 Mar 2021 07:22    TPro  6.4  /  Alb  1.7<L>  /  TBili  0.5  /  DBili  x   /  AST  42<H>  /  ALT  26  /  AlkPhos  103  03-30              PT/INR - ( 30 Mar 2021 07:22 )   PT: 16.9 sec;   INR: 1.48 ratio             ABG - ( 30 Mar 2021 08:20 )  pH, Arterial: x     pH, Blood: 7.54  /  pCO2: 39    /  pO2: 52    / HCO3: 33    / Base Excess: 9.3   /  SaO2: 88            CAPILLARY BLOOD GLUCOSE      POCT Blood Glucose.: 134 mg/dL (30 Mar 2021 11:13)      MEDICATIONS  (STANDING):  dexAMETHasone  Injectable 6 milliGRAM(s) IV Push daily  dextrose 40% Gel 15 Gram(s) Oral once  dextrose 5%. 1000 milliLiter(s) (100 mL/Hr) IV Continuous <Continuous>  dextrose 5%. 1000 milliLiter(s) (50 mL/Hr) IV Continuous <Continuous>  dextrose 50% Injectable 25 Gram(s) IV Push once  dextrose 50% Injectable 12.5 Gram(s) IV Push once  dextrose 50% Injectable 25 Gram(s) IV Push once  enoxaparin Injectable 40 milliGRAM(s) SubCutaneous daily  ergocalciferol 59775 Unit(s) Oral <User Schedule>  glucagon  Injectable 1 milliGRAM(s) IntraMuscular once  insulin glargine Injectable (LANTUS) 12 Unit(s) SubCutaneous at bedtime  insulin lispro (ADMELOG) corrective regimen sliding scale   SubCutaneous three times a day before meals  losartan 25 milliGRAM(s) Oral daily  pantoprazole  Injectable 40 milliGRAM(s) IV Push daily

## 2021-03-31 NOTE — PROGRESS NOTE ADULT - SUBJECTIVE AND OBJECTIVE BOX
INTERVAL HPI/OVERNIGHT EVENTS: Pt on HFNC 50litres   +SOB  No other complaints    65y  Vital Signs Last 24 Hrs  T(C): 36.7 (31 Mar 2021 10:00), Max: 36.7 (31 Mar 2021 05:23)  T(F): 98 (31 Mar 2021 10:00), Max: 98 (31 Mar 2021 05:23)  HR: 103 (31 Mar 2021 14:30) (95 - 104)  BP: 153/92 (31 Mar 2021 14:30) (147/89 - 165/97)  BP(mean): 103 (31 Mar 2021 10:00) (103 - 103)  RR: 26 (31 Mar 2021 14:30) (18 - 30)  SpO2: 91% (31 Mar 2021 14:30) (89% - 100%)  I&O's Summary    30 Mar 2021 07:01  -  31 Mar 2021 07:00  --------------------------------------------------------  IN: 118 mL / OUT: 300 mL / NET: -182 mL    31 Mar 2021 07:01  -  31 Mar 2021 15:13  --------------------------------------------------------  IN: 236 mL / OUT: 0 mL / NET: 236 mL      MEDICATIONS  (STANDING):  dexAMETHasone  Injectable 6 milliGRAM(s) IV Push daily  dextrose 40% Gel 15 Gram(s) Oral once  dextrose 5%. 1000 milliLiter(s) (50 mL/Hr) IV Continuous <Continuous>  dextrose 5%. 1000 milliLiter(s) (100 mL/Hr) IV Continuous <Continuous>  dextrose 50% Injectable 25 Gram(s) IV Push once  dextrose 50% Injectable 25 Gram(s) IV Push once  dextrose 50% Injectable 12.5 Gram(s) IV Push once  enoxaparin Injectable 40 milliGRAM(s) SubCutaneous daily  ergocalciferol 71176 Unit(s) Oral <User Schedule>  glucagon  Injectable 1 milliGRAM(s) IntraMuscular once  insulin glargine Injectable (LANTUS) 12 Unit(s) SubCutaneous at bedtime  insulin lispro (ADMELOG) corrective regimen sliding scale   SubCutaneous three times a day before meals  losartan 25 milliGRAM(s) Oral daily  metoprolol tartrate 12.5 milliGRAM(s) Oral two times a day  pantoprazole  Injectable 40 milliGRAM(s) IV Push daily    MEDICATIONS  (PRN):    LABS:    trop                        10.7   17.12 )-----------( 333      ( 30 Mar 2021 07:22 )             34.5     03-30    137  |  97  |  10  ----------------------------<  107<H>  3.8   |  33<H>  |  0.46<L>    Ca    8.7      30 Mar 2021 07:22    TPro  6.4  /  Alb  1.7<L>  /  TBili  0.5  /  DBili  x   /  AST  42<H>  /  ALT  26  /  AlkPhos  103  03-30    PT/INR - ( 30 Mar 2021 07:22 )   PT: 16.9 sec;   INR: 1.48 ratio             CAPILLARY BLOOD GLUCOSE      POCT Blood Glucose.: 252 mg/dL (31 Mar 2021 11:07)  POCT Blood Glucose.: 130 mg/dL (31 Mar 2021 07:59)  POCT Blood Glucose.: 136 mg/dL (30 Mar 2021 21:33)  POCT Blood Glucose.: 153 mg/dL (30 Mar 2021 15:51)      ABG - ( 30 Mar 2021 08:20 )  pH, Arterial: x     pH, Blood: 7.54  /  pCO2: 39    /  pO2: 52    / HCO3: 33    / Base Excess: 9.3   /  SaO2: 88                  REVIEW OF SYSTEMS:  CONSTITUTIONAL: No fever  RESPIRATORY: + shortness of breath  CARDIOVASCULAR: No chest pain, palpitations, dizziness, or leg swelling  GASTROINTESTINAL: No abdominal or epigastric pain. No nausea, vomiting, or hematemesis; No diarrhea or constipation. No melena or hematochezia.  GENITOURINARY: No dysuria, frequency, hematuria, or incontinence  NEUROLOGICAL: No headaches, memory loss, loss of strength, numbness, or tremors      RADIOLOGY & ADDITIONAL TESTS:    Imaging Personally Reviewed:  [ ] YES  [ ] NO    Consultant(s) Notes Reviewed:  [x ] YES  [ ] NO    PHYSICAL EXAM:  GENERAL: morbidly obese, on HFNC  NERVOUS SYSTEM:  Alert & Oriented X3, Non focal  CHEST/LUNG: Decreased air entry bilaterally, no wheezing, or crackles.  HEART: Regular rate and rhythm; No murmurs, rubs, or gallops  ABDOMEN: Soft, Nontender, Nondistended; Bowel sounds present  EXTREMITIES:  2+ Peripheral Pulses, No clubbing, cyanosis, or edema          Care Discussed with Consultants/Other Providers [ x] YES  [ ] NO

## 2021-03-31 NOTE — PROGRESS NOTE ADULT - SUBJECTIVE AND OBJECTIVE BOX
Pulmonary/Critical Care Followup    PAST MEDICAL & SURGICAL HISTORY:  HTN (hypertension)    DM (diabetes mellitus)    HLD (hyperlipidemia)    No significant past surgical history        Allergies    Carafate (Rash)  Levaquin (Rash)  Percocet 5/325 (Other)    Intolerances    lactose (Flatulence)      FAMILY HISTORY:  No pertinent family history in first degree relatives        SOCIAL HISTORY:      MEDICATIONS  (STANDING):  dexAMETHasone  Injectable 6 milliGRAM(s) IV Push daily  dextrose 40% Gel 15 Gram(s) Oral once  dextrose 5%. 1000 milliLiter(s) (50 mL/Hr) IV Continuous <Continuous>  dextrose 5%. 1000 milliLiter(s) (100 mL/Hr) IV Continuous <Continuous>  dextrose 50% Injectable 25 Gram(s) IV Push once  dextrose 50% Injectable 12.5 Gram(s) IV Push once  dextrose 50% Injectable 25 Gram(s) IV Push once  enoxaparin Injectable 40 milliGRAM(s) SubCutaneous daily  ergocalciferol 07997 Unit(s) Oral <User Schedule>  glucagon  Injectable 1 milliGRAM(s) IntraMuscular once  insulin glargine Injectable (LANTUS) 12 Unit(s) SubCutaneous at bedtime  insulin lispro (ADMELOG) corrective regimen sliding scale   SubCutaneous three times a day before meals  losartan 25 milliGRAM(s) Oral daily  metoprolol tartrate 12.5 milliGRAM(s) Oral two times a day  pantoprazole  Injectable 40 milliGRAM(s) IV Push daily    MEDICATIONS  (PRN):      Vital Signs Last 24 Hrs  T(C): 36.7 (31 Mar 2021 05:23), Max: 36.7 (31 Mar 2021 05:23)  T(F): 98 (31 Mar 2021 05:23), Max: 98 (31 Mar 2021 05:23)  HR: 98 (31 Mar 2021 05:43) (95 - 104)  BP: 158/91 (31 Mar 2021 05:23) (147/89 - 165/97)  BP(mean): --  RR: 30 (31 Mar 2021 05:23) (18 - 30)  SpO2: 96% (31 Mar 2021 05:43) (91% - 100%)    LABS:                        10.7   17.12 )-----------( 333      ( 30 Mar 2021 07:22 )             34.5     03-30    137  |  97  |  10  ----------------------------<  107<H>  3.8   |  33<H>  |  0.46<L>    Ca    8.7      30 Mar 2021 07:22    TPro  6.4  /  Alb  1.7<L>  /  TBili  0.5  /  DBili  x   /  AST  42<H>  /  ALT  26  /  AlkPhos  103  03-30    PT/INR - ( 30 Mar 2021 07:22 )   PT: 16.9 sec;   INR: 1.48 ratio               ABG - ( 30 Mar 2021 08:20 )  pH, Arterial: x     pH, Blood: 7.54  /  pCO2: 39    /  pO2: 52    / HCO3: 33    / Base Excess: 9.3   /  SaO2: 88                  WBC:  WBC Count: 17.12 K/uL (03-30 @ 07:22)  WBC Count: 13.47 K/uL (03-29 @ 06:55)  WBC Count: 12.52 K/uL (03-27 @ 07:44)      MICROBIOLOGY:  RECENT CULTURES:  03-24 .Urine Clean Catch (Midstream) XXXX XXXX   No growth                PT/INR - ( 30 Mar 2021 07:22 )   PT: 16.9 sec;   INR: 1.48 ratio             Sodium:  Sodium, Serum: 137 mmol/L (03-30 @ 07:22)  Sodium, Serum: 134 mmol/L (03-29 @ 06:55)  Sodium, Serum: 137 mmol/L (03-27 @ 07:44)      0.46 mg/dL 03-30 @ 07:22  0.46 mg/dL 03-29 @ 06:55  0.44 mg/dL 03-27 @ 07:44      Hemoglobin:  Hemoglobin: 10.7 g/dL (03-30 @ 07:22)  Hemoglobin: 10.9 g/dL (03-29 @ 06:55)  Hemoglobin: 11.4 g/dL (03-27 @ 07:44)      Platelets: Platelet Count - Automated: 333 K/uL (03-30 @ 07:22)  Platelet Count - Automated: 343 K/uL (03-29 @ 06:55)  Platelet Count - Automated: 352 K/uL (03-27 @ 07:44)      LIVER FUNCTIONS - ( 30 Mar 2021 07:22 )  Alb: 1.7 g/dL / Pro: 6.4 gm/dL / ALK PHOS: 103 U/L / ALT: 26 U/L / AST: 42 U/L / GGT: x                 RADIOLOGY & ADDITIONAL STUDIES:

## 2021-03-31 NOTE — PROGRESS NOTE ADULT - SUBJECTIVE AND OBJECTIVE BOX
Patient is a 65y old  Female who presents with a chief complaint of covid pna and dka (31 Mar 2021 15:12)      Interval History: Finger-sticks are in high 100's and low 200's   better finger sticks , decreased shortness of breath   on Lantus 12 units and Lispro sliding scale coverage with meals     MEDICATIONS  (STANDING):  dexAMETHasone  Injectable 6 milliGRAM(s) IV Push daily  dextrose 40% Gel 15 Gram(s) Oral once  dextrose 5%. 1000 milliLiter(s) (50 mL/Hr) IV Continuous <Continuous>  dextrose 5%. 1000 milliLiter(s) (100 mL/Hr) IV Continuous <Continuous>  dextrose 50% Injectable 25 Gram(s) IV Push once  dextrose 50% Injectable 12.5 Gram(s) IV Push once  dextrose 50% Injectable 25 Gram(s) IV Push once  enoxaparin Injectable 40 milliGRAM(s) SubCutaneous daily  ergocalciferol 99421 Unit(s) Oral <User Schedule>  glucagon  Injectable 1 milliGRAM(s) IntraMuscular once  insulin glargine Injectable (LANTUS) 12 Unit(s) SubCutaneous at bedtime  insulin lispro (ADMELOG) corrective regimen sliding scale   SubCutaneous three times a day before meals  losartan 25 milliGRAM(s) Oral daily  metoprolol tartrate 12.5 milliGRAM(s) Oral two times a day  pantoprazole  Injectable 40 milliGRAM(s) IV Push daily    MEDICATIONS  (PRN):      Allergies    Carafate (Rash)  Levaquin (Rash)  Percocet 5/325 (Other)    Intolerances    lactose (Flatulence)      REVIEW OF SYSTEMS:  CONSTITUTIONAL: no changes    Vital Signs Last 24 Hrs  T(C): 36.9 (31 Mar 2021 17:50), Max: 36.9 (31 Mar 2021 17:50)  T(F): 98.5 (31 Mar 2021 17:50), Max: 98.5 (31 Mar 2021 17:50)  HR: 105 (31 Mar 2021 17:50) (95 - 105)  BP: 158/81 (31 Mar 2021 17:50) (147/89 - 169/94)  BP(mean): 103 (31 Mar 2021 10:00) (103 - 103)  RR: 24 (31 Mar 2021 17:50) (20 - 30)  SpO2: 90% (31 Mar 2021 17:50) (89% - 100%)    PHYSICAL EXAM:  GENERAL:   HEAD: Atraumatic, Normocephalic  EYES: conjunctiva and sclera clear  CHEST/LUNG: Clear to auscultaion bilaterally; No rales, rhonchi, wheezing, or rubs  HEART: Regular rate and rhythm; No murmurs, rubs, or gallops  ABDOMEN: Soft, Nontender, Nondistended; Bowel sounds present  EXTREMITIES:  2+ Peripheral Pulses, no edema  SKIN: No rashes or lesions    LABS:    PT/INR - ( 30 Mar 2021 07:22 )   PT: 16.9 sec;   INR: 1.48 ratio             CAPILLARY BLOOD GLUCOSE      POCT Blood Glucose.: 188 mg/dL (31 Mar 2021 17:09)  POCT Blood Glucose.: 252 mg/dL (31 Mar 2021 11:07)  POCT Blood Glucose.: 130 mg/dL (31 Mar 2021 07:59)  POCT Blood Glucose.: 136 mg/dL (30 Mar 2021 21:33)    Lipid panel:       ABG - ( 30 Mar 2021 08:20 )  pH, Arterial: x     pH, Blood: 7.54  /  pCO2: 39    /  pO2: 52    / HCO3: 33    / Base Excess: 9.3   /  SaO2: 88                  Thyroid:  Diabetes Tests:  Parathyroid Panel:  Adrenals:  RADIOLOGY & ADDITIONAL TESTS:    Imaging Personally Reviewed:  [ ] YES  [ ] NO    Consultant(s) Notes Reviewed:  [ ] YES  [ ] NO    Care Discussed with Consultants/Other Providers [ ] YES  [ ] NO

## 2021-03-31 NOTE — PROGRESS NOTE ADULT - ASSESSMENT
Pt is a 64 yo F with h/o HTN, HLD and DM2 who presented with progressively worsening SOB associated with productive cough for past several days much worse the past 3 days.  Pt was started on Levaquin, Prednisone and Azithromycin  on 3/19 outpatient with no improvement.  In the ER CTA chest done to r/o PE findings consistent with COVID-19 PNA. Placed on High flow for dyspnea and hypoxemia. ICU admitting with Acute hypoxic respiratpry failure 2 to Covid-19 PNA with improvement in oxygenation Continue to titrate down HFnc O2 as tolerated.    Acute hypoxic respiratory failure   sec to COVID pneumonia.  cont with HFNC   pulm following. recs appreciated.     2.    DM type 2.   cont lantus 12 unit. Follow finger sticks. Endo on board.     3.    Overweight.          nutritional counseling provided.    4.    Hyponatremia.          resolved.     5.    leukocytosis. likely from steroids. no fever.        f/u cbc     6.   DVT ppx- lovenox  HSQ  Full code now.

## 2021-03-31 NOTE — PROGRESS NOTE ADULT - PROBLEM SELECTOR PLAN 1
Continue with the same regimen while inpatient   overall stable   patient can be discharged on the same regimen or Patient can resume  home regimen upon discharge

## 2021-03-31 NOTE — PROGRESS NOTE ADULT - ASSESSMENT
FINESSE JERNIGAN 65 F 1955 3/23/2021 DR YANN UNDERWOOD           REVIEW OF SYMPTOMS      Able to give ROS  Yes     RELIABLE +/-   CONSTITUTIONAL Weakness Yes  Chills No   ENDOCRINE  No heat or cold intolerance    ALLERGY No hives  Sore throat No stridor  RESP Coughing blood no  Shortness of breath YES   NEURO No Headache  Confusion Pain neck No   CARDIAC No Chest pain No Palpitations   GI  Pain abdomen NO   Vomiting NO     PHYSICAL EXAM    HEENT Unremarkable  atraumatic   RESP Fair air entry EXP prolonged    Harsh breath sound Resp distres mild   CARDIAC S1 S2 No S3     NO JVD    ABDOMEN SOFT BS PRESENT NOT DISTENDED No hepatosplenomegaly PEDAL EDEMA present No calf tenderness  NO rash       PATIENT SUMMARY  64 yo F with h/o HTN, HLD and DM2 who presented with progressively worsening SOB associated with productive cough for past several days much worse the past 3 days.  Pt was started on Levaquin, Prednisone and Azithromycin  on 3/19 outpatient with no improvement.      In the ER CTA chest done to r/o PE findings consistent with COVID-19 PNA. Labs remarkable for WBC 18.55, Na 130, K 3.2, COs 10, AG 29, Glucose 416, alb 2.5.  Placed on High flow for dyspnea and hypoxemia. ICU admitting dx : 1) Acute hypoxic resp failure 2 to Covid-19 PNA with improvement in oxygenation  2) s/p DKA.  Pt was admitted 3/23 transferred out of ICU 3/26  Pulm consulted 3/29/2021       PROBLEMS.  COVID PNEUMONIA   ACUTE HYPOXEMIC RESP FAILURE   ELEVATED Ddimr    VITALS/IO/VENT/DRIPS.  3/31/2021 afeb 100 150/50   3/31/2021 14/8/100% 12     GLOBAL AND BEST PRACTICE ISSUES                     ALLERGY. carafate levaquin percocet      WEIGHT.      3/29/2021 77                           BMI.                 3/29/2021 29          ADVANCED DIRECTIVE.                               HEAD OF BED ELEVATION. Yes  DVT PROPHYLAXIS.   hpsc (3/23)   --> lvnx 40 93/30)                  MANUEL PROPHYLAXIS. PROTONIX 40 (3/23)   APA.                                                                    DYSPHAGIA RECOMM.   DIET.  CONS CARB (3/25)       ASSESSMENT/RECOMMENDATIONS.    INFECTION   3/29/2021 W 13  3/23 blod c negv   3/24 urine c neg  3/23 flab negv  Defer abio   Doubt bact infectn  ANEMIA   Hb 3/29/2021 Hb 10   Monitor   D-DIMR ELEVATED  3/26-3/29/2021 D-dimr 1344-930  3/23 cta ch no pe bl ggo  Trending down   is on dvt ppl    COVID PNEUMONIA AND HYPOXIC RESP FAILURE MANAGEMENT.   REMDESEVIR (3/23)   DEXA (3/23)   PRONE (3/26)   Monitor pulse oximetry Target PO 92-96%  Monitor inflammatory and thrombotic biomarkers  Monitor for  for progressively  worsening oxygenation failure   O2 to keep po 90-95%  Oxygenation borderline on 100% May need intubation if fails hfnc/bpap     TIME SPENT   Over 25 minutes aggregate care time spent on encounter; activities included   direct patient care, counseling and/or coordinating care reviewing notes, lab data/ imaging , discussion with multidisciplinary team/ patient  /family and explaining in detail risks, benefits, alternatives  of the recommendations     FINESSE JERNIGAN 65 F 1955 3/23/2021 DR YANN UNDERWOOD

## 2021-03-31 NOTE — CHART NOTE - NSCHARTNOTEFT_GEN_A_CORE
RRT called to room for respiratory distress.    Pt hypoxic with O2 sat 70s, RR 40-50s with accessory muscle use, and HR 150s    -150s. Pt diaphoretic from work of breathing.    Pt found in left lateral recumbent position with medical team doing chest PT. Pt on BiPAP FIO2: 100% IPAP 14 EPAP 8.    EPAP was increased to 10 without any significant improvement in O2 sat which stayed in the 70s    Discussed with pt her low Oxygen levels on BiPAP and the need for intubation. Pt agrees and is full code.    Pt urgently transferred to ICU for intubation.    Peripheral access noted to be poor and malfunctioning.     Urgent peripheral IV access placed for venous access for medication administration.    Intubated via glidescope with 7.5 ETT in ICU    DX: acute hypoxic respiratory failure requiring intubation, ARDS, COVID-19 PNA, respiratory distress, sepsis due to COVID-19

## 2021-04-01 NOTE — PROGRESS NOTE ADULT - ASSESSMENT
FINESSE JERNIGAN 65 F 1955 3/23/2021 DR YANN UNDERWOOD     REVIEW OF SYMPTOMS      Able to give (reliable) ROS  NO     PHYSICAL EXAM    HEENT Unremarkable  atraumatic   RESP Fair air entry EXP prolonged    Harsh breath sound Resp distres mild   CARDIAC S1 S2 No S3     NO JVD    ABDOMEN SOFT BS PRESENT NOT DISTENDED No hepatosplenomegaly   PEDAL EDEMA present No calf tenderness  NO rash       PATIENT SUMMARY  64 yo F with h/o HTN, HLD and DM2 who presented with progressively worsening SOB associated with productive cough for past several days much worse the past 3 days.  Pt was started on Levaquin, Prednisone and Azithromycin  on 3/19 outpatient with no improvement.      In the ER CTA chest done to r/o PE findings consistent with COVID-19 PNA. Labs remarkable for WBC 18.55, Na 130, K 3.2, COs 10, AG 29, Glucose 416, alb 2.5.  Placed on High flow for dyspnea and hypoxemia. ICU admitting dx : 1) Acute hypoxic resp failure 2 to Covid-19 PNA with improvement in oxygenation  2) s/p DKA.  Pt was admitted 3/23 transferred out of ICU 3/26  Pulm consulted 3/29/2021         PROBLEMS.  COVID PNEUMONIA   ACUTE HYPOXEMIC RESP FAILURE   ELEVATED Ddimr  INTUBATED 3/31   LACTICEMIA 4/1/2021   TROPONINEMIA 4/1/2021     Transferred to ICU 3/31   KETAMINE INFUSION (4/1/2021)   CISATRACURIUM 4/1/2021   PROPOFOL 4/1/2021   NOREPI 4/1/2021     PATIENT DATA                          OXYGENATION.       3/29/2021 hf 50 l 100% O2 po 90%   ABG.     4/1/2021 32/400/100%/10 54434/67     VITALS/IO/VENT/DRIPS.  4/1/2021 100 80/50   4/1/2021 2a cisa 3 m/k/m   4/1/2021 3a nore .05 m/k/m   4/1/2021 ac 32/400/12/100      GLOBAL AND BEST PRACTICE ISSUES                     ALLERGY. carafate levaquin percocet      WEIGHT.      3/29/2021 77                           BMI.                 3/29/2021 29          ADVANCED DIRECTIVE.                               HEAD OF BED ELEVATION. Yes  DVT PROPHYLAXIS.   hpsc (3/23)   --> lvnx 40 93/30)                  MANUEL PROPHYLAXIS. PROTONIX 40 (3/23)   APA.                                                                    DYSPHAGIA RECOMM.   DIET.  CONS CARB (3/25)     INFECTION PROPHYLAXIS.   chlorhexidine .12% (4/1/20210  chlorhexidine 2% (4/1/2021)   FREE WATER.      ASSESSMENT/RECOMMENDATIONS.    D-DIMR ELEVATED  3/26-3/29/2021 D-dimr 1344-930  3/23 cta ch no pe bl ggo  Trending down   is on dvt ppl    COVID PNEUMONIA AND HYPOXIC RESP FAILURE MANAGEMENT.   REMDESEVIR (3/23)   DEXA (3/23)   PRONE (3/26)   Monitor pulse oximetry Target PO 92-96%  Monitor inflammatory and thrombotic biomarkers  Monitor for  for progressively  worsening oxygenation failure   O2 to keep po 90-95%  Intubated 3/31  Requiring nmba (4/1/2021) iv sedation (4/1/2021)     TIME SPENT   Over 25 minutes aggregate care time spent on encounter; activities included   direct patient care, counseling and/or coordinating care reviewing notes, lab data/ imaging , discussion with multidisciplinary team/ patient  /family and explaining in detail risks, benefits, alternatives  of the recommendations     FINESSE JERNIGAN 65 F 1955 3/23/2021 DR YANN UNDERWOOD

## 2021-04-01 NOTE — PROGRESS NOTE ADULT - SUBJECTIVE AND OBJECTIVE BOX
Patient is a 65y old  Female who presents with a chief complaint of covid pna and dka (01 Apr 2021 09:43)      Interval History: finger sticks are < 200 as steroids are discontinued   on Lantus 12 units and Lispro sliding scale coverage with meals     MEDICATIONS  (STANDING):  chlorhexidine 0.12% Liquid 15 milliLiter(s) Oral Mucosa every 12 hours  chlorhexidine 2% Cloths 1 Application(s) Topical <User Schedule>  cisatracurium Infusion 3 MICROgram(s)/kG/Min (13.9 mL/Hr) IV Continuous <Continuous>  dextrose 40% Gel 15 Gram(s) Oral once  dextrose 5%. 1000 milliLiter(s) (50 mL/Hr) IV Continuous <Continuous>  dextrose 5%. 1000 milliLiter(s) (100 mL/Hr) IV Continuous <Continuous>  dextrose 50% Injectable 25 Gram(s) IV Push once  dextrose 50% Injectable 12.5 Gram(s) IV Push once  dextrose 50% Injectable 25 Gram(s) IV Push once  enoxaparin Injectable 40 milliGRAM(s) SubCutaneous two times a day  ergocalciferol 32435 Unit(s) Oral <User Schedule>  glucagon  Injectable 1 milliGRAM(s) IntraMuscular once  insulin glargine Injectable (LANTUS) 12 Unit(s) SubCutaneous at bedtime  insulin lispro (ADMELOG) corrective regimen sliding scale   SubCutaneous every 6 hours  ketamine Infusion. 0.25 mG/kG/Hr (1.93 mL/Hr) IV Continuous <Continuous>  midodrine 10 milliGRAM(s) Oral every 8 hours  norepinephrine Infusion 0.05 MICROgram(s)/kG/Min (7.23 mL/Hr) IV Continuous <Continuous>  pantoprazole  Injectable 40 milliGRAM(s) IV Push daily  propofol Infusion 10.009 MICROgram(s)/kG/Min (4.63 mL/Hr) IV Continuous <Continuous>    MEDICATIONS  (PRN):  sodium chloride 0.9% lock flush 10 milliLiter(s) IV Push every 1 hour PRN Pre/post blood products, medications, blood draw, and to maintain line patency      Allergies    Carafate (Rash)  Levaquin (Rash)  Percocet 5/325 (Other)    Intolerances    lactose (Flatulence)      REVIEW OF SYSTEMS:  CONSTITUTIONAL: no changes      Vital Signs Last 24 Hrs  T(C): 36.7 (01 Apr 2021 19:30), Max: 37.5 (01 Apr 2021 04:49)  T(F): 98 (01 Apr 2021 19:30), Max: 99.5 (01 Apr 2021 04:49)  HR: 85 (01 Apr 2021 22:30) (85 - 130)  BP: --  BP(mean): --  RR: 32 (01 Apr 2021 22:30) (32 - 32)  SpO2: 98% (01 Apr 2021 22:30) (82% - 98%)    PHYSICAL EXAM:  GENERAL:   HEAD: Atraumatic, Normocephalic  EYES: PERRLA, conjunctiva and sclera clear  ENMT: No tonsillar erythema, exudates, or enlargement; Moist mucous membranes, Good dentition, No lesions  NECK: Supple, No JVD, Normal thyroid  NERVOUS SYSTEM:  Alert & Oriented X3, Good concentration; Motor Strength 5/5 B/L upper and lower extremities  CHEST/LUNG: Clear to auscultaion bilaterally; No rales, rhonchi, wheezing, or rubs  HEART: Regular rate and rhythm; No murmurs, rubs, or gallops  ABDOMEN: Soft, Nontender, Nondistended; Bowel sounds present  EXTREMITIES:  2+ Peripheral Pulses, no edema  SKIN: No rashes or lesions    LABS:    PT/INR - ( 01 Apr 2021 00:47 )   PT: 16.7 sec;   INR: 1.47 ratio         PTT - ( 01 Apr 2021 00:47 )  PTT:30.9 sec    CAPILLARY BLOOD GLUCOSE      POCT Blood Glucose.: 168 mg/dL (01 Apr 2021 21:16)  POCT Blood Glucose.: 301 mg/dL (01 Apr 2021 17:27)  POCT Blood Glucose.: 352 mg/dL (01 Apr 2021 12:30)  POCT Blood Glucose.: 349 mg/dL (01 Apr 2021 06:33)    Lipid panel:   CARDIAC MARKERS ( 01 Apr 2021 00:47 )  .190 ng/mL / x     / x     / x     / x          ABG - ( 01 Apr 2021 09:10 )  pH, Arterial: x     pH, Blood: 7.36  /  pCO2: 51    /  pO2: 67    / HCO3: 28    / Base Excess: 2.4   /  SaO2: 90                Mode: AC/ CMV (Assist Control/ Continuous Mandatory Ventilation)  RR (machine): 32  TV (machine): 380  FiO2: 100  PEEP: 10  ITime: 0.6  MAP: 18  PIP: 40    Thyroid:  Diabetes Tests:  Parathyroid Panel:  Adrenals:  RADIOLOGY & ADDITIONAL TESTS:    Imaging Personally Reviewed:  [ ] YES  [ ] NO    Consultant(s) Notes Reviewed:  [ ] YES  [ ] NO    Care Discussed with Consultants/Other Providers [ ] YES  [ ] NO

## 2021-04-01 NOTE — CONSULT NOTE ADULT - ATTENDING COMMENTS
pt seen and services provided from 3/31 2300 at time of RRT to 4/1 200    65F PMH HTN (on losartan) and DM2 (on metformin) presents on 3/23 for progressive worsening SOB with productive cough s/p outpatient treatment with levaquin, prednisone and azithromycin without improvement. Found to be with AG 29, glucose 416, HCO3: 10 and COVID + with hypoxia admitted to ICU for on insulin drip and with acute hypoxic respiratory failure requiring high flow oxygen supplementation due to COVID-PNA. Transferred to medical service 3/25. Course with worsening hypoxemia requiring biPAP. RRT today for respiratory distress, worsening acute hypoxic respiratory failure with O2 sat in the 70s, RR 40-60s with accessory muscle use, ARDS requiring urgent intubation, sepsis due to COVID    - sedated and paralyzed to achieve vent synchrony  - low TV high PEEP settings for ARDS  - O2 sat in the 60s post intubation supine  - will place pt in prone positioning  - ABG  - trend inflammatory markers (d-dimer, ferritin, LDH, CRP)  - d-dimer uptrending: check bilateral LE duplex r/o DVT - will place pt on intermediate dosing AC with lovenox 40mg BID  - CXR with worsening bilateral diffuse infiltrates (original CXR with more predominant bibasilar process)  - s/p course of remdesivir  - on dexamethasone  - tocilizumab at this point in time with ?benefit  - prognosis is extremely guarded and poor  - daughter updated by ICU staff on events, ICU transfer, and intubation  - pt critically ill requiring prolonged bedside visits for stablization    CC time 120 min not including procedures pt seen and services provided from 3/31 2300 at time of RRT to 4/1 200    65F PMH HTN (on losartan) and DM2 (on metformin) presents on 3/23 for progressive worsening SOB with productive cough s/p outpatient treatment with levaquin, prednisone and azithromycin without improvement. Found to be with AG 29, glucose 416, HCO3: 10 and COVID + with hypoxia admitted to ICU for DKA on insulin drip and with acute hypoxic respiratory failure requiring high flow oxygen supplementation due to COVID-PNA. Transferred to medical service 3/25. Course with worsening hypoxemia requiring biPAP. RRT today for respiratory distress, worsening acute hypoxic respiratory failure with O2 sat in the 70s, RR 40-60s with accessory muscle use, ARDS requiring urgent intubation, sepsis due to COVID    - sedated and paralyzed to achieve vent synchrony  - low TV high PEEP settings for ARDS  - O2 sat in the 60s post intubation supine  - will place pt in prone positioning  - ABG  - trend inflammatory markers (d-dimer, ferritin, LDH, CRP)  - d-dimer uptrending: check bilateral LE duplex r/o DVT - will place pt on intermediate dosing AC with lovenox 40mg BID  - CXR with worsening bilateral diffuse infiltrates (original CXR with more predominant bibasilar process)  - s/p course of remdesivir  - on dexamethasone  - tocilizumab at this point in time with ?benefit  - prognosis is extremely guarded and poor  - daughter updated by ICU staff on events, ICU transfer, and intubation  - pt critically ill requiring prolonged bedside visits for stabilization  - full code per discussion with pt    CC time 120 min not including procedures

## 2021-04-01 NOTE — PROGRESS NOTE ADULT - SUBJECTIVE AND OBJECTIVE BOX
Pulmonary/Critical Care Followup    PAST MEDICAL & SURGICAL HISTORY:  HTN (hypertension)    DM (diabetes mellitus)    HLD (hyperlipidemia)    No significant past surgical history        Allergies    Carafate (Rash)  Levaquin (Rash)  Percocet 5/325 (Other)    Intolerances    lactose (Flatulence)      FAMILY HISTORY:  No pertinent family history in first degree relatives        SOCIAL HISTORY:      MEDICATIONS  (STANDING):  chlorhexidine 0.12% Liquid 15 milliLiter(s) Oral Mucosa every 12 hours  chlorhexidine 2% Cloths 1 Application(s) Topical <User Schedule>  cisatracurium Infusion 3 MICROgram(s)/kG/Min (13.9 mL/Hr) IV Continuous <Continuous>  dextrose 40% Gel 15 Gram(s) Oral once  dextrose 5%. 1000 milliLiter(s) (50 mL/Hr) IV Continuous <Continuous>  dextrose 5%. 1000 milliLiter(s) (100 mL/Hr) IV Continuous <Continuous>  dextrose 50% Injectable 25 Gram(s) IV Push once  dextrose 50% Injectable 12.5 Gram(s) IV Push once  dextrose 50% Injectable 25 Gram(s) IV Push once  enoxaparin Injectable 40 milliGRAM(s) SubCutaneous two times a day  ergocalciferol 67763 Unit(s) Oral <User Schedule>  glucagon  Injectable 1 milliGRAM(s) IntraMuscular once  insulin glargine Injectable (LANTUS) 12 Unit(s) SubCutaneous at bedtime  insulin lispro (ADMELOG) corrective regimen sliding scale   SubCutaneous every 6 hours  ketamine Infusion. 0.25 mG/kG/Hr (1.93 mL/Hr) IV Continuous <Continuous>  norepinephrine Infusion 0.05 MICROgram(s)/kG/Min (7.23 mL/Hr) IV Continuous <Continuous>  pantoprazole  Injectable 40 milliGRAM(s) IV Push daily  propofol Infusion 10.009 MICROgram(s)/kG/Min (4.63 mL/Hr) IV Continuous <Continuous>    MEDICATIONS  (PRN):  sodium chloride 0.9% lock flush 10 milliLiter(s) IV Push every 1 hour PRN Pre/post blood products, medications, blood draw, and to maintain line patency      Vital Signs Last 24 Hrs  T(C): 37.5 (01 Apr 2021 04:49), Max: 37.5 (01 Apr 2021 04:49)  T(F): 99.5 (01 Apr 2021 04:49), Max: 99.5 (01 Apr 2021 04:49)  HR: 106 (01 Apr 2021 09:28) (95 - 130)  BP: 82/34 (31 Mar 2021 23:30) (82/34 - 169/94)  BP(mean): 43 (31 Mar 2021 23:30) (43 - 103)  RR: 32 (01 Apr 2021 08:00) (24 - 32)  SpO2: 92% (01 Apr 2021 09:28) (82% - 94%)    LABS:                        12.1   32.16 )-----------( 378      ( 01 Apr 2021 00:47 )             40.5     04-01    140  |  101  |  18  ----------------------------<  260<H>  4.3   |  28  |  0.85    Ca    8.8      01 Apr 2021 00:47  Phos  5.3     04-01  Mg     2.6     04-01    TPro  7.0  /  Alb  1.7<L>  /  TBili  1.0  /  DBili  x   /  AST  114<H>  /  ALT  59  /  AlkPhos  163<H>  04-01    PT/INR - ( 01 Apr 2021 00:47 )   PT: 16.7 sec;   INR: 1.47 ratio         PTT - ( 01 Apr 2021 00:47 )  PTT:30.9 sec      ABG - ( 01 Apr 2021 09:10 )  pH, Arterial: x     pH, Blood: 7.36  /  pCO2: 51    /  pO2: 67    / HCO3: 28    / Base Excess: 2.4   /  SaO2: 90                  WBC:  WBC Count: 32.16 K/uL (04-01 @ 00:47)  WBC Count: 17.12 K/uL (03-30 @ 07:22)  WBC Count: 13.47 K/uL (03-29 @ 06:55)      MICROBIOLOGY:  RECENT CULTURES:        CARDIAC MARKERS ( 01 Apr 2021 00:47 )  .190 ng/mL / x     / x     / x     / x            PT/INR - ( 01 Apr 2021 00:47 )   PT: 16.7 sec;   INR: 1.47 ratio         PTT - ( 01 Apr 2021 00:47 )  PTT:30.9 sec    Sodium:  Sodium, Serum: 140 mmol/L (04-01 @ 00:47)  Sodium, Serum: 137 mmol/L (03-30 @ 07:22)  Sodium, Serum: 134 mmol/L (03-29 @ 06:55)      0.85 mg/dL 04-01 @ 00:47  0.46 mg/dL 03-30 @ 07:22  0.46 mg/dL 03-29 @ 06:55      Hemoglobin:  Hemoglobin: 12.1 g/dL (04-01 @ 00:47)  Hemoglobin: 10.7 g/dL (03-30 @ 07:22)  Hemoglobin: 10.9 g/dL (03-29 @ 06:55)      Platelets: Platelet Count - Automated: 378 K/uL (04-01 @ 00:47)  Platelet Count - Automated: 333 K/uL (03-30 @ 07:22)  Platelet Count - Automated: 343 K/uL (03-29 @ 06:55)      LIVER FUNCTIONS - ( 01 Apr 2021 00:47 )  Alb: 1.7 g/dL / Pro: 7.0 gm/dL / ALK PHOS: 163 U/L / ALT: 59 U/L / AST: 114 U/L / GGT: x                 RADIOLOGY & ADDITIONAL STUDIES:

## 2021-04-01 NOTE — PROGRESS NOTE ADULT - SUBJECTIVE AND OBJECTIVE BOX
Progress update:  Intubated in the early morning.  Currently 32/400/100%/+10 plateau 34. Decreased  (target 330–440)  Proned and paralyzed on Nimbex  Sedation with propofol and Ketamine. Episode of hypertension and tachycardia, possible undersedation. Go up on propofol  Some pressors on board.  Leukocytosis but procalcitonin low. No need for antibiotics at this time.  c/w remdesivir and dexamethasone  c/w empiric ceftriaxone / azithromycin  Continue in ICU while ARDS COVID pneumonia. Progress update:  Intubated in the early morning.  Currently 32/400/100%/+10 plateau 34. Decreased  (target 330–440)  Proned and paralyzed on Nimbex  Sedation with propofol and Ketamine. Episode of hypertension and tachycardia, possible undersedation. Go up on propofol  Some pressors on board.  Leukocytosis but procalcitonin low. No need for antibiotics at this time.  s/p remdesivir and dexamethasone  s/p empiric ceftriaxone / azithromycin  Continue in ICU while ARDS COVID pneumonia.    ## Meds:  midodrine 10 milliGRAM(s) Oral every 8 hours  norepinephrine Infusion 0.05 MICROgram(s)/kG/Min IV Continuous <Continuous>  cisatracurium Infusion 3 MICROgram(s)/kG/Min IV Continuous <Continuous>  ketamine Infusion. 0.25 mG/kG/Hr IV Continuous <Continuous>  propofol Infusion 10.009 MICROgram(s)/kG/Min IV Continuous <Continuous>  enoxaparin Injectable 40 milliGRAM(s) SubCutaneous two times a day  pantoprazole  Injectable 40 milliGRAM(s) IV Push daily  insulin glargine Injectable (LANTUS) 12 Unit(s) SubCutaneous at bedtime  insulin lispro (ADMELOG) corrective regimen sliding scale   SubCutaneous every 6 hours

## 2021-04-01 NOTE — CHART NOTE - NSCHARTNOTEFT_GEN_A_CORE
Assessment:  Pt seen in CCU, in prone position, on vent, NGT in place.  Pt adm hypoxia, DKA w/o coma, c COVID-19 pneumonia, acute hypoxic respiratory failure, s/p RRT 03/31, Endocrine consult noted.  PMH include HLD, DM( insulin use PTA reported by pt, metformin as per home meds noted).    Factors impacting intake: [ ] none [ ] nausea  [ ] vomiting [ ] diarrhea [ ] constipation  [ ]chewing problems [ ] swallowing issues  [ x] other: acute illness     Diet Prescription: Diet, Consistent Carbohydrate w/Evening Snack:   Low Sodium  Lactose Restricted (Milk Sugar Intoler.) (03-25-21 @ 11:19)    Intake: <50% nutrition needs > 5 days     Current Weight: 03/30, 77.5 kg, 03/23, 82.6 kg, c wt. loss of 5.1 kg  % Weight Change: 6.17% in 1 week   04/01, 2+ generalized edema noted     Pertinent Medications: MEDICATIONS  (STANDING):  chlorhexidine 0.12% Liquid 15 milliLiter(s) Oral Mucosa every 12 hours  chlorhexidine 2% Cloths 1 Application(s) Topical <User Schedule>  cisatracurium Infusion 3 MICROgram(s)/kG/Min (13.9 mL/Hr) IV Continuous <Continuous>  dextrose 40% Gel 15 Gram(s) Oral once  dextrose 5%. 1000 milliLiter(s) (50 mL/Hr) IV Continuous <Continuous>  dextrose 5%. 1000 milliLiter(s) (100 mL/Hr) IV Continuous <Continuous>  dextrose 50% Injectable 25 Gram(s) IV Push once  dextrose 50% Injectable 12.5 Gram(s) IV Push once  dextrose 50% Injectable 25 Gram(s) IV Push once  enoxaparin Injectable 40 milliGRAM(s) SubCutaneous two times a day  ergocalciferol 60000 Unit(s) Oral <User Schedule>  glucagon  Injectable 1 milliGRAM(s) IntraMuscular once  insulin glargine Injectable (LANTUS) 12 Unit(s) SubCutaneous at bedtime  insulin lispro (ADMELOG) corrective regimen sliding scale   SubCutaneous every 6 hours  ketamine Infusion. 0.25 mG/kG/Hr (1.93 mL/Hr) IV Continuous <Continuous>  norepinephrine Infusion 0.05 MICROgram(s)/kG/Min (7.23 mL/Hr) IV Continuous <Continuous>  pantoprazole  Injectable 40 milliGRAM(s) IV Push daily  propofol Infusion 10.009 MICROgram(s)/kG/Min (4.63 mL/Hr) IV Continuous <Continuous>=14 ml(03/31)=15.4 calories     MEDICATIONS  (PRN):  sodium chloride 0.9% lock flush 10 milliLiter(s) IV Push every 1 hour PRN Pre/post blood products, medications, blood draw, and to maintain line patency    Pertinent Labs: 04-01 Na140 mmol/L Glu 260 mg/dL<H> K+ 4.3 mmol/L Cr  0.85 mg/dL BUN 18 mg/dL 04-01 Phos 5.3 mg/dL<H> 04-01 Alb 1.7 g/dL<L> 03-24 Chol 173 mg/dL LDL --    HDL 43 mg/dL<L> Trig 104 mg/dL04-01 ALT 59 U/L  U/L<H> Alkaline Phosphatase 163 U/L<H>  03-26-21 @ 08:56 a1c 12.2<H>  03-24-21 @ 09:30 a1c 12.5<H>  03-23-21 @ 19:22 a1c 12.2<H>   CAPILLARY BLOOD GLUCOSE      POCT Blood Glucose.: 352 mg/dL (01 Apr 2021 12:30)  POCT Blood Glucose.: 349 mg/dL (01 Apr 2021 06:33)  POCT Blood Glucose.: 199 mg/dL (31 Mar 2021 22:45)  POCT Blood Glucose.: 177 mg/dL (31 Mar 2021 21:58)  POCT Blood Glucose.: 188 mg/dL (31 Mar 2021 17:09)    Skin: WDL    Estimated Needs:   [x ] no change since previous assessment( 03/25)  [ ] recalculated:     Previous Nutrition Diagnosis: (03/25)  Inadequate Oral Intake.     Etiology COVID-19 illness, impaired respiratory status.     Signs/Symptoms estimated intake from diet is <75% nutrition needs.     Goal/Expected Outcome pt to consume >75% of meals; not applicable at present    Nutrition Diagnosis is [ ] ongoing  [ ] resolved [ x] not applicable     New Nutrition Diagnosis:   [x ] Malnutrition; severe malnutrition in context of acute illness     Related to: inadequate protein-energy intake in setting of COVID-19 illness, acute respiratory failure, DKA    As evidenced by: <50% nutrition needs > 5 days, >2% wt. loss in 1 week(6.17%)    Goal: pt to meet >75% protein-energy needs via enteral feeding     Interventions:   Recommend  [ ] Change Diet To:  [ ] Nutrition Supplement  [ x] Nutrition Support; recommend consider trickle feeding via NGT c Glucerna 1.2 @10-> 20 ml/hr= 480 ml, 576 calories, 29 grams protein, 386 ml free water, 38% RDIs while in prone position, when in supine position, recommend Glucerna 1.2 @ 30 ml/hr and increase as tolerated 5 ml increments q 8 hours to 45 ml/wu=4296 ml, 1296 calories, 65 grams protein, 869 ml fluid, 86% RDIs   [ ] Other    Monitoring and Evaluation:   [ ] PO intake [ x ] Tolerance to diet prescription [ x ] weights [ x ] labs; glucose, Triglyceride [ x ] follow up per protocol  [ ] other: Assessment:  Pt seen in CCU, in prone position, on vent, NGT in place.   Pt adm hypoxia, DKA w/o coma, c COVID-19 pneumonia, acute hypoxic respiratory failure, s/p RRT (03/31readmitted to CCU from ), Endocrine consult noted.  PMH include HLD, DM( insulin use PTA reported by pt, metformin as per home meds noted).    Factors impacting intake: [ ] none [ ] nausea  [ ] vomiting [ ] diarrhea [ ] constipation  [ ]chewing problems [ ] swallowing issues  [ x] other: acute illness     Diet Prescription: Diet, Consistent Carbohydrate w/Evening Snack:   Low Sodium  Lactose Restricted (Milk Sugar Intoler.) (03-25-21 @ 11:19)    Intake: <50% nutrition needs > 5 days     Current Weight: 03/30, 77.5 kg, 03/23, 82.6 kg, c wt. loss of 5.1 kg  % Weight Change: 6.17% in 1 week   04/01, 2+ generalized edema noted     Pertinent Medications: MEDICATIONS  (STANDING):  chlorhexidine 0.12% Liquid 15 milliLiter(s) Oral Mucosa every 12 hours  chlorhexidine 2% Cloths 1 Application(s) Topical <User Schedule>  cisatracurium Infusion 3 MICROgram(s)/kG/Min (13.9 mL/Hr) IV Continuous <Continuous>  dextrose 40% Gel 15 Gram(s) Oral once  dextrose 5%. 1000 milliLiter(s) (50 mL/Hr) IV Continuous <Continuous>  dextrose 5%. 1000 milliLiter(s) (100 mL/Hr) IV Continuous <Continuous>  dextrose 50% Injectable 25 Gram(s) IV Push once  dextrose 50% Injectable 12.5 Gram(s) IV Push once  dextrose 50% Injectable 25 Gram(s) IV Push once  enoxaparin Injectable 40 milliGRAM(s) SubCutaneous two times a day  ergocalciferol 59472 Unit(s) Oral <User Schedule>  glucagon  Injectable 1 milliGRAM(s) IntraMuscular once  insulin glargine Injectable (LANTUS) 12 Unit(s) SubCutaneous at bedtime  insulin lispro (ADMELOG) corrective regimen sliding scale   SubCutaneous every 6 hours  ketamine Infusion. 0.25 mG/kG/Hr (1.93 mL/Hr) IV Continuous <Continuous>  norepinephrine Infusion 0.05 MICROgram(s)/kG/Min (7.23 mL/Hr) IV Continuous <Continuous>  pantoprazole  Injectable 40 milliGRAM(s) IV Push daily  propofol Infusion 10.009 MICROgram(s)/kG/Min (4.63 mL/Hr) IV Continuous <Continuous>=14 ml(03/31)=15.4 calories     MEDICATIONS  (PRN):  sodium chloride 0.9% lock flush 10 milliLiter(s) IV Push every 1 hour PRN Pre/post blood products, medications, blood draw, and to maintain line patency    Pertinent Labs: 04-01 Na140 mmol/L Glu 260 mg/dL<H> K+ 4.3 mmol/L Cr  0.85 mg/dL BUN 18 mg/dL 04-01 Phos 5.3 mg/dL<H> 04-01 Alb 1.7 g/dL<L> 03-24 Chol 173 mg/dL LDL --    HDL 43 mg/dL<L> Trig 104 mg/dL04-01 ALT 59 U/L  U/L<H> Alkaline Phosphatase 163 U/L<H>  03-26-21 @ 08:56 a1c 12.2<H>  03-24-21 @ 09:30 a1c 12.5<H>  03-23-21 @ 19:22 a1c 12.2<H>   CAPILLARY BLOOD GLUCOSE      POCT Blood Glucose.: 352 mg/dL (01 Apr 2021 12:30)  POCT Blood Glucose.: 349 mg/dL (01 Apr 2021 06:33)  POCT Blood Glucose.: 199 mg/dL (31 Mar 2021 22:45)  POCT Blood Glucose.: 177 mg/dL (31 Mar 2021 21:58)  POCT Blood Glucose.: 188 mg/dL (31 Mar 2021 17:09)    Skin: WDL    Estimated Needs:   [x ] no change since previous assessment( 03/25)  [ ] recalculated:     Previous Nutrition Diagnosis: (03/25)  Inadequate Oral Intake.     Etiology COVID-19 illness, impaired respiratory status.     Signs/Symptoms estimated intake from diet is <75% nutrition needs.     Goal/Expected Outcome pt to consume >75% of meals; not applicable at present    Nutrition Diagnosis is [ ] ongoing  [ ] resolved [ x] not applicable     New Nutrition Diagnosis:   [x ] Malnutrition; severe malnutrition in context of acute illness     Related to: inadequate protein-energy intake in setting of COVID-19 illness, acute respiratory failure, DKA    As evidenced by: <50% nutrition needs > 5 days, >2% wt. loss in 1 week(6.17%)    Goal: pt to meet >75% protein-energy needs via enteral feeding     Interventions:   Recommend  [ ] Change Diet To:  [ ] Nutrition Supplement  [ x] Nutrition Support; recommend consider trickle feeding via NGT c Glucerna 1.2 @10-> 20 ml/hr= 480 ml, 576 calories, 29 grams protein, 386 ml free water, 38% RDIs while in prone position, when in supine position, recommend Glucerna 1.2 @ 30 ml/hr and increase as tolerated 5 ml increments q 8 hours to 45 ml/iu=0755 ml, 1296 calories, 65 grams protein, 869 ml fluid, 86% RDIs   [ ] Other    Monitoring and Evaluation:   [ ] PO intake [ x ] Tolerance to diet prescription [ x ] weights [ x ] labs; glucose, Triglyceride [ x ] follow up per protocol  [ ] other:

## 2021-04-01 NOTE — DIETITIAN NUTRITION RISK NOTIFICATION - TREATMENT: THE FOLLOWING DIET HAS BEEN RECOMMENDED
Diet, NPO (04-01-21 @ 16:22) [Pending Verification By Attending]  Diet, Consistent Carbohydrate w/Evening Snack:   Low Sodium  Lactose Restricted (Milk Sugar Intoler.) (03-25-21 @ 11:19) [Active]

## 2021-04-02 NOTE — PROGRESS NOTE ADULT - SUBJECTIVE AND OBJECTIVE BOX
64 yo lady now on vent.    ICU Vital Signs Last 24 Hrs  T(C): 35.6 (02 Apr 2021 15:00), Max: 36.7 (01 Apr 2021 19:30)  T(F): 96 (02 Apr 2021 15:00), Max: 98 (01 Apr 2021 19:30)  HR: 85 (02 Apr 2021 16:00) (73 - 97)  BP: --  BP(mean): --  ABP: 129/71 (02 Apr 2021 16:00) (89/47 - 181/96)  ABP(mean): 94 (02 Apr 2021 16:00) (63 - 133)  RR: 37 (02 Apr 2021 16:00) (30 - 37)  SpO2: 88% (02 Apr 2021 16:00) (86% - 98%)    MEDICATIONS  (STANDING):  chlorhexidine 0.12% Liquid 15 milliLiter(s) Oral Mucosa every 12 hours  chlorhexidine 2% Cloths 1 Application(s) Topical <User Schedule>  cisatracurium Infusion 3 MICROgram(s)/kG/Min (13.9 mL/Hr) IV Continuous <Continuous>  dextrose 40% Gel 15 Gram(s) Oral once  dextrose 5%. 1000 milliLiter(s) (50 mL/Hr) IV Continuous <Continuous>  dextrose 5%. 1000 milliLiter(s) (100 mL/Hr) IV Continuous <Continuous>  dextrose 5%. 1000 milliLiter(s) (30 mL/Hr) IV Continuous <Continuous>  dextrose 50% Injectable 25 Gram(s) IV Push once  dextrose 50% Injectable 12.5 Gram(s) IV Push once  dextrose 50% Injectable 25 Gram(s) IV Push once  enoxaparin Injectable 40 milliGRAM(s) SubCutaneous two times a day  ergocalciferol 79520 Unit(s) Oral <User Schedule>  glucagon  Injectable 1 milliGRAM(s) IntraMuscular once  insulin glargine Injectable (LANTUS) 8 Unit(s) SubCutaneous at bedtime  insulin lispro (ADMELOG) corrective regimen sliding scale   SubCutaneous every 6 hours  ketamine Infusion. 0.25 mG/kG/Hr (1.93 mL/Hr) IV Continuous <Continuous>  midodrine 10 milliGRAM(s) Oral every 8 hours  norepinephrine Infusion 0.05 MICROgram(s)/kG/Min (7.23 mL/Hr) IV Continuous <Continuous>  pantoprazole  Injectable 40 milliGRAM(s) IV Push daily  propofol Infusion 10.009 MICROgram(s)/kG/Min (4.63 mL/Hr) IV Continuous <Continuous>  vancomycin  IVPB        MEDICATIONS  (PRN):  sodium chloride 0.9% lock flush 10 milliLiter(s) IV Push every 1 hour PRN Pre/post blood products, medications, blood draw, and to maintain line patency

## 2021-04-02 NOTE — PROGRESS NOTE ADULT - SUBJECTIVE AND OBJECTIVE BOX
# CC: Patient is a 65y old  Female who presents with a chief complaint of covid pna and dka (02 Apr 2021 16:28)    ## HPI:  66yo F w/ HTN (on losartan) and DM2 (on metformin) presents with progressively worsening SOB associated with productive cough for past several days much worse the past 3 days.  Started on abx (levaquin, prednisone and azithromycin 3/19) outpatient with no improvement. Denies any chest pain, fevers, dizziness, syncope, abd pain, back pain, N/V/D, recent sick contact, recent travel.   In ED: CTA chest done to r/o PE findings consistent w/ COVID-19 PNA. Labs remarkable for WBC 18.55, Na 130, K 3.2, COs 10, AG 29, Glucose 416, alb 2.5.  Placed on High flow for dyspnea and hypoxemia. Admitted to ICU for COVID pna and DKA.   (23 Mar 2021 05:08)    **O/N:**  Remains intubated day 2: 32/380/70%/+10  Proned and paralyzed    ## ROS: Unobtainable    ## Labs:  ** CBC: **             10.6   17.82 )-----------( 220      ( 02 Apr 2021 04:05 )             35.4     ** Chem:  ** 04-02  142  |  105  |  27<H>  ----------------------------<  122<H>  3.8   |  32<H>  |  0.61    Ca    8.2<L>      02 Apr 2021 08:26  Phos  2.6     04-02  Mg     2.8     04-02    TPro  5.8<L>  /  Alb  1.5<L>  /  TBili  0.4  /  DBili  .28<H>  /  AST  940<H>  /  ALT  1259<H>  /  AlkPhos  142<H>  04-02    POCT Blood Glucose.: 256 mg/dL (02 Apr 2021 23:10)  POCT Blood Glucose.: 276 mg/dL (02 Apr 2021 21:27)  POCT Blood Glucose.: 233 mg/dL (02 Apr 2021 17:21)  POCT Blood Glucose.: 126 mg/dL (02 Apr 2021 11:02)  POCT Blood Glucose.: 85 mg/dL (02 Apr 2021 05:06)    Culture - Blood (collected 01 Apr 2021 08:25) Staphylococcus epidermidis, Methicillin resistant  Culture - Urine (collected 24 Mar 2021 08:29): No growth  Culture - Blood (collected 23 Mar 2021 08:12): No Growth Final  Culture - Blood (collected 23 Mar 2021 08:12): No Growth Final    ## Meds:  midodrine 10 milliGRAM(s) Oral every 8 hours  norepinephrine Infusion 0.05 MICROgram(s)/kG/Min IV Continuous <Continuous>  vancomycin  IVPB 1000 milliGRAM(s) IV Intermittent every 12 hours  cisatracurium Infusion 3 MICROgram(s)/kG/Min IV Continuous <Continuous>  ketamine Infusion. 0.25 mG/kG/Hr IV Continuous <Continuous>  propofol Infusion 10.009 MICROgram(s)/kG/Min IV Continuous <Continuous>  enoxaparin Injectable 40 milliGRAM(s) SubCutaneous two times a day  pantoprazole  Injectable 40 milliGRAM(s) IV Push daily  insulin glargine Injectable (LANTUS) 8 Unit(s) SubCutaneous at bedtime  insulin lispro (ADMELOG) corrective regimen sliding scale   SubCutaneous every 6 hours    ## O/E:  T(C): 35.6 (02 Apr 2021 15:00), Max: 35.6 (02 Apr 2021 15:00)  T(F): 96 (02 Apr 2021 15:00), Max: 96 (02 Apr 2021 15:00)  HR: 85 (03 Apr 2021 00:30) (67 - 99)  ABP: 159/83 (03 Apr 2021 00:30) (76/48 - 222/124)  ABP(mean): 110 (03 Apr 2021 00:30) (58 - 166)  RR: 32 (03 Apr 2021 00:30) (21 - 39)  SpO2: 98% (03 Apr 2021 00:30) (86% - 99%)  IN: 1189.4 mL / OUT: 1350 mL / NET: -160.6 mL    Mode: AC/ CMV (Assist Control/ Continuous Mandatory Ventilation), RR (machine): 32, TV (machine): 380, FiO2: 70, PEEP: 12, ITime: 1, MAP: 20, PIP: 47  Gen: orotracheally intubated, proned  HEENT: could not check d/t proning  Resp: mechanical breath sounds B/L  CVS: S1S2 no m/r/g  Abd: softly distended in obesity, hypoactive bowel sounds  Ext: no c/c; trace edema throughout  Neuro: paralyzed and sedated    ## Assessment / Plan:  65F with COVID pneumonia in ARDS  - Currently 32/380/100%/+10 (target 330–440)  - Proned and paralyzed on Nimbex  - Patient was supined in the afternoon, was transiently increased to PEEP 12 beforehand. Oxygenation remained stable, so was able to titrate PEEP back to 10  - Sedation with propofol and Ketamine.  - s/p remdesivir and dexamethasone  - s/p empiric ceftriaxone / azithromycin  - Continue in ICU while ARDS COVID pneumonia.  - Blood culture x 1 positive for MRSE, on vancomycin. Possible contaminant. Repeat cultures.    ## Code status:  Goals of care discussion: [x] yes [ ] no  [x] full code  [ ] DNR  [ ] DNI  [ ] MOLST    I have personally provided 45 minutes of critical care time.

## 2021-04-02 NOTE — PROGRESS NOTE ADULT - SUBJECTIVE AND OBJECTIVE BOX
Patient is a 65y old  Female who presents with a chief complaint of covid pna and dka (01 Apr 2021 23:34)      INTERVAL HPI/OVERNIGHT EVENTS:  pt critically ill  on vent    MEDICATIONS  (STANDING):  chlorhexidine 0.12% Liquid 15 milliLiter(s) Oral Mucosa every 12 hours  chlorhexidine 2% Cloths 1 Application(s) Topical <User Schedule>  cisatracurium Infusion 3 MICROgram(s)/kG/Min (13.9 mL/Hr) IV Continuous <Continuous>  dextrose 40% Gel 15 Gram(s) Oral once  dextrose 5%. 1000 milliLiter(s) (50 mL/Hr) IV Continuous <Continuous>  dextrose 5%. 1000 milliLiter(s) (100 mL/Hr) IV Continuous <Continuous>  dextrose 5%. 1000 milliLiter(s) (30 mL/Hr) IV Continuous <Continuous>  dextrose 50% Injectable 25 Gram(s) IV Push once  dextrose 50% Injectable 12.5 Gram(s) IV Push once  dextrose 50% Injectable 25 Gram(s) IV Push once  enoxaparin Injectable 40 milliGRAM(s) SubCutaneous two times a day  ergocalciferol 38086 Unit(s) Oral <User Schedule>  glucagon  Injectable 1 milliGRAM(s) IntraMuscular once  insulin glargine Injectable (LANTUS) 8 Unit(s) SubCutaneous at bedtime  insulin lispro (ADMELOG) corrective regimen sliding scale   SubCutaneous every 6 hours  ketamine Infusion. 0.25 mG/kG/Hr (1.93 mL/Hr) IV Continuous <Continuous>  midodrine 10 milliGRAM(s) Oral every 8 hours  norepinephrine Infusion 0.05 MICROgram(s)/kG/Min (7.23 mL/Hr) IV Continuous <Continuous>  pantoprazole  Injectable 40 milliGRAM(s) IV Push daily  propofol Infusion 10.009 MICROgram(s)/kG/Min (4.63 mL/Hr) IV Continuous <Continuous>    MEDICATIONS  (PRN):  sodium chloride 0.9% lock flush 10 milliLiter(s) IV Push every 1 hour PRN Pre/post blood products, medications, blood draw, and to maintain line patency      REVIEW OF SYSTEMS:  unobtainable      Vital Signs Last 24 Hrs  T(C): 36.3 (01 Apr 2021 23:40), Max: 36.7 (01 Apr 2021 19:30)  T(F): 97.3 (01 Apr 2021 23:40), Max: 98 (01 Apr 2021 19:30)  HR: 79 (02 Apr 2021 08:30) (76 - 107)  BP: --  BP(mean): --  RR: 32 (02 Apr 2021 08:30) (30 - 32)  SpO2: 94% (02 Apr 2021 08:30) (91% - 98%)    PHYSICAL EXAM:  GENERAL: NAD      LABS:                        10.6   17.82 )-----------( 220      ( 02 Apr 2021 04:05 )             35.4     04-02    142  |  105  |  27<H>  ----------------------------<  122<H>  3.8   |  32<H>  |  0.61    Ca    8.2<L>      02 Apr 2021 08:26  Phos  2.6     04-02  Mg     2.8     04-02    TPro  5.8<L>  /  Alb  1.5<L>  /  TBili  0.4  /  DBili  .28<H>  /  AST  940<H>  /  ALT  1259<H>  /  AlkPhos  142<H>  04-02    PT/INR - ( 01 Apr 2021 00:47 )   PT: 16.7 sec;   INR: 1.47 ratio         PTT - ( 01 Apr 2021 00:47 )  PTT:30.9 sec    CAPILLARY BLOOD GLUCOSE      POCT Blood Glucose.: 85 mg/dL (02 Apr 2021 05:06)  POCT Blood Glucose.: 121 mg/dL (01 Apr 2021 23:43)  POCT Blood Glucose.: 168 mg/dL (01 Apr 2021 21:16)  POCT Blood Glucose.: 301 mg/dL (01 Apr 2021 17:27)  POCT Blood Glucose.: 352 mg/dL (01 Apr 2021 12:30)    Lipid panel:   CARDIAC MARKERS ( 01 Apr 2021 00:47 )  .190 ng/mL / x     / x     / x     / x          ABG - ( 02 Apr 2021 05:48 )  pH, Arterial: x     pH, Blood: 7.46  /  pCO2: 46    /  pO2: 71    / HCO3: 31    / Base Excess: 6.5   /  SaO2: 94                Mode: AC/ CMV (Assist Control/ Continuous Mandatory Ventilation)  RR (machine): 32  TV (machine): 380  FiO2: 70  PEEP: 10  ITime: 0.6  MAP: 19  PIP: 40        RADIOLOGY & ADDITIONAL TESTS:

## 2021-04-03 NOTE — PROGRESS NOTE ADULT - SUBJECTIVE AND OBJECTIVE BOX
Patient is a 65y old  Female who presents with a chief complaint of covid pna and dka (03 Apr 2021 12:22)      Interval History: finger sticks are in 300's   on increased IV Dexamethasone   on Lantus 8 units and Lispro sliding scale coverage with meals     MEDICATIONS  (STANDING):  chlorhexidine 0.12% Liquid 15 milliLiter(s) Oral Mucosa every 12 hours  chlorhexidine 2% Cloths 1 Application(s) Topical <User Schedule>  cisatracurium Infusion 3 MICROgram(s)/kG/Min (13.9 mL/Hr) IV Continuous <Continuous>  dexMEDEtomidine Infusion 0.2 MICROgram(s)/kG/Hr (3.86 mL/Hr) IV Continuous <Continuous>  dextrose 40% Gel 15 Gram(s) Oral once  dextrose 5%. 1000 milliLiter(s) (50 mL/Hr) IV Continuous <Continuous>  dextrose 5%. 1000 milliLiter(s) (100 mL/Hr) IV Continuous <Continuous>  dextrose 50% Injectable 25 Gram(s) IV Push once  dextrose 50% Injectable 12.5 Gram(s) IV Push once  dextrose 50% Injectable 25 Gram(s) IV Push once  enoxaparin Injectable 40 milliGRAM(s) SubCutaneous two times a day  ergocalciferol 91045 Unit(s) Oral <User Schedule>  fentaNYL   Infusion. 0.5 MICROgram(s)/kG/Hr (3.86 mL/Hr) IV Continuous <Continuous>  glucagon  Injectable 1 milliGRAM(s) IntraMuscular once  insulin glargine Injectable (LANTUS) 8 Unit(s) SubCutaneous at bedtime  insulin lispro (ADMELOG) corrective regimen sliding scale   SubCutaneous every 6 hours  ketamine Infusion. 0.25 mG/kG/Hr (1.93 mL/Hr) IV Continuous <Continuous>  midodrine 10 milliGRAM(s) Oral every 8 hours  norepinephrine Infusion 0.05 MICROgram(s)/kG/Min (3.61 mL/Hr) IV Continuous <Continuous>  pantoprazole  Injectable 40 milliGRAM(s) IV Push daily  propofol Infusion 10.009 MICROgram(s)/kG/Min (4.63 mL/Hr) IV Continuous <Continuous>  vancomycin  IVPB      vancomycin  IVPB 1000 milliGRAM(s) IV Intermittent every 12 hours    MEDICATIONS  (PRN):  sodium chloride 0.9% lock flush 10 milliLiter(s) IV Push every 1 hour PRN Pre/post blood products, medications, blood draw, and to maintain line patency      Allergies    Carafate (Rash)  Levaquin (Rash)  Percocet 5/325 (Other)    Intolerances    lactose (Flatulence)      REVIEW OF SYSTEMS:  CONSTITUTIONAL: no changes    Vital Signs Last 24 Hrs  T(C): 37.3 (03 Apr 2021 19:02), Max: 37.3 (03 Apr 2021 19:02)  T(F): 99.2 (03 Apr 2021 19:02), Max: 99.2 (03 Apr 2021 19:02)  HR: 110 (03 Apr 2021 21:18) (78 - 120)  BP: --  BP(mean): --  RR: 28 (03 Apr 2021 21:00) (19 - 33)  SpO2: 95% (03 Apr 2021 21:18) (89% - 98%)    PHYSICAL EXAM:  GENERAL: as per the progress notes of Primary Team       LABS:        CAPILLARY BLOOD GLUCOSE      POCT Blood Glucose.: 323 mg/dL (03 Apr 2021 21:29)  POCT Blood Glucose.: 294 mg/dL (03 Apr 2021 18:35)  POCT Blood Glucose.: 264 mg/dL (03 Apr 2021 11:13)  POCT Blood Glucose.: 161 mg/dL (03 Apr 2021 05:26)  POCT Blood Glucose.: 256 mg/dL (02 Apr 2021 23:10)    Lipid panel:       ABG - ( 02 Apr 2021 21:17 )  pH, Arterial: x     pH, Blood: 7.42  /  pCO2: 44    /  pO2: 77    / HCO3: 28    / Base Excess: 3.4   /  SaO2: 94                Mode: AC/ CMV (Assist Control/ Continuous Mandatory Ventilation)  RR (machine): 32  TV (machine): 380  FiO2: 60  PEEP: 12  ITime: 1  MAP: 20  PIP: 49    Thyroid:  Diabetes Tests:  Parathyroid Panel:  Adrenals:  RADIOLOGY & ADDITIONAL TESTS:    Imaging Personally Reviewed:  [ ] YES  [ ] NO    Consultant(s) Notes Reviewed:  [ ] YES  [ ] NO    Care Discussed with Consultants/Other Providers [ ] YES  [ ] NO

## 2021-04-03 NOTE — PROGRESS NOTE ADULT - SUBJECTIVE AND OBJECTIVE BOX
# CC: Patient is a 65y old  Female who presents with a chief complaint of covid pna and dka (02 Apr 2021 16:28)    ## HPI:  66yo F w/ HTN (on losartan) and DM2 (on metformin) presents with progressively worsening SOB associated with productive cough for past several days much worse the past 3 days.  Started on abx (levaquin, prednisone and azithromycin 3/19) outpatient with no improvement. Denies any chest pain, fevers, dizziness, syncope, abd pain, back pain, N/V/D, recent sick contact, recent travel.   In ED: CTA chest done to r/o PE findings consistent w/ COVID-19 PNA. Labs remarkable for WBC 18.55, Na 130, K 3.2, COs 10, AG 29, Glucose 416, alb 2.5.  Placed on High flow for dyspnea and hypoxemia. Admitted to ICU for COVID pna and DKA.   (23 Mar 2021 05:08)    **O/N:**  Remains intubated day 3: 32/380/60%/+12 plat 38 (driving pressure 26)  Paralyzed    ## ROS: Unobtainable    ## Labs:  ** CBC: **             10.1   19.66 )-----------( 202      ( 03 Apr 2021 03:21 )             34.1     ** Chem:  ** 04-03  138  |  102  |  26<H>  ----------------------------<  195<H>  3.8   |  29  |  0.65    Ca    7.9<L>      03 Apr 2021 03:21  Phos  2.4     04-03  Mg     2.5     04-03    TPro  5.7<L>  /  Alb  1.5<L>  /  TBili  0.3  /  DBili  x   /  AST  370<H>  /  ALT  930<H>  /  AlkPhos  152<H>  04-03    POCT Blood Glucose.: 322 mg/dL (03 Apr 2021 23:16)  POCT Blood Glucose.: 323 mg/dL (03 Apr 2021 21:29)  POCT Blood Glucose.: 294 mg/dL (03 Apr 2021 18:35)  POCT Blood Glucose.: 264 mg/dL (03 Apr 2021 11:13)  POCT Blood Glucose.: 161 mg/dL (03 Apr 2021 05:26)    Culture - Blood (collected 01 Apr 2021 08:25) Staphylococcus epidermidis, Methicillin resistant  Culture - Urine (collected 24 Mar 2021 08:29): No growth  Culture - Blood (collected 23 Mar 2021 08:12): No Growth Final  Culture - Blood (collected 23 Mar 2021 08:12): No Growth Final    ## Meds:  midodrine 10 milliGRAM(s) Oral every 8 hours  norepinephrine Infusion 0.05 MICROgram(s)/kG/Min IV Continuous <Continuous>  vancomycin  IVPB 1000 milliGRAM(s) IV Intermittent every 12 hours    cisatracurium Infusion 3 MICROgram(s)/kG/Min IV Continuous <Continuous>  dexMEDEtomidine Infusion 0.2 MICROgram(s)/kG/Hr IV Continuous <Continuous>  fentaNYL   Infusion. 0.5 MICROgram(s)/kG/Hr IV Continuous <Continuous>  ketamine Infusion. 0.25 mG/kG/Hr IV Continuous <Continuous>  propofol Infusion 10.009 MICROgram(s)/kG/Min IV Continuous <Continuous>  enoxaparin Injectable 40 milliGRAM(s) SubCutaneous two times a day  pantoprazole  Injectable 40 milliGRAM(s) IV Push daily  insulin glargine Injectable (LANTUS) 8 Unit(s) SubCutaneous at bedtime  insulin lispro (ADMELOG) corrective regimen sliding scale   SubCutaneous every 6 hours    ## O/E:  T(F): 99.2 (04-03-21 @ 19:02), Max: 99.2 (04-03-21 @ 19:02)  HR: 108 (04-03-21 @ 22:30) (78 - 120)  ABP:  (80/47 - 196/88)  ABP(mean):  (57 - 127)  RR: 29 (04-03-21 @ 22:30) (19 - 33)  SpO2: 95% (04-03-21 @ 22:30) (89% - 98%)  IN: 1882 mL / OUT: 800 mL / NET: 1082 mL    Mode: AC/ CMV (Assist Control/ Continuous Mandatory Ventilation), RR (machine): 32, TV (machine): 380, FiO2: 60, PEEP: 12, ITime: 1, MAP: 20, PIP: 49  Gen: orotracheally intubated, supined  HEENT: sluggish pupils, ETT in place  Resp: mechanical breath sounds B/L  CVS: S1S2 no m/r/g  Abd: softly distended in obesity, hypoactive bowel sounds  Ext: no c/c; trace edema throughout  Neuro: paralyzed and sedated    ## Assessment / Plan:  65F with COVID pneumonia in ARDS  - Currently 32/380/60%/+12 (target 330–440)  - Paralyzed on Nimbex  - Patient appears to be settling down while supined. Oxygen requirements appear to be lower, although PEEP remains high. Driving pressure of 26 is much higher than the goal of =14, and is consistent with progressing of fibrosis.  - Sedation with propofol and Ketamine.  - s/p remdesivir and dexamethasone  - vancomycin for MSRE in blood culture. Repeat cultures to document clearance. Afebrile but persistent leukocytosis.  - Continue in ICU while ARDS COVID pneumonia.  - Prognosis is very poor    ## Code status:  Goals of care discussion: [x] yes [ ] no  [x] full code  [ ] DNR  [ ] DNI  [ ] MOLST    I have personally provided 45 minutes of critical care time.

## 2021-04-03 NOTE — PROGRESS NOTE ADULT - PROBLEM SELECTOR PLAN 1
Continue with the same regimen while inpatient   once steroids are decreased expect better finger sticks

## 2021-04-03 NOTE — PROGRESS NOTE ADULT - SUBJECTIVE AND OBJECTIVE BOX
66 yo lady on vent. No changes in status    ICU Vital Signs Last 24 Hrs  T(C): 35.6 (03 Apr 2021 07:30), Max: 35.8 (03 Apr 2021 05:00)  T(F): 96 (03 Apr 2021 07:30), Max: 96.5 (03 Apr 2021 05:00)  HR: 92 (03 Apr 2021 11:30) (67 - 99)  BP: --  BP(mean): --  ABP: 103/54 (03 Apr 2021 11:30) (76/48 - 222/124)  ABP(mean): 71 (03 Apr 2021 11:30) (58 - 166)  RR: 32 (03 Apr 2021 11:30) (21 - 39)  SpO2: 93% (03 Apr 2021 11:30) (86% - 99%)      MEDICATIONS  (STANDING):  chlorhexidine 0.12% Liquid 15 milliLiter(s) Oral Mucosa every 12 hours  chlorhexidine 2% Cloths 1 Application(s) Topical <User Schedule>  cisatracurium Infusion 3 MICROgram(s)/kG/Min (13.9 mL/Hr) IV Continuous <Continuous>  dexMEDEtomidine Infusion 0.2 MICROgram(s)/kG/Hr (3.86 mL/Hr) IV Continuous <Continuous>  dextrose 40% Gel 15 Gram(s) Oral once  dextrose 5%. 1000 milliLiter(s) (50 mL/Hr) IV Continuous <Continuous>  dextrose 5%. 1000 milliLiter(s) (100 mL/Hr) IV Continuous <Continuous>  dextrose 5%. 1000 milliLiter(s) (30 mL/Hr) IV Continuous <Continuous>  dextrose 50% Injectable 25 Gram(s) IV Push once  dextrose 50% Injectable 12.5 Gram(s) IV Push once  dextrose 50% Injectable 25 Gram(s) IV Push once  enoxaparin Injectable 40 milliGRAM(s) SubCutaneous two times a day  ergocalciferol 46367 Unit(s) Oral <User Schedule>  glucagon  Injectable 1 milliGRAM(s) IntraMuscular once  insulin glargine Injectable (LANTUS) 8 Unit(s) SubCutaneous at bedtime  insulin lispro (ADMELOG) corrective regimen sliding scale   SubCutaneous every 6 hours  ketamine Infusion. 0.25 mG/kG/Hr (1.93 mL/Hr) IV Continuous <Continuous>  midodrine 10 milliGRAM(s) Oral every 8 hours  norepinephrine Infusion 0.05 MICROgram(s)/kG/Min (3.61 mL/Hr) IV Continuous <Continuous>  pantoprazole  Injectable 40 milliGRAM(s) IV Push daily  propofol Infusion 10.009 MICROgram(s)/kG/Min (4.63 mL/Hr) IV Continuous <Continuous>  vancomycin  IVPB      vancomycin  IVPB 1000 milliGRAM(s) IV Intermittent every 12 hours    MEDICATIONS  (PRN):  sodium chloride 0.9% lock flush 10 milliLiter(s) IV Push every 1 hour PRN Pre/post blood products, medications, blood draw, and to maintain line patency

## 2021-04-04 NOTE — PROGRESS NOTE ADULT - RS GEN PE MLT RESP DETAILS PC
diminished breath sounds, L/diminished breath sounds, R/rales
diminished breath sounds, L/diminished breath sounds, R/rales
diminished breath sounds, L/diminished breath sounds, R

## 2021-04-04 NOTE — PROGRESS NOTE ADULT - SUBJECTIVE AND OBJECTIVE BOX
Patient is a 65y old  Female who presents with a chief complaint of covid pna and dka (04 Apr 2021 17:30)      Interval History:    MEDICATIONS  (STANDING):  chlorhexidine 0.12% Liquid 15 milliLiter(s) Oral Mucosa every 12 hours  chlorhexidine 2% Cloths 1 Application(s) Topical <User Schedule>  cisatracurium Infusion 3 MICROgram(s)/kG/Min (13.9 mL/Hr) IV Continuous <Continuous>  dextrose 40% Gel 15 Gram(s) Oral once  dextrose 5%. 1000 milliLiter(s) (100 mL/Hr) IV Continuous <Continuous>  dextrose 5%. 1000 milliLiter(s) (50 mL/Hr) IV Continuous <Continuous>  dextrose 50% Injectable 25 Gram(s) IV Push once  dextrose 50% Injectable 12.5 Gram(s) IV Push once  dextrose 50% Injectable 25 Gram(s) IV Push once  enoxaparin Injectable 40 milliGRAM(s) SubCutaneous two times a day  ergocalciferol 93516 Unit(s) Oral <User Schedule>  fentaNYL   Infusion. 0.5 MICROgram(s)/kG/Hr (3.86 mL/Hr) IV Continuous <Continuous>  glucagon  Injectable 1 milliGRAM(s) IntraMuscular once  insulin glargine Injectable (LANTUS) 8 Unit(s) SubCutaneous at bedtime  insulin lispro (ADMELOG) corrective regimen sliding scale   SubCutaneous every 6 hours  ketamine Infusion. 0.25 mG/kG/Hr (1.93 mL/Hr) IV Continuous <Continuous>  midodrine 10 milliGRAM(s) Oral every 8 hours  norepinephrine Infusion 0.05 MICROgram(s)/kG/Min (3.61 mL/Hr) IV Continuous <Continuous>  pantoprazole  Injectable 40 milliGRAM(s) IV Push daily  propofol Infusion 10.009 MICROgram(s)/kG/Min (4.63 mL/Hr) IV Continuous <Continuous>  vancomycin  IVPB      vancomycin  IVPB 1000 milliGRAM(s) IV Intermittent every 12 hours    MEDICATIONS  (PRN):  sodium chloride 0.9% lock flush 10 milliLiter(s) IV Push every 1 hour PRN Pre/post blood products, medications, blood draw, and to maintain line patency      Allergies    Carafate (Rash)  Levaquin (Rash)  Percocet 5/325 (Other)    Intolerances    lactose (Flatulence)      REVIEW OF SYSTEMS:  CONSTITUTIONAL: no changes    Vital Signs Last 24 Hrs  T(C): 38.3 (04 Apr 2021 22:00), Max: 38.5 (04 Apr 2021 19:00)  T(F): 101 (04 Apr 2021 22:00), Max: 101.3 (04 Apr 2021 19:00)  HR: 76 (04 Apr 2021 22:00) (73 - 112)  BP: --  BP(mean): --  RR: 32 (04 Apr 2021 22:00) (21 - 32)  SpO2: 96% (04 Apr 2021 22:00) (91% - 98%)    PHYSICAL EXAM:  GENERAL: as per the progress notes of Primary Team     LABS:        CAPILLARY BLOOD GLUCOSE      POCT Blood Glucose.: 180 mg/dL (04 Apr 2021 21:49)  POCT Blood Glucose.: 194 mg/dL (04 Apr 2021 17:34)  POCT Blood Glucose.: 221 mg/dL (04 Apr 2021 12:09)  POCT Blood Glucose.: 164 mg/dL (04 Apr 2021 05:25)  POCT Blood Glucose.: 322 mg/dL (03 Apr 2021 23:16)    Lipid panel:         Mode: AC/ CMV (Assist Control/ Continuous Mandatory Ventilation)  RR (machine): 32  TV (machine): 380  FiO2: 60  PEEP: 10  ITime: 1  MAP: 19  PIP: 45    Thyroid:  Diabetes Tests:  Parathyroid Panel:  Adrenals:  RADIOLOGY & ADDITIONAL TESTS:    Imaging Personally Reviewed:  [ ] YES  [ ] NO    Consultant(s) Notes Reviewed:  [ ] YES  [ ] NO    Care Discussed with Consultants/Other Providers [ ] YES  [ ] NO

## 2021-04-04 NOTE — PROGRESS NOTE ADULT - PROBLEM SELECTOR PLAN 1
Infection driven Hyperglycemia   on Lantus 8 units and Lispro sliding scale coverage with meals   now finger sticks are in 100's   Encourage more nutrition   Continue with the same regimen while inpatient

## 2021-04-04 NOTE — PROGRESS NOTE ADULT - SUBJECTIVE AND OBJECTIVE BOX
64 yo lady remains on vent.     ICU Vital Signs Last 24 Hrs  T(C): 37.8 (04 Apr 2021 15:52), Max: 38.2 (04 Apr 2021 03:30)  T(F): 100 (04 Apr 2021 15:52), Max: 100.7 (04 Apr 2021 03:30)  HR: 73 (04 Apr 2021 16:40) (73 - 112)  BP: --  BP(mean): --  ABP: 107/51 (04 Apr 2021 16:00) (74/43 - 231/93)  ABP(mean): 66 (04 Apr 2021 16:00) (51 - 141)  RR: 32 (04 Apr 2021 16:00) (20 - 33)  SpO2: 97% (04 Apr 2021 16:40) (89% - 97%)      MEDICATIONS  (STANDING):  chlorhexidine 0.12% Liquid 15 milliLiter(s) Oral Mucosa every 12 hours  chlorhexidine 2% Cloths 1 Application(s) Topical <User Schedule>  cisatracurium Infusion 3 MICROgram(s)/kG/Min (13.9 mL/Hr) IV Continuous <Continuous>  dextrose 40% Gel 15 Gram(s) Oral once  dextrose 5%. 1000 milliLiter(s) (50 mL/Hr) IV Continuous <Continuous>  dextrose 5%. 1000 milliLiter(s) (100 mL/Hr) IV Continuous <Continuous>  dextrose 50% Injectable 25 Gram(s) IV Push once  dextrose 50% Injectable 12.5 Gram(s) IV Push once  dextrose 50% Injectable 25 Gram(s) IV Push once  enoxaparin Injectable 40 milliGRAM(s) SubCutaneous two times a day  ergocalciferol 45135 Unit(s) Oral <User Schedule>  fentaNYL   Infusion. 0.5 MICROgram(s)/kG/Hr (3.86 mL/Hr) IV Continuous <Continuous>  glucagon  Injectable 1 milliGRAM(s) IntraMuscular once  insulin glargine Injectable (LANTUS) 8 Unit(s) SubCutaneous at bedtime  insulin lispro (ADMELOG) corrective regimen sliding scale   SubCutaneous every 6 hours  ketamine Infusion. 0.25 mG/kG/Hr (1.93 mL/Hr) IV Continuous <Continuous>  midodrine 10 milliGRAM(s) Oral every 8 hours  norepinephrine Infusion 0.05 MICROgram(s)/kG/Min (3.61 mL/Hr) IV Continuous <Continuous>  pantoprazole  Injectable 40 milliGRAM(s) IV Push daily  propofol Infusion 10.009 MICROgram(s)/kG/Min (4.63 mL/Hr) IV Continuous <Continuous>  vancomycin  IVPB      vancomycin  IVPB 1000 milliGRAM(s) IV Intermittent every 12 hours    MEDICATIONS  (PRN):  sodium chloride 0.9% lock flush 10 milliLiter(s) IV Push every 1 hour PRN Pre/post blood products, medications, blood draw, and to maintain line patency

## 2021-04-04 NOTE — PROGRESS NOTE ADULT - SUBJECTIVE AND OBJECTIVE BOX
# CC: Patient is a 65y old  Female who presents with a chief complaint of covid pna and dka (02 Apr 2021 16:28)    ## HPI:  64yo F w/ HTN (on losartan) and DM2 (on metformin) presents with progressively worsening SOB associated with productive cough for past several days much worse the past 3 days.  Started on abx (levaquin, prednisone and azithromycin 3/19) outpatient with no improvement. Denies any chest pain, fevers, dizziness, syncope, abd pain, back pain, N/V/D, recent sick contact, recent travel.   In ED: CTA chest done to r/o PE findings consistent w/ COVID-19 PNA. Labs remarkable for WBC 18.55, Na 130, K 3.2, COs 10, AG 29, Glucose 416, alb 2.5.  Placed on High flow for dyspnea and hypoxemia. Admitted to ICU for COVID pna and DKA.   (23 Mar 2021 05:08)    **O/N:**  Remains intubated day 4: 32/380/60%/+12 plat 38 (driving pressure 26)  Paralyzed    ## ROS: Unobtainable    ## Labs:  ** CBC: **             9.7    18.47 )-----------( 199      ( 04 Apr 2021 03:27 )             32.1     ** Chem:  **  04-04  138  |  102  |  27<H>  ----------------------------<  232<H>  4.2   |  32<H>  |  0.76    Ca    7.8<L>      04 Apr 2021 03:27  Phos  2.2     04-04  Mg     2.4     04-04    TPro  5.7<L>  /  Alb  1.5<L>  /  TBili  0.6  /  DBili  x   /  AST  222<H>  /  ALT  638<H>  /  AlkPhos  213<H>  04-04    POCT Blood Glucose.: 187 mg/dL (04 Apr 2021 23:40)  POCT Blood Glucose.: 180 mg/dL (04 Apr 2021 21:49)  POCT Blood Glucose.: 194 mg/dL (04 Apr 2021 17:34)  POCT Blood Glucose.: 221 mg/dL (04 Apr 2021 12:09)  POCT Blood Glucose.: 164 mg/dL (04 Apr 2021 05:25)    Culture - Blood (collected 01 Apr 2021 08:25) Staphylococcus epidermidis, Methicillin resistant  Culture - Urine (collected 24 Mar 2021 08:29): No growth  Culture - Blood (collected 23 Mar 2021 08:12): No Growth Final  Culture - Blood (collected 23 Mar 2021 08:12): No Growth Final    ## Meds:  midodrine 10 milliGRAM(s) Oral every 8 hours  norepinephrine Infusion 0.05 MICROgram(s)/kG/Min IV Continuous <Continuous>  vancomycin  IVPB 1000 milliGRAM(s) IV Intermittent every 12 hours  cisatracurium Infusion 3 MICROgram(s)/kG/Min IV Continuous <Continuous>  fentaNYL   Infusion. 0.5 MICROgram(s)/kG/Hr IV Continuous <Continuous>  ketamine Infusion. 0.25 mG/kG/Hr IV Continuous <Continuous>  propofol Infusion 10.009 MICROgram(s)/kG/Min IV Continuous <Continuous>  enoxaparin Injectable 40 milliGRAM(s) SubCutaneous two times a day  pantoprazole  Injectable 40 milliGRAM(s) IV Push daily  insulin glargine Injectable (LANTUS) 8 Unit(s) SubCutaneous at bedtime  insulin lispro (ADMELOG) corrective regimen sliding scale   SubCutaneous every 6 hours    ## O/E:  T(F): 101.6 (04-04-21 @ 23:30), Max: 101.6 (04-04-21 @ 23:30)  HR: 77 (04-04-21 @ 23:30) (73 - 112)  ABP:  (76/40 - 231/93)  ABP(mean):  (51 - 141)  RR: 32 (04-04-21 @ 23:30) (21 - 32)  SpO2: 97% (04-04-21 @ 23:30) (91% - 98%)  IN: 1516.6 mL / OUT: 1580 mL / NET: -63.4 mL    Mode: AC/ CMV (Assist Control/ Continuous Mandatory Ventilation), RR (machine): 32, TV (machine): 380, FiO2: 60, PEEP: 10, ITime: 1, MAP: 19, PIP: 45  Gen: orotracheally intubated, supined  HEENT: sluggish pupils, ETT in place  Resp: mechanical breath sounds B/L  CVS: S1S2 no m/r/g  Abd: softly distended in obesity, hypoactive bowel sounds  Ext: no c/c; trace edema throughout  Neuro: paralyzed and sedated    ## Assessment / Plan:  65F with COVID pneumonia in ARDS  - Currently 32/380/60%/+12 (target 330–440). Changed PEEP to 10, and plateau has decreased to 30.  - Paralyzed on Nimbex  - Plateau remained high. Decreasing PEEP to 10 resulted in plateau decreased to 30. Driving pressure of 20 is still higher than 14, but compliance is still better. However, this is consistent with progression of fibrosis.  - Patient appears to be settling down while supined. Oxygen requirements appear to be lower.  - Sedation with propofol and Ketamine.  - s/p remdesivir and dexamethasone  - vancomycin for MSRE in blood culture. Repeat cultures to document clearance. Afebrile but persistent leukocytosis.  - Continue in ICU while ARDS COVID pneumonia.  - Prognosis remains very poor    ## Code status:  Goals of care discussion: [x] yes [ ] no  [x] full code  [ ] DNR  [ ] DNI  [ ] MOLST    I have personally provided 45 minutes of critical care time.

## 2021-04-05 NOTE — PROGRESS NOTE ADULT - SUBJECTIVE AND OBJECTIVE BOX
Patient is a 65y old  Female who presents with a chief complaint of covid pna and dka (05 Apr 2021 08:14)      Interval History: doing poorly  On Vital as tube feeds  On Lantus 8 units and sliding scale lispro every 6 hours   fingersticks are in the high 100s    MEDICATIONS  (STANDING):  chlorhexidine 0.12% Liquid 15 milliLiter(s) Oral Mucosa every 12 hours  chlorhexidine 2% Cloths 1 Application(s) Topical <User Schedule>  cisatracurium Infusion 3 MICROgram(s)/kG/Min (13.9 mL/Hr) IV Continuous <Continuous>  dextrose 40% Gel 15 Gram(s) Oral once  dextrose 5%. 1000 milliLiter(s) (50 mL/Hr) IV Continuous <Continuous>  dextrose 5%. 1000 milliLiter(s) (100 mL/Hr) IV Continuous <Continuous>  dextrose 50% Injectable 25 Gram(s) IV Push once  dextrose 50% Injectable 12.5 Gram(s) IV Push once  dextrose 50% Injectable 25 Gram(s) IV Push once  enoxaparin Injectable 40 milliGRAM(s) SubCutaneous two times a day  ergocalciferol 09246 Unit(s) Oral <User Schedule>  fentaNYL   Infusion. 0.5 MICROgram(s)/kG/Hr (3.86 mL/Hr) IV Continuous <Continuous>  glucagon  Injectable 1 milliGRAM(s) IntraMuscular once  insulin glargine Injectable (LANTUS) 8 Unit(s) SubCutaneous at bedtime  insulin lispro (ADMELOG) corrective regimen sliding scale   SubCutaneous every 6 hours  ketamine Infusion. 0.25 mG/kG/Hr (1.93 mL/Hr) IV Continuous <Continuous>  midodrine 10 milliGRAM(s) Oral every 8 hours  norepinephrine Infusion 0.05 MICROgram(s)/kG/Min (3.61 mL/Hr) IV Continuous <Continuous>  pantoprazole  Injectable 40 milliGRAM(s) IV Push daily  propofol Infusion 10.009 MICROgram(s)/kG/Min (4.63 mL/Hr) IV Continuous <Continuous>  vancomycin  IVPB      vancomycin  IVPB 1000 milliGRAM(s) IV Intermittent every 12 hours    MEDICATIONS  (PRN):  sodium chloride 0.9% lock flush 10 milliLiter(s) IV Push every 1 hour PRN Pre/post blood products, medications, blood draw, and to maintain line patency      Allergies    Carafate (Rash)  Levaquin (Rash)  Percocet 5/325 (Other)    Intolerances    lactose (Flatulence)      REVIEW OF SYSTEMS:  CONSTITUTIONAL: no changes    Vital Signs Last 24 Hrs  T(C): 36.9 (05 Apr 2021 19:30), Max: 38.8 (05 Apr 2021 05:00)  T(F): 98.5 (05 Apr 2021 19:30), Max: 101.8 (05 Apr 2021 05:00)  HR: 70 (05 Apr 2021 21:11) (67 - 84)  BP: --  BP(mean): --  RR: 24 (05 Apr 2021 20:30) (22 - 32)  SpO2: 94% (05 Apr 2021 21:11) (87% - 99%)    PHYSICAL EXAM:  GENERAL: as per the progress notes of Primary Team       LABS:        CAPILLARY BLOOD GLUCOSE      POCT Blood Glucose.: 204 mg/dL (05 Apr 2021 17:11)  POCT Blood Glucose.: 156 mg/dL (05 Apr 2021 11:51)  POCT Blood Glucose.: 140 mg/dL (05 Apr 2021 05:19)  POCT Blood Glucose.: 187 mg/dL (04 Apr 2021 23:40)  POCT Blood Glucose.: 180 mg/dL (04 Apr 2021 21:49)    Lipid panel:         Mode: [place]  RR (machine): 32  TV (machine): 380  FiO2: 70  PEEP: 10  ITime: 1  MAP: 20  PIP: 35    Thyroid:  Diabetes Tests:  Parathyroid Panel:  Adrenals:  RADIOLOGY & ADDITIONAL TESTS:    Imaging Personally Reviewed:  [ ] YES  [ ] NO    Consultant(s) Notes Reviewed:  [ ] YES  [ ] NO    Care Discussed with Consultants/Other Providers [ ] YES  [ ] NO

## 2021-04-05 NOTE — PROGRESS NOTE ADULT - SUBJECTIVE AND OBJECTIVE BOX
Pulmonary/Critical Care Followup    PAST MEDICAL & SURGICAL HISTORY:  HTN (hypertension)    DM (diabetes mellitus)    HLD (hyperlipidemia)    No significant past surgical history        Allergies    Carafate (Rash)  Levaquin (Rash)  Percocet 5/325 (Other)    Intolerances    lactose (Flatulence)      FAMILY HISTORY:  No pertinent family history in first degree relatives        SOCIAL HISTORY:      MEDICATIONS  (STANDING):  chlorhexidine 0.12% Liquid 15 milliLiter(s) Oral Mucosa every 12 hours  chlorhexidine 2% Cloths 1 Application(s) Topical <User Schedule>  cisatracurium Infusion 3 MICROgram(s)/kG/Min (13.9 mL/Hr) IV Continuous <Continuous>  dextrose 40% Gel 15 Gram(s) Oral once  dextrose 5%. 1000 milliLiter(s) (50 mL/Hr) IV Continuous <Continuous>  dextrose 5%. 1000 milliLiter(s) (100 mL/Hr) IV Continuous <Continuous>  dextrose 50% Injectable 25 Gram(s) IV Push once  dextrose 50% Injectable 12.5 Gram(s) IV Push once  dextrose 50% Injectable 25 Gram(s) IV Push once  enoxaparin Injectable 40 milliGRAM(s) SubCutaneous two times a day  ergocalciferol 71330 Unit(s) Oral <User Schedule>  fentaNYL   Infusion. 0.5 MICROgram(s)/kG/Hr (3.86 mL/Hr) IV Continuous <Continuous>  glucagon  Injectable 1 milliGRAM(s) IntraMuscular once  insulin glargine Injectable (LANTUS) 8 Unit(s) SubCutaneous at bedtime  insulin lispro (ADMELOG) corrective regimen sliding scale   SubCutaneous every 6 hours  ketamine Infusion. 0.25 mG/kG/Hr (1.93 mL/Hr) IV Continuous <Continuous>  midodrine 10 milliGRAM(s) Oral every 8 hours  norepinephrine Infusion 0.05 MICROgram(s)/kG/Min (3.61 mL/Hr) IV Continuous <Continuous>  pantoprazole  Injectable 40 milliGRAM(s) IV Push daily  propofol Infusion 10.009 MICROgram(s)/kG/Min (4.63 mL/Hr) IV Continuous <Continuous>  vancomycin  IVPB      vancomycin  IVPB 1000 milliGRAM(s) IV Intermittent every 12 hours    MEDICATIONS  (PRN):  sodium chloride 0.9% lock flush 10 milliLiter(s) IV Push every 1 hour PRN Pre/post blood products, medications, blood draw, and to maintain line patency      Vital Signs Last 24 Hrs  T(C): 38.8 (05 Apr 2021 05:00), Max: 38.8 (05 Apr 2021 05:00)  T(F): 101.8 (05 Apr 2021 05:00), Max: 101.8 (05 Apr 2021 05:00)  HR: 74 (05 Apr 2021 07:11) (73 - 92)  BP: --  BP(mean): --  RR: 32 (05 Apr 2021 07:11) (21 - 32)  SpO2: 94% (05 Apr 2021 07:11) (93% - 98%)    LABS:                        8.7    14.90 )-----------( 166      ( 05 Apr 2021 02:42 )             28.4     04-05    138  |  104  |  35<H>  ----------------------------<  154<H>  4.5   |  32<H>  |  0.87    Ca    7.5<L>      05 Apr 2021 02:42  Phos  2.5     04-05  Mg     2.3     04-05    TPro  5.4<L>  /  Alb  1.4<L>  /  TBili  0.7  /  DBili  x   /  AST  1393<H>  /  ALT  868<H>  /  AlkPhos  215<H>  04-05              WBC:  WBC Count: 14.90 K/uL (04-05 @ 02:42)  WBC Count: 18.47 K/uL (04-04 @ 03:27)  WBC Count: 19.66 K/uL (04-03 @ 03:21)  WBC Count: 17.82 K/uL (04-02 @ 04:05)      MICROBIOLOGY:  RECENT CULTURES:  04-01 .Blood Blood-Peripheral Blood Culture PCR   Growth in anaerobic bottle: Gram Positive Cocci in Clusters  Growth in aerobic bottle: Gram Positive Cocci in Clusters   Growth in aerobic and anaerobic bottles: Staphylococcus epidermidis  Coag Negative Staphylococcus  Single set isolate, possible contaminant. Contact  Microbiology if susceptibility testing clinically  indicated.  ***Blood Panel PCR results on this specimen are available  approximately 3 hours after the Gram stain result.***  Gram stain, PCR, and/or culture results may not always  correspond due to difference in methodologies.  ************************************************************  This PCR assay was performed by multiplex PCR. This  Assay tests for 66 bacterial and resistance gene targets.  Please refer to the [id number] test directory  at [email or web address] for details.                    Sodium:  Sodium, Serum: 138 mmol/L (04-05 @ 02:42)  Sodium, Serum: 138 mmol/L (04-04 @ 03:27)  Sodium, Serum: 138 mmol/L (04-03 @ 03:21)  Sodium, Serum: 142 mmol/L (04-02 @ 08:26)  Sodium, Serum: 141 mmol/L (04-02 @ 04:05)      0.87 mg/dL 04-05 @ 02:42  0.76 mg/dL 04-04 @ 03:27  0.65 mg/dL 04-03 @ 03:21  0.61 mg/dL 04-02 @ 08:26  0.67 mg/dL 04-02 @ 04:05      Hemoglobin:  Hemoglobin: 8.7 g/dL (04-05 @ 02:42)  Hemoglobin: 9.7 g/dL (04-04 @ 03:27)  Hemoglobin: 10.1 g/dL (04-03 @ 03:21)  Hemoglobin: 10.6 g/dL (04-02 @ 04:05)      Platelets: Platelet Count - Automated: 166 K/uL (04-05 @ 02:42)  Platelet Count - Automated: 199 K/uL (04-04 @ 03:27)  Platelet Count - Automated: 202 K/uL (04-03 @ 03:21)  Platelet Count - Automated: 220 K/uL (04-02 @ 04:05)      LIVER FUNCTIONS - ( 05 Apr 2021 02:42 )  Alb: 1.4 g/dL / Pro: 5.4 gm/dL / ALK PHOS: 215 U/L / ALT: 868 U/L / AST: 1393 U/L / GGT: x                 RADIOLOGY & ADDITIONAL STUDIES:

## 2021-04-05 NOTE — PROGRESS NOTE ADULT - SUBJECTIVE AND OBJECTIVE BOX
# CC: Patient is a 65y old  Female who presents with a chief complaint of covid pna and dka (02 Apr 2021 16:28)    ## HPI:  66yo F w/ HTN (on losartan) and DM2 (on metformin) presents with progressively worsening SOB associated with productive cough for past several days much worse the past 3 days.  Started on abx (levaquin, prednisone and azithromycin 3/19) outpatient with no improvement. Denies any chest pain, fevers, dizziness, syncope, abd pain, back pain, N/V/D, recent sick contact, recent travel.   In ED: CTA chest done to r/o PE findings consistent w/ COVID-19 PNA. Labs remarkable for WBC 18.55, Na 130, K 3.2, COs 10, AG 29, Glucose 416, alb 2.5.  Placed on High flow for dyspnea and hypoxemia. Admitted to ICU for COVID pna and DKA.   (23 Mar 2021 05:08)    **O/N:**  Remains intubated day 5: 32/380/70%/+10 plat 30 (driving pressure 20)  Paralyzed    ## ROS: Unobtainable    ## Labs:  ** CBC: **             8.7    14.90 )-----------( 166      ( 05 Apr 2021 02:42 )             28.4     ** Chem:  **  04-05  138  |  104  |  35<H>  ----------------------------<  154<H>  4.5   |  32<H>  |  0.87    Ca    7.5<L>      05 Apr 2021 02:42  Phos  2.5     04-05  Mg     2.3     04-05    TPro  5.4<L>  /  Alb  1.4<L>  /  TBili  0.7  /  DBili  x   /  AST  1393<H>  /  ALT  868<H>  /  AlkPhos  215<H>  04-05    POCT Blood Glucose.: 204 mg/dL (05 Apr 2021 17:11)  POCT Blood Glucose.: 156 mg/dL (05 Apr 2021 11:51)  POCT Blood Glucose.: 140 mg/dL (05 Apr 2021 05:19)    Culture - Blood (collected 01 Apr 2021 08:25) Staphylococcus epidermidis, Methicillin resistant  Culture - Urine (collected 24 Mar 2021 08:29): No growth  Culture - Blood (collected 23 Mar 2021 08:12): No Growth Final  Culture - Blood (collected 23 Mar 2021 08:12): No Growth Final    ## Meds:  midodrine 10 milliGRAM(s) Oral every 8 hours  norepinephrine Infusion 0.05 MICROgram(s)/kG/Min IV Continuous <Continuous>  vancomycin  IVPB 1000 milliGRAM(s) IV Intermittent every 12 hours  cisatracurium Infusion 3 MICROgram(s)/kG/Min IV Continuous <Continuous>  fentaNYL   Infusion. 0.5 MICROgram(s)/kG/Hr IV Continuous <Continuous>  ketamine Infusion. 0.25 mG/kG/Hr IV Continuous <Continuous>  propofol Infusion 10.009 MICROgram(s)/kG/Min IV Continuous <Continuous>  enoxaparin Injectable 40 milliGRAM(s) SubCutaneous two times a day  pantoprazole  Injectable 40 milliGRAM(s) IV Push daily    insulin glargine Injectable (LANTUS) 8 Unit(s) SubCutaneous at bedtime  insulin lispro (ADMELOG) corrective regimen sliding scale   SubCutaneous every 6 hours    ## O/E:  T(F): 98.5 (04-05-21 @ 19:30), Max: 101.8 (04-05-21 @ 05:00)  HR: 68 (04-05-21 @ 23:30) (67 - 83)  ABP:  (65/33 - 227/85)  ABP(mean):  (43 - 130)  RR: 32 (04-05-21 @ 23:30) (22 - 32)  SpO2: 97% (04-05-21 @ 23:30) (87% - 99%)  IN: 1785.2 mL / OUT: 1195 mL / NET: +590 mL    Mode: [place], RR (machine): 32, TV (machine): 380, FiO2: 70, PEEP: 10, ITime: 1, MAP: 20, PIP: 35    Gen: orotracheally intubated, supined  HEENT: sluggish pupils, ETT in place  Resp: mechanical breath sounds B/L  CVS: S1S2 no m/r/g  Abd: softly distended in obesity +BS  Ext: no c/c; trace edema throughout  Neuro: paralyzed and sedated    ## Assessment / Plan:  65F with COVID pneumonia in ARDS  - Currently 32/380/70%/+10 (target 330–440). Plateau now 30 (following ARDSnet)  - Paralyzed on Nimbex. If remains stable, will need to attempt sedation vacation / paralytic vacation  - Plateau remains high. Driving pressure of 20 is still higher than 14, but compliance is still better. However, this is consistent with progression of fibrosis.  - Patient appears somewhat stable while supined.  - Sedation with propofol and Ketamine.  - s/p remdesivir and dexamethasone  - vancomycin for MSRE in blood culture. Repeat cultures to document clearance. Febrile and persistent leukocytosis.  - Continue in ICU while ARDS COVID pneumonia.  - Prognosis remains very poor    ## Code status:  Goals of care discussion: [x] yes [ ] no  [x] full code  [ ] DNR  [ ] DNI  [ ] MOLST    I have personally provided 45 minutes of critical care time.

## 2021-04-05 NOTE — PROGRESS NOTE ADULT - ASSESSMENT
FINESSE JERNIGAN 65 F 1955 3/23/2021 DR YANN UNDERWOOD     REVIEW OF SYMPTOMS      Able to give (reliable) ROS  NO     PHYSICAL EXAM    HEENT Unremarkable  atraumatic   RESP Fair air entry EXP prolonged    Harsh breath sound Resp distres mild   CARDIAC S1 S2 No S3     NO JVD    ABDOMEN SOFT BS PRESENT NOT DISTENDED No hepatosplenomegaly   PEDAL EDEMA present No calf tenderness  NO rash       PATIENT SUMMARY  64 yo F with h/o HTN, HLD and DM2 who presented with progressively worsening SOB associated with productive cough for past several days much worse the past 3 days.  Pt was started on Levaquin, Prednisone and Azithromycin  on 3/19 outpatient with no improvement.      In the ER CTA chest done to r/o PE findings consistent with COVID-19 PNA. Labs remarkable for WBC 18.55, Na 130, K 3.2, COs 10, AG 29, Glucose 416, alb 2.5.  Placed on High flow for dyspnea and hypoxemia. ICU admitting dx : 1) Acute hypoxic resp failure 2 to Covid-19 PNA with improvement in oxygenation  2) s/p DKA.  Pt was admitted 3/23 transferred out of ICU 3/26  Pulm consulted 3/29/2021         PROBLEMS.  COVID PNEUMONIA   ACUTE HYPOXEMIC RESP FAILURE   ELEVATED Ddimr  INTUBATED 3/31   NG TBE 3/31  LACTICEMIA 4/1/2021   TROPONINEMIA 4/1/2021   ELEVATED LFTS 4/5/2021  AST 1393     Transferred to ICU 3/31   KETAMINE INFUSION (4/1/2021)   CISATRACURIUM 4/1/2021   PROPOFOL 4/1/2021   NOREPI 4/1/2021     VITALS/IO/VENT/DRIPS.  4/5/2021 afeb 70 120/50 90   4/5/2021 12p cisa 3 m/k/m   4/5/2021 7a fent 3 m/k/h   4/5/2021  7a gurpreet 1.9 m/k/h   4/5/2021 3p nore .1 m/k/m   4/5/2021 7a prop 10 m/k/m   4/5/2021 4p ac 32/380/10/70    GLOBAL AND BEST PRACTICE ISSUES                     ALLERGY. carafate levaquin percocet      WEIGHT.      3/29/2021 77                           BMI.                 3/29/2021 29          ADVANCED DIRECTIVE.                               HEAD OF BED ELEVATION. Yes  DVT PROPHYLAXIS.   hpsc (3/23)   --> lvnx 40 93/30)                  MANUEL PROPHYLAXIS. PROTONIX 40 (3/23)   APA.                                                                    DYSPHAGIA RECOMM.   DIET.  CONS CARB (3/25) --> vital 1080 (4/5)      INFECTION PROPHYLAXIS.   chlorhexidine .12% (4/1/20210  chlorhexidine 2% (4/1/2021)   FREE WATER.      ASSESSMENT/RECOMMENDATIONS.    D-DIMR ELEVATED  3/26-3/29/2021 D-dimr 1344-930  3/23 cta ch no pe bl ggo  Trending down   is on dvt ppl  INFECTION  STAPH EPI BACATREMIA 4/1 4/1-4/5/2021 w 32 -14.9   4/1 blod c staph epid   vanco (4/2)   SHOCK   On vasopressors  Target MAP 65   MECHANICAL VENT   Target PO2 60 (+) PPlat 35 (-) pH 730 (+)     COVID PNEUMONIA AND HYPOXIC RESP FAILURE MANAGEMENT.   REMDESEVIR (3/23)   DEXA (3/23)   PRONE (3/26)   Monitor pulse oximetry Target PO 92-96%  Monitor inflammatory and thrombotic biomarkers  Monitor for  for progressively  worsening oxygenation failure   O2 to keep po 90-95%  Intubated 3/31  Requiring nmba (4/1/2021) iv sedation (4/1/2021)     TIME SPENT   Over 25 minutes aggregate care time spent on encounter; activities included   direct patient care, counseling and/or coordinating care reviewing notes, lab data/ imaging , discussion with multidisciplinary team/ patient  /family and explaining in detail risks, benefits, alternatives  of the recommendations     FINESSE JERNIGAN 65 F 1955 3/23/2021 DR YANN UNDERWOOD

## 2021-04-05 NOTE — PROGRESS NOTE ADULT - PROBLEM SELECTOR PLAN 1
Finger-sticks are in high 100's and low 200's   Patient is no longer on steroids.  Encourage more nutrition and watch closely fingersticks

## 2021-04-05 NOTE — CHART NOTE - NSCHARTNOTEFT_GEN_A_CORE
Assessment:  Pt seen in CCU, on vent, on NGT feeding.  Pt adm c hypoxia, DKA w/o coma( being followed by Endocrinologist), c COVID-19 pneumonia in ARDS.   PMH include HLD, DM( insulin use PTA was reported by pt, as per home meds metformin use noted), HTN.      Factors impacting intake: [ ] none [ ] nausea  [ ] vomiting [ ] diarrhea [ ] constipation  [ ]chewing problems [ ] swallowing issues  [ x] other: acute illness    Diet Prescription: Diet, NPO with Tube Feed:   Vital AF 1.2  @ 20 ml/hr via NGT and increase by 5 ml q 8 hours to goal of 45 ml/hr(04/05)    Intake: Vital AF 1.2 @ 10 ml/he=046 ml, 288 calories, 18 grams protein, 195 ml water, 20% RDIs     Current Weight: 03/30, 77.5 kg, 03/23, 82.6 kg, c wt. loss of 5.1kg   % Weight Change: 6.17%  04/05, 1+ generalized edema noted    Pertinent Medications: MEDICATIONS  (STANDING):  chlorhexidine 0.12% Liquid 15 milliLiter(s) Oral Mucosa every 12 hours  chlorhexidine 2% Cloths 1 Application(s) Topical <User Schedule>  cisatracurium Infusion 3 MICROgram(s)/kG/Min (13.9 mL/Hr) IV Continuous <Continuous>  dextrose 40% Gel 15 Gram(s) Oral once  dextrose 5%. 1000 milliLiter(s) (50 mL/Hr) IV Continuous <Continuous>  dextrose 5%. 1000 milliLiter(s) (100 mL/Hr) IV Continuous <Continuous>  dextrose 50% Injectable 25 Gram(s) IV Push once  dextrose 50% Injectable 12.5 Gram(s) IV Push once  dextrose 50% Injectable 25 Gram(s) IV Push once  enoxaparin Injectable 40 milliGRAM(s) SubCutaneous two times a day  ergocalciferol 22700 Unit(s) Oral <User Schedule>  fentaNYL   Infusion. 0.5 MICROgram(s)/kG/Hr (3.86 mL/Hr) IV Continuous <Continuous>  glucagon  Injectable 1 milliGRAM(s) IntraMuscular once  insulin glargine Injectable (LANTUS) 8 Unit(s) SubCutaneous at bedtime  insulin lispro (ADMELOG) corrective regimen sliding scale   SubCutaneous every 6 hours  ketamine Infusion. 0.25 mG/kG/Hr (1.93 mL/Hr) IV Continuous <Continuous>  midodrine 10 milliGRAM(s) Oral every 8 hours  norepinephrine Infusion 0.05 MICROgram(s)/kG/Min (3.61 mL/Hr) IV Continuous <Continuous>  pantoprazole  Injectable 40 milliGRAM(s) IV Push daily  propofol Infusion 10.009 MICROgram(s)/kG/Min (4.63 mL/Hr) IV Continuous <Continuous>=110 ml(04/04)=121 calories   vancomycin  IVPB      vancomycin  IVPB 1000 milliGRAM(s) IV Intermittent every 12 hours    MEDICATIONS  (PRN):  sodium chloride 0.9% lock flush 10 milliLiter(s) IV Push every 1 hour PRN Pre/post blood products, medications, blood draw, and to maintain line patency    Pertinent Labs: 04-05 Na138 mmol/L Glu 154 mg/dL<H> K+ 4.5 mmol/L Cr  0.87 mg/dL BUN 35 mg/dL<H> 04-05 Phos 2.5 mg/dL 04-05 Alb 1.4 g/dL<L> 03-24 Chol 173 mg/dL LDL --    HDL 43 mg/dL<L> Trig 104 mg/dL04-05  U/L<H> AST 1393 U/L<H> Alkaline Phosphatase 215 U/L<H>  03-26-21 @ 08:56 a1c 12.2<H>  03-24-21 @ 09:30 a1c 12.5<H>  03-23-21 @ 19:22 a1c 12.2<H>   CAPILLARY BLOOD GLUCOSE      POCT Blood Glucose.: 156 mg/dL (05 Apr 2021 11:51)  POCT Blood Glucose.: 140 mg/dL (05 Apr 2021 05:19)  POCT Blood Glucose.: 187 mg/dL (04 Apr 2021 23:40)  POCT Blood Glucose.: 180 mg/dL (04 Apr 2021 21:49)  POCT Blood Glucose.: 194 mg/dL (04 Apr 2021 17:34)    Skin: WDL    Estimated Needs:   [ x] no change since previous assessment(03/25)  [ ] recalculated:     Previous Nutrition Diagnosis:   Malnutrition; severe malnutrition in context of acute illness     Related to: inadequate protein-energy intake in setting of COVID-19 illness, acute respiratory failure, DKA    As evidenced by: <50% nutrition needs > 5 days, >2% wt. loss in 1 week(6.17%)    Goal: pt to meet >75% protein-energy needs via enteral feeding; not met     Nutrition Diagnosis is [ x] ongoing  [ ] resolved [ ] not applicable       New Nutrition Diagnosis: [x ] not applicable     Interventions:   Recommend  [ ] Change Diet To:  [ ] Nutrition Supplement  [x ] Nutrition Support; Continue c current enteral feeding as ordered c Vital AF 1.2  @ goal of 45 ml/hr= 1080 ml, 1296 calories, 81 grams protein, 875 ml free water, 91% RDIs + additional calories from propofol noted   [ ] Other:     Monitoring and Evaluation:   [ ] PO intake [ x ] Tolerance to diet prescription [ x ] weights [ x ] labs; triglyceride, glucose [ x ] follow up per protocol  [ ] other:

## 2021-04-06 NOTE — PROGRESS NOTE ADULT - SUBJECTIVE AND OBJECTIVE BOX
Patient is a 65y old  Female who presents with a chief complaint of covid pna and dka (06 Apr 2021 10:54)      Interval History: doing poorly   intubated but not on steroids   Finger-sticks are in high 100's and low 200's   on Lantus 8 units and sliding scale lispro every 6 hours   Vital as tube feeds     MEDICATIONS  (STANDING):  chlorhexidine 0.12% Liquid 15 milliLiter(s) Oral Mucosa every 12 hours  chlorhexidine 2% Cloths 1 Application(s) Topical <User Schedule>  cisatracurium Infusion 3 MICROgram(s)/kG/Min (13.9 mL/Hr) IV Continuous <Continuous>  dextrose 40% Gel 15 Gram(s) Oral once  dextrose 5%. 1000 milliLiter(s) (100 mL/Hr) IV Continuous <Continuous>  dextrose 5%. 1000 milliLiter(s) (50 mL/Hr) IV Continuous <Continuous>  dextrose 50% Injectable 25 Gram(s) IV Push once  dextrose 50% Injectable 12.5 Gram(s) IV Push once  dextrose 50% Injectable 25 Gram(s) IV Push once  enoxaparin Injectable 40 milliGRAM(s) SubCutaneous two times a day  ergocalciferol 47555 Unit(s) Oral <User Schedule>  fentaNYL   Infusion. 0.5 MICROgram(s)/kG/Hr (3.86 mL/Hr) IV Continuous <Continuous>  glucagon  Injectable 1 milliGRAM(s) IntraMuscular once  insulin glargine Injectable (LANTUS) 8 Unit(s) SubCutaneous at bedtime  insulin lispro (ADMELOG) corrective regimen sliding scale   SubCutaneous every 6 hours  ketamine Infusion. 0.25 mG/kG/Hr (1.93 mL/Hr) IV Continuous <Continuous>  midodrine 20 milliGRAM(s) Oral every 8 hours  norepinephrine Infusion 0.05 MICROgram(s)/kG/Min (3.61 mL/Hr) IV Continuous <Continuous>  pantoprazole  Injectable 40 milliGRAM(s) IV Push daily  propofol Infusion 10.009 MICROgram(s)/kG/Min (4.63 mL/Hr) IV Continuous <Continuous>  vancomycin  IVPB 1000 milliGRAM(s) IV Intermittent every 12 hours  vancomycin  IVPB        MEDICATIONS  (PRN):  sodium chloride 0.9% lock flush 10 milliLiter(s) IV Push every 1 hour PRN Pre/post blood products, medications, blood draw, and to maintain line patency      Allergies    Carafate (Rash)  Levaquin (Rash)  Percocet 5/325 (Other)    Intolerances    lactose (Flatulence)      REVIEW OF SYSTEMS:  CONSTITUTIONAL: no changes  intubated   Vital Signs Last 24 Hrs  T(C): 36.9 (06 Apr 2021 19:30), Max: 37 (06 Apr 2021 07:30)  T(F): 98.4 (06 Apr 2021 19:30), Max: 98.6 (06 Apr 2021 07:30)  HR: 96 (06 Apr 2021 20:50) (58 - 101)  BP: --  BP(mean): --  RR: 32 (06 Apr 2021 20:50) (21 - 33)  SpO2: 94% (06 Apr 2021 20:30) (70% - 100%)    PHYSICAL EXAM:  GENERAL: as per the progress notes of Primary Team       LABS:        CAPILLARY BLOOD GLUCOSE      POCT Blood Glucose.: 215 mg/dL (06 Apr 2021 17:35)  POCT Blood Glucose.: 224 mg/dL (06 Apr 2021 12:06)  POCT Blood Glucose.: 172 mg/dL (06 Apr 2021 06:02)  POCT Blood Glucose.: 235 mg/dL (06 Apr 2021 00:09)    Lipid panel:         Mode: AC/ CMV (Assist Control/ Continuous Mandatory Ventilation)  RR (machine): 32  TV (machine): 380  FiO2: 50  PEEP: 10  ITime: 1  MAP: 19  PIP: 48    Thyroid:  Diabetes Tests:  Parathyroid Panel:  Adrenals:  RADIOLOGY & ADDITIONAL TESTS:    Imaging Personally Reviewed:  [ ] YES  [ ] NO    Consultant(s) Notes Reviewed:  [ ] YES  [ ] NO    Care Discussed with Consultants/Other Providers [ ] YES  [ ] NO

## 2021-04-06 NOTE — PROGRESS NOTE ADULT - SUBJECTIVE AND OBJECTIVE BOX
RERE FINESSE    [facility] CCU 6    Patient is a 65y old  Female who presents with a chief complaint of covid pna and dka (05 Apr 2021 21:14)       Allergies    Carafate (Rash)  Levaquin (Rash)  Percocet 5/325 (Other)    Intolerances    lactose (Flatulence)      HPI:  64yo F w/ HTN (on losartan) and DM2 (on metformin) presents with progressively worsening SOB associated with productive cough for past several days much worse the past 3 days.  Started on abx (levaquin, prednisone and azithromycin 3/19) outpatient with no improvement. Denies any chest pain, fevers, dizziness, syncope, abd pain, back pain, N/V/D, recent sick contact, recent travel.     In ED: CTA chest done to r/o PE findings consistent w/ COVID-19 PNA. Labs remarkable for WBC 18.55, Na 130, K 3.2, COs 10, AG 29, Glucose 416, alb 2.5.  Placed on High flow for dyspnea and hypoxemia. Admitted to ICU for COVID pna and DKA.   (23 Mar 2021 05:08)      PAST MEDICAL & SURGICAL HISTORY:  HTN (hypertension)    DM (diabetes mellitus)    HLD (hyperlipidemia)    No significant past surgical history        FAMILY HISTORY:  No pertinent family history in first degree relatives          MEDICATIONS   chlorhexidine 0.12% Liquid 15 milliLiter(s) Oral Mucosa every 12 hours  chlorhexidine 2% Cloths 1 Application(s) Topical <User Schedule>  cisatracurium Infusion 3 MICROgram(s)/kG/Min IV Continuous <Continuous>  dextrose 40% Gel 15 Gram(s) Oral once  dextrose 5%. 1000 milliLiter(s) IV Continuous <Continuous>  dextrose 5%. 1000 milliLiter(s) IV Continuous <Continuous>  dextrose 50% Injectable 25 Gram(s) IV Push once  dextrose 50% Injectable 12.5 Gram(s) IV Push once  dextrose 50% Injectable 25 Gram(s) IV Push once  enoxaparin Injectable 40 milliGRAM(s) SubCutaneous two times a day  ergocalciferol 65425 Unit(s) Oral <User Schedule>  fentaNYL   Infusion. 0.5 MICROgram(s)/kG/Hr IV Continuous <Continuous>  glucagon  Injectable 1 milliGRAM(s) IntraMuscular once  insulin glargine Injectable (LANTUS) 8 Unit(s) SubCutaneous at bedtime  insulin lispro (ADMELOG) corrective regimen sliding scale   SubCutaneous every 6 hours  ketamine Infusion. 0.25 mG/kG/Hr IV Continuous <Continuous>  midodrine 10 milliGRAM(s) Oral every 8 hours  norepinephrine Infusion 0.05 MICROgram(s)/kG/Min IV Continuous <Continuous>  pantoprazole  Injectable 40 milliGRAM(s) IV Push daily  propofol Infusion 10.009 MICROgram(s)/kG/Min IV Continuous <Continuous>  sodium chloride 0.9% lock flush 10 milliLiter(s) IV Push every 1 hour PRN  vancomycin  IVPB      vancomycin  IVPB 1000 milliGRAM(s) IV Intermittent every 12 hours      Vital Signs Last 24 Hrs  T(C): 36.7 (06 Apr 2021 04:00), Max: 37.7 (05 Apr 2021 10:00)  T(F): 98.1 (06 Apr 2021 04:00), Max: 99.8 (05 Apr 2021 10:00)  HR: 66 (06 Apr 2021 09:00) (64 - 80)  BP: --  BP(mean): --  RR: 27 (06 Apr 2021 09:00) (21 - 32)  SpO2: 97% (06 Apr 2021 09:00) (87% - 99%)      04-05-21 @ 07:01  -  04-06-21 @ 07:00  --------------------------------------------------------  IN: 2024 mL / OUT: 1820 mL / NET: 204 mL    04-06-21 @ 07:01  -  04-06-21 @ 09:56  --------------------------------------------------------  IN: 157.6 mL / OUT: 175 mL / NET: -17.4 mL        Mode: AC/ CMV (Assist Control/ Continuous Mandatory Ventilation), RR (machine): 32, TV (machine): 380, FiO2: 70, PEEP: 10, ITime: 1, MAP: 20, PIP: 45    LABS:                        8.7    11.94 )-----------( 167      ( 06 Apr 2021 05:39 )             29.4     04-06    140  |  105  |  25<H>  ----------------------------<  164<H>  4.1   |  30  |  0.53    Ca    7.9<L>      06 Apr 2021 05:39  Phos  2.1     04-06  Mg     2.3     04-06    TPro  5.8<L>  /  Alb  1.5<L>  /  TBili  0.8  /  DBili  x   /  AST  277<H>  /  ALT  587<H>  /  AlkPhos  211<H>  04-06              WBC:  WBC Count: 11.94 K/uL (04-06 @ 05:39)  WBC Count: 14.90 K/uL (04-05 @ 02:42)  WBC Count: 18.47 K/uL (04-04 @ 03:27)  WBC Count: 19.66 K/uL (04-03 @ 03:21)      MICROBIOLOGY:  RECENT CULTURES:  04-01 .Blood Blood-Peripheral Blood Culture PCR   Growth in anaerobic bottle: Gram Positive Cocci in Clusters  Growth in aerobic bottle: Gram Positive Cocci in Clusters   Growth in aerobic and anaerobic bottles: Staphylococcus epidermidis  Coag Negative Staphylococcus  Single set isolate, possible contaminant. Contact  Microbiology if susceptibility testing clinically  indicated.  ***Blood Panel PCR results on this specimen are available  approximately 3 hours after the Gram stain result.***  Gram stain, PCR, and/or culture results may not always  correspond due to difference in methodologies.  ************************************************************  This PCR assay was performed by multiplex PCR. This  Assay tests for 66 bacterial and resistance gene targets.  Please refer to the [facility] [employer] test directory  at [email or web address] for details.                    Sodium:  Sodium, Serum: 140 mmol/L (04-06 @ 05:39)  Sodium, Serum: 138 mmol/L (04-05 @ 02:42)  Sodium, Serum: 138 mmol/L (04-04 @ 03:27)  Sodium, Serum: 138 mmol/L (04-03 @ 03:21)      0.53 mg/dL 04-06 @ 05:39  0.87 mg/dL 04-05 @ 02:42  0.76 mg/dL 04-04 @ 03:27  0.65 mg/dL 04-03 @ 03:21      Hemoglobin:  Hemoglobin: 8.7 g/dL (04-06 @ 05:39)  Hemoglobin: 8.7 g/dL (04-05 @ 02:42)  Hemoglobin: 9.7 g/dL (04-04 @ 03:27)  Hemoglobin: 10.1 g/dL (04-03 @ 03:21)      Platelets: Platelet Count - Automated: 167 K/uL (04-06 @ 05:39)  Platelet Count - Automated: 166 K/uL (04-05 @ 02:42)  Platelet Count - Automated: 199 K/uL (04-04 @ 03:27)  Platelet Count - Automated: 202 K/uL (04-03 @ 03:21)      LIVER FUNCTIONS - ( 06 Apr 2021 05:39 )  Alb: 1.5 g/dL / Pro: 5.8 gm/dL / ALK PHOS: 211 U/L / ALT: 587 U/L / AST: 277 U/L / GGT: x                 RADIOLOGY & ADDITIONAL STUDIES:

## 2021-04-06 NOTE — PROGRESS NOTE ADULT - PROBLEM SELECTOR PLAN 1
can try to wean off nimbex and control with ketamine, propo, fent and aim for vent synchroniy  continuie to wean FIo2. post [id number], finishned course of decadron.  - vanco by level for MR staph epi, repeat blood cult sent.  kidneys are ok, making urine  Day 6 of intubation,

## 2021-04-06 NOTE — PROGRESS NOTE ADULT - SUBJECTIVE AND OBJECTIVE BOX
INTERVAL HPI/OVERNIGHT EVENTS:   HPI:  66yo F w/ HTN (on losartan) and DM2 (on metformin) presents with progressively worsening SOB associated with productive cough for past several days much worse the past 3 days.  Started on abx (levaquin, prednisone and azithromycin 3/19) outpatient with no improvement. Denies any chest pain, fevers, dizziness, syncope, abd pain, back pain, N/V/D, recent sick contact, recent travel.     In ED: CTA chest done to r/o PE findings consistent w/ COVID-19 PNA. Labs remarkable for WBC 18.55, Na 130, K 3.2, COs 10, AG 29, Glucose 416, alb 2.5.  Placed on High flow for dyspnea and hypoxemia. Admitted to ICU for COVID pna and DKA.   (23 Mar 2021 05:08)    still on nimbex, levo,ketamine, fent, propofol, oxygen remts are decreasing, pressors are low dose 0.1 levo       HTN (hypertension)    DM (diabetes mellitus)    HLD (hyperlipidemia)    No significant past surgical history               ICU Vital Signs Last 24 Hrs  T(C): 36.7 (06 Apr 2021 04:00), Max: 37.2 (05 Apr 2021 16:00)  T(F): 98.1 (06 Apr 2021 04:00), Max: 98.9 (05 Apr 2021 16:00)  HR: 69 (06 Apr 2021 10:30) (64 - 80)  BP: --  BP(mean): --  ABP: 140/57 (06 Apr 2021 10:30) (82/42 - 196/78)  ABP(mean): 80 (06 Apr 2021 10:30) (53 - 116)  RR: 22 (06 Apr 2021 10:30) (21 - 32)  SpO2: 94% (06 Apr 2021 10:30) (87% - 99%)          I&O's Detail    05 Apr 2021 07:01  -  06 Apr 2021 07:00  --------------------------------------------------------  IN:    Cisatracurium: 300.2 mL    FentaNYL: 535.4 mL    IV PiggyBack: 250 mL    Ketamine: 281.4 mL    Norepinephrine: 181.8 mL    Propofol: 125.2 mL    Vital1.0: 350 mL  Total IN: 2024 mL    OUT:    Indwelling Catheter - Urethral (mL): 1820 mL  Total OUT: 1820 mL    Total NET: 204 mL      06 Apr 2021 07:01  -  06 Apr 2021 10:54  --------------------------------------------------------  IN:    Cisatracurium: 41.7 mL    FentaNYL: 69.3 mL    Ketamine: 23.1 mL    Norepinephrine: 21.6 mL    Propofol: 20.7 mL    Vital1.0: 65 mL  Total IN: 241.4 mL    OUT:    Indwelling Catheter - Urethral (mL): 175 mL  Total OUT: 175 mL    Total NET: 66.4 mL          Mode: AC/ CMV (Assist Control/ Continuous Mandatory Ventilation)  RR (machine): 32  TV (machine): 380  FiO2: 70  PEEP: 10  ITime: 1  MAP: 19  PIP: 48    CAPILLARY BLOOD GLUCOSE      POCT Blood Glucose.: 172 mg/dL (06 Apr 2021 06:02)  POCT Blood Glucose.: 235 mg/dL (06 Apr 2021 00:09)  POCT Blood Glucose.: 204 mg/dL (05 Apr 2021 17:11)  POCT Blood Glucose.: 156 mg/dL (05 Apr 2021 11:51)    PHYSICAL EXAM:    GENERAL:  inthubarted/sedated  HEENt No JVD  Lungs B/L air entry  CVs S1 S2 RRR  ABD NT/ND  ext no edema        LABS:                        8.7    11.94 )-----------( 167      ( 06 Apr 2021 05:39 )             29.4      04-06    140  |  105  |  25<H>  ----------------------------<  164<H>  4.1   |  30  |  0.53    Ca    7.9<L>      06 Apr 2021 05:39  Phos  2.1     04-06  Mg     2.3     04-06    TPro  5.8<L>  /  Alb  1.5<L>  /  TBili  0.8  /  DBili  x   /  AST  277<H>  /  ALT  587<H>  /  AlkPhos  211<H>  04-06            RADIOLOGY & ADDITIONAL STUDIES:      Assessment and Plan:    CRITICAL CARE TIME SPENT:

## 2021-04-06 NOTE — PROGRESS NOTE ADULT - ASSESSMENT
FINESSE JERNIGAN 65 F 1955 3/23/2021 DR YANN UNDERWOOD     REVIEW OF SYMPTOMS      Able to give (reliable) ROS  NO     PHYSICAL EXAM    HEENT Unremarkable  atraumatic   RESP Fair air entry EXP prolonged    Harsh breath sound Resp distres mild   CARDIAC S1 S2 No S3     NO JVD    ABDOMEN SOFT BS PRESENT NOT DISTENDED No hepatosplenomegaly   PEDAL EDEMA present No calf tenderness  NO rash       PATIENT SUMMARY  66 yo F with h/o HTN, HLD and DM2 who presented with progressively worsening SOB associated with productive cough for past several days much worse the past 3 days.  Pt was started on Levaquin, Prednisone and Azithromycin  on 3/19 outpatient with no improvement.      In the ER CTA chest done to r/o PE findings consistent with COVID-19 PNA. Labs remarkable for WBC 18.55, Na 130, K 3.2, COs 10, AG 29, Glucose 416, alb 2.5.  Placed on High flow for dyspnea and hypoxemia. ICU admitting dx : 1) Acute hypoxic resp failure 2 to Covid-19 PNA with improvement in oxygenation  2) s/p DKA.  Pt was admitted 3/23 transferred out of ICU 3/26  Pulm consulted 3/29/2021         PROBLEMS.  COVID PNEUMONIA   STAPH EPI BACTEREMIA 4/1   ACUTE HYPOXEMIC RESP FAILURE   ELEVATED Ddimr  INTUBATED 3/31   NG TBE 3/31  LACTICEMIA 4/1/2021   TROPONINEMIA 4/1/2021   ELEVATED LFTS 4/5/2021  AST 1393       VITALS/IO/VENT/DRIPS.  4/6/2021 afeb 72 130/50   4/6/2021 12p cisa 1.5 m/k/m   4/6/2021 1p fent 4 m/k/h   4/6/2021 7a gurpreet .9 m/k/h   4/6/2021 1p nore .03 m/k/m   4/6/2021 1p prop 30 m/k/m   4/6/2021 ac 32/380/10/70%       GLOBAL AND BEST PRACTICE ISSUES                     ALLERGY. carafate levaquin percocet      WEIGHT.      3/29/2021 77                           BMI.                 3/29/2021 29          ADVANCED DIRECTIVE.                               HEAD OF BED ELEVATION. Yes  DVT PROPHYLAXIS.   hpsc (3/23)   --> lvnx 40 93/30)                  MANUEL PROPHYLAXIS. PROTONIX 40 (3/23)   APA.                                                                    DYSPHAGIA RECOMM.   DIET.  CONS CARB (3/25) --> vital 1080 (4/5)      INFECTION PROPHYLAXIS.   chlorhexidine .12% (4/1/20210  chlorhexidine 2% (4/1/2021)   FREE WATER.      ASSESSMENT/RECOMMENDATIONS.    D-DIMR ELEVATED  3/26-3/29/2021 D-dimr 1344-930  3/23 cta ch no pe bl ggo  Trending down   is on dvt ppl  INFECTION  STAPH EPI BACATREMIA 4/1 4/1-4/5/2021 w 32 -14.9   4/1 blod c staph epid   vanco (4/2)   SHOCK   On vasopressors  Target MAP 65   MECHANICAL VENT   Target PO2 60 (+) PPlat 35 (-) pH 730 (+)   On nmba     COVID PNEUMONIA AND HYPOXIC RESP FAILURE MANAGEMENT.   REMDESEVIR (3/23)   DEXA (3/23)   PRONE (3/26)   Monitor pulse oximetry Target PO 92-96%  Monitor inflammatory and thrombotic biomarkers  Monitor for  for progressively  worsening oxygenation failure   O2 to keep po 90-95%  Intubated 3/31  Requiring nmba (4/1/2021) iv sedation (4/1/2021)     TIME SPENT   Over 25 minutes aggregate care time spent on encounter; activities included   direct patient care, counseling and/or coordinating care reviewing notes, lab data/ imaging , discussion with multidisciplinary team/ patient  /family and explaining in detail risks, benefits, alternatives  of the recommendations     FINESSE JERNIGAN 65 F 1955 3/23/2021 DR YANN UNDERWOOD

## 2021-04-07 NOTE — PROGRESS NOTE ADULT - SUBJECTIVE AND OBJECTIVE BOX
INTERVAL HPI/OVERNIGHT EVENTS:   HPI:  66yo F w/ HTN (on losartan) and DM2 (on metformin) presents with progressively worsening SOB associated with productive cough for past several days much worse the past 3 days.  Started on abx (levaquin, prednisone and azithromycin 3/19) outpatient with no improvement. Denies any chest pain, fevers, dizziness, syncope, abd pain, back pain, N/V/D, recent sick contact, recent travel.     In ED: CTA chest done to r/o PE findings consistent w/ COVID-19 PNA. Labs remarkable for WBC 18.55, Na 130, K 3.2, COs 10, AG 29, Glucose 416, alb 2.5.  Placed on High flow for dyspnea and hypoxemia. Admitted to ICU for COVID pna and DKA.   (23 Mar 2021 05:08)    synchronous off paralytics, still on ketamine, prop, fent, levo 0.1 awatin repeat cx fr MRSE       PAST MEDICAL & SURGICAL HISTORY:  HTN (hypertension)    DM (diabetes mellitus)    HLD (hyperlipidemia)    No significant past surgical history           A     ICU Vital Signs Last 24 Hrs  T(C): 37.1 (07 Apr 2021 04:00), Max: 38.6 (06 Apr 2021 23:00)  T(F): 98.8 (07 Apr 2021 04:00), Max: 101.4 (06 Apr 2021 23:00)  HR: 83 (07 Apr 2021 10:00) (58 - 114)  BP: --  BP(mean): --  ABP: 138/68 (07 Apr 2021 10:00) (85/39 - 198/75)  ABP(mean): 89 (07 Apr 2021 10:00) (48 - 113)  RR: 32 (07 Apr 2021 10:00) (22 - 34)  SpO2: 100% (07 Apr 2021 10:00) (70% - 100%)      ABG - ( 07 Apr 2021 09:48 )  pH, Arterial: x     pH, Blood: 7.37  /  pCO2: 47    /  pO2: 58    / HCO3: 27    / Base Excess: 1.8   /  SaO2: 58                  I&O's Detail    06 Apr 2021 07:01  -  07 Apr 2021 07:00  --------------------------------------------------------  IN:    Cisatracurium: 101.7 mL    FentaNYL: 662.2 mL    IV PiggyBack: 250 mL    Ketamine: 242.6 mL    Norepinephrine: 197 mL    Propofol: 340 mL    Vasopressin: 24 mL    Vital1.0: 550 mL  Total IN: 2367.5 mL    OUT:    Indwelling Catheter - Urethral (mL): 1310 mL  Total OUT: 1310 mL    Total NET: 1057.5 mL      07 Apr 2021 07:01  -  07 Apr 2021 10:24  --------------------------------------------------------  IN:    FentaNYL: 61.6 mL    Ketamine: 30.8 mL    Norepinephrine: 14.4 mL    Propofol: 32.4 mL    Vasopressin: 4.8 mL  Total IN: 144 mL    OUT:    Indwelling Catheter - Urethral (mL): 150 mL    Vital1.0: 0 mL  Total OUT: 150 mL    Total NET: -6 mL          Mode: AC/ CMV (Assist Control/ Continuous Mandatory Ventilation)  RR (machine): 32  TV (machine): 380  FiO2: 50  PEEP: 10  ITime: 1  MAP: 10  PIP: 40    CAPILLARY BLOOD GLUCOSE      POCT Blood Glucose.: 289 mg/dL (07 Apr 2021 05:35)  POCT Blood Glucose.: 329 mg/dL (06 Apr 2021 23:42)  POCT Blood Glucose.: 215 mg/dL (06 Apr 2021 17:35)  POCT Blood Glucose.: 224 mg/dL (06 Apr 2021 12:06)    PHYSICAL EXAM:    GENERAL:  intuabted/sedated  HEENT No JVD  Lungs b/l air entry  CVS S1 S2 RRR  ABD NT/ND  ext no edema      LABS:                        8.8    11.30 )-----------( 167      ( 07 Apr 2021 04:15 )             29.2      04-07    136  |  103  |  26<H>  ----------------------------<  292<H>  5.3   |  27  |  0.62    Ca    8.0<L>      07 Apr 2021 04:15  Phos  2.6     04-07  Mg     2.4     04-07    TPro  6.0  /  Alb  1.5<L>  /  TBili  0.8  /  DBili  x   /  AST  655<H>  /  ALT  730<H>  /  AlkPhos  248<H>  04-07            RADIOLOGY & ADDITIONAL STUDIES:      Assessment and Plan:    CRITICAL CARE TIME SPENT:

## 2021-04-07 NOTE — PROGRESS NOTE ADULT - SUBJECTIVE AND OBJECTIVE BOX
RERE FINESSE    [facility] CCU 6    Patient is a 65y old  Female who presents with a chief complaint of covid pna and dka (06 Apr 2021 21:06)       Allergies    Carafate (Rash)  Levaquin (Rash)  Percocet 5/325 (Other)    Intolerances    lactose (Flatulence)      HPI:  66yo F w/ HTN (on losartan) and DM2 (on metformin) presents with progressively worsening SOB associated with productive cough for past several days much worse the past 3 days.  Started on abx (levaquin, prednisone and azithromycin 3/19) outpatient with no improvement. Denies any chest pain, fevers, dizziness, syncope, abd pain, back pain, N/V/D, recent sick contact, recent travel.     In ED: CTA chest done to r/o PE findings consistent w/ COVID-19 PNA. Labs remarkable for WBC 18.55, Na 130, K 3.2, COs 10, AG 29, Glucose 416, alb 2.5.  Placed on High flow for dyspnea and hypoxemia. Admitted to ICU for COVID pna and DKA.   (23 Mar 2021 05:08)      PAST MEDICAL & SURGICAL HISTORY:  HTN (hypertension)    DM (diabetes mellitus)    HLD (hyperlipidemia)    No significant past surgical history        FAMILY HISTORY:  No pertinent family history in first degree relatives          MEDICATIONS   chlorhexidine 0.12% Liquid 15 milliLiter(s) Oral Mucosa every 12 hours  chlorhexidine 2% Cloths 1 Application(s) Topical <User Schedule>  dextrose 40% Gel 15 Gram(s) Oral once  dextrose 5%. 1000 milliLiter(s) IV Continuous <Continuous>  dextrose 5%. 1000 milliLiter(s) IV Continuous <Continuous>  dextrose 50% Injectable 25 Gram(s) IV Push once  dextrose 50% Injectable 12.5 Gram(s) IV Push once  dextrose 50% Injectable 25 Gram(s) IV Push once  enoxaparin Injectable 40 milliGRAM(s) SubCutaneous two times a day  ergocalciferol 35953 Unit(s) Oral <User Schedule>  fentaNYL   Infusion. 0.5 MICROgram(s)/kG/Hr IV Continuous <Continuous>  glucagon  Injectable 1 milliGRAM(s) IntraMuscular once  insulin glargine Injectable (LANTUS) 8 Unit(s) SubCutaneous at bedtime  insulin lispro (ADMELOG) corrective regimen sliding scale   SubCutaneous every 6 hours  ketamine Infusion. 0.25 mG/kG/Hr IV Continuous <Continuous>  midodrine 20 milliGRAM(s) Oral every 8 hours  norepinephrine Infusion 0.05 MICROgram(s)/kG/Min IV Continuous <Continuous>  pantoprazole  Injectable 40 milliGRAM(s) IV Push daily  propofol Infusion 10.009 MICROgram(s)/kG/Min IV Continuous <Continuous>  sodium chloride 0.9% lock flush 10 milliLiter(s) IV Push every 1 hour PRN  vancomycin  IVPB      vancomycin  IVPB 1000 milliGRAM(s) IV Intermittent every 12 hours  vasopressin Infusion 0.04 Unit(s)/Min IV Continuous <Continuous>      Vital Signs Last 24 Hrs  T(C): 37.1 (07 Apr 2021 04:00), Max: 38.6 (06 Apr 2021 23:00)  T(F): 98.8 (07 Apr 2021 04:00), Max: 101.4 (06 Apr 2021 23:00)  HR: 88 (07 Apr 2021 07:00) (58 - 114)  BP: --  BP(mean): --  RR: 32 (07 Apr 2021 07:00) (21 - 34)  SpO2: 98% (07 Apr 2021 07:00) (70% - 100%)      04-06-21 @ 07:01  -  04-07-21 @ 07:00  --------------------------------------------------------  IN: 2360.3 mL / OUT: 1310 mL / NET: 1050.3 mL        Mode: AC/ CMV (Assist Control/ Continuous Mandatory Ventilation), RR (machine): 32, TV (machine): 380, FiO2: 50, PEEP: 10, ITime: 1, MAP: 19, PIP: 42    LABS:                        8.8    11.30 )-----------( 167      ( 07 Apr 2021 04:15 )             29.2     04-07    136  |  103  |  26<H>  ----------------------------<  292<H>  5.3   |  27  |  0.62    Ca    8.0<L>      07 Apr 2021 04:15  Phos  2.6     04-07  Mg     2.4     04-07    TPro  6.0  /  Alb  1.5<L>  /  TBili  0.8  /  DBili  x   /  AST  655<H>  /  ALT  730<H>  /  AlkPhos  248<H>  04-07              WBC:  WBC Count: 11.30 K/uL (04-07 @ 04:15)  WBC Count: 11.94 K/uL (04-06 @ 05:39)  WBC Count: 14.90 K/uL (04-05 @ 02:42)  WBC Count: 18.47 K/uL (04-04 @ 03:27)      MICROBIOLOGY:  RECENT CULTURES:  04-01 .Blood Blood-Peripheral Blood Culture PCR   Growth in anaerobic bottle: Gram Positive Cocci in Clusters  Growth in aerobic bottle: Gram Positive Cocci in Clusters   Growth in aerobic and anaerobic bottles: Staphylococcus epidermidis  Coag Negative Staphylococcus  Single set isolate, possible contaminant. Contact  Microbiology if susceptibility testing clinically  indicated.  ***Blood Panel PCR results on this specimen are available  approximately 3 hours after the Gram stain result.***  Gram stain, PCR, and/or culture results may not always  correspond due to difference in methodologies.  ************************************************************  This PCR assay was performed by multiplex PCR. This  Assay tests for 66 bacterial and resistance gene targets.  Please refer to the [facility] [employer] test directory  at [email or web address] for details.                    Sodium:  Sodium, Serum: 136 mmol/L (04-07 @ 04:15)  Sodium, Serum: 140 mmol/L (04-06 @ 05:39)  Sodium, Serum: 138 mmol/L (04-05 @ 02:42)  Sodium, Serum: 138 mmol/L (04-04 @ 03:27)      0.62 mg/dL 04-07 @ 04:15  0.53 mg/dL 04-06 @ 05:39  0.87 mg/dL 04-05 @ 02:42  0.76 mg/dL 04-04 @ 03:27      Hemoglobin:  Hemoglobin: 8.8 g/dL (04-07 @ 04:15)  Hemoglobin: 8.7 g/dL (04-06 @ 05:39)  Hemoglobin: 8.7 g/dL (04-05 @ 02:42)  Hemoglobin: 9.7 g/dL (04-04 @ 03:27)      Platelets: Platelet Count - Automated: 167 K/uL (04-07 @ 04:15)  Platelet Count - Automated: 167 K/uL (04-06 @ 05:39)  Platelet Count - Automated: 166 K/uL (04-05 @ 02:42)  Platelet Count - Automated: 199 K/uL (04-04 @ 03:27)      LIVER FUNCTIONS - ( 07 Apr 2021 04:15 )  Alb: 1.5 g/dL / Pro: 6.0 gm/dL / ALK PHOS: 248 U/L / ALT: 730 U/L / AST: 655 U/L / GGT: x                 RADIOLOGY & ADDITIONAL STUDIES:

## 2021-04-07 NOTE — PROGRESS NOTE ADULT - PROBLEM SELECTOR PLAN 1
Continue with the same regimen while inpatient   hypnosis poor, patient critically ill ;end-stage respiratory failure

## 2021-04-07 NOTE — PROGRESS NOTE ADULT - ASSESSMENT
FINESSE JERNIGAN 65 F 1955 3/23/2021 DR YANN UNDERWOOD     REVIEW OF SYMPTOMS      Able to give (reliable) ROS  NO     PHYSICAL EXAM    HEENT Unremarkable  atraumatic   RESP Fair air entry EXP prolonged    Harsh breath sound Resp distres mild   CARDIAC S1 S2 No S3     NO JVD    ABDOMEN SOFT BS PRESENT NOT DISTENDED No hepatosplenomegaly   PEDAL EDEMA present No calf tenderness  NO rash       PATIENT SUMMARY  66 yo F with h/o HTN, HLD and DM2 who presented with progressively worsening SOB  Patient ruled in for  COVID-19 PNA.  ICU admitting dx : 1) Acute hypoxic resp failure 2 to Covid-19 PNA with improvement in oxygenation  2) s/p DKA.  Pt was admitted 3/23 transferred out of ICU 3/26  Pulm consulted 3/29/2021   Transferred to ICU 3/31   INTUBATED 3/31   NG TBE 3/31    PROBLEMS.  COVID PNEUMONIA   STAPH EPI BACTEREMIA 4/1   ACUTE HYPOXEMIC RESP FAILURE   ELEVATED Ddimr  INTUBATED 3/31   NG TBE 3/31  LACTICEMIA 4/1/2021   TROPONINEMIA 4/1/2021   ELEVATED LFTS 4/5/2021  AST 1393     PATIENT DATA   ABG.     4/7/2021 737/47/58 32/380/10/60%     VITALS/IO/VENT/DRIPS.  4/7/2021 afeb 80 150/70   4/7/2021  8a fent 4.5 m/k/h   4/7/2021 2p gurpreet .5 m/k/h   4/7/2021 2p nore .07 m/k/m   4/7/2021 8a prop 35 m/k/m   4/7/2021 8a vaso .04 u/m   4/7/2021 ac 32/380/10/60%     GLOBAL AND BEST PRACTICE ISSUES                     ALLERGY. carafate levaquin percocet      WEIGHT.      3/29/2021 77                           BMI.                 3/29/2021 29          ADVANCED DIRECTIVE.                               HEAD OF BED ELEVATION. Yes  DVT PROPHYLAXIS.   hpsc (3/23)   --> lvnx 40 93/30)                  MANUEL PROPHYLAXIS. PROTONIX 40 (3/23)   APA.                                                                    DYSPHAGIA RECOMM.   DIET.  CONS CARB (3/25) --> vital 1080 (4/5)      INFECTION PROPHYLAXIS.   chlorhexidine .12% (4/1/20210  chlorhexidine 2% (4/1/2021)     ASSESSMENT/RECOMMENDATIONS.    D-DIMR ELEVATED  3/26-3/29/2021 D-dimr 1344-930  3/23 cta ch no pe bl ggo  Trending down   is on dvt ppl  INFECTION  STAPH EPI BACATREMIA 4/1 4/1-4/5/2021 w 32 -14.9   4/1 blod c staph epid   vanco (4/2)   SHOCK   On vasopressors  Target MAP 65   MECHANICAL VENT   Target PO2 60 (+) PPlat 35 (-) pH 730 (+)   oxygenation improved     COVID PNEUMONIA AND HYPOXIC RESP FAILURE MANAGEMENT.   REMDESEVIR (3/23)   DEXA (3/23)   PRONE (3/26)   Monitor pulse oximetry Target PO 92-96%  Monitor inflammatory and thrombotic biomarkers  Monitor for  for progressively  worsening oxygenation failure   O2 to keep po 90-95%  Intubated 3/31  Cont supp care     TIME SPENT   Over 25 minutes aggregate care time spent on encounter; activities included   direct patient care, counseling and/or coordinating care reviewing notes, lab data/ imaging , discussion with multidisciplinary team/ patient  /family and explaining in detail risks, benefits, alternatives  of the recommendations     FINESSE JERNIGAN 65 F 1955 3/23/2021 DR YANN UNDERWOOD

## 2021-04-07 NOTE — PROGRESS NOTE ADULT - SUBJECTIVE AND OBJECTIVE BOX
Patient is a 65y old  Female who presents with a chief complaint of covid pna and dka (07 Apr 2021 12:48)      Interval History: doing poorly.  Fingersticks in the Finger-sticks are in high 100's and low 200's  On 8 units of Lantus and sliding scale lispro every 6 hours   No change in vent settings  hypoxemic respiratory failure. increased SIRS   On Vital aas tube feeds    MEDICATIONS  (STANDING):  chlorhexidine 0.12% Liquid 15 milliLiter(s) Oral Mucosa every 12 hours  chlorhexidine 2% Cloths 1 Application(s) Topical <User Schedule>  dextrose 40% Gel 15 Gram(s) Oral once  dextrose 5%. 1000 milliLiter(s) (50 mL/Hr) IV Continuous <Continuous>  dextrose 5%. 1000 milliLiter(s) (100 mL/Hr) IV Continuous <Continuous>  dextrose 50% Injectable 25 Gram(s) IV Push once  dextrose 50% Injectable 12.5 Gram(s) IV Push once  dextrose 50% Injectable 25 Gram(s) IV Push once  enoxaparin Injectable 40 milliGRAM(s) SubCutaneous two times a day  ergocalciferol 65882 Unit(s) Oral <User Schedule>  fentaNYL   Infusion. 0.5 MICROgram(s)/kG/Hr (3.86 mL/Hr) IV Continuous <Continuous>  glucagon  Injectable 1 milliGRAM(s) IntraMuscular once  insulin glargine Injectable (LANTUS) 8 Unit(s) SubCutaneous at bedtime  insulin lispro (ADMELOG) corrective regimen sliding scale   SubCutaneous every 6 hours  ketamine Infusion. 0.25 mG/kG/Hr (1.93 mL/Hr) IV Continuous <Continuous>  midodrine 20 milliGRAM(s) Oral every 8 hours  norepinephrine Infusion 0.05 MICROgram(s)/kG/Min (3.61 mL/Hr) IV Continuous <Continuous>  pantoprazole  Injectable 40 milliGRAM(s) IV Push daily  propofol Infusion 10.009 MICROgram(s)/kG/Min (4.63 mL/Hr) IV Continuous <Continuous>  vancomycin  IVPB      vancomycin  IVPB 1000 milliGRAM(s) IV Intermittent every 12 hours  vasopressin Infusion 0.04 Unit(s)/Min (2.4 mL/Hr) IV Continuous <Continuous>    MEDICATIONS  (PRN):  sodium chloride 0.9% lock flush 10 milliLiter(s) IV Push every 1 hour PRN Pre/post blood products, medications, blood draw, and to maintain line patency      Allergies    Carafate (Rash)  Levaquin (Rash)  Percocet 5/325 (Other)    Intolerances    lactose (Flatulence)      REVIEW OF SYSTEMS:  CONSTITUTIONAL: no changes    Vital Signs Last 24 Hrs  T(C): 37.6 (07 Apr 2021 19:17), Max: 37.6 (07 Apr 2021 19:17)  T(F): 99.6 (07 Apr 2021 19:17), Max: 99.6 (07 Apr 2021 19:17)  HR: 79 (07 Apr 2021 22:30) (72 - 106)  BP: --  BP(mean): --  RR: 32 (07 Apr 2021 22:30) (26 - 34)  SpO2: 98% (07 Apr 2021 22:30) (77% - 100%)    PHYSICAL EXAM:  GENERAL: intubated and unresponsive  HEAD: Atraumatic, Normocephalic    LABS:        CAPILLARY BLOOD GLUCOSE      POCT Blood Glucose.: 129 mg/dL (07 Apr 2021 21:12)  POCT Blood Glucose.: 127 mg/dL (07 Apr 2021 17:27)  POCT Blood Glucose.: 189 mg/dL (07 Apr 2021 11:53)  POCT Blood Glucose.: 289 mg/dL (07 Apr 2021 05:35)  POCT Blood Glucose.: 329 mg/dL (06 Apr 2021 23:42)    Lipid panel:       ABG - ( 07 Apr 2021 09:48 )  pH, Arterial: x     pH, Blood: 7.37  /  pCO2: 47    /  pO2: 58    / HCO3: 27    / Base Excess: 1.8   /  SaO2: 87                Mode: AC/ CMV (Assist Control/ Continuous Mandatory Ventilation)  RR (machine): 32  TV (machine): 380  FiO2: 60  PEEP: 10  ITime: 0.6  MAP: 19  PIP: 44    Thyroid:  Diabetes Tests:  Parathyroid Panel:  Adrenals:  RADIOLOGY & ADDITIONAL TESTS:    Imaging Personally Reviewed:  [ ] YES  [ ] NO    Consultant(s) Notes Reviewed:  [ ] YES  [ ] NO    Care Discussed with Consultants/Other Providers [ ] YES  [ ] NO

## 2021-04-07 NOTE — CONSULT NOTE ADULT - ASSESSMENT
66yo F w/ HTN (on losartan) and DM2 (on metformin) admitted with acute repiratory failure due to covid now in ARDS.   Imaging personally reviewed and shows extensive infiltrates   Blood culture from 4/1 (one set only was sent) grew FAN  She has had intermittent fevers the last several days   Repeat blood cultures have been sent and awaiting results   Her initial presentation was with DKA though her glucose levels are better controlled and no longer in DKA   Suffered shock liver likely from initial intubation/septic shock from covid though her AST/ALT are improving   Staph epi is often a contaminant. The ideal situation is to draw 2 sets of blood cultures from 2 separate sticks to ensure that if in fact a contaminant it would not grow in the additional set. In this case only one set was sent and she has a central line, a-line, ETT, NGT, montiel  and has been having intermittent fevers. Still suspect that this was a procurement contaminant but will await for the repeat blood cultures.   DDx regarding her fevers are quite broad: covid vs DVT/PE (dvt study negative), medication induced ( vanco on rare occasions causes fevers), subacute thyroiditis, bacterial pneumonia etc      antibiotics this hospitalization:   Vanco 4/2->  RDV 3/23-3/27  ceftriaxone 3/23  Azithro 3/23-3/24    Overall, 65F septic shock due to covid, shock liver, leukocytosis, fever, ARDS, anemia, +CONS     Suggest-  Continue Vancomycin for now with target trough 15-20  f/u repeat blood cultures   if able, consider exchanging lines   trend WBC, LFTs   trend fever curve   if repeat blood cx negative, would limit antibiotics to another 1-2 days   pressors per ICU   Ventilator management per ICU   Send TSH, free T4   Good but not too tight glycemic control->endocrine recommendations appreciated   Remains critically ill.    D/W ICU/CCU team    Rosales Mccollum DO  Infectious Disease Attending  Pager [phone]  After 5pm/weekends please call [phone] for all inquiries and new consults 
diabetes uncontrolled   steroid induced hyperglycemia 
64 yo F with PMH of HTN, HLD and DM2 who presented on 3/23/21 with progressively worsening SOB associated with productive cough for past several days much worse the past 3 days. Pt was started on Levaquin, Prednisone and Azithromycin  on 3/19 outpatient with no improvement. In the ER CTA chest done to r/o PE findings consistent with COVID-19 PNA. Labs remarkable for WBC 18.55, Na 130, K 3.2, COs 10, AG 29, glucose 416, alb 2.5.  Placed on HFNC for dyspnea and hypoxemia. Initially admitted to ICU with admitting dx of acute hypoxic resp failure 2 to Covid-19 PNA and DKA. Pt was treated in ICU for DKA and downgraded to medicine on 3/25. On 3/31/21 RRT called for hypoxia. Pt being transferred back to ICU for intubation and invasive mechanical ventilation.      -Neuro: Now s/p intubation; maintain sedation with propofol and ketamine gtt. Given 50 Junior s/p intubation for persistent hypoxia, will continue with nimbex gtt. Goal RASS -4 to -5.  -Cardio: Hypotensive post-intubation, like sedation-related. Continue levophed, titrate to maintain MAP>65.  -Resp: Acute hypoxic respiratory failure 2/2 Covid PNA. Pt has been on HFNC/BIPAP x1 week. RRT called for hypoxia to 70s-80s on BIPAP, in respiratory distress with RR in 50s. Now s/p intubation and paralysis. Continue mechanical ventilation with low TV strategy. Will prone pt for increased alveolar recruitment. CXR shows significant increase in b/l infiltrates.   -GI: Will start trickle feeds.   -Renal: No issues at present, trend BUN/Cr, monitor UO.   -ID: Covid PNA. F/u blood cx, urine cx, PCT. F/u inflammatory markers.   -Heme: Ddimer increasing to now >7k, will increase lovenox to 40 BID and f/u LE dopplers.   -Endocrine: S/p insulin gtt for DKA. Sugars currently well controlled, continue Lantus + ISS. Continue dexamethasone to complete 10-day course.  -Disposition: Transfer to ICU    Seen with ICU attending Dr. Nogueira. 
      FINESSE JERNIGAN 65 F 1955 3/23/2021 DR YANN UNDERWOOD     Initial evaluation/Pulmonary Critical Care consultation requested on  3/29/2021  by Dr UNDERWOOD  from Dr Cutler   Patient examined chart reviewed    HOSPITAL ADMISSION   PATIENT CAME  FROM (if information available)      FINESSE JERNIGAN 65 F 1955 3/23/2021 DR YANN UNDERWOOD           REVIEW OF SYMPTOMS      Able to give ROS  Yes     RELIABLE +/-   CONSTITUTIONAL Weakness Yes  Chills No   ENDOCRINE  No heat or cold intolerance    ALLERGY No hives  Sore throat No stridor  RESP Coughing blood no  Shortness of breath YES   NEURO No Headache  Confusion Pain neck No   CARDIAC No Chest pain No Palpitations   GI  Pain abdomen NO   Vomiting NO     PHYSICAL EXAM    HEENT Unremarkable  atraumatic   RESP Fair air entry EXP prolonged    Harsh breath sound Resp distres mild   CARDIAC S1 S2 No S3     NO JVD    ABDOMEN SOFT BS PRESENT NOT DISTENDED No hepatosplenomegaly PEDAL EDEMA present No calf tenderness  NO rash       PATIENT SUMMARY  66 yo F with h/o HTN, HLD and DM2 who presented with progressively worsening SOB associated with productive cough for past several days much worse the past 3 days.  Pt was started on Levaquin, Prednisone and Azithromycin  on 3/19 outpatient with no improvement.  In the ER CTA chest done to r/o PE findings consistent with COVID-19 PNA. Labs remarkable for WBC 18.55, Na 130, K 3.2, COs 10, AG 29, Glucose 416, alb 2.5.  Placed on High flow for dyspnea and hypoxemia. ICU admitting dx : 1) Acute hypoxic resp failure 2 to Covid-19 PNA with improvement in oxygenation  2) s/p DKA.  Pt was admitted 3/23 transferred out of ICU 3/26  Pulm consulted 3/29/2021       PROBLEMS.  COVID PNEUMONIA   ACUTE HYPOXEMIC RESP FAILURE   ELEVATED Ddimr    PATIENT DATA                          OXYGENATION.       3/29/2021 hf 50 l 100% O2 po 90%   ABG.       3/26/2021 100% 754/36/51     VITALS/IO/VENT/DRIPS.  3/29/2021 afeb 114 180/90     GLOBAL AND BEST PRACTICE ISSUES                     ALLERGY. carafate levaquin percocet      WEIGHT.      3/29/2021 77                           BMI.                 3/29/2021 29          ADVANCED DIRECTIVE.                               HEAD OF BED ELEVATION. Yes  DVT PROPHYLAXIS.   hpsc (3/23)                    MANUEL PROPHYLAXIS. PROTONIX 40 (3/23)   APA.                                                                    DYSPHAGIA RECOMM.   DIET.  CONS CARB (3/25)     INFECTION PROPHYLAXIS.     ASSESSMENT/RECOMMENDATIONS.    INFECTION   3/29/2021 W 13  3/23 blod c negv   3/24 urine c neg  3/23 flab negv  Defer abio   Doubt bact infectn  ANEMIA   Hb 3/29/2021 Hb 10   Monitor   D-DIMR ELEVATED  3/26-3/29/2021 D-dimr 1344-930  3/23 cta ch no pe bl ggo  Trending down   is on dvt ppl      COVID PNEUMONIA AND HYPOXIC RESP FAILURE MANAGEMENT.   REMDESEVIR (3/23)   DEXA (3/23)   PRONE (3/26)   Monitor pulse oximetry Target PO 92-96%  Monitor inflammatory and thrombotic biomarkers  Monitor for  for progressively  worsening oxygenation failure   O2 to keep po 90-95%    TIME SPENT   Over 55 minutes aggregate care time spent on encounter; activities included   direct patient care, counseling and/or coordinating care reviewing notes, lab data/ imaging , discussion with multidisciplinary team/ patient  /family and explaining in detail risks, benefits, alternatives  of the recommendations

## 2021-04-07 NOTE — CONSULT NOTE ADULT - SUBJECTIVE AND OBJECTIVE BOX
Patient is a 65y old  Female who presents with a chief complaint of covid pna and dka (31 Mar 2021 18:22)    HPI:  64yo F w/ HTN (on losartan) and DM2 (on metformin) presents with progressively worsening SOB associated with productive cough for past several days much worse the past 3 days.  Started on abx (levaquin, prednisone and azithromycin 3/19) outpatient with no improvement. Denies any chest pain, fevers, dizziness, syncope, abd pain, back pain, N/V/D, recent sick contact, recent travel.     In ED: CTA chest done to r/o PE findings consistent w/ COVID-19 PNA. Labs remarkable for WBC 18.55, Na 130, K 3.2, COs 10, AG 29, Glucose 416, alb 2.5.  Placed on High flow for dyspnea and hypoxemia. Admitted to ICU for COVID pna and DKA.   (23 Mar 2021 05:08)      Allergies  Carafate (Rash)  Levaquin (Rash)  Percocet 5/325 (Other)    Meds:  cisatracurium Infusion 3 MICROgram(s)/kG/Min IV Continuous <Continuous>  enoxaparin Injectable 40 milliGRAM(s) SubCutaneous daily  ketamine Infusion. 0.25 mG/kG/Hr IV Continuous <Continuous>  losartan 25 milliGRAM(s) Oral daily  norepinephrine Infusion 0.05 MICROgram(s)/kG/Min IV Continuous <Continuous>  pantoprazole  Injectable 40 milliGRAM(s) IV Push daily  propofol Infusion 10 MICROgram(s)/kG/Min IV Continuous <Continuous>    Drug Dosing Weight  Height (cm): 162.6 (23 Mar 2021 02:58)  Weight (kg): 77.1 (23 Mar 2021 02:58)  BMI (kg/m2): 29.2 (23 Mar 2021 02:58)  BSA (m2): 1.83 (23 Mar 2021 02:58)    PAST MEDICAL & SURGICAL HISTORY:  HTN (hypertension)  DM (diabetes mellitus)  HLD (hyperlipidemia)    ADVANCE DIRECTIVES: Full Code, HCP is daughter (Beau [phone])    REVIEW OF SYSTEMS: Admits to SOB. Unable to obtain further ROS due to clinical condition.     ICU Vital Signs Last 24 Hrs  T(C): 36.9 (31 Mar 2021 17:50), Max: 36.9 (31 Mar 2021 17:50)  T(F): 98.5 (31 Mar 2021 17:50), Max: 98.5 (31 Mar 2021 17:50)  HR: 128 (31 Mar 2021 23:30) (95 - 128)  BP: 82/34 (31 Mar 2021 23:30) (82/34 - 169/94)  BP(mean): 43 (31 Mar 2021 23:30) (43 - 103)  RR: 27 (31 Mar 2021 23:30) (24 - 30)  SpO2: 74% (31 Mar 2021 23:30) (74% - 99%)    ABG - ( 01 Apr 2021 00:48 )  pH, Arterial: x     pH, Blood: 7.31  /  pCO2: 55    /  pO2: <42   / HCO3: 27    / Base Excess: 0.0   /  SaO2: 63        I&O's Detail    30 Mar 2021 07:01  -  31 Mar 2021 07:00  --------------------------------------------------------  IN:    Oral Fluid: 118 mL  Total IN: 118 mL    OUT:    Voided (mL): 300 mL  Total OUT: 300 mL    Total NET: -182 mL      31 Mar 2021 07:01  -  01 Apr 2021 00:53  --------------------------------------------------------  IN:    Oral Fluid: 236 mL  Total IN: 236 mL    OUT:    Voided (mL): 500 mL  Total OUT: 500 mL    Total NET: -264 mL    PHYSICAL EXAM  GENERAL: Respiratory distress, on BIPAP, well-groomed, well-developed  HEENT: NCAT, PERRL, EOMI  NECK: Supple, no JVD  NERVOUS SYSTEM: AAOx3, answers questions appropriately, moving all extremities  CHEST/LUNG: +resp distress, diminished breath sounds; no wheezes/rales/rhonchi  HEART: Regular rate and rhythm; No murmurs/rubs/gallops  ABDOMEN: Soft, nontender, nondistended  EXTREMITIES:  2+ peripheral pulses; no clubbing/cyanosis/edema    LABS:                        12.1   32.16 )-----------( 378      ( 01 Apr 2021 00:47 )             40.5     137  |  97  |  10  ----------------------------<  107<H>  3.8   |  33<H>  |  0.46<L>    Ca    8.7      30 Mar 2021 07:22    TPro  6.4  /  Alb  1.7<L>  /  TBili  0.5  /  DBili  x   /  AST  42<H>  /  ALT  26  /  AlkPhos  103  03-30    CAPILLARY BLOOD GLUCOSE  POCT Blood Glucose.: 199 mg/dL (31 Mar 2021 22:45)    PT/INR - ( 30 Mar 2021 07:22 )   PT: 16.9 sec;   INR: 1.48 ratio      CRITICAL CARE TIME SPENT: 120 minutes  
RERE KILPATRICK  MRN-[id number]        Patient is a 65y old  Female who presents with a chief complaint of covid pna and dka (07 Apr 2021 10:23)      HPI:  65F PMH HTN (on losartan) and DM2 (on metformin) presents on 3/23 for progressive worsening SOB with productive cough s/p outpatient treatment with levaquin, prednisone and azithromycin without improvement. Found to be with AG 29, glucose 416, HCO3: 10 and COVID + with hypoxia admitted to ICU for DKA on insulin drip and with acute hypoxic respiratory failure requiring high flow oxygen supplementation due to COVID-PNA. Transferred to medical service 3/25. Course with worsening hypoxemia requiring biPAP. RRT 3/31 for respiratory distress, worsening acute hypoxic respiratory failure with O2 sat in the 70s, RR 40-60s with accessory muscle use, ARDS requiring urgent intubation, sepsis due to COVID    Blood culture returned positive from 4/1 with MRSE and was started on vancomycin. She is intubated and sedated on pressors and paralyzed. ID consulted for workup and antibiotic management     PAST MEDICAL & SURGICAL HISTORY:  HTN (hypertension)    DM (diabetes mellitus)    HLD (hyperlipidemia)    No significant past surgical history        Allergies  Carafate (Rash)  Levaquin (Rash)  Percocet 5/325 (Other)        ANTIMICROBIALS:  vancomycin  IVPB    vancomycin  IVPB 1000 every 12 hours      MEDICATIONS  (STANDING):    azithromycin  IVPB   255 mL/Hr IV Intermittent (03-23-21 @ 06:15)    azithromycin  IVPB   255 mL/Hr IV Intermittent (03-24-21 @ 05:23)    cefTRIAXone   IVPB   100 mL/Hr IV Intermittent (03-23-21 @ 04:13)    cefTRIAXone   IVPB   100 mL/Hr IV Intermittent (03-23-21 @ 07:36)    remdesivir  IVPB   500 mL/Hr IV Intermittent (03-23-21 @ 08:39)    remdesivir  IVPB   500 mL/Hr IV Intermittent (03-27-21 @ 08:44)   500 mL/Hr IV Intermittent (03-26-21 @ 08:56)   500 mL/Hr IV Intermittent (03-25-21 @ 08:08)   500 mL/Hr IV Intermittent (03-24-21 @ 08:42)    vancomycin  IVPB   250 mL/Hr IV Intermittent (04-02-21 @ 15:51)    vancomycin  IVPB   250 mL/Hr IV Intermittent (04-07-21 @ 05:29)   250 mL/Hr IV Intermittent (04-06-21 @ 17:31)   250 mL/Hr IV Intermittent (04-06-21 @ 05:58)   250 mL/Hr IV Intermittent (04-05-21 @ 17:13)   250 mL/Hr IV Intermittent (04-05-21 @ 05:10)   250 mL/Hr IV Intermittent (04-04-21 @ 18:29)   250 mL/Hr IV Intermittent (04-04-21 @ 06:13)   250 mL/Hr IV Intermittent (04-03-21 @ 17:58)   250 mL/Hr IV Intermittent (04-03-21 @ 05:21)          OTHER MEDS: MEDICATIONS  (STANDING):  dextrose 40% Gel 15 once  dextrose 50% Injectable 25 once  dextrose 50% Injectable 12.5 once  dextrose 50% Injectable 25 once  enoxaparin Injectable 40 two times a day  fentaNYL   Infusion. 0.5 <Continuous>  glucagon  Injectable 1 once  insulin glargine Injectable (LANTUS) 8 at bedtime  insulin lispro (ADMELOG) corrective regimen sliding scale  every 6 hours  ketamine Infusion. 0.25 <Continuous>  midodrine 20 every 8 hours  norepinephrine Infusion 0.05 <Continuous>  pantoprazole  Injectable 40 daily  propofol Infusion 10.009 <Continuous>  vasopressin Infusion 0.04 <Continuous>      SOCIAL HISTORY:     unable   FAMILY HISTORY:  unable         REVIEW OF SYSTEMS  [ X ] ROS unobtainable because:  intubated and sedated  [  ] All other systems negative except as noted below:	    Constitutional:  [ ] fever [ ] chills  [ ] weight loss  [ ] weakness  Skin:  [ ] rash [ ] phlebitis	  Eyes: [ ] icterus [ ] pain  [ ] discharge	  ENMT: [ ] sore throat  [ ] thrush [ ] ulcers [ ] exudates  Respiratory: [ ] dyspnea [ ] hemoptysis [ ] cough [ ] sputum	  Cardiovascular:  [ ] chest pain [ ] palpitations [ ] edema	  Gastrointestinal:  [ ] nausea [ ] vomiting [ ] diarrhea [ ] constipation [ ] pain	  Genitourinary:  [ ] dysuria [ ] frequency [ ] hematuria [ ] discharge [ ] flank pain  [ ] incontinence  Musculoskeletal:  [ ] myalgias [ ] arthralgias [ ] arthritis  [ ] back pain  Neurological:  [ ] headache [ ] seizures  [ ] confusion/altered mental status  Psychiatric:  [ ] anxiety [ ] depression	  Hematology/Lymphatics:  [ ] lymphadenopathy  Endocrine:  [ ] adrenal [ ] thyroid  Allergic/Immunologic:	 [ ] transplant [ ] seasonal    Vital Signs Last 24 Hrs  T(F): 98.8 (04-07-21 @ 04:00), Max: 101.8 (04-05-21 @ 05:00)    Vital Signs Last 24 Hrs  HR: 80 (04-07-21 @ 12:00) (58 - 114)  RR: 32 (04-07-21 @ 12:00)  SpO2: 100% (04-07-21 @ 12:00) (70% - 100%)      PHYSICAL EXAM:  Constitutional: non-toxic, no distress, intubated and sedated   HEAD/EYES: anicteric, no conjunctival injection  ENT:  supple, no thrush, +ETT and OGT  Cardiovascular:   normal S1, S2, no murmur, trace edema b/l in LE   Respiratory:  ventilator BS bilaterally, no wheezes, no rales  GI:  soft, non-tender, normal bowel sounds  : + montiel, no CVA tenderness  Musculoskeletal:  no synovitis, normal ROM  Neurologic: intubated and sedated   Skin:  no rash, no erythema, no phlebitis  Heme/Onc: no lymphadenopathy   Psychiatric:  unable  Lines: +sam c/d/i, +right IJ c/d/i           WBC Count: 11.30 K/uL (04-07 @ 04:15)  WBC Count: 11.94 K/uL (04-06 @ 05:39)  WBC Count: 14.90 K/uL (04-05 @ 02:42)  WBC Count: 18.47 K/uL (04-04 @ 03:27)  WBC Count: 19.66 K/uL (04-03 @ 03:21)  WBC Count: 17.82 K/uL (04-02 @ 04:05)  WBC Count: 32.16 K/uL (04-01 @ 00:47)                            8.8    11.30 )-----------( 167      ( 07 Apr 2021 04:15 )             29.2       04-07    136  |  103  |  26<H>  ----------------------------<  292<H>  5.3   |  27  |  0.62    Ca    8.0<L>      07 Apr 2021 04:15  Phos  2.6     04-07  Mg     2.4     04-07    TPro  6.0  /  Alb  1.5<L>  /  TBili  0.8  /  DBili  x   /  AST  655<H>  /  ALT  730<H>  /  AlkPhos  248<H>  04-07      Creatinine Trend: 0.62<--, 0.53<--, 0.87<--, 0.76<--, 0.65<--, 0.61<--        MICROBIOLOGY:  Vancomycin Level, Random: 15.1 ug/mL (04-07-21 @ 04:15)      C-Reactive Protein, Serum: 194 (04-07)  C-Reactive Protein, Serum: 157 (04-01)  C-Reactive Protein, Serum: <3 (03-29)  C-Reactive Protein, Serum: 184 (03-26)    Ferritin, Serum: 72864 (04-05)  Ferritin, Serum: 2873 (04-01)  Ferritin, Serum: 1426 (03-30)  Ferritin, Serum: 1558 (03-29)  Ferritin, Serum: 2181 (03-26)      D-Dimer Assay, Quantitative: 2091 (04-07)  D-Dimer Assay, Quantitative: 1498 (04-04)  D-Dimer Assay, Quantitative: 7615 (04-01)  D-Dimer Assay, Quantitative: 938 (03-29)  D-Dimer Assay, Quantitative: 1344 (03-26)    Procalcitonin, Serum: 0.16 (04-01-21 @ 09:23)      RADIOLOGY:  US Abdomen Limited (04.07.21 @ 10:02)   IMPRESSION:    Hepatic steatosis.    US Abdomen Complete (US Abdomen Complete .) (04.02.21 @ 18:56)   IMPRESSION:  Hepatomegaly and hepatic steatosis.    US Duplex Venous Lower Ext Complete, Bilateral (04.02.21 @ 18:51)   IMPRESSION:  No evidence of deep venous thrombosis in either lower extremity.    Xray Chest 1 View- PORTABLE-Urgent (Xray Chest 1 View- PORTABLE-Urgent .) (04.01.21 @ 12:25)   IMPRESSION: Improved endotracheal tube position.    Persistent advanced infiltrates.        
    RERE BARRYINIQUE    S 2C 246 D    Patient is a 65y old  Female who presents with a chief complaint of covid pna and dka (29 Mar 2021 11:36)       Allergies    Carafate (Rash)  Levaquin (Rash)  Percocet 5/325 (Other)    Intolerances    lactose (Flatulence)      HPI:  64yo F w/ HTN (on losartan) and DM2 (on metformin) presents with progressively worsening SOB associated with productive cough for past several days much worse the past 3 days.  Started on abx (levaquin, prednisone and azithromycin 3/19) outpatient with no improvement. Denies any chest pain, fevers, dizziness, syncope, abd pain, back pain, N/V/D, recent sick contact, recent travel.     In ED: CTA chest done to r/o PE findings consistent w/ COVID-19 PNA. Labs remarkable for WBC 18.55, Na 130, K 3.2, COs 10, AG 29, Glucose 416, alb 2.5.  Placed on High flow for dyspnea and hypoxemia. Admitted to ICU for COVID pna and DKA.   (23 Mar 2021 05:08)      PAST MEDICAL & SURGICAL HISTORY:  HLD (hyperlipidemia)    DM (diabetes mellitus)    HTN (hypertension)    No significant past surgical history        FAMILY HISTORY:  No pertinent family history in first degree relatives          MEDICATIONS   dexAMETHasone  Injectable 6 milliGRAM(s) IV Push daily  dextrose 40% Gel 15 Gram(s) Oral once  dextrose 5%. 1000 milliLiter(s) IV Continuous <Continuous>  dextrose 5%. 1000 milliLiter(s) IV Continuous <Continuous>  dextrose 50% Injectable 25 Gram(s) IV Push once  dextrose 50% Injectable 12.5 Gram(s) IV Push once  dextrose 50% Injectable 25 Gram(s) IV Push once  ergocalciferol 48350 Unit(s) Oral <User Schedule>  glucagon  Injectable 1 milliGRAM(s) IntraMuscular once  heparin   Injectable 5000 Unit(s) SubCutaneous every 8 hours  insulin glargine Injectable (LANTUS) 12 Unit(s) SubCutaneous at bedtime  insulin lispro (ADMELOG) corrective regimen sliding scale   SubCutaneous three times a day before meals  losartan 25 milliGRAM(s) Oral daily  pantoprazole  Injectable 40 milliGRAM(s) IV Push daily      Vital Signs Last 24 Hrs  T(C): 36.6 (29 Mar 2021 16:49), Max: 37.9 (29 Mar 2021 08:00)  T(F): 97.8 (29 Mar 2021 16:49), Max: 100.3 (29 Mar 2021 08:00)  HR: 110 (29 Mar 2021 17:36) (96 - 114)  BP: 184/91 (29 Mar 2021 16:49) (158/96 - 184/91)  BP(mean): --  RR: 22 (29 Mar 2021 17:37) (18 - 22)  SpO2: 90% (29 Mar 2021 17:37) (64% - 97%)      03-28-21 @ 07:01  -  03-29-21 @ 07:00  --------------------------------------------------------  IN: 625 mL / OUT: 900 mL / NET: -275 mL    03-29-21 @ 07:01  -  03-29-21 @ 18:55  --------------------------------------------------------  IN: 118 mL / OUT: 350 mL / NET: -232 mL            LABS:                        10.9   13.47 )-----------( 343      ( 29 Mar 2021 06:55 )             35.5     03-29    134<L>  |  94<L>  |  11  ----------------------------<  113<H>  3.8   |  32<H>  |  0.46<L>    Ca    8.7      29 Mar 2021 06:55                WBC:  WBC Count: 13.47 K/uL (03-29 @ 06:55)  WBC Count: 12.52 K/uL (03-27 @ 07:44)  WBC Count: 12.17 K/uL (03-26 @ 06:42)      MICROBIOLOGY:  RECENT CULTURES:  03-24 .Urine Clean Catch (Midstream) XXXX XXXX   No growth    03-23 .Blood Blood-Peripheral XXXX XXXX   No Growth Final                    Sodium:  Sodium, Serum: 134 mmol/L (03-29 @ 06:55)  Sodium, Serum: 137 mmol/L (03-27 @ 07:44)  Sodium, Serum: 134 mmol/L (03-26 @ 06:42)      0.46 mg/dL 03-29 @ 06:55  0.44 mg/dL 03-27 @ 07:44  0.43 mg/dL 03-26 @ 06:42      Hemoglobin:  Hemoglobin: 10.9 g/dL (03-29 @ 06:55)  Hemoglobin: 11.4 g/dL (03-27 @ 07:44)  Hemoglobin: 11.5 g/dL (03-26 @ 06:42)      Platelets: Platelet Count - Automated: 343 K/uL (03-29 @ 06:55)  Platelet Count - Automated: 352 K/uL (03-27 @ 07:44)  Platelet Count - Automated: 365 K/uL (03-26 @ 06:42)              RADIOLOGY & ADDITIONAL STUDIES:  
  Patient is a 65y old  Female who presents with a chief complaint of covid pna and dka (25 Mar 2021 16:22)      Reason For Consult: hyperglycemia , steroid induced hyperglycemia     HPI:  64yo F w/ HTN (on losartan) and DM2 (on metformin) presents with progressively worsening SOB associated with productive cough for past several days much worse the past 3 days.  Started on abx (levaquin, prednisone and azithromycin 3/19) outpatient with no improvement. Denies any chest pain, fevers, dizziness, syncope, abd pain, back pain, N/V/D, recent sick contact, recent travel.     In ED: CTA chest done to r/o PE findings consistent w/ COVID-19 PNA. Labs remarkable for WBC 18.55, Na 130, K 3.2, COs 10, AG 29, Glucose 416, alb 2.5.  Placed on High flow for dyspnea and hypoxemia. Admitted to ICU for COVID pna and DKA.   (23 Mar 2021 05:08)  comes COVID-19 Pneumonia and is on Dexamethasone   also HbA1C ?, was on Prednisone 20 mg at home and Synjardy at home     PAST MEDICAL & SURGICAL HISTORY:  HLD (hyperlipidemia)    DM (diabetes mellitus)    HTN (hypertension)    No significant past surgical history        FAMILY HISTORY:  No pertinent family history in first degree relatives          Social History:    MEDICATIONS  (STANDING):  dexAMETHasone  Injectable 6 milliGRAM(s) IV Push daily  dextrose 40% Gel 15 Gram(s) Oral once  dextrose 5%. 1000 milliLiter(s) (50 mL/Hr) IV Continuous <Continuous>  dextrose 5%. 1000 milliLiter(s) (100 mL/Hr) IV Continuous <Continuous>  dextrose 50% Injectable 25 Gram(s) IV Push once  dextrose 50% Injectable 12.5 Gram(s) IV Push once  dextrose 50% Injectable 25 Gram(s) IV Push once  ergocalciferol 33705 Unit(s) Oral <User Schedule>  glucagon  Injectable 1 milliGRAM(s) IntraMuscular once  heparin   Injectable 5000 Unit(s) SubCutaneous every 8 hours  insulin glargine Injectable (LANTUS) 25 Unit(s) SubCutaneous at bedtime  insulin lispro (ADMELOG) corrective regimen sliding scale   SubCutaneous three times a day before meals  pantoprazole  Injectable 40 milliGRAM(s) IV Push daily  remdesivir  IVPB   IV Intermittent   remdesivir  IVPB 100 milliGRAM(s) IV Intermittent every 24 hours    MEDICATIONS  (PRN):      REVIEW OF SYSTEMS:  CONSTITUTIONAL:  as per HPI    T(C): 36.7 (03-25-21 @ 17:53), Max: 38.1 (03-25-21 @ 07:30)  HR: 101 (03-25-21 @ 18:52) (81 - 104)  BP: 138/79 (03-25-21 @ 17:53) (89/66 - 171/77)  RR: 20 (03-25-21 @ 18:52) (20 - 38)  SpO2: 95% (03-25-21 @ 18:52) (87% - 100%)  Wt(kg): --    PHYSICAL EXAM: as per the progress notes of Primary Team   GENERAL: NAD, well-groomed, well-developed  HEAD:  Atraumatic, Normocephalic    CAPILLARY BLOOD GLUCOSE      POCT Blood Glucose.: 226 mg/dL (25 Mar 2021 16:24)  POCT Blood Glucose.: 263 mg/dL (25 Mar 2021 11:24)  POCT Blood Glucose.: 235 mg/dL (25 Mar 2021 07:23)  POCT Blood Glucose.: 216 mg/dL (24 Mar 2021 21:22)                            12.0   11.02 )-----------( 348      ( 25 Mar 2021 03:03 )             37.5       CMP:  03-25 @ 03:03  SGPT 24  Albumin 2.3   Alk Phos 88   Anion Gap 9   SGOT 23   Total Bili 0.8   BUN 16   Calcium Total 8.9   CO2 29   Chloride 96   Creatinine 0.49   eGFR if    eGFR if non    Glucose 203   Potassium 3.9   Protein 6.6   Sodium 134      Thyroid Function Tests:      Diabetes Tests:     Parathyroids:     Adrenals:       Radiology:

## 2021-04-07 NOTE — PROGRESS NOTE ADULT - PROBLEM SELECTOR PLAN 1
doing well off paralytics, can wean ketamine today and c/w propofol and fent, ABG in computer reading sat 58 but plth with good wavefrom at bedside is 100 and pritned ABG is 7.37/47/58/ sat 87. C/W AC ventialtion today  - c/W Vanco, awit repeat Cx for MRSE  - finished steroids and remdesivir for COVID-19  - Lovenox for PPX  - synchronous with the vent off paralytics, can wean ketamine today and c/w propofol and fent, ABG in computer reading sat 58 but pleth with good wavefrom at bedside is 100 and printed ABG is 7.37/47/58/ sat 87. C/W AC ventilation today  - c/W Vanco, await repeat Cx for MRSE  - finished steroids and remdesivir for COVID-19  - Lovenox for PPX  -

## 2021-04-08 NOTE — PROGRESS NOTE ADULT - SUBJECTIVE AND OBJECTIVE BOX
RERE KILPATRICK  MRN-[id number]    Follow Up:  COVID, MRSE in blood culture     Interval History: increased FiO2 to 100% today, cultures remain negative, not a lot of secretions, LFTs are improving, remains critically ill with very guarded prognosis     ROS:    [ X] Unobtainable because: intubated/sedated   [ ] All other systems negative    Constitutional: no fever, no chills  Head: no trauma  Eyes: no vision changes, no eye pain  ENT:  no sore throat, no rhinorrhea  Cardiovascular:  no chest pain, no palpitation  Respiratory:  no SOB, no cough  GI:  no abd pain, no vomiting, no diarrhea  urinary: no dysuria, no hematuria, no flank pain  musculoskeletal:  no joint pain, no joint swelling  skin:  no rash  neurology:  no headache, no seizure, no change in mental status  psych: no anxiety, no depression         Allergies  Carafate (Rash)  Levaquin (Rash)  Percocet 5/325 (Other)        ANTIMICROBIALS:  vancomycin  IVPB    vancomycin  IVPB 1000 every 12 hours      OTHER MEDS:  chlorhexidine 0.12% Liquid 15 milliLiter(s) Oral Mucosa every 12 hours  chlorhexidine 2% Cloths 1 Application(s) Topical <User Schedule>  dextrose 40% Gel 15 Gram(s) Oral once  dextrose 5%. 1000 milliLiter(s) IV Continuous <Continuous>  dextrose 5%. 1000 milliLiter(s) IV Continuous <Continuous>  dextrose 50% Injectable 25 Gram(s) IV Push once  dextrose 50% Injectable 12.5 Gram(s) IV Push once  dextrose 50% Injectable 25 Gram(s) IV Push once  enoxaparin Injectable 40 milliGRAM(s) SubCutaneous two times a day  ergocalciferol 68080 Unit(s) Oral <User Schedule>  fentaNYL   Infusion. 0.5 MICROgram(s)/kG/Hr IV Continuous <Continuous>  glucagon  Injectable 1 milliGRAM(s) IntraMuscular once  insulin glargine Injectable (LANTUS) 8 Unit(s) SubCutaneous at bedtime  insulin lispro (ADMELOG) corrective regimen sliding scale   SubCutaneous every 6 hours  midodrine 20 milliGRAM(s) Oral every 8 hours  norepinephrine Infusion 0.05 MICROgram(s)/kG/Min IV Continuous <Continuous>  pantoprazole  Injectable 40 milliGRAM(s) IV Push daily  propofol Infusion 10.009 MICROgram(s)/kG/Min IV Continuous <Continuous>  sodium chloride 0.9% lock flush 10 milliLiter(s) IV Push every 1 hour PRN      Physical Exam:  Vital Signs Last 24 Hrs  T(C): 36 (08 Apr 2021 15:47), Max: 37.8 (08 Apr 2021 06:00)  T(F): 96.8 (08 Apr 2021 15:47), Max: 100 (08 Apr 2021 06:00)  HR: 64 (08 Apr 2021 16:00) (62 - 92)  BP: --  BP(mean): --  RR: 32 (08 Apr 2021 16:00) (26 - 32)  SpO2: 100% (08 Apr 2021 16:00) (90% - 100%)    Constitutional: non-toxic, no distress, intubated and sedated   HEAD/EYES: anicteric, no conjunctival injection  ENT:  supple, no thrush, +ETT and OGT  Cardiovascular:   normal S1, S2, no murmur, trace edema b/l in LE   Respiratory:  ventilator BS bilaterally, no wheezes, no rales  GI:  soft, distended,  normal bowel sounds  : + montiel  Musculoskeletal:  no synovitis  Neurologic: intubated and sedated   Skin:  no rash, no erythema, no phlebitis  Heme/Onc: no lymphadenopathy   Psychiatric:  unable  Lines: +sam c/d/i, +right IJ c/d/i     WBC Count: 10.35 K/uL (04-08 @ 04:07)  WBC Count: 11.30 K/uL (04-07 @ 04:15)  WBC Count: 11.94 K/uL (04-06 @ 05:39)  WBC Count: 14.90 K/uL (04-05 @ 02:42)  WBC Count: 18.47 K/uL (04-04 @ 03:27)  WBC Count: 19.66 K/uL (04-03 @ 03:21)  WBC Count: 17.82 K/uL (04-02 @ 04:05)                            7.9    10.35 )-----------( 181      ( 08 Apr 2021 04:07 )             27.3       04-08    135  |  103  |  25<H>  ----------------------------<  142<H>  5.0   |  26  |  0.40<L>    Ca    7.7<L>      08 Apr 2021 04:07  Phos  2.3     04-08  Mg     2.2     04-08    TPro  5.7<L>  /  Alb  1.5<L>  /  TBili  0.6  /  DBili  x   /  AST  273<H>  /  ALT  501<H>  /  AlkPhos  200<H>  04-08          Creatinine Trend: 0.40<--, 0.62<--, 0.53<--, 0.87<--, 0.76<--, 0.65<--      MICROBIOLOGY:  Vancomycin Level, Trough: 18.6 ug/mL (04-08-21 @ 06:31)    .Blood Blood  04-06-21   No growth to date.  --  --      .Blood Blood-Peripheral  04-01-21   Growth in aerobic and anaerobic bottles: Staphylococcus epidermidis  Coag Negative Staphylococcus  Single set isolate, possible contaminant. Contact  Microbiology if susceptibility testing clinically  indicated.  ***Blood Panel PCR results on this specimen are available  approximately 3 hours after the Gram stain result.***  Gram stain, PCR, and/or culture results may not always  correspond due to difference in methodologies.  ************************************************************  This PCR assay was performed by multiplex PCR. This  Assay tests for 66 bacterial and resistance gene targets.  Please refer to the [facility] test directory  at [email or web address] for details.  --  Blood Culture PCR      .Urine Clean Catch (Midstream)  03-24-21   No growth  --  --      .Blood Blood-Peripheral  03-23-21   No Growth Final  --  --        C-Reactive Protein, Serum: 194 (04-07)  C-Reactive Protein, Serum: 157 (04-01)  C-Reactive Protein, Serum: <3 (03-29)  C-Reactive Protein, Serum: 184 (03-26)    Ferritin, Serum: 37707 (04-07)  Ferritin, Serum: 64562 (04-05)  Ferritin, Serum: 2873 (04-01)  Ferritin, Serum: 1426 (03-30)  Ferritin, Serum: 1558 (03-29)  Ferritin, Serum: 2181 (03-26)      D-Dimer Assay, Quantitative: 2091 (04-07)  D-Dimer Assay, Quantitative: 1498 (04-04)  D-Dimer Assay, Quantitative: 7615 (04-01)  D-Dimer Assay, Quantitative: 938 (03-29)  D-Dimer Assay, Quantitative: 1344 (03-26)        RADIOLOGY:

## 2021-04-08 NOTE — PROGRESS NOTE ADULT - SUBJECTIVE AND OBJECTIVE BOX
Patient is a 65y old  Female who presents with a chief complaint of covid pna and dka (08 Apr 2021 17:14)      Interval History: doing poorly   sedated with Propofol  finger sticks are in 100's   on Lantus 8 units and sliding scale lispro every 6 hours        MEDICATIONS  (STANDING):  chlorhexidine 0.12% Liquid 15 milliLiter(s) Oral Mucosa every 12 hours  chlorhexidine 2% Cloths 1 Application(s) Topical <User Schedule>  dextrose 40% Gel 15 Gram(s) Oral once  dextrose 5%. 1000 milliLiter(s) (100 mL/Hr) IV Continuous <Continuous>  dextrose 5%. 1000 milliLiter(s) (50 mL/Hr) IV Continuous <Continuous>  dextrose 50% Injectable 25 Gram(s) IV Push once  dextrose 50% Injectable 12.5 Gram(s) IV Push once  dextrose 50% Injectable 25 Gram(s) IV Push once  enoxaparin Injectable 40 milliGRAM(s) SubCutaneous two times a day  ergocalciferol 43611 Unit(s) Oral <User Schedule>  fentaNYL   Infusion. 0.5 MICROgram(s)/kG/Hr (3.86 mL/Hr) IV Continuous <Continuous>  glucagon  Injectable 1 milliGRAM(s) IntraMuscular once  insulin glargine Injectable (LANTUS) 8 Unit(s) SubCutaneous at bedtime  insulin lispro (ADMELOG) corrective regimen sliding scale   SubCutaneous every 6 hours  midodrine 20 milliGRAM(s) Oral every 8 hours  norepinephrine Infusion 0.05 MICROgram(s)/kG/Min (3.61 mL/Hr) IV Continuous <Continuous>  pantoprazole  Injectable 40 milliGRAM(s) IV Push daily  propofol Infusion 10.009 MICROgram(s)/kG/Min (4.63 mL/Hr) IV Continuous <Continuous>  zinc oxide 40% Ointment 1 Application(s) Topical three times a day    MEDICATIONS  (PRN):  sodium chloride 0.9% lock flush 10 milliLiter(s) IV Push every 1 hour PRN Pre/post blood products, medications, blood draw, and to maintain line patency      Allergies    Carafate (Rash)  Levaquin (Rash)  Percocet 5/325 (Other)    Intolerances    lactose (Flatulence)      REVIEW OF SYSTEMS:  CONSTITUTIONAL: no changes    Vital Signs Last 24 Hrs  T(C): 35.9 (08 Apr 2021 19:30), Max: 37.8 (08 Apr 2021 06:00)  T(F): 96.7 (08 Apr 2021 19:30), Max: 100 (08 Apr 2021 06:00)  HR: 67 (08 Apr 2021 22:30) (62 - 84)  BP: --  BP(mean): --  RR: 32 (08 Apr 2021 22:30) (32 - 32)  SpO2: 100% (08 Apr 2021 22:30) (94% - 100%)    PHYSICAL EXAM:  GENERAL: deferred ,aspc     LABS:        CAPILLARY BLOOD GLUCOSE      POCT Blood Glucose.: 130 mg/dL (08 Apr 2021 17:16)  POCT Blood Glucose.: 208 mg/dL (08 Apr 2021 10:54)  POCT Blood Glucose.: 151 mg/dL (08 Apr 2021 06:12)    Lipid panel:       ABG - ( 08 Apr 2021 10:51 )  pH, Arterial: x     pH, Blood: 7.35  /  pCO2: 46    /  pO2: 106   / HCO3: 25    / Base Excess: -0.2  /  SaO2: 97                Mode: AC/ CMV (Assist Control/ Continuous Mandatory Ventilation)  RR (machine): 32  TV (machine): 380  FiO2: 100  PEEP: 10  ITime: 0.7  MAP: 19  PIP: 46    Thyroid:  Diabetes Tests:  Parathyroid Panel:  Adrenals:  RADIOLOGY & ADDITIONAL TESTS:    Imaging Personally Reviewed:  [ ] YES  [ ] NO    Consultant(s) Notes Reviewed:  [ ] YES  [ ] NO    Care Discussed with Consultants/Other Providers [ ] YES  [ ] NO

## 2021-04-08 NOTE — PROGRESS NOTE ADULT - PROBLEM SELECTOR PLAN 1
Right femoral artery angiogram was performed  using a Sheath Seville Cv [id number] .[id number] by hand injection.  The access site was deemed to be appropriate for closure.  See above

## 2021-04-08 NOTE — PROGRESS NOTE ADULT - SUBJECTIVE AND OBJECTIVE BOX
RERE FINESSE    [facility] CCU 6    Patient is a 65y old  Female who presents with a chief complaint of covid pna and dka (07 Apr 2021 23:19)       Allergies    Carafate (Rash)  Levaquin (Rash)  Percocet 5/325 (Other)    Intolerances    lactose (Flatulence)      HPI:  66yo F w/ HTN (on losartan) and DM2 (on metformin) presents with progressively worsening SOB associated with productive cough for past several days much worse the past 3 days.  Started on abx (levaquin, prednisone and azithromycin 3/19) outpatient with no improvement. Denies any chest pain, fevers, dizziness, syncope, abd pain, back pain, N/V/D, recent sick contact, recent travel.     In ED: CTA chest done to r/o PE findings consistent w/ COVID-19 PNA. Labs remarkable for WBC 18.55, Na 130, K 3.2, COs 10, AG 29, Glucose 416, alb 2.5.  Placed on High flow for dyspnea and hypoxemia. Admitted to ICU for COVID pna and DKA.   (23 Mar 2021 05:08)      PAST MEDICAL & SURGICAL HISTORY:  HTN (hypertension)    DM (diabetes mellitus)    HLD (hyperlipidemia)    No significant past surgical history        FAMILY HISTORY:  No pertinent family history in first degree relatives          MEDICATIONS   chlorhexidine 0.12% Liquid 15 milliLiter(s) Oral Mucosa every 12 hours  chlorhexidine 2% Cloths 1 Application(s) Topical <User Schedule>  dextrose 40% Gel 15 Gram(s) Oral once  dextrose 5%. 1000 milliLiter(s) IV Continuous <Continuous>  dextrose 5%. 1000 milliLiter(s) IV Continuous <Continuous>  dextrose 50% Injectable 25 Gram(s) IV Push once  dextrose 50% Injectable 12.5 Gram(s) IV Push once  dextrose 50% Injectable 25 Gram(s) IV Push once  enoxaparin Injectable 40 milliGRAM(s) SubCutaneous two times a day  ergocalciferol 57370 Unit(s) Oral <User Schedule>  fentaNYL   Infusion. 0.5 MICROgram(s)/kG/Hr IV Continuous <Continuous>  glucagon  Injectable 1 milliGRAM(s) IntraMuscular once  insulin glargine Injectable (LANTUS) 8 Unit(s) SubCutaneous at bedtime  insulin lispro (ADMELOG) corrective regimen sliding scale   SubCutaneous every 6 hours  ketamine Infusion. 0.25 mG/kG/Hr IV Continuous <Continuous>  midodrine 20 milliGRAM(s) Oral every 8 hours  norepinephrine Infusion 0.05 MICROgram(s)/kG/Min IV Continuous <Continuous>  pantoprazole  Injectable 40 milliGRAM(s) IV Push daily  propofol Infusion 10.009 MICROgram(s)/kG/Min IV Continuous <Continuous>  sodium chloride 0.9% lock flush 10 milliLiter(s) IV Push every 1 hour PRN  vancomycin  IVPB      vancomycin  IVPB 1000 milliGRAM(s) IV Intermittent every 12 hours  vasopressin Infusion 0.04 Unit(s)/Min IV Continuous <Continuous>      Vital Signs Last 24 Hrs  T(C): 37.4 (08 Apr 2021 07:30), Max: 37.8 (08 Apr 2021 06:00)  T(F): 99.4 (08 Apr 2021 07:30), Max: 100 (08 Apr 2021 06:00)  HR: 80 (08 Apr 2021 08:30) (72 - 96)  BP: --  BP(mean): --  RR: 32 (08 Apr 2021 08:30) (26 - 32)  SpO2: 100% (08 Apr 2021 08:02) (90% - 100%)      04-07-21 @ 07:01  -  04-08-21 @ 07:00  --------------------------------------------------------  IN: 1752.4 mL / OUT: 825 mL / NET: 927.4 mL    04-08-21 @ 07:01  -  04-08-21 @ 09:28  --------------------------------------------------------  IN: 58.4 mL / OUT: 50 mL / NET: 8.4 mL        Mode: AC/ CMV (Assist Control/ Continuous Mandatory Ventilation), RR (machine): 32, TV (machine): 380, FiO2: 100, PEEP: 10, ITime: 0.7, MAP: 20, PIP: 51    LABS:                        7.9    10.35 )-----------( 181      ( 08 Apr 2021 04:07 )             27.3     04-08    135  |  103  |  25<H>  ----------------------------<  142<H>  5.0   |  26  |  0.40<L>    Ca    7.7<L>      08 Apr 2021 04:07  Phos  2.3     04-08  Mg     2.2     04-08    TPro  5.7<L>  /  Alb  1.5<L>  /  TBili  0.6  /  DBili  x   /  AST  273<H>  /  ALT  501<H>  /  AlkPhos  200<H>  04-08          ABG - ( 08 Apr 2021 08:12 )  pH, Arterial: x     pH, Blood: 7.34  /  pCO2: 47    /  pO2: 52    / HCO3: 25    / Base Excess: -0.4  /  SaO2: 82                  WBC:  WBC Count: 10.35 K/uL (04-08 @ 04:07)  WBC Count: 11.30 K/uL (04-07 @ 04:15)  WBC Count: 11.94 K/uL (04-06 @ 05:39)  WBC Count: 14.90 K/uL (04-05 @ 02:42)      MICROBIOLOGY:  RECENT CULTURES:  04-06 .Blood Blood XXXX XXXX   No growth to date.                    Sodium:  Sodium, Serum: 135 mmol/L (04-08 @ 04:07)  Sodium, Serum: 136 mmol/L (04-07 @ 04:15)  Sodium, Serum: 140 mmol/L (04-06 @ 05:39)  Sodium, Serum: 138 mmol/L (04-05 @ 02:42)      0.40 mg/dL 04-08 @ 04:07  0.62 mg/dL 04-07 @ 04:15  0.53 mg/dL 04-06 @ 05:39  0.87 mg/dL 04-05 @ 02:42      Hemoglobin:  Hemoglobin: 7.9 g/dL (04-08 @ 04:07)  Hemoglobin: 8.8 g/dL (04-07 @ 04:15)  Hemoglobin: 8.7 g/dL (04-06 @ 05:39)  Hemoglobin: 8.7 g/dL (04-05 @ 02:42)      Platelets: Platelet Count - Automated: 181 K/uL (04-08 @ 04:07)  Platelet Count - Automated: 167 K/uL (04-07 @ 04:15)  Platelet Count - Automated: 167 K/uL (04-06 @ 05:39)  Platelet Count - Automated: 166 K/uL (04-05 @ 02:42)      LIVER FUNCTIONS - ( 08 Apr 2021 04:07 )  Alb: 1.5 g/dL / Pro: 5.7 gm/dL / ALK PHOS: 200 U/L / ALT: 501 U/L / AST: 273 U/L / GGT: x                 RADIOLOGY & ADDITIONAL STUDIES:

## 2021-04-08 NOTE — PROGRESS NOTE ADULT - ASSESSMENT
FINESSE JERNIGAN 65 F 1955 3/23/2021 DR YANN UNDERWOOD     REVIEW OF SYMPTOMS      Able to give (reliable) ROS  NO     PHYSICAL EXAM    HEENT Unremarkable  atraumatic   RESP Fair air entry EXP prolonged    Harsh breath sound Resp distres mild   CARDIAC S1 S2 No S3     NO JVD    ABDOMEN SOFT BS PRESENT NOT DISTENDED No hepatosplenomegaly   PEDAL EDEMA present No calf tenderness  NO rash       PATIENT SUMMARY  66 yo F with h/o HTN, HLD and DM2 who presented with progressively worsening SOB  Patient ruled in for  COVID-19 PNA.  ICU admitting dx : 1) Acute hypoxic resp failure 2 to Covid-19 PNA with improvement in oxygenation  2) s/p DKA.  Pt was admitted 3/23 transferred out of ICU 3/26  Pulm consulted 3/29/2021   Transferred to ICU 3/31   INTUBATED 3/31   NG TBE 3/31      PROBLEMS.  COVID PNEUMONIA   STAPH EPI BACTEREMIA 4/1   ACUTE HYPOXEMIC RESP FAILURE   ELEVATED Ddimr  INTUBATED 3/31   NG TBE 3/31  LACTICEMIA 4/1/2021   TROPONINEMIA 4/1/2021   ELEVATED LFTS 4/5/2021  AST 1393       PATIENT DATA   ABG.     4/8/2021 100% vent 735/46/106     VITALS/IO/VENT/DRIPS.  4/8/2021 afeb 120/50   4/8/2021 7a fent 4.5 m/k/h   4/8/2021 1p nore .05 m/k/m   4/8/2021 7a prop 35 m/k/m   4/8/2021 ac 32/38/10/100%     GLOBAL AND BEST PRACTICE ISSUES                     ALLERGY. carafate levaquin percocet      WEIGHT.      3/29/2021 77                           BMI.                 3/29/2021 29          ADVANCED DIRECTIVE.                               HEAD OF BED ELEVATION. Yes  DVT PROPHYLAXIS.   hpsc (3/23)   --> lvnx 40 93/30)                  MANUEL PROPHYLAXIS. PROTONIX 40 (3/23)   APA.                                                                    DYSPHAGIA RECOMM.   DIET.  CONS CARB (3/25) --> vital 1080 (4/5)      INFECTION PROPHYLAXIS.   chlorhexidine .12% (4/1/20210  chlorhexidine 2% (4/1/2021)   FREE WATER.      ASSESSMENT/RECOMMENDATIONS.    D-DIMR ELEVATED  3/26-3/29/2021 D-dimr 1344-930  3/23 cta ch no pe bl ggo  Trending down   is on dvt ppl  INFECTION  STAPH EPI BACATREMIA 4/1 4/1-4/5/2021 w 32 -14.9   4/1 blod c staph epid   vanco (4/2)   SHOCK   On vasopressors  Target MAP 65   MECHANICAL VENT   Target PO2 60 (+) PPlat 35 (-) pH 730 (+)   oxygenation improved     COVID PNEUMONIA AND HYPOXIC RESP FAILURE MANAGEMENT.   REMDESEVIR (3/23)   DEXA (3/23)   PRONE (3/26)   Monitor pulse oximetry Target PO 92-96%  Monitor inflammatory and thrombotic biomarkers  Monitor for  for progressively  worsening oxygenation failure   O2 to keep po 90-95%  Intubated 3/31  Cont supp care   Requriing 100% O2 again    ASSESSMENT/RECOMMENDATIONS.    D-DIMR ELEVATED  3/26-3/29/2021 D-dimr 1344-930  3/23 cta ch no pe bl ggo  Trending down   is on dvt ppl  INFECTION  STAPH EPI BACATREMIA 4/1 4/1-4/5/2021 w 32 -14.9   4/1 blod c staph epid   vanco (4/2)   SHOCK   On vasopressors  Target MAP 65   MECHANICAL VENT   Target PO2 60 (+) PPlat 35 (-) pH 730 (+)   oxygenation improved     TIME SPENT   Over 25 minutes aggregate care time spent on encounter; activities included   direct patient care, counseling and/or coordinating care reviewing notes, lab data/ imaging , discussion with multidisciplinary team/ patient  /family and explaining in detail risks, benefits, alternatives  of the recommendations     FINESSE JERNIGAN 65 F 1955 3/23/2021 DR YANN UNDERWOOD

## 2021-04-08 NOTE — PROGRESS NOTE ADULT - ASSESSMENT
64yo F w/ HTN (on losartan) and DM2 (on metformin) admitted with acute repiratory failure due to covid now in ARDS.   Imaging personally reviewed and shows extensive infiltrates   Blood culture from 4/1 (one set only was sent) grew FAN  She has had intermittent fevers the last several days   Repeat blood cultures have been sent and awaiting results   Her initial presentation was with DKA though her glucose levels are better controlled and no longer in DKA   Suffered shock liver likely from initial intubation/septic shock from covid though her AST/ALT are improving   Staph epi is often a contaminant. The ideal situation is to draw 2 sets of blood cultures from 2 separate sticks to ensure that if in fact a contaminant it would not grow in the additional set. In this case only one set was sent and she has a central line, a-line, ETT, NGT, montiel  and has been having intermittent fevers. Still suspect that this was a procurement contaminant but will await for the repeat blood cultures.   DDx regarding her fevers are quite broad: covid vs DVT/PE (dvt study negative), medication induced ( vanco on rare occasions causes fevers), subacute thyroiditis, bacterial pneumonia etc     4/8: on pressors and intubated, Fio2 100%, day 7 of vanco, repeat blood culture negative      Antibiotics this hospitalization:   Vanco 4/2->  RDV 3/23-3/27  ceftriaxone 3/23  Azithro 3/23-3/24    Overall, 65F septic shock due to covid, shock liver, leukocytosis, fever, ARDS, anemia, +CONS     Suggest-  Would suggest stopping vancomycin   f/u repeat blood cultures   if able, consider exchanging lines   trend WBC, LFTs   trend fever curve   pressors per ICU   Ventilator management per ICU   Send TSH, free T4   Good but not too tight glycemic control->endocrine recommendations appreciated   Remains critically ill.    D/W Dr. Shannon and CCU team     Rosales Mccollum DO  Infectious Disease Attending  Pager [phone]   After 5pm/weekends please call [phone] for all inquiries and new consults

## 2021-04-08 NOTE — PROGRESS NOTE ADULT - PROBLEM SELECTOR PLAN 1
Oxygen reqmts increasing, ID follow up appreciated. This is likely due to worsening COVID pneumonitis, secretions are the same, no major temp spikes. ABX as per ID  There is a discordance better sat on pleth and sat on gas, will do concomitant ABG from a line and peripheral stick. Oxygen reqmts increasing, This is likely due to worsening COVID pneumonitis, secretions are the same, no major temp spikes. ABX as per ID, follow up appreciated  There is a discordance better sat on pleth and sat on gas, will do concomitant ABG from a line and peripheral stick.  tube feeds  prognosis is extremely guarded

## 2021-04-08 NOTE — PROGRESS NOTE ADULT - SUBJECTIVE AND OBJECTIVE BOX
INTERVAL HPI/OVERNIGHT EVENTS:   HPI:  66yo F w/ HTN (on losartan) and DM2 (on metformin) presents with progressively worsening SOB associated with productive cough for past several days much worse the past 3 days.  Started on abx (levaquin, prednisone and azithromycin 3/19) outpatient with no improvement. Denies any chest pain, fevers, dizziness, syncope, abd pain, back pain, N/V/D, recent sick contact, recent travel.     In ED: CTA chest done to r/o PE findings consistent w/ COVID-19 PNA. Labs remarkable for WBC 18.55, Na 130, K 3.2, COs 10, AG 29, Glucose 416, alb 2.5.  Placed on High flow for dyspnea and hypoxemia. Admitted to ICU for COVID pna and DKA.   (23 Mar 2021 05:08)      CENTRAL LINE: [ ] YES [ ] NO  LOCATION:   DATE INSERTED:  REMOVE: [ ] YES [ ] NO  EXPLAIN:    GARCIA: [ ] YES [ ] NO    DATE INSERTED:  REMOVE:  [ ] YES [ ] NO  EXPLAIN:    A-LINE:  [ ] YES [ ] NO  LOCATION:   DATE INSERTED:  REMOVE:  [ ] YES [ ] NO  EXPLAIN:    PAST MEDICAL & SURGICAL HISTORY:  HTN (hypertension)    DM (diabetes mellitus)    HLD (hyperlipidemia)    No significant past surgical history        REVIEW OF SYSTEMS:    CONSTITUTIONAL: No fever, weight loss, or fatigue  EYES: No eye pain, visual disturbances, or discharge  ENMT:  No difficulty hearing, tinnitus, vertigo; No sinus or throat pain  NECK: No pain or stiffness  BREASTS: No pain, masses, or nipple discharge  RESPIRATORY: No cough, wheezing, chills or hemoptysis; No shortness of breath  CARDIOVASCULAR: No chest pain, palpitations, dizziness, or leg swelling  GASTROINTESTINAL: No abdominal or epigastric pain. No nausea, vomiting, or hematemesis; No diarrhea or constipation. No melena or hematochezia.  GENITOURINARY: No dysuria, frequency, hematuria, or incontinence  NEUROLOGICAL: No headaches, memory loss, loss of strength, numbness, or tremors  SKIN: No itching, burning, rashes, or lesions   LYMPH NODES: No enlarged glands  ENDOCRINE: No heat or cold intolerance; No hair loss  MUSCULOSKELETAL: No joint pain or swelling; No muscle, back, or extremity pain  PSYCHIATRIC: No depression, anxiety, mood swings, or difficulty sleeping  HEME/LYMPH: No easy bruising, or bleeding gums  ALLERGY AND IMMUNOLOGIC: No hives or eczema      ICU Vital Signs Last 24 Hrs  T(C): 37.4 (08 Apr 2021 07:30), Max: 37.8 (08 Apr 2021 06:00)  T(F): 99.4 (08 Apr 2021 07:30), Max: 100 (08 Apr 2021 06:00)  HR: 80 (08 Apr 2021 08:30) (72 - 96)  BP: --  BP(mean): --  ABP: 150/64 (08 Apr 2021 08:30) (104/49 - 157/75)  ABP(mean): 87 (08 Apr 2021 08:30) (63 - 102)  RR: 32 (08 Apr 2021 08:30) (26 - 32)  SpO2: 100% (08 Apr 2021 08:02) (90% - 100%)      ABG - ( 08 Apr 2021 08:12 )  pH, Arterial: x     pH, Blood: 7.34  /  pCO2: 47    /  pO2: 52    / HCO3: 25    / Base Excess: -0.4  /  SaO2: 82                  I&O's Detail    07 Apr 2021 07:01  -  08 Apr 2021 07:00  --------------------------------------------------------  IN:    FentaNYL: 825 mL    IV PiggyBack: 250 mL    Ketamine: 96 mL    Norepinephrine: 135 mL    Propofol: 388.8 mL    Vasopressin: 57.6 mL  Total IN: 1752.4 mL    OUT:    Indwelling Catheter - Urethral (mL): 825 mL    Vital1.0: 0 mL  Total OUT: 825 mL    Total NET: 927.4 mL      08 Apr 2021 07:01  -  08 Apr 2021 10:17  --------------------------------------------------------  IN:    FentaNYL: 34.7 mL    Norepinephrine: 5.1 mL    Propofol: 16.2 mL    Vasopressin: 2.4 mL  Total IN: 58.4 mL    OUT:    Indwelling Catheter - Urethral (mL): 50 mL    Vital1.0: 0 mL  Total OUT: 50 mL    Total NET: 8.4 mL          Mode: AC/ CMV (Assist Control/ Continuous Mandatory Ventilation)  RR (machine): 32  TV (machine): 380  FiO2: 100  PEEP: 10  ITime: 0.7  MAP: 20  PIP: 51    CAPILLARY BLOOD GLUCOSE      POCT Blood Glucose.: 151 mg/dL (08 Apr 2021 06:12)  POCT Blood Glucose.: 141 mg/dL (07 Apr 2021 23:46)  POCT Blood Glucose.: 129 mg/dL (07 Apr 2021 21:12)  POCT Blood Glucose.: 127 mg/dL (07 Apr 2021 17:27)  POCT Blood Glucose.: 189 mg/dL (07 Apr 2021 11:53)    PHYSICAL EXAM:    GENERAL: NAD, well-groomed, well-developed  HEAD:  Atraumatic, Normocephalic  EYES: EOMI, PERRLA, conjunctiva and sclera clear  ENMT: No tonsillar erythema, exudates, or enlargement; Moist mucous membranes, Good dentition, No lesions  NECK: Supple, No JVD, Normal thyroid  NERVOUS SYSTEM:  Alert & Oriented X3, Good concentration; Motor Strength 5/5 B/L upper and lower extremities; DTRs 2+ intact and symmetric  CHEST/LUNG: Clear to percussion bilaterally; No rales, rhonchi, wheezing, or rubs  HEART: Regular rate and rhythm; No murmurs, rubs, or gallops  ABDOMEN: Soft, Nontender, Nondistended; Bowel sounds present  EXTREMITIES:  2+ Peripheral Pulses, No clubbing, cyanosis, or edema  LYMPH: No lymphadenopathy noted  SKIN: No rashes or lesions      LABS:                        7.9    10.35 )-----------( 181      ( 08 Apr 2021 04:07 )             27.3      04-08    135  |  103  |  25<H>  ----------------------------<  142<H>  5.0   |  26  |  0.40<L>    Ca    7.7<L>      08 Apr 2021 04:07  Phos  2.3     04-08  Mg     2.2     04-08    TPro  5.7<L>  /  Alb  1.5<L>  /  TBili  0.6  /  DBili  x   /  AST  273<H>  /  ALT  501<H>  /  AlkPhos  200<H>  04-08        Culture Results:   No growth to date. (04-06 @ 12:15)  Culture Results:   No growth to date. (04-06 @ 12:15)      RADIOLOGY & ADDITIONAL STUDIES:      Assessment and Plan:    CRITICAL CARE TIME SPENT: INTERVAL HPI/OVERNIGHT EVENTS:   HPI:  64yo F w/ HTN (on losartan) and DM2 (on metformin) presents with progressively worsening SOB associated with productive cough for past several days much worse the past 3 days.  Started on abx (levaquin, prednisone and azithromycin 3/19) outpatient with no improvement. Denies any chest pain, fevers, dizziness, syncope, abd pain, back pain, N/V/D, recent sick contact, recent travel.     In ED: CTA chest done to r/o PE findings consistent w/ COVID-19 PNA. Labs remarkable for WBC 18.55, Na 130, K 3.2, COs 10, AG 29, Glucose 416, alb 2.5.  Placed on High flow for dyspnea and hypoxemia. Admitted to ICU for COVID pna and DKA.   (23 Mar 2021 05:08)    up to 100 FIO2 overnight    PAST MEDICAL & SURGICAL HISTORY:  HTN (hypertension)    DM (diabetes mellitus)    HLD (hyperlipidemia)    No significant past surgical history        R       ICU Vital Signs Last 24 Hrs  T(C): 37.4 (08 Apr 2021 07:30), Max: 37.8 (08 Apr 2021 06:00)  T(F): 99.4 (08 Apr 2021 07:30), Max: 100 (08 Apr 2021 06:00)  HR: 80 (08 Apr 2021 08:30) (72 - 96)  BP: --  BP(mean): --  ABP: 150/64 (08 Apr 2021 08:30) (104/49 - 157/75)  ABP(mean): 87 (08 Apr 2021 08:30) (63 - 102)  RR: 32 (08 Apr 2021 08:30) (26 - 32)  SpO2: 100% (08 Apr 2021 08:02) (90% - 100%)      ABG - ( 08 Apr 2021 08:12 )  pH, Arterial: x     pH, Blood: 7.34  /  pCO2: 47    /  pO2: 52    / HCO3: 25    / Base Excess: -0.4  /  SaO2: 82                  I&O's Detail    07 Apr 2021 07:01  -  08 Apr 2021 07:00  --------------------------------------------------------  IN:    FentaNYL: 825 mL    IV PiggyBack: 250 mL    Ketamine: 96 mL    Norepinephrine: 135 mL    Propofol: 388.8 mL    Vasopressin: 57.6 mL  Total IN: 1752.4 mL    OUT:    Indwelling Catheter - Urethral (mL): 825 mL    Vital1.0: 0 mL  Total OUT: 825 mL    Total NET: 927.4 mL      08 Apr 2021 07:01  -  08 Apr 2021 10:17  --------------------------------------------------------  IN:    FentaNYL: 34.7 mL    Norepinephrine: 5.1 mL    Propofol: 16.2 mL    Vasopressin: 2.4 mL  Total IN: 58.4 mL    OUT:    Indwelling Catheter - Urethral (mL): 50 mL    Vital1.0: 0 mL  Total OUT: 50 mL    Total NET: 8.4 mL          Mode: AC/ CMV (Assist Control/ Continuous Mandatory Ventilation)  RR (machine): 32  TV (machine): 380  FiO2: 100  PEEP: 10  ITime: 0.7  MAP: 20  PIP: 51    CAPILLARY BLOOD GLUCOSE      POCT Blood Glucose.: 151 mg/dL (08 Apr 2021 06:12)  POCT Blood Glucose.: 141 mg/dL (07 Apr 2021 23:46)  POCT Blood Glucose.: 129 mg/dL (07 Apr 2021 21:12)  POCT Blood Glucose.: 127 mg/dL (07 Apr 2021 17:27)  POCT Blood Glucose.: 189 mg/dL (07 Apr 2021 11:53)    PHYSICAL EXAM:    GENERAL:  intaubted/sedated/synchronous  Heent No JVD  Lungs B/L air entry  CVs S1 S2 RRR  ABD Nt/ND  EXt no edema    LABS:                        7.9    10.35 )-----------( 181      ( 08 Apr 2021 04:07 )             27.3      04-08    135  |  103  |  25<H>  ----------------------------<  142<H>  5.0   |  26  |  0.40<L>    Ca    7.7<L>      08 Apr 2021 04:07  Phos  2.3     04-08  Mg     2.2     04-08    TPro  5.7<L>  /  Alb  1.5<L>  /  TBili  0.6  /  DBili  x   /  AST  273<H>  /  ALT  501<H>  /  AlkPhos  200<H>  04-08        Culture Results:   No growth to date. (04-06 @ 12:15)  Culture Results:   No growth to date. (04-06 @ 12:15)      RADIOLOGY & ADDITIONAL STUDIES:rad      Assessment and Plan:    CRITICAL CARE TIME SPENT:

## 2021-04-09 NOTE — PROGRESS NOTE ADULT - SUBJECTIVE AND OBJECTIVE BOX
[facility]TER[facility] HPI/OVERNIGHT EVENTS:   HPI:  64yo F w/ HTN (on losartan) and DM2 (on metformin) presents with progressively worsening SOB associated with productive cough for past several days much worse the past 3 days.  Started on abx (levaquin, prednisone and azithromycin 3/19) outpatient with no improvement. Denies any chest pain, fevers, dizziness, syncope, abd pain, back pain, N/V/D, recent sick contact, recent travel.     In ED: CTA chest done to r/o PE findings consistent w/ COVID-19 PNA. Labs remarkable for WBC 18.55, Na 130, K 3.2, COs 10, AG 29, Glucose 416, alb 2.5.  Placed on High flow for dyspnea and hypoxemia. Admitted to ICU for COVID pna and DKA.   (23 Mar 2021 05:08)    4/2 started vacomycin  4/8 repeat ABG showed better O2 sat (97% vs 80s)  4/9 - desatted down to 78% overnight, up to 100%      PAST MEDICAL & SURGICAL HISTORY:  HTN (hypertension)  DM (diabetes mellitus)  HLD (hyperlipidemia)  No significant past surgical history    OBJECTIVE  ICU Vital Signs Last 24 Hrs  T(C): 36.9 (09 Apr 2021 07:30), Max: 37 (09 Apr 2021 05:30)  T(F): 98.5 (09 Apr 2021 07:30), Max: 98.6 (09 Apr 2021 05:30)  HR: 83 (09 Apr 2021 07:40) (62 - 89)  ABP: 130/59 (09 Apr 2021 07:40) (90/47 - 164/69)  ABP(mean): 78 (09 Apr 2021 07:40) (56 - 101)  RR: 31 (09 Apr 2021 07:40) (23 - 32)  SpO2: 94% (09 Apr 2021 07:40) (78% - 100%)    I&O's Detail  08 Apr 2021 07:01  -  09 Apr 2021 07:00  --------------------------------------------------------  IN:    FentaNYL: 836.6 mL    Lactated Ringers Bolus: 1000 mL    Norepinephrine: 107.4 mL    Propofol: 395.7 mL    Vasopressin: 7.2 mL    Vital1.0: 485 mL  Total IN: 2831.9 mL    OUT:    Indwelling Catheter - Urethral (mL): 1805 mL  Total OUT: 1805 mL    Total NET: 1026.9 mL    Vent settings  Mode: AC/ CMV (Assist Control/ Continuous Mandatory Ventilation)  RR (machine): 32  TV (machine): 380  FiO2: 100  PEEP: 10    CAPILLARY BLOOD GLUCOSE  POCT Blood Glucose.: 187 mg/dL (09 Apr 2021 06:51)  POCT Blood Glucose.: 204 mg/dL (09 Apr 2021 00:28)  POCT Blood Glucose.: 130 mg/dL (08 Apr 2021 17:16)  POCT Blood Glucose.: 208 mg/dL (08 Apr 2021 10:54)    LABS                        8.1    9.06  )-----------( 195      ( 09 Apr 2021 03:30 )             28.2   04-09    137  |  104  |  19  ----------------------------<  196<H>  4.2   |  28  |  0.34<L>    Ca    7.6<L>      09 Apr 2021 03:30  Phos  2.2     04-09  Mg     2.2     04-09    TPro  5.3<L>  /  Alb  1.4<L>  /  TBili  0.6  /  DBili  x   /  AST  145<H>  /  ALT  372<H>  /  AlkPhos  217<H>  04-09    CULTURE RESULTS:                04-06-21 @ 12:15  Specimen Source: Peripheral Blood  Culture Results: Culture Results:   No growth to date. (04-06-21 @ 12:15) - Prelim  Culture Results:   No growth to date. (04-06-21 @ 12:15) - Prelim    ABG - ( 08 Apr 2021 10:51 )  pH, Arterial: x     pH, Blood: 7.35  /  pCO2: 46    /  pO2: 106   / HCO3: 25    / Base Excess: -0.2  /  SaO2: 97                   INTERVAL HPI/OVERNIGHT EVENTS:   HPI:  66yo F w/ HTN (on losartan) and DM2 (on metformin) presents with progressively worsening SOB associated with productive cough for past several days much worse the past 3 days.  Started on abx (levaquin, prednisone and azithromycin 3/19) outpatient with no improvement. Denies any chest pain, fevers, dizziness, syncope, abd pain, back pain, N/V/D, recent sick contact, recent travel.     In ED: CTA chest done to r/o PE findings consistent w/ COVID-19 PNA. Labs remarkable for WBC 18.55, Na 130, K 3.2, COs 10, AG 29, Glucose 416, alb 2.5.  Placed on High flow for dyspnea and hypoxemia. Admitted to ICU for COVID pna and DKA.   (23 Mar 2021 05:08)    4/2 started vacomycin  4/8 repeat ABG showed better O2 sat (97% vs 80s)  4/9 - desatted down to 78% overnight, up to 100%      PAST MEDICAL & SURGICAL HISTORY:  HTN (hypertension)  DM (diabetes mellitus)  HLD (hyperlipidemia)  No significant past surgical history    MEDICATIONS  (STANDING):  chlorhexidine 0.12% Liquid 15 milliLiter(s) Oral Mucosa every 12 hours  chlorhexidine 2% Cloths 1 Application(s) Topical <User Schedule>  dextrose 40% Gel 15 Gram(s) Oral once  dextrose 5%. 1000 milliLiter(s) (100 mL/Hr) IV Continuous <Continuous>  dextrose 5%. 1000 milliLiter(s) (50 mL/Hr) IV Continuous <Continuous>  dextrose 50% Injectable 25 Gram(s) IV Push once  dextrose 50% Injectable 12.5 Gram(s) IV Push once  dextrose 50% Injectable 25 Gram(s) IV Push once  enoxaparin Injectable 40 milliGRAM(s) SubCutaneous two times a day  ergocalciferol 77489 Unit(s) Oral <User Schedule>  fentaNYL   Infusion. 0.5 MICROgram(s)/kG/Hr (3.86 mL/Hr) IV Continuous <Continuous>  glucagon  Injectable 1 milliGRAM(s) IntraMuscular once  insulin glargine Injectable (LANTUS) 8 Unit(s) SubCutaneous at bedtime  insulin lispro (ADMELOG) corrective regimen sliding scale   SubCutaneous every 6 hours  midodrine 20 milliGRAM(s) Oral every 8 hours  norepinephrine Infusion 0.05 MICROgram(s)/kG/Min (3.61 mL/Hr) IV Continuous <Continuous>  pantoprazole  Injectable 40 milliGRAM(s) IV Push daily  propofol Infusion 10.009 MICROgram(s)/kG/Min (4.63 mL/Hr) IV Continuous <Continuous>  zinc oxide 40% Ointment 1 Application(s) Topical three times a day      OBJECTIVE  ICU Vital Signs Last 24 Hrs  T(C): 36.9 (09 Apr 2021 07:30), Max: 37 (09 Apr 2021 05:30)  T(F): 98.5 (09 Apr 2021 07:30), Max: 98.6 (09 Apr 2021 05:30)  HR: 83 (09 Apr 2021 07:40) (62 - 89)  ABP: 130/59 (09 Apr 2021 07:40) (90/47 - 164/69)  ABP(mean): 78 (09 Apr 2021 07:40) (56 - 101)  RR: 31 (09 Apr 2021 07:40) (23 - 32)  SpO2: 94% (09 Apr 2021 07:40) (78% - 100%)    I&O's Detail  08 Apr 2021 07:01  -  09 Apr 2021 07:00  --------------------------------------------------------  IN:    FentaNYL: 836.6 mL    Lactated Ringers Bolus: 1000 mL    Norepinephrine: 107.4 mL    Propofol: 395.7 mL    Vasopressin: 7.2 mL    Vital1.0: 485 mL  Total IN: 2831.9 mL    OUT:    Indwelling Catheter - Urethral (mL): 1805 mL  Total OUT: 1805 mL    Total NET: 1026.9 mL    Vent settings  Mode: AC/ CMV (Assist Control/ Continuous Mandatory Ventilation)  RR (machine): 32  TV (machine): 380  FiO2: 100  PEEP: 10    CAPILLARY BLOOD GLUCOSE  POCT Blood Glucose.: 187 mg/dL (09 Apr 2021 06:51)  POCT Blood Glucose.: 204 mg/dL (09 Apr 2021 00:28)  POCT Blood Glucose.: 130 mg/dL (08 Apr 2021 17:16)  POCT Blood Glucose.: 208 mg/dL (08 Apr 2021 10:54)    LABS                        8.1    9.06  )-----------( 195      ( 09 Apr 2021 03:30 )             28.2   04-09    137  |  104  |  19  ----------------------------<  196<H>  4.2   |  28  |  0.34<L>    Ca    7.6<L>      09 Apr 2021 03:30  Phos  2.2     04-09  Mg     2.2     04-09    TPro  5.3<L>  /  Alb  1.4<L>  /  TBili  0.6  /  DBili  x   /  AST  145<H>  /  ALT  372<H>  /  AlkPhos  217<H>  04-09    CULTURE RESULTS:                04-06-21 @ 12:15  Specimen Source: Peripheral Blood  Culture Results: Culture Results:   No growth to date. (04-06-21 @ 12:15) - Prelim  Culture Results:   No growth to date. (04-06-21 @ 12:15) - Prelim    ABG - ( 08 Apr 2021 10:51 )  pH, Arterial: x     pH, Blood: 7.35  /  pCO2: 46    /  pO2: 106   / HCO3: 25    / Base Excess: -0.2  /  SaO2: 97                   INTERVAL HPI/OVERNIGHT EVENTS:   HPI:  66yo F w/ HTN (on losartan) and DM2 (on metformin) presents with progressively worsening SOB associated with productive cough for past several days much worse the past 3 days.  Started on abx (levaquin, prednisone and azithromycin 3/19) outpatient with no improvement. Denies any chest pain, fevers, dizziness, syncope, abd pain, back pain, N/V/D, recent sick contact, recent travel.     In ED: CTA chest done to r/o PE findings consistent w/ COVID-19 PNA. Labs remarkable for WBC 18.55, Na 130, K 3.2, COs 10, AG 29, Glucose 416, alb 2.5.  Placed on High flow for dyspnea and hypoxemia. Admitted to ICU for COVID pna and DKA.   (23 Mar 2021 05:08)    4/2 started vacomycin  4/8 repeat ABG showed better O2 sat (97% vs 80s)  4/9 - desatted down to 78% overnight, up to 100%    Central line in R IJ  Ponce: Y    PAST MEDICAL & SURGICAL HISTORY:  HTN (hypertension)  DM (diabetes mellitus)  HLD (hyperlipidemia)  No significant past surgical history    MEDICATIONS  (STANDING):  chlorhexidine 0.12% Liquid 15 milliLiter(s) Oral Mucosa every 12 hours  chlorhexidine 2% Cloths 1 Application(s) Topical <User Schedule>  dextrose 40% Gel 15 Gram(s) Oral once  dextrose 5%. 1000 milliLiter(s) (100 mL/Hr) IV Continuous <Continuous>  dextrose 5%. 1000 milliLiter(s) (50 mL/Hr) IV Continuous <Continuous>  dextrose 50% Injectable 25 Gram(s) IV Push once  dextrose 50% Injectable 12.5 Gram(s) IV Push once  dextrose 50% Injectable 25 Gram(s) IV Push once  enoxaparin Injectable 40 milliGRAM(s) SubCutaneous two times a day  ergocalciferol 81562 Unit(s) Oral <User Schedule>  fentaNYL   Infusion. 0.5 MICROgram(s)/kG/Hr (3.86 mL/Hr) IV Continuous <Continuous>  glucagon  Injectable 1 milliGRAM(s) IntraMuscular once  insulin glargine Injectable (LANTUS) 8 Unit(s) SubCutaneous at bedtime  insulin lispro (ADMELOG) corrective regimen sliding scale   SubCutaneous every 6 hours  midodrine 20 milliGRAM(s) Oral every 8 hours  norepinephrine Infusion 0.05 MICROgram(s)/kG/Min (3.61 mL/Hr) IV Continuous <Continuous>  pantoprazole  Injectable 40 milliGRAM(s) IV Push daily  propofol Infusion 10.009 MICROgram(s)/kG/Min (4.63 mL/Hr) IV Continuous <Continuous>  zinc oxide 40% Ointment 1 Application(s) Topical three times a day      OBJECTIVE  ICU Vital Signs Last 24 Hrs  T(C): 36.9 (09 Apr 2021 07:30), Max: 37 (09 Apr 2021 05:30)  T(F): 98.5 (09 Apr 2021 07:30), Max: 98.6 (09 Apr 2021 05:30)  HR: 83 (09 Apr 2021 07:40) (62 - 89)  ABP: 130/59 (09 Apr 2021 07:40) (90/47 - 164/69)  ABP(mean): 78 (09 Apr 2021 07:40) (56 - 101)  RR: 31 (09 Apr 2021 07:40) (23 - 32)  SpO2: 94% (09 Apr 2021 07:40) (78% - 100%)    I&O's Detail  08 Apr 2021 07:01  -  09 Apr 2021 07:00  --------------------------------------------------------  IN:    FentaNYL: 836.6 mL    Lactated Ringers Bolus: 1000 mL    Norepinephrine: 107.4 mL    Propofol: 395.7 mL    Vasopressin: 7.2 mL    Vital1.0: 485 mL  Total IN: 2831.9 mL    OUT:    Indwelling Catheter - Urethral (mL): 1805 mL  Total OUT: 1805 mL    Total NET: 1026.9 mL    Vent settings  Mode: AC/ CMV (Assist Control/ Continuous Mandatory Ventilation)  RR (machine): 32  TV (machine): 380  FiO2: 100  PEEP: 10    CAPILLARY BLOOD GLUCOSE  POCT Blood Glucose.: 187 mg/dL (09 Apr 2021 06:51)  POCT Blood Glucose.: 204 mg/dL (09 Apr 2021 00:28)  POCT Blood Glucose.: 130 mg/dL (08 Apr 2021 17:16)  POCT Blood Glucose.: 208 mg/dL (08 Apr 2021 10:54)    LABS                        8.1    9.06  )-----------( 195      ( 09 Apr 2021 03:30 )             28.2   04-09    137  |  104  |  19  ----------------------------<  196<H>  4.2   |  28  |  0.34<L>    Ca    7.6<L>      09 Apr 2021 03:30  Phos  2.2     04-09  Mg     2.2     04-09    TPro  5.3<L>  /  Alb  1.4<L>  /  TBili  0.6  /  DBili  x   /  AST  145<H>  /  ALT  372<H>  /  AlkPhos  217<H>  04-09    CULTURE RESULTS:                04-06-21 @ 12:15  Specimen Source: Peripheral Blood  Culture Results: Culture Results:   No growth to date. (04-06-21 @ 12:15) - Prelim  Culture Results:   No growth to date. (04-06-21 @ 12:15) - Prelim    ABG - ( 08 Apr 2021 10:51 )  pH, Arterial: x     pH, Blood: 7.35  /  pCO2: 46    /  pO2: 106   / HCO3: 25    / Base Excess: -0.2  /  SaO2: 97

## 2021-04-09 NOTE — PROGRESS NOTE ADULT - ASSESSMENT
FINESSE JERNIGAN 65 F 1955 3/23/2021 DR YANN UNDERWOOD     REVIEW OF SYMPTOMS      Able to give (reliable) ROS  NO     PHYSICAL EXAM    HEENT Unremarkable  atraumatic   RESP Fair air entry EXP prolonged    Harsh breath sound Resp distres mild   CARDIAC S1 S2 No S3     NO JVD    ABDOMEN SOFT BS PRESENT NOT DISTENDED No hepatosplenomegaly   PEDAL EDEMA present No calf tenderness  NO rash       PATIENT SUMMARY  66 yo F with h/o HTN, HLD and DM2 who presented with progressively worsening SOB  Patient ruled in for  COVID-19 PNA.  ICU admitting dx : 1) Acute hypoxic resp failure 2 to Covid-19 PNA with improvement in oxygenation  2) s/p DKA.  Pt was admitted 3/23 transferred out of ICU 3/26  Pulm consulted 3/29/2021   Transferred to ICU 3/31   INTUBATED 3/31   NG TBE 3/31    Home meds.     PROBLEMS.  COVID PNEUMONIA   STAPH EPI BACTEREMIA 4/1   ACUTE HYPOXEMIC RESP FAILURE   ELEVATED Ddimr  INTUBATED 3/31   NG TBE 3/31  LACTICEMIA 4/1/2021   TROPONINEMIA 4/1/2021   ELEVATED LFTS 4/5/2021  AST 1393       VITALS/IO/VENT/DRIPS.  4/9/2021 89 150/70   4/9/2021 7a fent 5 m/k/h   4/9/2021 9a gurpreet ,25 m/k/h   4/9/2021 7a prop 50 m/k/m   4/9/2021 32/380/10/100%     GLOBAL AND BEST PRACTICE ISSUES                     ALLERGY. carafate levaquin percocet      WEIGHT.      3/29/2021 77                           BMI.                 3/29/2021 29          ADVANCED DIRECTIVE.                               HEAD OF BED ELEVATION. Yes  DVT PROPHYLAXIS.   hpsc (3/23)   --> lvnx 40 93/30)                  MANUEL PROPHYLAXIS. PROTONIX 40 (3/23)   APA.                                                                    DYSPHAGIA RECOMM.   DIET.  CONS CARB (3/25) --> vital 1080 (4/5)      INFECTION PROPHYLAXIS.   chlorhexidine .12% (4/1/20210  chlorhexidine 2% (4/1/2021)   FREE WATER.      ASSESSMENT/RECOMMENDATIONS.    D-DIMR ELEVATED  3/26-3/29/2021 D-dimr 1344-930  3/23 cta ch no pe bl ggo  Trending down   is on dvt ppl  INFECTION  STAPH EPI BACATREMIA 4/1 4/1-4/5/2021 w 32 -14.9   4/1 blod c staph epid   vanco (4/2)  completed    SHOCK   On vasopressors  Target MAP 65   MECHANICAL VENT   Target PO2 60 (+) PPlat 35 (-) pH 730 (+)   oxygenation improved     COVID PNEUMONIA AND HYPOXIC RESP FAILURE MANAGEMENT.   REMDESEVIR (3/23)   DEXA (3/23)   PRONE (3/26)   Monitor pulse oximetry Target PO 92-96%  Monitor inflammatory and thrombotic biomarkers  Monitor for  for progressively  worsening oxygenation failure   O2 to keep po 90-95%  Intubated 3/31  Cont supp care   Requriing 100% O2 again    TIME SPENT   Over 25 minutes aggregate care time spent on encounter; activities included   direct patient care, counseling and/or coordinating care reviewing notes, lab data/ imaging , discussion with multidisciplinary team/ patient  /family and explaining in detail risks, benefits, alternatives  of the recommendations     FINESSE JERNIGAN 65 F 1955 3/23/2021 DR YANN UNDERWOOD

## 2021-04-09 NOTE — CHART NOTE - NSCHARTNOTEFT_GEN_A_CORE
Pt w/ COVID PNA ; DKA, now w/ ARDS : Intubated on vent (3/31) ; Hx HTN ; (on losarten) and DM2 (metformin).     Factors impacting intake: [ ] none [ ] nausea  [ ] vomiting [ ] diarrhea [ ] constipation  [ ]chewing problems [ ] swallowing issues  [x ] other: Intubated on vent (3/31)    4/5 - Diet Prescription: Diet, NPO with Tube Feed:   Tube Feeding Modality: Gastrostomy  Vital AF  Total Volume for 24 Hours (mL): 1080  Continuous  Starting Tube Feed Rate {mL per Hour}: 20  Increase Tube Feed Rate by (mL): 5     Every 8 hours  Until Goal Tube Feed Rate (mL per Hour): 45  Tube Feed Duration (in Hours): 24  Tube Feed Start Time: 16:30 (04-05-21 @ 16:32)    Intake: on hold yesterday (4/8) due to residual. Vital AF 1.2 @ 35 ml/hr = 840 ml, 1008 kcal & 63 g pro + 203 additional calories from propofol / shift ( pt not at goal yet)    Current Weight: 4/8 - 303.7 (92.4 kg) Edema (4/7) - 2+ ( L & R) Foot ; 3/30 - 170.8 (77.5 kg)   % Weight Change - 16 % / 14.9 kg gain x 9 days. Noted 2+ edema    Physical Appearance - 4/7- 2+ Edema ( L & R) FOOT      Pertinent Medications: MEDICATIONS  (STANDING):  chlorhexidine 0.12% Liquid 15 milliLiter(s) Oral Mucosa every 12 hours  chlorhexidine 2% Cloths 1 Application(s) Topical <User Schedule>  dextrose 40% Gel 15 Gram(s) Oral once  dextrose 5%. 1000 milliLiter(s) (50 mL/Hr) IV Continuous <Continuous>  dextrose 5%. 1000 milliLiter(s) (100 mL/Hr) IV Continuous <Continuous>  dextrose 50% Injectable 25 Gram(s) IV Push once  dextrose 50% Injectable 12.5 Gram(s) IV Push once  dextrose 50% Injectable 25 Gram(s) IV Push once  enoxaparin Injectable 40 milliGRAM(s) SubCutaneous two times a day  fentaNYL   Infusion. 0.5 MICROgram(s)/kG/Hr (3.86 mL/Hr) IV Continuous <Continuous>  glucagon  Injectable 1 milliGRAM(s) IntraMuscular once  insulin glargine Injectable (LANTUS) 8 Unit(s) SubCutaneous at bedtime  insulin lispro (ADMELOG) corrective regimen sliding scale   SubCutaneous every 6 hours  ketamine Infusion. 0.25 mG/kG/Hr (1.93 mL/Hr) IV Continuous <Continuous>  midodrine 20 milliGRAM(s) Oral every 8 hours  norepinephrine Infusion 0.05 MICROgram(s)/kG/Min (3.61 mL/Hr) IV Continuous <Continuous>  pantoprazole  Injectable 40 milliGRAM(s) IV Push daily  polyethylene glycol 3350 17 Gram(s) Oral daily  propofol Infusion 10.009 MICROgram(s)/kG/Min (4.63 mL/Hr) IV Continuous <Continuous>  senna Syrup 10 milliLiter(s) Oral at bedtime  zinc oxide 40% Ointment 1 Application(s) Topical three times a day    MEDICATIONS  (PRN):  sodium chloride 0.9% lock flush 10 milliLiter(s) IV Push every 1 hour PRN Pre/post blood products, medications, blood draw, and to maintain line patency    Pertinent Labs: 04-09 Na137 mmol/L Glu 196 mg/dL<H> K+ 4.2 mmol/L Cr  0.34 mg/dL<L> BUN 19 mg/dL 04-09 Phos 2.2 mg/dL<L> 04-09 Alb 1.4 g/dL<L> 03-24 Chol 173 mg/dL LDL --    HDL 43 mg/dL<L> Trig 104 mg/dL04-09  U/L<H>  U/L<H> Alkaline Phosphatase 217 U/L<H>03-26-21 @ 08:56 A1C 12.2  03-24-21 @ 09:30 A1C 12.5  03-23-21 @ 19:22 A1C 12.2       CAPILLARY BLOOD GLUCOSE      POCT Blood Glucose.: 247 mg/dL (09 Apr 2021 11:40)  POCT Blood Glucose.: 187 mg/dL (09 Apr 2021 06:51)  POCT Blood Glucose.: 204 mg/dL (09 Apr 2021 00:28)  POCT Blood Glucose.: 130 mg/dL (08 Apr 2021 17:16)    Skin: coccyx - sacrum - stage 1    Estimated Needs:   [x ] no change since previous assessment (3/25/21)  [ ] recalculated:     Previous Nutrition Diagnosis:   Malnutrition; severe malnutrition in context of acute illness     Related to: inadequate protein-energy intake in setting of COVID-19 illness, acute respiratory failure, DKA    As evidenced by: <50% nutrition needs > 5 days, >2% wt. loss in 1 week(6.17%)    Goal: pt to meet >75% protein-energy needs via enteral feeding; -> NOT MET      Nutrition Diagnosis is [x ] ongoing  [ ] resolved [ ] not applicable     New Nutrition Diagnosis: [x ] not applicable       Interventions:   Recommend  [ ] Change Diet To: Continue w/ current enteral feeding as ordered Vital AF 1.2 @ goal rate 45 ml/hr = 1080 ml, 1296 kcal, 81 g pro, 875 ml free water, 91 % RDI's + additional calories from propofol.   [ ] Nutrition Supplement  [ ] Nutrition Support  [ ] Other:     Monitoring and Evaluation:   [ ] PO intake [ x ] Tolerance to diet prescription [ x ] weights [ x ] labs[ x ] follow up per protocol  [ ] other:

## 2021-04-09 NOTE — PROGRESS NOTE ADULT - SUBJECTIVE AND OBJECTIVE BOX
RERE FINESSE    [facility] CCU 6    Patient is a 65y old  Female who presents with a chief complaint of covid pna and dka (08 Apr 2021 23:45)       Allergies    Carafate (Rash)  Levaquin (Rash)  Percocet 5/325 (Other)    Intolerances    lactose (Flatulence)      HPI:  64yo F w/ HTN (on losartan) and DM2 (on metformin) presents with progressively worsening SOB associated with productive cough for past several days much worse the past 3 days.  Started on abx (levaquin, prednisone and azithromycin 3/19) outpatient with no improvement. Denies any chest pain, fevers, dizziness, syncope, abd pain, back pain, N/V/D, recent sick contact, recent travel.     In ED: CTA chest done to r/o PE findings consistent w/ COVID-19 PNA. Labs remarkable for WBC 18.55, Na 130, K 3.2, COs 10, AG 29, Glucose 416, alb 2.5.  Placed on High flow for dyspnea and hypoxemia. Admitted to ICU for COVID pna and DKA.   (23 Mar 2021 05:08)      PAST MEDICAL & SURGICAL HISTORY:  HTN (hypertension)    DM (diabetes mellitus)    HLD (hyperlipidemia)    No significant past surgical history        FAMILY HISTORY:  No pertinent family history in first degree relatives          MEDICATIONS   chlorhexidine 0.12% Liquid 15 milliLiter(s) Oral Mucosa every 12 hours  chlorhexidine 2% Cloths 1 Application(s) Topical <User Schedule>  dextrose 40% Gel 15 Gram(s) Oral once  dextrose 5%. 1000 milliLiter(s) IV Continuous <Continuous>  dextrose 5%. 1000 milliLiter(s) IV Continuous <Continuous>  dextrose 50% Injectable 25 Gram(s) IV Push once  dextrose 50% Injectable 12.5 Gram(s) IV Push once  dextrose 50% Injectable 25 Gram(s) IV Push once  enoxaparin Injectable 40 milliGRAM(s) SubCutaneous two times a day  ergocalciferol 96175 Unit(s) Oral <User Schedule>  fentaNYL   Infusion. 0.5 MICROgram(s)/kG/Hr IV Continuous <Continuous>  glucagon  Injectable 1 milliGRAM(s) IntraMuscular once  insulin glargine Injectable (LANTUS) 8 Unit(s) SubCutaneous at bedtime  insulin lispro (ADMELOG) corrective regimen sliding scale   SubCutaneous every 6 hours  midodrine 20 milliGRAM(s) Oral every 8 hours  norepinephrine Infusion 0.05 MICROgram(s)/kG/Min IV Continuous <Continuous>  pantoprazole  Injectable 40 milliGRAM(s) IV Push daily  propofol Infusion 10.009 MICROgram(s)/kG/Min IV Continuous <Continuous>  sodium chloride 0.9% lock flush 10 milliLiter(s) IV Push every 1 hour PRN  zinc oxide 40% Ointment 1 Application(s) Topical three times a day      Vital Signs Last 24 Hrs  T(C): 36.3 (08 Apr 2021 23:00), Max: 37.4 (08 Apr 2021 07:30)  T(F): 97.4 (08 Apr 2021 23:00), Max: 99.4 (08 Apr 2021 07:30)  HR: 66 (09 Apr 2021 05:30) (62 - 84)  BP: --  BP(mean): --  RR: 32 (09 Apr 2021 05:30) (32 - 32)  SpO2: 98% (09 Apr 2021 05:30) (95% - 100%)      04-07-21 @ 07:01  -  04-08-21 @ 07:00  --------------------------------------------------------  IN: 1752.4 mL / OUT: 825 mL / NET: 927.4 mL    04-08-21 @ 07:01  -  04-09-21 @ 06:57  --------------------------------------------------------  IN: 1756.4 mL / OUT: 485 mL / NET: 1271.4 mL        Mode: AC/ CMV (Assist Control/ Continuous Mandatory Ventilation), RR (machine): 32, TV (machine): 380, FiO2: 100, PEEP: 10, ITime: 0.7, MAP: 19, PIP: 41    LABS:                        8.1    9.06  )-----------( 195      ( 09 Apr 2021 03:30 )             28.2     04-09    137  |  104  |  19  ----------------------------<  196<H>  4.2   |  28  |  0.34<L>    Ca    7.6<L>      09 Apr 2021 03:30  Phos  2.2     04-09  Mg     2.2     04-09    TPro  5.3<L>  /  Alb  1.4<L>  /  TBili  0.6  /  DBili  x   /  AST  145<H>  /  ALT  372<H>  /  AlkPhos  217<H>  04-09          ABG - ( 08 Apr 2021 10:51 )  pH, Arterial: x     pH, Blood: 7.35  /  pCO2: 46    /  pO2: 106   / HCO3: 25    / Base Excess: -0.2  /  SaO2: 97                  WBC:  WBC Count: 9.06 K/uL (04-09 @ 03:30)  WBC Count: 10.35 K/uL (04-08 @ 04:07)  WBC Count: 11.30 K/uL (04-07 @ 04:15)  WBC Count: 11.94 K/uL (04-06 @ 05:39)      MICROBIOLOGY:  RECENT CULTURES:  04-06 .Blood Blood XXXX XXXX   No growth to date.                    Sodium:  Sodium, Serum: 137 mmol/L (04-09 @ 03:30)  Sodium, Serum: 135 mmol/L (04-08 @ 04:07)  Sodium, Serum: 136 mmol/L (04-07 @ 04:15)  Sodium, Serum: 140 mmol/L (04-06 @ 05:39)      0.34 mg/dL 04-09 @ 03:30  0.40 mg/dL 04-08 @ 04:07  0.62 mg/dL 04-07 @ 04:15  0.53 mg/dL 04-06 @ 05:39      Hemoglobin:  Hemoglobin: 8.1 g/dL (04-09 @ 03:30)  Hemoglobin: 7.9 g/dL (04-08 @ 04:07)  Hemoglobin: 8.8 g/dL (04-07 @ 04:15)  Hemoglobin: 8.7 g/dL (04-06 @ 05:39)      Platelets: Platelet Count - Automated: 195 K/uL (04-09 @ 03:30)  Platelet Count - Automated: 181 K/uL (04-08 @ 04:07)  Platelet Count - Automated: 167 K/uL (04-07 @ 04:15)  Platelet Count - Automated: 167 K/uL (04-06 @ 05:39)      LIVER FUNCTIONS - ( 09 Apr 2021 03:30 )  Alb: 1.4 g/dL / Pro: 5.3 gm/dL / ALK PHOS: 217 U/L / ALT: 372 U/L / AST: 145 U/L / GGT: x                 RADIOLOGY & ADDITIONAL STUDIES:

## 2021-04-09 NOTE — PROGRESS NOTE ADULT - ASSESSMENT
64yo F w/ HTN (on losartan) and DM2 (on metformin) p/w COVID PNA and DKA, now in ARDS and intubated.    1. COVID pneumonia in ARDS  - Vent: 32/380/10/100%  - s/p remdesivir and dexamethasone  - MRSE+ (likely contaminant), s/p vancomycin, leukocytosis and fevers resolved  - ID following   66yo F w/ HTN (on losartan) and DM2 (on metformin) p/w COVID PNA and DKA, now in ARDS and intubated.    Respiratory + ID  - COVID pneumonia in ARDS, intubated 3/31  - Vent: 32/380/10/100%  - s/p remdesivir and dexamethasone  - MRSE+ (likely contaminant), s/p 8 days vancomycin; leukocytosis and fevers resolved  - ID following    Neuro  - c/w Propofol and Fentanyl for sedation/paralysis    Cardio  - c/w levophed + midodrine for MAP support    Heme ppx  - c/w lovenox    GI ppx  - c/w protonix    Endo  - POCT glucose   - c/w Lantus 8 qhs + Lispro ISS  - phos 2.2 yesterday, s/p potassium phosphate 64yo F w/ HTN (on losartan) and DM2 (on metformin) p/w COVID PNA and DKA, now in ARDS and intubated.    Respiratory + ID  - COVID pneumonia in ARDS, intubated 3/31  - Vent: 32/380/10/100%    - decrease FiO2 as tolerated  - s/p remdesivir and dexamethasone  - MRSE+ (likely contaminant), s/p 8 days vancomycin; leukocytosis and fevers resolved  - ID following    Neuro  - c/w Propofol and Fentanyl for sedation/paralysis    Cardio  - c/w levophed + midodrine for MAP support    - wean levophed as tolerated    Heme ppx  - c/w lovenox    GI ppx  - c/w protonix    Endo  - POCT glucose   - c/w Lantus 8 qhs + Lispro ISS  - phos 2.2 yesterday, s/p potassium phosphate

## 2021-04-09 NOTE — PROGRESS NOTE ADULT - PROBLEM SELECTOR PLAN 1
fair control  continue lantus 8units  JUANCARLOS q6  if fsbg persistently >180mg/dl will need to adjust regimen then

## 2021-04-09 NOTE — PROGRESS NOTE ADULT - SUBJECTIVE AND OBJECTIVE BOX
RERE KILPATRICK  MRN-[id number]    Follow Up:  COVID, MRSE in blood culture     Interval History: remains on FiO2 100%, cultures remain negative, LFTs are improving, remains critically ill with very guarded prognosis, unable to trach due to high FiO2 and peep     ROS:    [ X] Unobtainable because: intubated/sedated   [ ] All other systems negative    Constitutional: no fever, no chills  Head: no trauma  Eyes: no vision changes, no eye pain  ENT:  no sore throat, no rhinorrhea  Cardiovascular:  no chest pain, no palpitation  Respiratory:  no SOB, no cough  GI:  no abd pain, no vomiting, no diarrhea  urinary: no dysuria, no hematuria, no flank pain  musculoskeletal:  no joint pain, no joint swelling  skin:  no rash  neurology:  no headache, no seizure, no change in mental status  psych: no anxiety, no depression         Allergies  Carafate (Rash)  Levaquin (Rash)  Percocet 5/325 (Other)        ANTIMICROBIALS:        MEDICATIONS  (STANDING):  dextrose 40% Gel 15 once  dextrose 50% Injectable 25 once  dextrose 50% Injectable 12.5 once  dextrose 50% Injectable 25 once  enoxaparin Injectable 40 two times a day  fentaNYL   Infusion. 0.5 <Continuous>  glucagon  Injectable 1 once  insulin glargine Injectable (LANTUS) 8 at bedtime  insulin lispro (ADMELOG) corrective regimen sliding scale  every 6 hours  ketamine Infusion. 0.25 <Continuous>  midodrine 20 every 8 hours  norepinephrine Infusion 0.05 <Continuous>  pantoprazole  Injectable 40 daily  polyethylene glycol 3350 17 daily  propofol Infusion 10.009 <Continuous>  senna Syrup 10 at bedtime      PRN      Physical Exam:  Vital Signs Last 24 Hrs  T(F): 98.8 (04-09-21 @ 15:30), Max: 98.8 (04-09-21 @ 12:30)  HR: 93 (04-09-21 @ 15:30)  BP: --  RR: 32 (04-09-21 @ 15:30)  SpO2: 100% (04-09-21 @ 15:30) (78% - 100%)  Wt(kg): --    Constitutional: non-toxic, no distress, intubated and sedated   HEAD/EYES: anicteric, no conjunctival injection  ENT:  supple, no thrush, +ETT and OGT  Cardiovascular:   normal S1, S2, no murmur, trace edema b/l in LE   Respiratory:  coarse BS bilaterally, no wheezes, no rales  GI:  soft, distended,  normal bowel sounds  : + montiel  Musculoskeletal:  no synovitis  Neurologic: intubated and sedated   Skin:  no rash, no erythema, no phlebitis  Heme/Onc: no lymphadenopathy   Psychiatric:  unable  Lines: +sam c/d/i, +right IJ c/d/i     WBC Count: 9.06 K/uL (04-09 @ 03:30)  WBC Count: 10.35 K/uL (04-08 @ 04:07)  WBC Count: 11.30 K/uL (04-07 @ 04:15)  WBC Count: 11.94 K/uL (04-06 @ 05:39)  WBC Count: 14.90 K/uL (04-05 @ 02:42)  WBC Count: 18.47 K/uL (04-04 @ 03:27)  WBC Count: 19.66 K/uL (04-03 @ 03:21)                            8.1    9.06  )-----------( 195      ( 09 Apr 2021 03:30 )             28.2       04-09    137  |  104  |  19  ----------------------------<  196<H>  4.2   |  28  |  0.34<L>    Ca    7.6<L>      09 Apr 2021 03:30  Phos  2.2     04-09  Mg     2.2     04-09    TPro  5.3<L>  /  Alb  1.4<L>  /  TBili  0.6  /  DBili  x   /  AST  145<H>  /  ALT  372<H>  /  AlkPhos  217<H>  04-09      Creatinine Trend: 0.34<--, 0.40<--, 0.62<--, 0.53<--, 0.87<--, 0.76<--        MICROBIOLOGY:  Vancomycin Level, Trough: 18.6 ug/mL (04-08-21 @ 06:31)    .Blood Blood  04-06-21   No growth to date.  --  --      .Blood Blood-Peripheral  04-01-21   Growth in aerobic and anaerobic bottles: Staphylococcus epidermidis  Coag Negative Staphylococcus  Single set isolate, possible contaminant. Contact  Microbiology if susceptibility testing clinically  indicated.  --  Blood Culture PCR      .Urine Clean Catch (Midstream)  03-24-21   No growth  --  --      .Blood Blood-Peripheral  03-23-21   No Growth Final  --  --        C-Reactive Protein, Serum: 194 (04-07)  C-Reactive Protein, Serum: 157 (04-01)  C-Reactive Protein, Serum: <3 (03-29)  C-Reactive Protein, Serum: 184 (03-26)    Ferritin, Serum: 79338 (04-07)  Ferritin, Serum: 92761 (04-05)  Ferritin, Serum: 2873 (04-01)  Ferritin, Serum: 1426 (03-30)  Ferritin, Serum: 1558 (03-29)  Ferritin, Serum: 2181 (03-26)      D-Dimer Assay, Quantitative: 2091 (04-07)  D-Dimer Assay, Quantitative: 1498 (04-04)  D-Dimer Assay, Quantitative: 7615 (04-01)  D-Dimer Assay, Quantitative: 938 (03-29)  D-Dimer Assay, Quantitative: 1344 (03-26)        RADIOLOGY:

## 2021-04-09 NOTE — PROGRESS NOTE ADULT - SUBJECTIVE AND OBJECTIVE BOX
Patient is a 65y old  Female who presents with a chief complaint of covid pna and dka (09 Apr 2021 08:14)      INTERVAL HPI/OVERNIGHT EVENTS:  pt remains critically ill, on vent  fsbg stable    MEDICATIONS  (STANDING):  chlorhexidine 0.12% Liquid 15 milliLiter(s) Oral Mucosa every 12 hours  chlorhexidine 2% Cloths 1 Application(s) Topical <User Schedule>  dextrose 40% Gel 15 Gram(s) Oral once  dextrose 5%. 1000 milliLiter(s) (50 mL/Hr) IV Continuous <Continuous>  dextrose 5%. 1000 milliLiter(s) (100 mL/Hr) IV Continuous <Continuous>  dextrose 50% Injectable 25 Gram(s) IV Push once  dextrose 50% Injectable 12.5 Gram(s) IV Push once  dextrose 50% Injectable 25 Gram(s) IV Push once  enoxaparin Injectable 40 milliGRAM(s) SubCutaneous two times a day  fentaNYL   Infusion. 0.5 MICROgram(s)/kG/Hr (3.86 mL/Hr) IV Continuous <Continuous>  glucagon  Injectable 1 milliGRAM(s) IntraMuscular once  insulin glargine Injectable (LANTUS) 8 Unit(s) SubCutaneous at bedtime  insulin lispro (ADMELOG) corrective regimen sliding scale   SubCutaneous every 6 hours  ketamine Infusion. 0.25 mG/kG/Hr (1.93 mL/Hr) IV Continuous <Continuous>  midodrine 20 milliGRAM(s) Oral every 8 hours  norepinephrine Infusion 0.05 MICROgram(s)/kG/Min (3.61 mL/Hr) IV Continuous <Continuous>  pantoprazole  Injectable 40 milliGRAM(s) IV Push daily  polyethylene glycol 3350 17 Gram(s) Oral daily  propofol Infusion 10.009 MICROgram(s)/kG/Min (4.63 mL/Hr) IV Continuous <Continuous>  senna Syrup 10 milliLiter(s) Oral at bedtime  zinc oxide 40% Ointment 1 Application(s) Topical three times a day    MEDICATIONS  (PRN):  sodium chloride 0.9% lock flush 10 milliLiter(s) IV Push every 1 hour PRN Pre/post blood products, medications, blood draw, and to maintain line patency      REVIEW OF SYSTEMS:  unobtainable      Vital Signs Last 24 Hrs  T(C): 36.9 (09 Apr 2021 07:30), Max: 37 (09 Apr 2021 05:30)  T(F): 98.5 (09 Apr 2021 07:30), Max: 98.6 (09 Apr 2021 05:30)  HR: 85 (09 Apr 2021 10:00) (62 - 106)  BP: --  BP(mean): --  RR: 32 (09 Apr 2021 10:00) (21 - 32)  SpO2: 100% (09 Apr 2021 10:00) (78% - 100%)    PHYSICAL EXAM:  GENERAL: NAD,on vent      LABS:                        8.1    9.06  )-----------( 195      ( 09 Apr 2021 03:30 )             28.2     04-09    137  |  104  |  19  ----------------------------<  196<H>  4.2   |  28  |  0.34<L>    Ca    7.6<L>      09 Apr 2021 03:30  Phos  2.2     04-09  Mg     2.2     04-09    TPro  5.3<L>  /  Alb  1.4<L>  /  TBili  0.6  /  DBili  x   /  AST  145<H>  /  ALT  372<H>  /  AlkPhos  217<H>  04-09        CAPILLARY BLOOD GLUCOSE      POCT Blood Glucose.: 187 mg/dL (09 Apr 2021 06:51)  POCT Blood Glucose.: 204 mg/dL (09 Apr 2021 00:28)  POCT Blood Glucose.: 130 mg/dL (08 Apr 2021 17:16)  POCT Blood Glucose.: 208 mg/dL (08 Apr 2021 10:54)    Lipid panel:       ABG - ( 09 Apr 2021 10:20 )  pH, Arterial: x     pH, Blood: 7.27  /  pCO2: 54    /  pO2: 73    / HCO3: 24    / Base Excess: -3.0  /  SaO2: 92                Mode: AC/ CMV (Assist Control/ Continuous Mandatory Ventilation)  RR (machine): 32  TV (machine): 380  FiO2: 100  PEEP: 10  ITime: 0.7  MAP: 10  PIP: 43        RADIOLOGY & ADDITIONAL TESTS:

## 2021-04-09 NOTE — PROGRESS NOTE ADULT - ASSESSMENT
64yo F w/ HTN (on losartan) and DM2 (on metformin) admitted with acute repiratory failure due to covid now in ARDS.   Imaging personally reviewed and shows extensive infiltrates   Blood culture from 4/1 (one set only was sent) grew FAN  She has had intermittent fevers the last several days   Repeat blood cultures have been sent and awaiting results   Her initial presentation was with DKA though her glucose levels are better controlled and no longer in DKA   Suffered shock liver likely from initial intubation/septic shock from covid though her AST/ALT are improving   Staph epi is often a contaminant. The ideal situation is to draw 2 sets of blood cultures from 2 separate sticks to ensure that if in fact a contaminant it would not grow in the additional set. In this case only one set was sent and she has a central line, a-line, ETT, NGT, montiel  and has been having intermittent fevers. Still suspect that this was a procurement contaminant but will await for the repeat blood cultures.   DDx regarding her fevers are quite broad: covid vs DVT/PE (dvt study negative), medication induced ( vanco on rare occasions causes fevers), subacute thyroiditis, bacterial pneumonia etc     4/8: on pressors and intubated, Fio2 100%, day 7 of vanco, repeat blood culture negative   4/9: off vanco, afebrile, FiO2 still 100%, on pressors, cultures negative,      Antibiotics this hospitalization:   Vanco 4/2->4/8  RDV 3/23-3/27  ceftriaxone 3/23  Azithro 3/23-3/24    Overall, 65F septic shock due to covid, shock liver, leukocytosis, fever, ARDS, anemia, +CONS     Suggest-  monitor off antibiotics   if able, consider exchanging lines   trend WBC, LFTs   trend fever curve   pressors per ICU   Ventilator management per ICU   Good but not too tight glycemic control->endocrine recommendations appreciated   Remains critically ill.    D/W Dr. Nica Mccollum,   Infectious Disease Attending  Pager [phone]   After 5pm/weekends please call [phone] for all inquiries and new consults

## 2021-04-10 NOTE — PROGRESS NOTE ADULT - SUBJECTIVE AND OBJECTIVE BOX
RERE FINESSE    [facility] CCU 6    Patient is a 65y old  Female who presents with a chief complaint of covid pna and dka (09 Apr 2021 16:10)       Allergies    Carafate (Rash)  Levaquin (Rash)  Percocet 5/325 (Other)    Intolerances    lactose (Flatulence)      HPI:  66yo F w/ HTN (on losartan) and DM2 (on metformin) presents with progressively worsening SOB associated with productive cough for past several days much worse the past 3 days.  Started on abx (levaquin, prednisone and azithromycin 3/19) outpatient with no improvement. Denies any chest pain, fevers, dizziness, syncope, abd pain, back pain, N/V/D, recent sick contact, recent travel.     In ED: CTA chest done to r/o PE findings consistent w/ COVID-19 PNA. Labs remarkable for WBC 18.55, Na 130, K 3.2, COs 10, AG 29, Glucose 416, alb 2.5.  Placed on High flow for dyspnea and hypoxemia. Admitted to ICU for COVID pna and DKA.   (23 Mar 2021 05:08)      PAST MEDICAL & SURGICAL HISTORY:  HTN (hypertension)    DM (diabetes mellitus)    HLD (hyperlipidemia)    No significant past surgical history        FAMILY HISTORY:  No pertinent family history in first degree relatives          MEDICATIONS   chlorhexidine 0.12% Liquid 15 milliLiter(s) Oral Mucosa every 12 hours  chlorhexidine 2% Cloths 1 Application(s) Topical <User Schedule>  cisatracurium Infusion 3 MICROgram(s)/kG/Min IV Continuous <Continuous>  dextrose 40% Gel 15 Gram(s) Oral once  dextrose 5%. 1000 milliLiter(s) IV Continuous <Continuous>  dextrose 5%. 1000 milliLiter(s) IV Continuous <Continuous>  dextrose 50% Injectable 25 Gram(s) IV Push once  dextrose 50% Injectable 12.5 Gram(s) IV Push once  dextrose 50% Injectable 25 Gram(s) IV Push once  enoxaparin Injectable 40 milliGRAM(s) SubCutaneous two times a day  fentaNYL   Infusion. 0.5 MICROgram(s)/kG/Hr IV Continuous <Continuous>  glucagon  Injectable 1 milliGRAM(s) IntraMuscular once  insulin glargine Injectable (LANTUS) 8 Unit(s) SubCutaneous at bedtime  insulin lispro (ADMELOG) corrective regimen sliding scale   SubCutaneous every 6 hours  ketamine Infusion. 0.25 mG/kG/Hr IV Continuous <Continuous>  midodrine 20 milliGRAM(s) Oral every 8 hours  norepinephrine Infusion 0.05 MICROgram(s)/kG/Min IV Continuous <Continuous>  pantoprazole  Injectable 40 milliGRAM(s) IV Push daily  polyethylene glycol 3350 17 Gram(s) Oral daily  propofol Infusion 10.009 MICROgram(s)/kG/Min IV Continuous <Continuous>  senna Syrup 10 milliLiter(s) Oral at bedtime  sodium chloride 0.9% lock flush 10 milliLiter(s) IV Push every 1 hour PRN  zinc oxide 40% Ointment 1 Application(s) Topical three times a day      Vital Signs Last 24 Hrs  T(C): 37.2 (10 Apr 2021 08:00), Max: 37.2 (10 Apr 2021 08:00)  T(F): 98.9 (10 Apr 2021 08:00), Max: 98.9 (10 Apr 2021 08:00)  HR: 114 (10 Apr 2021 08:30) (79 - 121)  BP: --  BP(mean): --  RR: 34 (10 Apr 2021 08:30) (0 - 34)  SpO2: 92% (10 Apr 2021 08:30) (87% - 100%)      04-09-21 @ 07:01  -  04-10-21 @ 07:00  --------------------------------------------------------  IN: 2709.7 mL / OUT: 1980 mL / NET: 729.7 mL    04-10-21 @ 07:01  -  04-10-21 @ 09:00  --------------------------------------------------------  IN: 227 mL / OUT: 40 mL / NET: 187 mL        Mode: AC/ CMV (Assist Control/ Continuous Mandatory Ventilation), RR (machine): 32, TV (machine): 380, FiO2: 80, PEEP: 8, ITime: 0.7, MAP: 9, PIP: 37    LABS:                        8.0    10.28 )-----------( 208      ( 10 Apr 2021 02:34 )             27.3     04-10    139  |  106  |  19  ----------------------------<  287<H>  4.7   |  27  |  0.45<L>    Ca    8.0<L>      10 Apr 2021 02:34  Phos  2.5     04-10  Mg     2.3     04-10    TPro  5.7<L>  /  Alb  1.4<L>  /  TBili  0.8  /  DBili  x   /  AST  64<H>  /  ALT  265<H>  /  AlkPhos  245<H>  04-10          ABG - ( 10 Apr 2021 04:59 )  pH, Arterial: x     pH, Blood: 7.36  /  pCO2: 50    /  pO2: 80    / HCO3: 28    / Base Excess: 2.4   /  SaO2: 95                  WBC:  WBC Count: 10.28 K/uL (04-10 @ 02:34)  WBC Count: 9.06 K/uL (04-09 @ 03:30)  WBC Count: 10.35 K/uL (04-08 @ 04:07)  WBC Count: 11.30 K/uL (04-07 @ 04:15)      MICROBIOLOGY:  RECENT CULTURES:  04-06 .Blood Blood XXXX XXXX   No growth to date.                    Sodium:  Sodium, Serum: 139 mmol/L (04-10 @ 02:34)  Sodium, Serum: 137 mmol/L (04-09 @ 03:30)  Sodium, Serum: 135 mmol/L (04-08 @ 04:07)  Sodium, Serum: 136 mmol/L (04-07 @ 04:15)      0.45 mg/dL 04-10 @ 02:34  0.34 mg/dL 04-09 @ 03:30  0.40 mg/dL 04-08 @ 04:07  0.62 mg/dL 04-07 @ 04:15      Hemoglobin:  Hemoglobin: 8.0 g/dL (04-10 @ 02:34)  Hemoglobin: 8.1 g/dL (04-09 @ 03:30)  Hemoglobin: 7.9 g/dL (04-08 @ 04:07)  Hemoglobin: 8.8 g/dL (04-07 @ 04:15)      Platelets: Platelet Count - Automated: 208 K/uL (04-10 @ 02:34)  Platelet Count - Automated: 195 K/uL (04-09 @ 03:30)  Platelet Count - Automated: 181 K/uL (04-08 @ 04:07)  Platelet Count - Automated: 167 K/uL (04-07 @ 04:15)      LIVER FUNCTIONS - ( 10 Apr 2021 02:34 )  Alb: 1.4 g/dL / Pro: 5.7 gm/dL / ALK PHOS: 245 U/L / ALT: 265 U/L / AST: 64 U/L / GGT: x                 RADIOLOGY & ADDITIONAL STUDIES:

## 2021-04-10 NOTE — PROGRESS NOTE ADULT - ASSESSMENT
64yo F w/ HTN (on losartan) and DM2 (on metformin) admitted with acute repiratory failure due to covid now in ARDS.   Imaging personally reviewed and shows extensive infiltrates   Blood culture from 4/1 (one set only was sent) grew FAN  She has had intermittent fevers the last several days   Repeat blood cultures have been sent and awaiting results   Her initial presentation was with DKA though her glucose levels are better controlled and no longer in DKA   Suffered shock liver likely from initial intubation/septic shock from covid though her AST/ALT are improving   Staph epi is often a contaminant. The ideal situation is to draw 2 sets of blood cultures from 2 separate sticks to ensure that if in fact a contaminant it would not grow in the additional set. In this case only one set was sent and she has a central line, a-line, ETT, NGT, montiel  and has been having intermittent fevers. Still suspect that this was a procurement contaminant but will await for the repeat blood cultures.   DDx regarding her fevers are quite broad: covid vs DVT/PE (dvt study negative), medication induced ( vanco on rare occasions causes fevers), subacute thyroiditis, bacterial pneumonia etc     4/8: on pressors and intubated, Fio2 100%, day 7 of vanco, repeat blood culture negative   4/9: off vanco, afebrile, FiO2 still 100%, on pressors, cultures negative  4/10: slowly reducing FiO2, afebrile, lfts getting better, WBC normal      Antibiotics this hospitalization:   Vanco 4/2->4/8  RDV 3/23-3/27  ceftriaxone 3/23  Azithro 3/23-3/24    Overall, 65F septic shock due to covid, shock liver, leukocytosis, fever, ARDS, anemia, +CONS     Suggest-  monitor off antibiotics   if able, consider exchanging lines   trend WBC, LFTs   trend fever curve   pressors per ICU   Ventilator management per ICU   Good but not too tight glycemic control->endocrine recommendations appreciated   Remains critically ill.    D/W Ellie Mccollum DO  Infectious Disease Attending  Pager [phone]   After 5pm/weekends please call [phone] for all inquiries and new consults

## 2021-04-10 NOTE — PROGRESS NOTE ADULT - ASSESSMENT
64yo F w/ HTN (on losartan) and DM2 (on metformin) a/w COVID PNA and DKA, now in ARDS and intubated.  Noted overnight to have desaturation to 70s; restarted on ketamine on 4/9/21 for overbreathing vent and vent dyssynchrony with plateau pressures noted to be in the 40s with peaks now 50s; despite reparalyzing.  Changed to pressure control.     Neuro: Intubated and sedated on propofol, fentanyl and restarted ketamine this AM.  Aim for RASS -3 to -4 for vent synchrony. May require paralytics.   CV: Shock state possible component of vasoplegia given sedation; c/w levophed, wean as tolerated; c/w midodrine.    Pulm: COVID PNA and ARDS - c/w vent, intubated 3/31 Vent: 32/32/8/80%, decrease FiO2 as tolerated,  s/p remdesivir and dexamethasone.  Required pressure control given elevated peak and plateau pressures.  Repeat ABG with permissive hypercapnia and pH 7.33.  GI: Protonix, Tube feeds  Renal/Metabolic: No acute issues  ID: MRSE+ (likely contaminant), s/p 8 days vancomycin; leukocytosis and fevers resolved  - ID following  Heme: Lovenox BID  Endo: Lantus/ISS, will increase lantus.  Dispo: Remains critically ill with COVID ARDS.

## 2021-04-10 NOTE — PROGRESS NOTE ADULT - ASSESSMENT
FINESSE JERNIGAN 65 F 1955 3/23/2021 DR YANN UNDERWOOD     REVIEW OF SYMPTOMS      Able to give (reliable) ROS  NO     PHYSICAL EXAM    HEENT Unremarkable  atraumatic   RESP Fair air entry EXP prolonged    Harsh breath sound Resp distres mild   CARDIAC S1 S2 No S3     NO JVD    ABDOMEN SOFT BS PRESENT NOT DISTENDED No hepatosplenomegaly   PEDAL EDEMA present No calf tenderness  NO rash       PATIENT SUMMARY  66 yo F with h/o HTN, HLD and DM2 who presented with progressively worsening SOB  Patient ruled in for  COVID-19 PNA.  ICU admitting dx : 1) Acute hypoxic resp failure 2 to Covid-19 PNA with improvement in oxygenation  2) s/p DKA.  Pt was admitted 3/23 transferred out of ICU 3/26  Pulm consulted 3/29/2021   Transferred to ICU 3/31   INTUBATED 3/31   NG TBE 3/31      PROBLEMS.  COVID PNEUMONIA   STAPH EPI BACTEREMIA 4/1   ACUTE HYPOXEMIC RESP FAILURE   ELEVATED Ddimr  INTUBATED 3/31   NG TBE 3/31  LACTICEMIA 4/1/2021   TROPONINEMIA 4/1/2021  ANEMIA    ELEVATED LFTS 4/5/2021  AST 1393       VITALS/IO/VENT/DRIPS.  4/10/2021 afeb 90 120/60   4/10/2021 4p cisa 4 m/k/m  4/10/2021 4p gurpreet .7 m/k/h   4/10/2021 4p fent 3 m/k/h   4/10/2021 4p nore .1 m/k/m   4/10/2021 ac 34 fio2 70    PATIENT DATA   ABG.     4/10/2021 ac 32/80% 733/53/90     GLOBAL AND BEST PRACTICE ISSUES                     ALLERGY. carafate levaquin percocet      WEIGHT.      3/29/2021 77                           BMI.                 3/29/2021 29          ADVANCED DIRECTIVE.                               HEAD OF BED ELEVATION. Yes  DVT PROPHYLAXIS.   hpsc (3/23)   --> lvnx 40 93/30)                  MANUEL PROPHYLAXIS. PROTONIX 40 (3/23)   APA.                                                                    DYSPHAGIA RECOMM.   DIET.  CONS CARB (3/25) --> vital 1080 (4/5)      INFECTION PROPHYLAXIS.   chlorhexidine .12% (4/1/20210  chlorhexidine 2% (4/1/2021)     ASSESSMENT/RECOMMENDATIONS.    D-DIMR ELEVATED  3/26-3/29/2021 D-dimr 1344-930  3/23 cta ch no pe bl ggo  Trending down   is on dvt ppl  INFECTION  STAPH EPI BACATREMIA 4/1 4/1-4/5/2021 w 32 -14.9   4/1 blod c staph epid   vanco (4/2)  completed    SHOCK   On vasopressors  Target MAP 65   MECHANICAL VENT   Target PO2 60 (+) PPlat 35 (-) pH 730 (+)   oxygenation improved     COVID PNEUMONIA AND HYPOXIC RESP FAILURE MANAGEMENT.   REMDESEVIR (3/23)   DEXA (3/23)   PRONE (3/26)   Monitor pulse oximetry Target PO 92-96%  Monitor inflammatory and thrombotic biomarkers  Monitor for  for progressively  worsening oxygenation failure   O2 to keep po 90-95%  Intubated 3/31  Cont supp care   On fent cisa nore     TIME SPENT   Over 25 minutes aggregate care time spent on encounter; activities included   direct patient care, counseling and/or coordinating care reviewing notes, lab data/ imaging , discussion with multidisciplinary team/ patient  /family and explaining in detail risks, benefits, alternatives  of the recommendations     FINESSE JERNIGAN 65 F 1955 3/23/2021 DR YANN UNDERWOOD

## 2021-04-10 NOTE — PROGRESS NOTE ADULT - SUBJECTIVE AND OBJECTIVE BOX
Patient is a 65y old  Female who presents with a chief complaint of covid pna and dka (10 Apr 2021 15:09)      Interval History finger sticks are increased and > 350   Lantus increased to 16 units and sliding scale lispro every 6 hours continued     MEDICATIONS  (STANDING):  chlorhexidine 0.12% Liquid 15 milliLiter(s) Oral Mucosa every 12 hours  chlorhexidine 2% Cloths 1 Application(s) Topical <User Schedule>  cisatracurium Infusion 3 MICROgram(s)/kG/Min (13.9 mL/Hr) IV Continuous <Continuous>  dextrose 40% Gel 15 Gram(s) Oral once  dextrose 5%. 1000 milliLiter(s) (50 mL/Hr) IV Continuous <Continuous>  dextrose 5%. 1000 milliLiter(s) (100 mL/Hr) IV Continuous <Continuous>  dextrose 50% Injectable 25 Gram(s) IV Push once  dextrose 50% Injectable 12.5 Gram(s) IV Push once  dextrose 50% Injectable 25 Gram(s) IV Push once  enoxaparin Injectable 40 milliGRAM(s) SubCutaneous two times a day  fentaNYL   Infusion. 0.5 MICROgram(s)/kG/Hr (3.86 mL/Hr) IV Continuous <Continuous>  glucagon  Injectable 1 milliGRAM(s) IntraMuscular once  insulin glargine Injectable (LANTUS) 16 Unit(s) SubCutaneous at bedtime  insulin lispro (ADMELOG) corrective regimen sliding scale   SubCutaneous every 6 hours  ketamine Infusion. 0.25 mG/kG/Hr (1.93 mL/Hr) IV Continuous <Continuous>  midodrine 20 milliGRAM(s) Oral every 8 hours  norepinephrine Infusion 0.05 MICROgram(s)/kG/Min (3.61 mL/Hr) IV Continuous <Continuous>  pantoprazole  Injectable 40 milliGRAM(s) IV Push daily  polyethylene glycol 3350 17 Gram(s) Oral daily  propofol Infusion 10.009 MICROgram(s)/kG/Min (4.63 mL/Hr) IV Continuous <Continuous>  senna Syrup 10 milliLiter(s) Oral at bedtime  zinc oxide 40% Ointment 1 Application(s) Topical three times a day    MEDICATIONS  (PRN):  sodium chloride 0.9% lock flush 10 milliLiter(s) IV Push every 1 hour PRN Pre/post blood products, medications, blood draw, and to maintain line patency      Allergies    Carafate (Rash)  Levaquin (Rash)  Percocet 5/325 (Other)    Intolerances    lactose (Flatulence)      REVIEW OF SYSTEMS:  CONSTITUTIONAL: no changes    Vital Signs Last 24 Hrs  T(C): 37.4 (10 Apr 2021 20:00), Max: 37.4 (10 Apr 2021 20:00)  T(F): 99.3 (10 Apr 2021 20:00), Max: 99.3 (10 Apr 2021 20:00)  HR: 96 (10 Apr 2021 21:24) (80 - 121)  BP: --  BP(mean): --  RR: 34 (10 Apr 2021 20:30) (30 - 34)  SpO2: 93% (10 Apr 2021 21:24) (90% - 100%)    PHYSICAL EXAM:  GENERAL: intubated , sedated  HEAD: Atraumatic, Normocephalic  as per the progress notes of Primary Team     LABS:        CAPILLARY BLOOD GLUCOSE      POCT Blood Glucose.: 410 mg/dL (10 Apr 2021 17:20)  POCT Blood Glucose.: 372 mg/dL (10 Apr 2021 11:04)  POCT Blood Glucose.: 289 mg/dL (10 Apr 2021 05:55)  POCT Blood Glucose.: 239 mg/dL (09 Apr 2021 23:22)    Lipid panel:       ABG - ( 10 Apr 2021 11:38 )  pH, Arterial: x     pH, Blood: 7.33  /  pCO2: 53    /  pO2: 95    / HCO3: 27    / Base Excess: 1.3   /  SaO2: 97                Mode: AC/ CMV (Assist Control/ Continuous Mandatory Ventilation)  RR (machine): 34  TV (machine): 380  FiO2: 70  PEEP: 8  ITime: 0.7  MAP: 19  PC: 32  PIP: 40    Thyroid:  Diabetes Tests:  Parathyroid Panel:  Adrenals:  RADIOLOGY & ADDITIONAL TESTS:    Imaging Personally Reviewed:  [ ] YES  [ ] NO    Consultant(s) Notes Reviewed:  [ ] YES  [ ] NO    Care Discussed with Consultants/Other Providers [ ] YES  [ ] NO

## 2021-04-10 NOTE — PHYSICAL THERAPY INITIAL EVALUATION ADULT - PRECAUTIONS/LIMITATIONS, REHAB EVAL
airborne/contact-COVID-19 precaution./cardiac precautions/fall precautions/isolation precautions/oxygen therapy device and L/min

## 2021-04-10 NOTE — PHYSICAL THERAPY INITIAL EVALUATION ADULT - GENERAL OBSERVATIONS, REHAB EVAL
Pt was seen in supine c full vent support, sedated, c  IV, montiel catheter donned. Pt was lethargic and unresponsible to verbal and kinetic stimulation.

## 2021-04-10 NOTE — PROGRESS NOTE ADULT - PROBLEM SELECTOR PLAN 1
Continue with the same regimen while inpatient   will need increased Lantus and Regular Insulin Every six hours added   Infection driven Hyperglycemia   prognosis guarded

## 2021-04-10 NOTE — PROGRESS NOTE ADULT - SUBJECTIVE AND OBJECTIVE BOX
INTERVAL HPI/OVERNIGHT EVENTS:    Increased sedation and reparalyzed overnight, Noted to have increasing peak inspiratory pressures to 50s with plateau pressure approx 40.  Changed to pressure control Mode: RR34, TPIP 40, PS 32, PEEP 8, FiO2 80.      CENTRAL LINE: [x ] YES [ ] NO  LOCATION:   [facility] 4/1/2021    GARCIA: [ x] YES [ ] NO        A-LINE:  [ ] YES [x ] NO  LOCATION:       GLOBAL ISSUE/BEST PRACTICE:  Analgesia: Fentanyl  Sedation: Propofol, Ketamine  HOB elevation: yes  Stress ulcer prophylaxis: Protonix  VTE prophylaxis: Lovenox  Oral Care: Chlorhexidine  Glycemic control: ISS/Lantus  Nutrition:    REVIEW OF SYSTEMS: [x] Unable to obtain because: intubated and sedated, paralyzed    PHYSICAL EXAM:    Gen: intubated and sedated  HEENT: Intubated with ETT  CARD -s1s2, RRR, no M,G,R;   PULM - Coarse vented breath sounds, symmetric breath sounds;   ABD -  +BS, ND, NT, soft, no guarding, no rebound, no masses;   EXT - symmetric pulses, 2+ dp, capillary refill < 2 seconds, no cyanosis, no edema;   NEURO - sedated and paralyzed    ICU Vital Signs Last 24 Hrs  T(C): 37.2 (10 Apr 2021 08:00), Max: 37.2 (10 Apr 2021 08:00)  T(F): 98.9 (10 Apr 2021 08:00), Max: 98.9 (10 Apr 2021 08:00)  HR: 87 (10 Apr 2021 14:00) (79 - 121)  BP: --  BP(mean): --  ABP: 94/50 (10 Apr 2021 14:00) (85/44 - 195/78)  ABP(mean): 63 (10 Apr 2021 14:00) (54 - 117)  RR: 34 (10 Apr 2021 14:00) (30 - 34)  SpO2: 97% (10 Apr 2021 14:00) (90% - 100%)    I&O's Detail    09 Apr 2021 07:01  -  10 Apr 2021 07:00  --------------------------------------------------------  IN:    Cisatracurium: 283.1 mL    Enteral Tube Flush: 110 mL    FentaNYL: 703.2 mL    Ketamine: 38 mL    Norepinephrine: 184.6 mL    Propofol: 480.8 mL    Vital1.0: 910 mL  Total IN: 2709.7 mL    OUT:    Indwelling Catheter - Urethral (mL): 1980 mL  Total OUT: 1980 mL    Total NET: 729.7 mL      10 Apr 2021 07:01  -  10 Apr 2021 15:10  --------------------------------------------------------  IN:    Cisatracurium: 129.5 mL    FentaNYL: 161.7 mL    Ketamine: 26.6 mL    Norepinephrine: 30.9 mL    Propofol: 161.7 mL    Vital1.0: 315 mL  Total IN: 825.4 mL    OUT:    Indwelling Catheter - Urethral (mL): 310 mL  Total OUT: 310 mL    Total NET: 515.4 mL        MEDICATIONS  NEURO  Meds: cisatracurium Infusion 3 MICROgram(s)/kG/Min (13.9 mL/Hr) IV Continuous <Continuous>  fentaNYL   Infusion. 0.5 MICROgram(s)/kG/Hr (3.86 mL/Hr) IV Continuous <Continuous>  ketamine Infusion. 0.25 mG/kG/Hr (1.93 mL/Hr) IV Continuous <Continuous>  propofol Infusion 10.009 MICROgram(s)/kG/Min (4.63 mL/Hr) IV Continuous <Continuous>    RESPIRATORY  ABG - ( 10 Apr 2021 11:38 )  pH: x     /  pCO2: 53    /  pO2: 95    / HCO3: 27    / Base Excess: 1.3   /  SaO2: 97      Lactate: x                Meds:   CARDIOVASCULAR  Meds: midodrine 20 milliGRAM(s) Oral every 8 hours  norepinephrine Infusion 0.05 MICROgram(s)/kG/Min (3.61 mL/Hr) IV Continuous <Continuous>    GI/NUTRITION  Meds: pantoprazole  Injectable 40 milliGRAM(s) IV Push daily  polyethylene glycol 3350 17 Gram(s) Oral daily  senna Syrup 10 milliLiter(s) Oral at bedtime    GENITOURINARY  Meds: dextrose 5%. 1000 milliLiter(s) IV Continuous <Continuous>  dextrose 5%. 1000 milliLiter(s) IV Continuous <Continuous>  sodium chloride 0.9% lock flush 10 milliLiter(s) IV Push every 1 hour PRN Pre/post blood products, medications, blood draw, and to maintain line patency    HEMATOLOGIC  Meds: enoxaparin Injectable 40 milliGRAM(s) SubCutaneous two times a day    [x] VTE Prophylaxis  INFECTIOUS DISEASES  Meds:   ENDOCRINE  CAPILLARY BLOOD GLUCOSE      POCT Blood Glucose.: 372 mg/dL (10 Apr 2021 11:04)  POCT Blood Glucose.: 289 mg/dL (10 Apr 2021 05:55)  POCT Blood Glucose.: 239 mg/dL (09 Apr 2021 23:22)  POCT Blood Glucose.: 208 mg/dL (09 Apr 2021 21:42)  POCT Blood Glucose.: 333 mg/dL (09 Apr 2021 16:53)    Meds: insulin glargine Injectable (LANTUS) 8 Unit(s) SubCutaneous at bedtime  insulin lispro (ADMELOG) corrective regimen sliding scale   SubCutaneous every 6 hours    OTHER MEDICATIONS:  chlorhexidine 0.12% Liquid 15 milliLiter(s) Oral Mucosa every 12 hours  chlorhexidine 2% Cloths 1 Application(s) Topical <User Schedule>  zinc oxide 40% Ointment 1 Application(s) Topical three times a day  :    LABS:                        8.0    10.28 )-----------( 208      ( 10 Apr 2021 02:34 )             27.3      04-10    139  |  106  |  19  ----------------------------<  287<H>  4.7   |  27  |  0.45<L>    Ca    8.0<L>      10 Apr 2021 02:34  Phos  2.5     04-10  Mg     2.3     04-10    TPro  5.7<L>  /  Alb  1.4<L>  /  TBili  0.8  /  DBili  x   /  AST  64<H>  /  ALT  265<H>  /  AlkPhos  245<H>  04-10      ## COVID Panel  Ferritin, Serum: 88145 ng/mL (04-07-21 @ 09:35)  Lactate Dehydrogenase, Serum: 1614 U/L (04-07-21 @ 09:35)  C-Reactive Protein, Serum: 194 mg/L (04-07-21 @ 09:35)  D-Dimer Assay, Quantitative: 2091 ng/mL DDU (04-07-21 @ 04:15)          Mode: AC/ CMV (Assist Control/ Continuous Mandatory Ventilation), RR (machine): 34, TV (machine): 380, FiO2: 70, PEEP: 8, ITime: 0.7, MAP: 9, PC: 32, PIP: 50      RADIOLOGY & ADDITIONAL STUDIES:

## 2021-04-11 NOTE — PROGRESS NOTE ADULT - SUBJECTIVE AND OBJECTIVE BOX
RERE FINESSE    [facility] CCU 6    Patient is a 65y old  Female who presents with a chief complaint of covid pna and dka (10 Apr 2021 23:15)       Allergies    Carafate (Rash)  Levaquin (Rash)  Percocet 5/325 (Other)    Intolerances    lactose (Flatulence)      HPI:  66yo F w/ HTN (on losartan) and DM2 (on metformin) presents with progressively worsening SOB associated with productive cough for past several days much worse the past 3 days.  Started on abx (levaquin, prednisone and azithromycin 3/19) outpatient with no improvement. Denies any chest pain, fevers, dizziness, syncope, abd pain, back pain, N/V/D, recent sick contact, recent travel.     In ED: CTA chest done to r/o PE findings consistent w/ COVID-19 PNA. Labs remarkable for WBC 18.55, Na 130, K 3.2, COs 10, AG 29, Glucose 416, alb 2.5.  Placed on High flow for dyspnea and hypoxemia. Admitted to ICU for COVID pna and DKA.   (23 Mar 2021 05:08)      PAST MEDICAL & SURGICAL HISTORY:  HTN (hypertension)    DM (diabetes mellitus)    HLD (hyperlipidemia)    No significant past surgical history        FAMILY HISTORY:  No pertinent family history in first degree relatives          MEDICATIONS   chlorhexidine 0.12% Liquid 15 milliLiter(s) Oral Mucosa every 12 hours  chlorhexidine 2% Cloths 1 Application(s) Topical <User Schedule>  cisatracurium Infusion 3 MICROgram(s)/kG/Min IV Continuous <Continuous>  dextrose 40% Gel 15 Gram(s) Oral once  dextrose 5%. 1000 milliLiter(s) IV Continuous <Continuous>  dextrose 5%. 1000 milliLiter(s) IV Continuous <Continuous>  dextrose 50% Injectable 25 Gram(s) IV Push once  dextrose 50% Injectable 12.5 Gram(s) IV Push once  dextrose 50% Injectable 25 Gram(s) IV Push once  enoxaparin Injectable 40 milliGRAM(s) SubCutaneous two times a day  fentaNYL   Infusion. 0.501 MICROgram(s)/kG/Hr IV Continuous <Continuous>  glucagon  Injectable 1 milliGRAM(s) IntraMuscular once  insulin glargine Injectable (LANTUS) 20 Unit(s) SubCutaneous at bedtime  insulin lispro (ADMELOG) corrective regimen sliding scale   SubCutaneous every 6 hours  insulin regular  human recombinant 6 Unit(s) SubCutaneous every 6 hours  ketamine Infusion. 0.25 mG/kG/Hr IV Continuous <Continuous>  norepinephrine Infusion 0.05 MICROgram(s)/kG/Min IV Continuous <Continuous>  pantoprazole  Injectable 40 milliGRAM(s) IV Push daily  polyethylene glycol 3350 17 Gram(s) Oral daily  propofol Infusion 10.009 MICROgram(s)/kG/Min IV Continuous <Continuous>  senna Syrup 10 milliLiter(s) Oral at bedtime  sodium chloride 0.9% lock flush 10 milliLiter(s) IV Push every 1 hour PRN  zinc oxide 40% Ointment 1 Application(s) Topical three times a day      Vital Signs Last 24 Hrs  T(C): 37.2 (11 Apr 2021 08:00), Max: 37.4 (10 Apr 2021 20:00)  T(F): 98.9 (11 Apr 2021 08:00), Max: 99.3 (10 Apr 2021 20:00)  HR: 93 (11 Apr 2021 09:30) (84 - 100)  BP: --  BP(mean): --  RR: 34 (11 Apr 2021 09:30) (32 - 36)  SpO2: 93% (11 Apr 2021 09:30) (91% - 100%)      04-10-21 @ 07:01  -  04-11-21 @ 07:00  --------------------------------------------------------  IN: 3187.5 mL / OUT: 1160 mL / NET: 2027.5 mL    04-11-21 @ 07:01  -  04-11-21 @ 10:13  --------------------------------------------------------  IN: 197.2 mL / OUT: 0 mL / NET: 197.2 mL        Mode: AC/ CMV (Assist Control/ Continuous Mandatory Ventilation), RR (machine): 34, FiO2: 70, PEEP: 8, PS: 32, ITime: 1, MAP: 19, PC: 32, PIP: 41    LABS:                        8.0    11.05 )-----------( 251      ( 11 Apr 2021 03:10 )             27.5     04-11    136  |  103  |  24<H>  ----------------------------<  332<H>  5.0   |  29  |  0.54    Ca    8.1<L>      11 Apr 2021 03:10  Phos  2.4     04-11  Mg     2.5     04-11    TPro  6.0  /  Alb  1.5<L>  /  TBili  0.7  /  DBili  x   /  AST  56<H>  /  ALT  199<H>  /  AlkPhos  252<H>  04-11          ABG - ( 11 Apr 2021 08:21 )  pH, Arterial: x     pH, Blood: 7.29  /  pCO2: 59    /  pO2: 70    / HCO3: 27    / Base Excess: 0.9   /  SaO2: 92                  WBC:  WBC Count: 11.05 K/uL (04-11 @ 03:10)  WBC Count: 10.28 K/uL (04-10 @ 02:34)  WBC Count: 9.06 K/uL (04-09 @ 03:30)  WBC Count: 10.35 K/uL (04-08 @ 04:07)      MICROBIOLOGY:  RECENT CULTURES:  04-06 .Blood Blood XXXX XXXX   No growth to date.                    Sodium:  Sodium, Serum: 136 mmol/L (04-11 @ 03:10)  Sodium, Serum: 139 mmol/L (04-10 @ 02:34)  Sodium, Serum: 137 mmol/L (04-09 @ 03:30)  Sodium, Serum: 135 mmol/L (04-08 @ 04:07)      0.54 mg/dL 04-11 @ 03:10  0.45 mg/dL 04-10 @ 02:34  0.34 mg/dL 04-09 @ 03:30  0.40 mg/dL 04-08 @ 04:07      Hemoglobin:  Hemoglobin: 8.0 g/dL (04-11 @ 03:10)  Hemoglobin: 8.0 g/dL (04-10 @ 02:34)  Hemoglobin: 8.1 g/dL (04-09 @ 03:30)  Hemoglobin: 7.9 g/dL (04-08 @ 04:07)      Platelets: Platelet Count - Automated: 251 K/uL (04-11 @ 03:10)  Platelet Count - Automated: 208 K/uL (04-10 @ 02:34)  Platelet Count - Automated: 195 K/uL (04-09 @ 03:30)  Platelet Count - Automated: 181 K/uL (04-08 @ 04:07)      LIVER FUNCTIONS - ( 11 Apr 2021 03:10 )  Alb: 1.5 g/dL / Pro: 6.0 gm/dL / ALK PHOS: 252 U/L / ALT: 199 U/L / AST: 56 U/L / GGT: x                 RADIOLOGY & ADDITIONAL STUDIES:

## 2021-04-11 NOTE — PROGRESS NOTE ADULT - SUBJECTIVE AND OBJECTIVE BOX
RERE KILPATRICK  MRN-[id number]    Follow Up:  COVID, MRSE in blood culture     Interval History: seen and examined, FiO2 going back up, off antibiotics, weaned off pressor for a while, LFTs are improving, remains critically ill with very guarded prognosis    ROS:    [ X] Unobtainable because: intubated/sedated   [ ] All other systems negative          Allergies  Carafate (Rash)  Levaquin (Rash)  Percocet 5/325 (Other)        ANTIMICROBIALS:        MEDICATIONS  (STANDING):  cisatracurium Infusion 3 <Continuous>  dextrose 40% Gel 15 once  dextrose 50% Injectable 25 once  dextrose 50% Injectable 12.5 once  dextrose 50% Injectable 25 once  enoxaparin Injectable 40 two times a day  fentaNYL   Infusion. 0.501 <Continuous>  glucagon  Injectable 1 once  insulin glargine Injectable (LANTUS) 20 at bedtime  insulin lispro (ADMELOG) corrective regimen sliding scale  every 6 hours  insulin regular  human recombinant 6 every 6 hours  ketamine Infusion. 0.25 <Continuous>  pantoprazole  Injectable 40 daily  polyethylene glycol 3350 17 daily  propofol Infusion 10.009 <Continuous>  senna Syrup 10 at bedtime      PRN      Physical Exam:  Vital Signs Last 24 Hrs  T(F): 99.6 (04-11-21 @ 19:45), Max: 99.7 (04-11-21 @ 17:30)  HR: 111 (04-11-21 @ 22:30)  BP: --  RR: 34 (04-11-21 @ 22:30)  SpO2: 97% (04-11-21 @ 22:30) (88% - 97%)  Wt(kg): --      Constitutional: non-toxic, no distress, intubated and sedated   HEAD/EYES: anicteric, no conjunctival injection  ENT:  supple, no thrush, +ETT and OGT  Cardiovascular:   normal S1, S2, no murmur, trace edema b/l in LE   Respiratory:  coarse BS bilaterally though improving, no wheezes, no rales  GI:  soft, distended,  normal bowel sounds  : + montiel  Musculoskeletal:  no synovitis  Neurologic: intubated and sedated   Skin:  no rash, no erythema, no phlebitis  Heme/Onc: no lymphadenopathy   Psychiatric:  unable  Lines: +sam c/d/i, +right IJ c/d/i       WBC Count: 11.05 K/uL (04-11 @ 03:10)  WBC Count: 10.28 K/uL (04-10 @ 02:34)  WBC Count: 9.06 K/uL (04-09 @ 03:30)  WBC Count: 10.35 K/uL (04-08 @ 04:07)  WBC Count: 11.30 K/uL (04-07 @ 04:15)  WBC Count: 11.94 K/uL (04-06 @ 05:39)  WBC Count: 14.90 K/uL (04-05 @ 02:42)                            8.0    11.05 )-----------( 251      ( 11 Apr 2021 03:10 )             27.5       04-11    137  |  103  |  23  ----------------------------<  361<H>  5.1   |  30  |  0.52    Ca    8.1<L>      11 Apr 2021 11:51  Phos  2.4     04-11  Mg     2.5     04-11    TPro  6.0  /  Alb  1.5<L>  /  TBili  0.7  /  DBili  x   /  AST  56<H>  /  ALT  199<H>  /  AlkPhos  252<H>  04-11      Creatinine Trend: 0.52<--, 0.54<--, 0.45<--, 0.34<--, 0.40<--, 0.62<--        C-Reactive Protein, Serum: 114 (04-11)  C-Reactive Protein, Serum: 194 (04-07)  C-Reactive Protein, Serum: 157 (04-01)  C-Reactive Protein, Serum: <3 (03-29)    Ferritin, Serum: 5050 (04-11)  Ferritin, Serum: 05735 (04-07)  Ferritin, Serum: 62745 (04-05)  Ferritin, Serum: 2873 (04-01)  Ferritin, Serum: 1426 (03-30)  Ferritin, Serum: 1558 (03-29)      D-Dimer Assay, Quantitative: 1096 (04-11)  D-Dimer Assay, Quantitative: 2091 (04-07)  D-Dimer Assay, Quantitative: 1498 (04-04)  D-Dimer Assay, Quantitative: 7615 (04-01)  D-Dimer Assay, Quantitative: 938 (03-29)        MICROBIOLOGY:  Vancomycin Level, Trough: 18.6 ug/mL (04-08-21 @ 06:31)    .Blood Blood  04-06-21   No growth to date.  --  --      .Blood Blood-Peripheral  04-01-21   Growth in aerobic and anaerobic bottles: Staphylococcus epidermidis  Coag Negative Staphylococcus  Single set isolate, possible contaminant. Contact  Microbiology if susceptibility testing clinically  indicated.  --  Blood Culture PCR      .Urine Clean Catch (Midstream)  03-24-21   No growth  --  --      .Blood Blood-Peripheral  03-23-21   No Growth Final  --  --        C-Reactive Protein, Serum: 194 (04-07)  C-Reactive Protein, Serum: 157 (04-01)  C-Reactive Protein, Serum: <3 (03-29)  C-Reactive Protein, Serum: 184 (03-26)    Ferritin, Serum: 06463 (04-07)  Ferritin, Serum: 27658 (04-05)  Ferritin, Serum: 2873 (04-01)  Ferritin, Serum: 1426 (03-30)  Ferritin, Serum: 1558 (03-29)  Ferritin, Serum: 2181 (03-26)      D-Dimer Assay, Quantitative: 2091 (04-07)  D-Dimer Assay, Quantitative: 1498 (04-04)  D-Dimer Assay, Quantitative: 7615 (04-01)  D-Dimer Assay, Quantitative: 938 (03-29)  D-Dimer Assay, Quantitative: 1344 (03-26)      RADIOLOGY:

## 2021-04-11 NOTE — PROGRESS NOTE ADULT - ASSESSMENT
FINESSE JERNIGAN 65 F 1955 3/23/2021 DR YANN UNDERWOOD     REVIEW OF SYMPTOMS      Able to give (reliable) ROS  NO     PHYSICAL EXAM    HEENT Unremarkable  atraumatic   RESP Fair air entry EXP prolonged    Harsh breath sound Resp distres mild   CARDIAC S1 S2 No S3     NO JVD    ABDOMEN SOFT BS PRESENT NOT DISTENDED No hepatosplenomegaly   PEDAL EDEMA present No calf tenderness  NO rash       PATIENT SUMMARY  64 yo F with h/o HTN, HLD and DM2 who presented with progressively worsening SOB  Patient ruled in for  COVID-19 PNA.  ICU admitting dx : 1) Acute hypoxic resp failure 2 to Covid-19 PNA with improvement in oxygenation  2) s/p DKA.  Pt was admitted 3/23 transferred out of ICU 3/26  Pulm consulted 3/29/2021   Transferred to ICU 3/31   INTUBATED 3/31   NG TBE 3/31      PROBLEMS 3/23 admission .  COVID PNEUMONIA   ACUTE HYPOXEMIC RESP FAILURE   ELEVATED Ddimr  3/26-3/29-4/11/2021 D-dimr [id number]  3/23 cta ch no pe bl ggo  INTUBATED 3/31   NG TBE 3/31  SHOCK   LACTICEMIA 4/1/2021   TROPONINEMIA 4/1/2021  HFREF  4/3 echo ef 55% mildly decr lvsf  ANEMIA    4/11/2021 Hb 8  ELEVATED LFTS   4/11/2021 ap 252 ast 56 alt 199   4/5/2021  AST 1393       VITALS/IO/VENT/DRIPS.  4/11/2021 afeb 89 120/60   4/11/2021 7a fent 3 m/k/h   4/11/2021 9a nore .06 m/k/m   4/11/2021 7a prop 40 m/k/m   4/11/2021 ac 34/70%     PATIENT DATA   ABG.     4/11/2021 ac 32/8/r 34 70% 729/59/70    GLOBAL AND BEST PRACTICE ISSUES                     ALLERGY. carafate levaquin percocet      WEIGHT.      3/29/2021 77                           BMI.                 3/29/2021 29          ADVANCED DIRECTIVE.                               HEAD OF BED ELEVATION. Yes  DVT PROPHYLAXIS.   hpsc (3/23)   --> lvnx 40 93/30)                  MANUEL PROPHYLAXIS. PROTONIX 40 (3/23)   APA.                                                                    DYSPHAGIA RECOMM.   DIET.  CONS CARB (3/25) --> vital 1080 (4/5)      INFECTION PROPHYLAXIS.   chlorhexidine .12% (4/1/20210  chlorhexidine 2% (4/1/2021)   FREE WATER.      ASSESSMENT/RECOMMENDATIONS.    D-DIMR ELEVATED  Trending down   cta ch negv 3/23  is on dvt ppl  SHOCK   On vasopressors  Target MAP 65   MECHANICAL VENT   Target PO2 60 (+) PPlat 35 (-) pH 730 (+)   oxygenation improved     COVID PNEUMONIA AND HYPOXIC RESP FAILURE MANAGEMENT.   REMDESEVIR (3/23)   DEXA (3/23)   PRONE (3/26)   Monitor pulse oximetry Target PO 92-96%  Monitor inflammatory and thrombotic biomarkers  Monitor for  for progressively  worsening oxygenation failure   O2 to keep po 90-95%  Intubated 3/31  Cont supp care   On fent cisa nore     TIME SPENT   Over 25 minutes aggregate care time spent on encounter; activities included   direct patient care, counseling and/or coordinating care reviewing notes, lab data/ imaging , discussion with multidisciplinary team/ patient  /family and explaining in detail risks, benefits, alternatives  of the recommendations     FINESSE JERNIGAN 65 F 1955 3/23/2021 DR YANN UNDERWOOD

## 2021-04-11 NOTE — PROGRESS NOTE ADULT - ASSESSMENT
64yo F w/ HTN (on losartan) and DM2 (on metformin) a/w COVID PNA and DKA, now in ARDS and intubated.  Noted overnight to have desaturation to 70s; restarted on ketamine on 4/9/21 for overbreathing vent and vent dyssynchrony with plateau pressures noted to be in the 40s with peaks now 50s; despite reparalyzing.  Changed to pressure control.     Neuro: Intubated and sedated on propofol, fentanyl and restarted ketamine this AM.  Aim for RASS -3 to -4 for vent synchrony. May require paralytics.   CV: Shock state possible component of vasoplegia given sedation; c/w levophed, wean as tolerated; c/w midodrine.    Pulm: COVID PNA and ARDS - c/w vent, intubated 3/31 Vent: 32/32/8/70%, decrease FiO2 as tolerated,  s/p remdesivir and dexamethasone.  Required pressure control given elevated peak and plateau pressures.  Repeat ABG with permissive hypercapnia and pH 7.29. s/p proning.    GI: Protonix, Tube feeds  Renal/Metabolic: No acute issues  ID: MRSE+ (likely contaminant), s/p 8 days vancomycin; leukocytosis and fevers resolved  - ID following, off antibiotics, no fevers, wbc stable.    Heme: Lovenox BID  Endo: Lantus/ISS, will increase lantus today, added humalin 6units q6H with ISS.   Dispo: Remains critically ill with COVID ARDS.   66yo F w/ HTN (on losartan) and DM2 (on metformin) a/w COVID PNA and DKA, now in ARDS and intubated.  Noted overnight to have desaturation to 70s; restarted on ketamine on 4/9/21 for overbreathing vent and vent dyssynchrony with plateau pressures noted to be in the 40s with peaks now 50s; despite reparalyzing.  Changed to pressure control.     Neuro: Intubated and sedated on propofol, fentanyl and restarted ketamine this AM.  Aim for RASS -3 to -4 for vent synchrony. Wean paralytics today as tolerated.   CV: Shock state possible component of vasoplegia given sedation; c/w levophed, wean as tolerated; c/w midodrine.    Pulm: COVID PNA and ARDS - c/w vent, intubated 3/31 Vent: 32/32/8/70%, decrease FiO2 as tolerated,  s/p remdesivir and dexamethasone.  Required pressure control given elevated peak and plateau pressures.  Repeat ABG with permissive hypercapnia and pH 7.29. s/p proning.    GI: Protonix, Tube feeds  Renal/Metabolic: No acute issues  ID: MRSE+ (likely contaminant), s/p 8 days vancomycin; leukocytosis and fevers resolved  - ID following, off antibiotics, no fevers, wbc stable.    Heme: Lovenox BID  Endo: Lantus/ISS, will increase lantus today, added humalin 6units q6H with ISS.   Dispo: Remains critically ill with COVID ARDS.

## 2021-04-11 NOTE — PROGRESS NOTE ADULT - ASSESSMENT
64yo F w/ HTN (on losartan) and DM2 (on metformin) admitted with acute repiratory failure due to covid now in ARDS.   Imaging personally reviewed and shows extensive infiltrates   Blood culture from 4/1 (one set only was sent) grew FAN  She has had intermittent fevers the last several days   Repeat blood cultures have been sent and awaiting results   Her initial presentation was with DKA though her glucose levels are better controlled and no longer in DKA   Suffered shock liver likely from initial intubation/septic shock from covid though her AST/ALT are improving   Staph epi is often a contaminant. The ideal situation is to draw 2 sets of blood cultures from 2 separate sticks to ensure that if in fact a contaminant it would not grow in the additional set. In this case only one set was sent and she has a central line, a-line, ETT, NGT, montiel  and has been having intermittent fevers. Still suspect that this was a procurement contaminant but will await for the repeat blood cultures.   DDx regarding her fevers are quite broad: covid vs DVT/PE (dvt study negative), medication induced ( vanco on rare occasions causes fevers), subacute thyroiditis, bacterial pneumonia etc     4/8: on pressors and intubated, Fio2 100%, day 7 of vanco, repeat blood culture negative   4/9: off vanco, afebrile, FiO2 still 100%, on pressors, cultures negative  4/10: slowly reducing FiO2, afebrile, lfts getting better, WBC normal   4/11: FiO2 rising, able to be weaned off pressors, wbc steady, making very little progress unfortunately      Antibiotics this hospitalization:   Vanco 4/2->4/8  RDV 3/23-3/27  ceftriaxone 3/23  Azithro 3/23-3/24    Overall, 65F septic shock due to covid, shock liver, leukocytosis, fever, ARDS, anemia, +CONS     Suggest-  monitor off antibiotics   if able, consider exchanging lines   trend WBC, LFTs   trend fever curve   pressors per ICU   Ventilator management per ICU   Good but not too tight glycemic control->endocrine recommendations appreciated   Remains critically ill.    D/W Dr. Nica Mccollum, DO  Infectious Disease Attending  Pager [phone]   After 5pm/weekends please call [phone] for all inquiries and new consults

## 2021-04-11 NOTE — PROGRESS NOTE ADULT - SUBJECTIVE AND OBJECTIVE BOX
INTERVAL HPI/OVERNIGHT EVENTS:    No acute overnight events, weaning nimbex.    CENTRAL LINE: [x ] YES [ ] NO  LOCATION:   [facility] 4/1/2021    GARCIA: [ x] YES [ ] NO        A-LINE:  [ ] YES [x ] NO  LOCATION:       GLOBAL ISSUE/BEST PRACTICE:  Analgesia: Fentanyl  Sedation: Propofol, Ketamine  HOB elevation: yes  Stress ulcer prophylaxis: Protonix  VTE prophylaxis: Lovenox  Oral Care: Chlorhexidine  Glycemic control: ISS/Lantus  Nutrition:    REVIEW OF SYSTEMS: [x] Unable to obtain because: intubated and sedated, paralyzed    PHYSICAL EXAM:    Gen: intubated and sedated  HEENT: Intubated with ETT  CARD -s1s2, RRR, no M,G,R;   PULM - Coarse vented breath sounds, symmetric breath sounds;   ABD -  +BS, ND, NT, soft, no guarding, no rebound, no masses;   EXT - symmetric pulses, 2+ dp, capillary refill < 2 seconds, no cyanosis, no edema;   NEURO - sedated and paralyzed    ICU Vital Signs Last 24 Hrs  T(C): 37.2 (11 Apr 2021 08:00), Max: 37.4 (10 Apr 2021 20:00)  T(F): 98.9 (11 Apr 2021 08:00), Max: 99.3 (10 Apr 2021 20:00)  HR: 96 (11 Apr 2021 10:00) (84 - 100)  BP: --  BP(mean): --  ABP: 161/67 (11 Apr 2021 10:00) (94/50 - 170/77)  ABP(mean): 92 (11 Apr 2021 10:00) (60 - 104)  RR: 34 (11 Apr 2021 10:00) (32 - 36)  SpO2: 93% (11 Apr 2021 10:00) (91% - 100%)    I&O's Detail    10 Apr 2021 07:01  -  11 Apr 2021 07:00  --------------------------------------------------------  IN:    Cisatracurium: 430.2 mL    Enteral Tube Flush: 50 mL    FentaNYL: 115.5 mL    FentaNYL: 438.9 mL    IV PiggyBack: 250 mL    Ketamine: 115.2 mL    Norepinephrine: 153.3 mL    Propofol: 554.4 mL    Vital1.0: 1080 mL  Total IN: 3187.5 mL    OUT:    Indwelling Catheter - Urethral (mL): 1160 mL  Total OUT: 1160 mL    Total NET: 2027.5 mL      11 Apr 2021 07:01  -  11 Apr 2021 11:44  --------------------------------------------------------  IN:    Cisatracurium: 32.4 mL    FentaNYL: 46.2 mL    Norepinephrine: 10.1 mL    Propofol: 18.5 mL    Vital1.0: 90 mL  Total IN: 197.2 mL    OUT:    Indwelling Catheter - Urethral (mL): 0 mL    Ketamine: 0 mL  Total OUT: 0 mL    Total NET: 197.2 mL        MEDICATIONS  NEURO  Meds: cisatracurium Infusion 3 MICROgram(s)/kG/Min (13.9 mL/Hr) IV Continuous <Continuous>  fentaNYL   Infusion. 0.501 MICROgram(s)/kG/Hr (3.86 mL/Hr) IV Continuous <Continuous>  ketamine Infusion. 0.25 mG/kG/Hr (1.93 mL/Hr) IV Continuous <Continuous>  propofol Infusion 10.009 MICROgram(s)/kG/Min (4.63 mL/Hr) IV Continuous <Continuous>    RESPIRATORY  ABG - ( 11 Apr 2021 08:21 )  pH: x     /  pCO2: 59    /  pO2: 70    / HCO3: 27    / Base Excess: 0.9   /  SaO2: 92      Lactate: x                Meds:   CARDIOVASCULAR  Meds: norepinephrine Infusion 0.05 MICROgram(s)/kG/Min (3.61 mL/Hr) IV Continuous <Continuous>    GI/NUTRITION  Meds: pantoprazole  Injectable 40 milliGRAM(s) IV Push daily  polyethylene glycol 3350 17 Gram(s) Oral daily  senna Syrup 10 milliLiter(s) Oral at bedtime    GENITOURINARY  Meds: dextrose 5%. 1000 milliLiter(s) IV Continuous <Continuous>  dextrose 5%. 1000 milliLiter(s) IV Continuous <Continuous>  sodium chloride 0.9% lock flush 10 milliLiter(s) IV Push every 1 hour PRN Pre/post blood products, medications, blood draw, and to maintain line patency    HEMATOLOGIC  Meds: enoxaparin Injectable 40 milliGRAM(s) SubCutaneous two times a day    [x] VTE Prophylaxis  INFECTIOUS DISEASES  Meds:   ENDOCRINE  CAPILLARY BLOOD GLUCOSE      POCT Blood Glucose.: 309 mg/dL (11 Apr 2021 04:57)  POCT Blood Glucose.: 334 mg/dL (11 Apr 2021 00:00)  POCT Blood Glucose.: 306 mg/dL (10 Apr 2021 22:06)  POCT Blood Glucose.: 410 mg/dL (10 Apr 2021 17:20)    Meds: insulin glargine Injectable (LANTUS) 20 Unit(s) SubCutaneous at bedtime  insulin lispro (ADMELOG) corrective regimen sliding scale   SubCutaneous every 6 hours  insulin regular  human recombinant 6 Unit(s) SubCutaneous every 6 hours    OTHER MEDICATIONS:  chlorhexidine 0.12% Liquid 15 milliLiter(s) Oral Mucosa every 12 hours  chlorhexidine 2% Cloths 1 Application(s) Topical <User Schedule>  zinc oxide 40% Ointment 1 Application(s) Topical three times a day  :    LABS:                        8.0    11.05 )-----------( 251      ( 11 Apr 2021 03:10 )             27.5      04-11    136  |  103  |  24<H>  ----------------------------<  332<H>  5.0   |  29  |  0.54    Ca    8.1<L>      11 Apr 2021 03:10  Phos  2.4     04-11  Mg     2.5     04-11    TPro  6.0  /  Alb  1.5<L>  /  TBili  0.7  /  DBili  x   /  AST  56<H>  /  ALT  199<H>  /  AlkPhos  252<H>  04-11      ## COVID Panel  Ferritin, Serum: 5050 ng/mL (04-11-21 @ 10:44)  D-Dimer Assay, Quantitative: 1096 ng/mL DDU (04-11-21 @ 03:10)  Ferritin, Serum: 68039 ng/mL (04-07-21 @ 09:35)  Lactate Dehydrogenase, Serum: 1614 U/L (04-07-21 @ 09:35)  C-Reactive Protein, Serum: 194 mg/L (04-07-21 @ 09:35)  D-Dimer Assay, Quantitative: 2091 ng/mL DDU (04-07-21 @ 04:15)          Mode: AC/ CMV (Assist Control/ Continuous Mandatory Ventilation), RR (machine): 34, FiO2: 70, PEEP: 8, PS: 32, ITime: 1, MAP: 19, PC: 32, PIP: 41      RADIOLOGY & ADDITIONAL STUDIES:

## 2021-04-12 NOTE — PROGRESS NOTE ADULT - ASSESSMENT
65F PMH HTN (on losartan) and DM2 (on metformin) presents on 3/23 for progressive worsening SOB with productive cough s/p outpatient treatment with levaquin, prednisone and azithromycin without improvement. Found to be with AG 29, glucose 416, HCO3: 10 and COVID + with hypoxia admitted to ICU for DKA on insulin drip and with acute hypoxic respiratory failure requiring high flow oxygen supplementation due to COVID-PNA. Transferred to medical service 3/25. Course with worsening hypoxemia requiring biPAP s/p RRT 3/31 for respiratory distress, worsening acute hypoxic respiratory failure with O2 sat in the 70s, RR 40-60s with accessory muscle use, ARDS requiring urgent intubation, sepsis with septic shock due to COVID. Intubated 3/31. s/p proning 4/1-4/3. Course with MRSE in blood but likely a contaminant.     - remains heavily sedated and now back on paralytics after being weaned off due to labored breathing, increased work of breathing, vent dyssynchrony  - remains on mechanical ventilation, unable to wean due to high FIo2 requirements  - will cont attempts at weaning down FIO2  - on pressure control as pt with elevated peak and plateau pressures  - s/p remdesivir  - s/p dexamethasone  - pt with labile BP: intermittently on/off vasopressor support, currently back on levophed which was initiated overnight  - maintain MAP ~ 65  - cont NGT feeds  - monitor electrolytes, hyperkalemia: if K remains elevated start lokelma  - family updated: both daughters and son updated over the phone today. questions answered for family. children made aware that prognosis is still guarded and that pt remains vent dependent    critical care time: 50 min

## 2021-04-12 NOTE — PROGRESS NOTE ADULT - SUBJECTIVE AND OBJECTIVE BOX
RERE FINESSE    [facility] CCU 6    Patient is a 65y old  Female who presents with a chief complaint of covid pna and dka (12 Apr 2021 08:33)       Allergies    Carafate (Rash)  Levaquin (Rash)  Percocet 5/325 (Other)    Intolerances    lactose (Flatulence)      HPI:  66yo F w/ HTN (on losartan) and DM2 (on metformin) presents with progressively worsening SOB associated with productive cough for past several days much worse the past 3 days.  Started on abx (levaquin, prednisone and azithromycin 3/19) outpatient with no improvement. Denies any chest pain, fevers, dizziness, syncope, abd pain, back pain, N/V/D, recent sick contact, recent travel.     In ED: CTA chest done to r/o PE findings consistent w/ COVID-19 PNA. Labs remarkable for WBC 18.55, Na 130, K 3.2, COs 10, AG 29, Glucose 416, alb 2.5.  Placed on High flow for dyspnea and hypoxemia. Admitted to ICU for COVID pna and DKA.   (23 Mar 2021 05:08)      PAST MEDICAL & SURGICAL HISTORY:  HTN (hypertension)    DM (diabetes mellitus)    HLD (hyperlipidemia)    No significant past surgical history        FAMILY HISTORY:  No pertinent family history in first degree relatives          MEDICATIONS   chlorhexidine 0.12% Liquid 15 milliLiter(s) Oral Mucosa every 12 hours  chlorhexidine 2% Cloths 1 Application(s) Topical <User Schedule>  cisatracurium Infusion 3 MICROgram(s)/kG/Min IV Continuous <Continuous>  dextrose 40% Gel 15 Gram(s) Oral once  dextrose 5%. 1000 milliLiter(s) IV Continuous <Continuous>  dextrose 5%. 1000 milliLiter(s) IV Continuous <Continuous>  dextrose 50% Injectable 25 Gram(s) IV Push once  dextrose 50% Injectable 12.5 Gram(s) IV Push once  dextrose 50% Injectable 25 Gram(s) IV Push once  enoxaparin Injectable 40 milliGRAM(s) SubCutaneous two times a day  fentaNYL   Infusion. 0.501 MICROgram(s)/kG/Hr IV Continuous <Continuous>  glucagon  Injectable 1 milliGRAM(s) IntraMuscular once  insulin glargine Injectable (LANTUS) 20 Unit(s) SubCutaneous at bedtime  insulin lispro (ADMELOG) corrective regimen sliding scale   SubCutaneous every 6 hours  insulin regular  human recombinant 6 Unit(s) SubCutaneous every 6 hours  ketamine Infusion. 0.25 mG/kG/Hr IV Continuous <Continuous>  norepinephrine Infusion 0.05 MICROgram(s)/kG/Min IV Continuous <Continuous>  pantoprazole  Injectable 40 milliGRAM(s) IV Push daily  polyethylene glycol 3350 17 Gram(s) Oral daily  propofol Infusion 10.009 MICROgram(s)/kG/Min IV Continuous <Continuous>  senna Syrup 10 milliLiter(s) Oral at bedtime  sodium chloride 0.9% lock flush 10 milliLiter(s) IV Push every 1 hour PRN  zinc oxide 40% Ointment 1 Application(s) Topical three times a day      Vital Signs Last 24 Hrs  T(C): 37.6 (11 Apr 2021 19:45), Max: 37.6 (11 Apr 2021 17:30)  T(F): 99.6 (11 Apr 2021 19:45), Max: 99.7 (11 Apr 2021 17:30)  HR: 80 (12 Apr 2021 08:49) (80 - 116)  BP: --  BP(mean): --  RR: 34 (12 Apr 2021 07:00) (20 - 34)  SpO2: 98% (12 Apr 2021 08:49) (88% - 100%)      04-11-21 @ 07:01  -  04-12-21 @ 07:00  --------------------------------------------------------  IN: 2852.4 mL / OUT: 1045 mL / NET: 1807.4 mL        Mode: AC/ CMV (Assist Control/ Continuous Mandatory Ventilation), RR (machine): 34, FiO2: 90, PEEP: 10, ITime: 0.7, MAP: 21, PC: 32, PIP: 43    LABS:                        7.4    10.51 )-----------( 258      ( 12 Apr 2021 04:42 )             25.7     04-12    135  |  103  |  29<H>  ----------------------------<  242<H>  5.5<H>   |  29  |  0.60    Ca    8.0<L>      12 Apr 2021 04:42  Phos  3.2     04-12  Mg     2.7     04-12    TPro  5.6<L>  /  Alb  1.4<L>  /  TBili  0.6  /  DBili  x   /  AST  52<H>  /  ALT  141<H>  /  AlkPhos  244<H>  04-12    PTT - ( 12 Apr 2021 04:42 )  PTT:36.6 sec      ABG - ( 11 Apr 2021 22:52 )  pH, Arterial: x     pH, Blood: 7.27  /  pCO2: 64    /  pO2: 81    / HCO3: 28    / Base Excess: 1.0   /  SaO2: 94                  WBC:  WBC Count: 10.51 K/uL (04-12 @ 04:42)  WBC Count: 11.05 K/uL (04-11 @ 03:10)  WBC Count: 10.28 K/uL (04-10 @ 02:34)  WBC Count: 9.06 K/uL (04-09 @ 03:30)      MICROBIOLOGY:  RECENT CULTURES:  04-06 .Blood Blood XXXX XXXX   No Growth Final                PTT - ( 12 Apr 2021 04:42 )  PTT:36.6 sec    Sodium:  Sodium, Serum: 135 mmol/L (04-12 @ 04:42)  Sodium, Serum: 137 mmol/L (04-11 @ 11:51)  Sodium, Serum: 136 mmol/L (04-11 @ 03:10)  Sodium, Serum: 139 mmol/L (04-10 @ 02:34)  Sodium, Serum: 137 mmol/L (04-09 @ 03:30)      0.60 mg/dL 04-12 @ 04:42  0.52 mg/dL 04-11 @ 11:51  0.54 mg/dL 04-11 @ 03:10  0.45 mg/dL 04-10 @ 02:34  0.34 mg/dL 04-09 @ 03:30      Hemoglobin:  Hemoglobin: 7.4 g/dL (04-12 @ 04:42)  Hemoglobin: 8.0 g/dL (04-11 @ 03:10)  Hemoglobin: 8.0 g/dL (04-10 @ 02:34)  Hemoglobin: 8.1 g/dL (04-09 @ 03:30)      Platelets: Platelet Count - Automated: 258 K/uL (04-12 @ 04:42)  Platelet Count - Automated: 251 K/uL (04-11 @ 03:10)  Platelet Count - Automated: 208 K/uL (04-10 @ 02:34)  Platelet Count - Automated: 195 K/uL (04-09 @ 03:30)      LIVER FUNCTIONS - ( 12 Apr 2021 04:42 )  Alb: 1.4 g/dL / Pro: 5.6 gm/dL / ALK PHOS: 244 U/L / ALT: 141 U/L / AST: 52 U/L / GGT: x                 RADIOLOGY & ADDITIONAL STUDIES:

## 2021-04-12 NOTE — PROGRESS NOTE ADULT - SUBJECTIVE AND OBJECTIVE BOX
RERE KILPATRICK  MRN-[id number]    Follow Up:  COVID, MRSE in blood culture     Interval History: seen and examined, FiO2 still very high, needed to be paralyzed for synchrony, off antibiotics, was back on pressors, LFTs are improving, remains critically ill with very guarded prognosis.    ROS:    [ X] Unobtainable because: intubated/sedated   [ ] All other systems negative          Allergies  Carafate (Rash)  Levaquin (Rash)  Percocet 5/325 (Other)        ANTIMICROBIALS:        MEDICATIONS  (STANDING):  cisatracurium Infusion 3 <Continuous>  dextrose 40% Gel 15 once  dextrose 50% Injectable 25 once  dextrose 50% Injectable 12.5 once  dextrose 50% Injectable 25 once  enoxaparin Injectable 40 two times a day  fentaNYL   Infusion. 0.501 <Continuous>  glucagon  Injectable 1 once  insulin lispro (ADMELOG) corrective regimen sliding scale  every 6 hours  insulin regular  human recombinant 6 every 6 hours  norepinephrine Infusion 0.05 <Continuous>  pantoprazole  Injectable 40 daily  polyethylene glycol 3350 17 daily  propofol Infusion 10.009 <Continuous>  senna Syrup 10 at bedtime      PRN      Physical Exam:  Vital Signs Last 24 Hrs  T(F): 98 (04-12-21 @ 19:17), Max: 99.9 (04-12-21 @ 08:00)  HR: 112 (04-12-21 @ 23:00)  BP: --  RR: 34 (04-12-21 @ 23:00)  SpO2: 97% (04-12-21 @ 21:30) (91% - 100%)  Wt(kg): --      Constitutional: ill appearing, intubated and sedated   HEAD/EYES: anicteric, no conjunctival injection  ENT:   +ETT and OGT  Cardiovascular:   normal S1, S2, no murmur, trace edema b/l in LE   Respiratory:  ventilatory BS bilaterally, no wheezes, no rales  GI:  soft, distended,  normal bowel sounds, hypoactive bowel sounds   : + montiel  Musculoskeletal:  no synovitis  Neurologic: intubated and sedated   Skin:  no rash, no erythema, no phlebitis  Heme/Onc: no lymphadenopathy   Psychiatric:  unable  Lines: right +sam c/d/i, +right IJ c/d/i       WBC Count: 10.60 K/uL (04-12 @ 11:43)  WBC Count: 10.51 K/uL (04-12 @ 04:42)  WBC Count: 11.05 K/uL (04-11 @ 03:10)  WBC Count: 10.28 K/uL (04-10 @ 02:34)  WBC Count: 9.06 K/uL (04-09 @ 03:30)  WBC Count: 10.35 K/uL (04-08 @ 04:07)  WBC Count: 11.30 K/uL (04-07 @ 04:15)                            8.0    10.60 )-----------( 279      ( 12 Apr 2021 11:43 )             28.4       04-12    137  |  103  |  28<H>  ----------------------------<  228<H>  4.9   |  31  |  0.66    Ca    8.2<L>      12 Apr 2021 11:43  Phos  2.8     04-12  Mg     2.6     04-12    TPro  5.6<L>  /  Alb  1.4<L>  /  TBili  0.6  /  DBili  x   /  AST  52<H>  /  ALT  141<H>  /  AlkPhos  244<H>  04-12      Creatinine Trend: 0.66<--, 0.60<--, 0.52<--, 0.54<--, 0.45<--, 0.34<--        MICROBIOLOGY:    .Blood Blood  04-06-21   No growth to date.  --  --      .Blood Blood-Peripheral  04-01-21   Growth in aerobic and anaerobic bottles: Staphylococcus epidermidis  Coag Negative Staphylococcus  Single set isolate, possible contaminant. Contact  Microbiology if susceptibility testing clinically  indicated.  --  Blood Culture PCR      .Urine Clean Catch (Midstream)  03-24-21   No growth  --  --      .Blood Blood-Peripheral  03-23-21   No Growth Final  --  --      RADIOLOGY:

## 2021-04-12 NOTE — PROGRESS NOTE ADULT - SUBJECTIVE AND OBJECTIVE BOX
24 hr events:  had been weaned off nimbex and ketamine but restarted due to vent dyssynchrony and labored breathing  FIO2 increased back up from 70% to 90% due to oxygen desaturations  had been weaned off pressors, levophed reinitiated overnight due to hypotension\  s/p lasix overnight with good urine output  hyperkalemic this morning on labs : repeat potassium WNL after medical treatment (insulin, lasix)  remains hyperglycemic: insulin adjusted    ## ROS:  [x ] unable to obtain due to intubation/sedation      ## Labs:  CBC:                          8.0    10.60 )-----------( 279      ( 12 Apr 2021 11:43 )             28.4       04-12    137  |  103  |  28<H>  ----------------------------<  228<H>  4.9   |  31  |  0.66    Ca    8.2<L>      12 Apr 2021 11:43  Phos  2.8     04-12  Mg     2.6     04-12    TPro  5.6<L>  /  Alb  1.4<L>  /  TBili  0.6  /  DBili  x   /  AST  52<H>  /  ALT  141<H>  /  AlkPhos  244<H>  04-12      Coags:  PTT - ( 12 Apr 2021 04:42 )  PTT:36.6 sec    Culture - Blood in AM (04.06.21 @ 12:15)    Specimen Source: .Blood Blood-Peripheral    Culture Results: No Growth Final    Culture - Blood (04.01.21 @ 08:25)    -  Staphylococcus epidermidis, Methicillin resistant: Detec    Specimen Source: .Blood Blood-Peripheral    Organism: Blood Culture PCR    Culture Results: Growth in aerobic and anaerobic bottles: Staphylococcus epidermidis    coag Negative Staphylococcus. Single set isolate, possible contaminant      ## Imaging:  CXR < from: Xray Chest 1 View- PORTABLE-Urgent (Xray Chest 1 View- PORTABLE-Urgent .) (04.11.21 @ 14:43) >   ET tube tip above tracheal bifurcation.  NG tube tip beyond GE junction.  RIGHT IJ catheter tip in SVC.    Diffuse airspace disease. No interval change.      ## Medications:  norepinephrine Infusion 0.05 MICROgram(s)/kG/Min IV Continuous <Continuous>    dextrose 40% Gel 15 Gram(s) Oral once  dextrose 50% Injectable 25 Gram(s) IV Push once  dextrose 50% Injectable 12.5 Gram(s) IV Push once  dextrose 50% Injectable 25 Gram(s) IV Push once  glucagon  Injectable 1 milliGRAM(s) IntraMuscular once  insulin glargine Injectable (LANTUS) 30 Unit(s) SubCutaneous at bedtime  insulin lispro (ADMELOG) corrective regimen sliding scale   SubCutaneous every 6 hours  insulin regular  human recombinant 6 Unit(s) SubCutaneous every 6 hours    enoxaparin Injectable 40 milliGRAM(s) SubCutaneous two times a day    pantoprazole  Injectable 40 milliGRAM(s) IV Push daily  polyethylene glycol 3350 17 Gram(s) Oral daily  senna Syrup 10 milliLiter(s) Oral at bedtime    cisatracurium Infusion 3 MICROgram(s)/kG/Min IV Continuous <Continuous>  dexMEDEtomidine Infusion 0.2 MICROgram(s)/kG/Hr IV Continuous <Continuous>  fentaNYL   Infusion. 0.501 MICROgram(s)/kG/Hr IV Continuous <Continuous>  propofol Infusion 10.009 MICROgram(s)/kG/Min IV Continuous <Continuous>      ## Vitals:  T(F): 98 (04-12-21 @ 19:17), Max: 99.9 (04-12-21 @ 08:00)  HR: 112 (04-12-21 @ 23:00)  BP: 135/78  RR: 34 (04-12-21 @ 23:00)  SpO2: 97% (04-12-21 @ 21:30) (91% - 100%)      Vent: Mode: AC/ CMV (Assist Control/ Continuous Mandatory Ventilation), RR (machine): 34, RR (patient): 34, FiO2: 100, PEEP: 10, PC: 32, PIP: 43    ABG: ABG - ( 11 Apr 2021 22:52 )  pH, Arterial: x     pH, Blood: 7.27  /  pCO2: 64    /  pO2: 81    / HCO3: 28    / Base Excess: 1.0   /  SaO2: 94            04-11 @ 07:01  -  04-12 @ 07:00  --------------------------------------------------------  IN: 2852.4 mL / OUT: 1045 mL / NET: 1807.4 mL            ## P/E:  Gen: lying comfortably in bed in no apparent distress, sedated, orally intubated  HEENT: ETT and NGT in place  Resp: CTA B/L, mechanical breath sounds  CVS: RRR  Abd: soft ND hypoactive BS  Ext: no clubbing no cyanosis  Neuro: sedated and paralyzed    CENTRAL LINE: [ x] YES [ ] NO  LOCATION:   DATE INSERTED:  REMOVE: [ ] YES [ ] NO      GARCIA: [ ] YES [ ] NO    DATE INSERTED:  REMOVE:  [ ] YES [ ] NO      A-LINE:  [x ] YES [ ] NO  LOCATION:   DATE INSERTED:  REMOVE:  [ ] YES [ ] NO  EXPLAIN:    GLOBAL ISSUE/BEST PRACTICE:  Analgesia: na  Sedation: yes  HOB elevation: yes  Stress ulcer prophylaxis: yes  VTE prophylaxis: yes  Oral Care: yes  Glycemic control: yes  Nutrition: tube feeds    CODE STATUS: [ x] full code  [ ] DNR  [ ] DNI  [ ] MOLST  Goals of care discussion: [ x] yes

## 2021-04-12 NOTE — PROGRESS NOTE ADULT - ASSESSMENT
66yo F w/ HTN (on losartan) and DM2 (on metformin) p/w COVID PNA and DKA, now in ARDS and intubated.    Respiratory + ID  - COVID pneumonia in ARDS, intubated 3/31  - Vent: 32/10/90%    - decrease FiO2 as tolerated  - s/p ceftriaxone and azithromycin  - s/p remdesivir and dexamethasone  - s/p 8 days vancomycin; MRSE+ (likely contaminant) w/ f/u Blood cx negative; fevers resolved  - ID following    Neuro  - c/w Propofol, Fentanyl, Nimbex and Ketamine     Cardio  - weaned off pressors 4/11  - 4/12 overnight, levophed restarted and midodrine given for MAP support    - wean levophed as tolerated    Heme ppx  - c/w lovenox    GI ppx  - c/w protonix    Endo  - POCT glucose 253-309 last 24 hr  - Endo following, increased insulin  - Lantus 20 units qhs, regular insulin 6 units q6h + Lispro ISS   64yo F w/ HTN (on losartan) and DM2 (on metformin) p/w COVID PNA and DKA, now in ARDS and intubated.    Respiratory + ID  - COVID pneumonia in ARDS, intubated 3/31  - Vent: 32/10/90%    - decrease FiO2 as tolerated  - s/p ceftriaxone and azithromycin  - s/p remdesivir and dexamethasone  - s/p 8 days vancomycin; MRSE+ (likely contaminant) w/ f/u Blood cx negative; fevers resolved  - ID following    Neuro  - c/w Propofol, Fentanyl, Nimbex and Ketamine     Cardio  - weaned off pressors 4/11  - 4/12 overnight, levophed restarted and midodrine given for MAP support    - wean levophed as tolerated    Heme ppx  - c/w lovenox    GI ppx  - c/w protonix    Endo  - POCT glucose 253-309 last 24 hr  - Endo following, increased insulin  - Increase Lantus to 30 units qd, regular insulin 6 units q6h + Lispro ISS

## 2021-04-12 NOTE — PROGRESS NOTE ADULT - ASSESSMENT
FINESSE JERNIGAN 65 F 1955 3/23/2021 DR YANN UNDERWOOD     REVIEW OF SYMPTOMS      Able to give (reliable) ROS  NO     PHYSICAL EXAM    HEENT Unremarkable  atraumatic   RESP Fair air entry EXP prolonged    Harsh breath sound Resp distres mild   CARDIAC S1 S2 No S3     NO JVD    ABDOMEN SOFT BS PRESENT NOT DISTENDED No hepatosplenomegaly   PEDAL EDEMA present No calf tenderness  NO rash       PATIENT SUMMARY  66 yo F with h/o HTN, HLD and DM2 who presented with progressively worsening SOB  Patient ruled in for  COVID-19 PNA.  ICU admitting dx : 1) Acute hypoxic resp failure 2 to Covid-19 PNA with improvement in oxygenation  2) s/p DKA.  Pt was admitted 3/23 transferred out of ICU 3/26  Pulm consulted 3/29/2021   Transferred to ICU 3/31   INTUBATED 3/31   NG TBE 3/31      PROBLEMS 3/23 admission .  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  4/12/2021 afeb 78 120/50   4/12/2021 8a cisa 5 m/k/m   4/12/2021 8a fent 4 m/k/h   4/12/2021 8a gurpreet .2 m/k/h   4/12/2021 8a nore 0.1 m/k/m   4/12/2021 8a prop 45 m/k/m   4/12/2021 ac 34 90% 10     GLOBAL AND BEST PRACTICE ISSUES                     ALLERGY. carafate levaquin percocet      WEIGHT.      3/29/2021 77                           BMI.                 3/29/2021 29          ADVANCED DIRECTIVE.                               HEAD OF BED ELEVATION. Yes  DVT PROPHYLAXIS.   hpsc (3/23)   --> lvnx 40 93/30)                  MANUEL PROPHYLAXIS. PROTONIX 40 (3/23)   APA.                                                                    DYSPHAGIA RECOMM.   DIET.  CONS CARB (3/25) --> vital 1080 (4/5)      INFECTION PROPHYLAXIS.   chlorhexidine .12% (4/1/20210  chlorhexidine 2% (4/1/2021)   FREE WATER.      ASSESSMENT/RECOMMENDATIONS.    D-DIMR ELEVATED  Trending down   cta ch negv 3/23  is on dvt ppl  SHOCK   On vasopressors  Target MAP 65   MECHANICAL VENT   Target PO2 60 (+) PPlat 35 (-) pH 730 (+)   oxygenation improved     COVID PNEUMONIA AND HYPOXIC RESP FAILURE MANAGEMENT.   REMDESEVIR (3/23)   DEXA (3/23)   PRONE (3/26)   Monitor pulse oximetry Target PO 92-96%  Monitor inflammatory and thrombotic biomarkers  Monitor for  for progressively  worsening oxygenation failure   O2 to keep po 90-95%  Intubated 3/31  Cont supp care   On fent cisa nore     TIME SPENT   Over 25 minutes aggregate care time spent on encounter; activities included   direct patient care, counseling and/or coordinating care reviewing notes, lab data/ imaging , discussion with multidisciplinary team/ patient  /family and explaining in detail risks, benefits, alternatives  of the recommendations     FINESSE JERNIGAN 65 F 1955 3/23/2021 DR YANN UNDERWOOD

## 2021-04-12 NOTE — PROGRESS NOTE ADULT - SUBJECTIVE AND OBJECTIVE BOX
Patient is a 65y old  Female who presents with a chief complaint of covid pna and dka (12 Apr 2021 09:10)      Interval History: finger sticks are in mid 200's   no change in clinical status   on Lantus and Regular Insulin Every six hours       MEDICATIONS  (STANDING):  chlorhexidine 0.12% Liquid 15 milliLiter(s) Oral Mucosa every 12 hours  chlorhexidine 2% Cloths 1 Application(s) Topical <User Schedule>  cisatracurium Infusion 3 MICROgram(s)/kG/Min (13.9 mL/Hr) IV Continuous <Continuous>  dextrose 40% Gel 15 Gram(s) Oral once  dextrose 5%. 1000 milliLiter(s) (50 mL/Hr) IV Continuous <Continuous>  dextrose 5%. 1000 milliLiter(s) (100 mL/Hr) IV Continuous <Continuous>  dextrose 50% Injectable 25 Gram(s) IV Push once  dextrose 50% Injectable 12.5 Gram(s) IV Push once  dextrose 50% Injectable 25 Gram(s) IV Push once  enoxaparin Injectable 40 milliGRAM(s) SubCutaneous two times a day  fentaNYL   Infusion. 0.501 MICROgram(s)/kG/Hr (3.86 mL/Hr) IV Continuous <Continuous>  glucagon  Injectable 1 milliGRAM(s) IntraMuscular once  insulin lispro (ADMELOG) corrective regimen sliding scale   SubCutaneous every 6 hours  insulin regular  human recombinant 6 Unit(s) SubCutaneous every 6 hours  norepinephrine Infusion 0.05 MICROgram(s)/kG/Min (3.61 mL/Hr) IV Continuous <Continuous>  pantoprazole  Injectable 40 milliGRAM(s) IV Push daily  polyethylene glycol 3350 17 Gram(s) Oral daily  propofol Infusion 10.009 MICROgram(s)/kG/Min (4.63 mL/Hr) IV Continuous <Continuous>  senna Syrup 10 milliLiter(s) Oral at bedtime  sodium zirconium cyclosilicate 10 Gram(s) Oral every 8 hours  zinc oxide 40% Ointment 1 Application(s) Topical three times a day    MEDICATIONS  (PRN):  sodium chloride 0.9% lock flush 10 milliLiter(s) IV Push every 1 hour PRN Pre/post blood products, medications, blood draw, and to maintain line patency      Allergies    Carafate (Rash)  Levaquin (Rash)  Percocet 5/325 (Other)    Intolerances    lactose (Flatulence)      REVIEW OF SYSTEMS:  CONSTITUTIONAL: no changes      Vital Signs Last 24 Hrs  T(C): 36.7 (12 Apr 2021 19:17), Max: 37.7 (12 Apr 2021 08:00)  T(F): 98 (12 Apr 2021 19:17), Max: 99.9 (12 Apr 2021 08:00)  HR: 102 (12 Apr 2021 21:30) (76 - 122)  BP: --  BP(mean): --  RR: 34 (12 Apr 2021 21:30) (18 - 34)  SpO2: 97% (12 Apr 2021 21:30) (91% - 100%)    PHYSICAL EXAM:  GENERAL: as per the progress notes of Primary Team   LABS:    PTT - ( 12 Apr 2021 04:42 )  PTT:36.6 sec    CAPILLARY BLOOD GLUCOSE      POCT Blood Glucose.: 182 mg/dL (12 Apr 2021 17:11)  POCT Blood Glucose.: 223 mg/dL (12 Apr 2021 13:29)  POCT Blood Glucose.: 254 mg/dL (12 Apr 2021 05:08)  POCT Blood Glucose.: 269 mg/dL (11 Apr 2021 23:50)    Lipid panel:       ABG - ( 11 Apr 2021 22:52 )  pH, Arterial: x     pH, Blood: 7.27  /  pCO2: 64    /  pO2: 81    / HCO3: 28    / Base Excess: 1.0   /  SaO2: 94                Mode: AC/ CMV (Assist Control/ Continuous Mandatory Ventilation)  RR (machine): 34  FiO2: 100  PEEP: 10  ITime: 1  MAP: 22  PC: 32  PIP: 42    Thyroid:  Diabetes Tests:  Parathyroid Panel:  Adrenals:  RADIOLOGY & ADDITIONAL TESTS:    Imaging Personally Reviewed:  [ ] YES  [ ] NO    Consultant(s) Notes Reviewed:  [ ] YES  [ ] NO    Care Discussed with Consultants/Other Providers [ ] YES  [ ] NO

## 2021-04-12 NOTE — PROGRESS NOTE ADULT - ASSESSMENT
66yo F w/ HTN (on losartan) and DM2 (on metformin) admitted with acute repiratory failure due to covid now in ARDS.   Imaging personally reviewed and shows extensive infiltrates   Blood culture from 4/1 (one set only was sent) grew FAN  She has had intermittent fevers the last several days   Repeat blood cultures have been sent and awaiting results   Her initial presentation was with DKA though her glucose levels are better controlled and no longer in DKA   Suffered shock liver likely from initial intubation/septic shock from covid though her AST/ALT are improving   Staph epi is often a contaminant. The ideal situation is to draw 2 sets of blood cultures from 2 separate sticks to ensure that if in fact a contaminant it would not grow in the additional set. In this case only one set was sent and she has a central line, a-line, ETT, NGT, montiel  and has been having intermittent fevers. Still suspect that this was a procurement contaminant but will await for the repeat blood cultures.   DDx regarding her fevers are quite broad: covid vs DVT/PE (dvt study negative), medication induced ( vanco on rare occasions causes fevers), subacute thyroiditis, bacterial pneumonia etc     4/8: on pressors and intubated, Fio2 100%, day 7 of vanco, repeat blood culture negative   4/9: off vanco, afebrile, FiO2 still 100%, on pressors, cultures negative  4/10: slowly reducing FiO2, afebrile, lfts getting better, WBC normal   4/11: FiO2 rising, able to be weaned off pressors, wbc steady, making very little progress unfortunately  4/12:  not doing well, off antibiotics, WBC remains near normal, remains in severe ARDS     Antibiotics this hospitalization:   Vanco 4/2->4/8  RDV 3/23-3/27  ceftriaxone 3/23  Azithro 3/23-3/24    Overall, 65F septic shock due to covid, shock liver, leukocytosis, fever, ARDS, anemia, +CONS     Suggest-  monitor off antibiotics   if able, consider exchanging lines (central line was placed 4/1/21)  trend WBC, LFTs   trend fever curve   pressors per ICU   Ventilator management per ICU   Good but not too tight glycemic control->endocrine recommendations appreciated   Remains critically ill.    Dr. Leone will be covering tomorrow.    Rosales Garellek, DO  Infectious Disease Attending  Pager [phone]   After 5pm/weekends please call [phone] for all inquiries and new consults  Occupational Therapy  Visit Type: treatment  Co-treat with: Physical therapist  Precautions:  Medical precautions:  fall risk;. The patient is a 52 year old male admitted on 1/17/2021.  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 Pt reintubated 1/22 for respiratory distress. 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   Pt is s/p trach placement on 2/2.    Lines:       Lines in chart and on patient reviewed, cautions maintained throughout session.  Hearing: no hearing deficits  Vision:     Current vision: no visual deficits  Safety Measures: bed alarm and chair alarm    SUBJECTIVE                                                                                                              \"Zelda been sitting on the side of the bed to eat.\"  Patient / Family Goal: return to previous functional status and maximize function      OBJECTIVE                                                                                                              Level of consciousness: alert    Oriented to person, place, time and situation     Affect/Behavior: alert, cooperative and calm  Functional Communication/Cognition    Overall status:  Within functional limits   Attention span:  Attends with cues to redirect    Commands: follows one step commands with increased time.  Hand Dominance: left  Upper Extremity Function: Left: functional  Right: nonfunctional  Range of Motion (measured in degrees unless otherwise indicated)  WFL: LUE RUE except as noted  Shoulder:   - Flexion (180):      • Left:  90   Right:   Passive: 90  Strength (out of 5 unless otherwise indicated)  WFL: LUE  Shoulder:   - Internal Rotation:      • Right: 1   - External Rotation:      • Right:1   Elbow/Forearm:   - Flexion:      • Right: 1   Finger/Thumb:  Gross : right  less then left  Comments / Details:  LUE with trace muscle activation noted with internal/external rotation at elbow and elbow flexion  Balance  Sitting:    Static:  supervision      Dynamic:  contact guard/touching/steadying assist    Motor Planning: appears intact  Coordination  Gross Motor:  LUE: effortful movement and grossly intact  Fine Motor:  LUE: grossly intact and impaired in-hand/object manipulation  Bed mobility:      Supine to sit: moderate assist and 2 persons    Sit to supine: maximal assist and 2 persons  Activities of Daily Living (ADLs):  Lower Body Dressing:     Footwear assistance: total assist - dependent  Bathing:     Assist: minimal assist    Position: edge of bed      Interventions                                                                                                                Shoulder abduction: right, PROM reps sets   Shoulder adduction: right, PROM reps sets   Shoulder extension: right, PROM reps sets   Scapular retraction: bilateral, AROM reps sets   Elbow extension: right, AAROM reps sets   Elbow flexion: right, AAROM reps sets   Wrist flexion: right, PROM reps sets   Hand grasp/release: right, PROM reps sets   Digits: right, PROM reps sets    Additional exercise details: Pt educated on self PROM of R hand and elbow due to increased tone/tightness in R digits causing pain.   Training provided: ADL training, activity tolerance, balance retraining, bed mobility training, body mechanics, safety training, neuromuscular reeducation, transfer training, energy conservation and breathing/relaxation  Skilled input: verbal instruction/cues, tactile instruction/cues, posture correction and facilitation        ASSESSMENT                                                                                                                Impairments: activity tolerance, abnormal tone, bed mobility, balance, cardiovascular endurance, communication, safety awareness, range of motion, coordination/proprioception, strength and visual perceptual  Functional Limitations: bed mobility, functional mobility, grooming, bathing, toileting, functional transfers, showering,  dressing and participating in meaningful/purposeful activities   Pt received supine in bed for OT treatment, co-treat with pt to maximize pts participation, trunk control, and sitting tolerance EOB for ADLs; pt also very complex medically impacting tolerance to therapy and safety during mobility. Pt pending I &D for sacral wound today. Pt is alert and motivated for therapy. Pt with trace muscle activation for elbow flexion and internal rotation. Pt continues to c/o of pain in R digits due to tightness and extended time in fisted/flexed position. Pt educated on self ROM, verbalized understanding. Facilitated improved positioning and ROM while WB through RUE while EOB.  Also trialed splint to improve positioning at rest to avoid pain, tightness, and contracture with evolving tone, RN notified to assess throughout day.   Pt complete grooming tasks and UB/LB sponge bathing with min A; assistance required due to impaired function of RUE.   Pts BP cleared before sitting EOB, does not c/o of dizziness this date however no standing trials completed due to upcoming surgery.   Pt with complicated medical course impacting tolerance to therapy however demonstrates great improvement, motivation, and potential this date, he  would benefit from acute inpatient rehab pending clinical course and further progress/tolerance. Pt would benefit from continued inpt OT to maximize safety and independence in functional tasks.     Pt reports he lives with his girlfriend, was working/driving and independent at baseline.      Discharge Recommendations:   Recommendations for Discharge: OT IL: Patient needs intensive skilled therapy for a minimum of 3 hours daily by at least two disciplines with the oversight of a physical medicine and rehabilitation physician                         Skilled therapy is required to address these limitations in attempt to maximize the patient's independence.    End of Session:   Location: in bed  Safety measures:  alarm system in place/re-engaged and call light within reach  Handoff to: nurse    PLAN                                                                                                                          Suggestions for next session as indicated: OT Frequency: 5 days/week  Frequency Comments: 4/5 last seen 2/12 (AIR) Keep OJ    Interventions: activity tolerance training, ADL retraining, body mechanics, therapeutic activity, therapeutic exercise, balance, bed mobility training, neuromuscular reeducation, energy conservation and functional transfer training  Agreement to plan and goals: patient agrees with goals and treatment plan      GOALS:  Long Term Goals: (to be met by time of discharge from hospital)  Grooming: Patient will complete grooming tasks at bedside set up.  Status: progressing/ongoing  Lower body dressing: Patient will complete lower body dressing minimal assist. Status: progressing/ongoing  Toilet transfer: Patient will complete toilet transfer with moderate assist.  Status: progressing/ongoingSit stand device: to commode.    Home setting transfer: Patient will complete home setting transfers with supervision.  Status: progressing/ongoingLateral transfer device: bed mobility.          Documented in the chart in the following areas: Assessment. Plan.

## 2021-04-12 NOTE — PROGRESS NOTE ADULT - SUBJECTIVE AND OBJECTIVE BOX
INTERVAL HPI/OVERNIGHT EVENTS:   HPI:  66yo F w/ HTN (on losartan) and DM2 (on metformin) presents with progressively worsening SOB associated with productive cough for past several days much worse the past 3 days.  Started on abx (levaquin, prednisone and azithromycin 3/19) outpatient with no improvement. Denies any chest pain, fevers, dizziness, syncope, abd pain, back pain, N/V/D, recent sick contact, recent travel.     In ED: CTA chest done to r/o PE findings consistent w/ COVID-19 PNA. Labs remarkable for WBC 18.55, Na 130, K 3.2, COs 10, AG 29, Glucose 416, alb 2.5.  Placed on High flow for dyspnea and hypoxemia. Admitted to ICU for COVID pna and DKA.   (23 Mar 2021 05:08)    3/31 readmitted to ICU, intubated  4/12 overnight, 2 doses 20mg midodrine given and restarted levophed for low BP      Central line in R IJ  Ponce: Y    PAST MEDICAL & SURGICAL HISTORY:  HTN (hypertension)  DM (diabetes mellitus)  HLD (hyperlipidemia)  No significant past surgical history    MEDICATIONS  (STANDING):  chlorhexidine 0.12% Liquid 15 milliLiter(s) Oral Mucosa every 12 hours  chlorhexidine 2% Cloths 1 Application(s) Topical <User Schedule>  cisatracurium Infusion 3 MICROgram(s)/kG/Min (13.9 mL/Hr) IV Continuous <Continuous>  dextrose 40% Gel 15 Gram(s) Oral once  dextrose 5%. 1000 milliLiter(s) (50 mL/Hr) IV Continuous <Continuous>  dextrose 5%. 1000 milliLiter(s) (100 mL/Hr) IV Continuous <Continuous>  dextrose 50% Injectable 25 Gram(s) IV Push once  dextrose 50% Injectable 12.5 Gram(s) IV Push once  dextrose 50% Injectable 25 Gram(s) IV Push once  enoxaparin Injectable 40 milliGRAM(s) SubCutaneous two times a day  fentaNYL   Infusion. 0.501 MICROgram(s)/kG/Hr (3.86 mL/Hr) IV Continuous <Continuous>  glucagon  Injectable 1 milliGRAM(s) IntraMuscular once  insulin glargine Injectable (LANTUS) 20 Unit(s) SubCutaneous at bedtime  insulin lispro (ADMELOG) corrective regimen sliding scale   SubCutaneous every 6 hours  insulin regular  human recombinant 6 Unit(s) SubCutaneous every 6 hours  ketamine Infusion. 0.25 mG/kG/Hr (1.93 mL/Hr) IV Continuous <Continuous>  norepinephrine Infusion 0.05 MICROgram(s)/kG/Min (3.61 mL/Hr) IV Continuous <Continuous>  pantoprazole  Injectable 40 milliGRAM(s) IV Push daily  polyethylene glycol 3350 17 Gram(s) Oral daily  propofol Infusion 10.009 MICROgram(s)/kG/Min (4.63 mL/Hr) IV Continuous <Continuous>  senna Syrup 10 milliLiter(s) Oral at bedtime  zinc oxide 40% Ointment 1 Application(s) Topical three times a day      OBJECTIVE  ICU Vital Signs Last 24 Hrs  T(C): 37.6 (11 Apr 2021 19:45), Max: 37.6 (11 Apr 2021 17:30)  T(F): 99.6 (11 Apr 2021 19:45), Max: 99.7 (11 Apr 2021 17:30)  HR: 85 (12 Apr 2021 07:00) (85 - 116)  ABP: 120/53 (12 Apr 2021 07:00) (77/43 - 258/99)  ABP(mean): 73 (12 Apr 2021 07:00) (53 - 149)  RR: 34 (12 Apr 2021 07:00) (20 - 34)  SpO2: 99% (12 Apr 2021 07:00) (88% - 100%)    I&O's Detail    11 Apr 2021 07:01  -  12 Apr 2021 07:00  --------------------------------------------------------  IN:    Cisatracurium: 434.4 mL    FentaNYL: 723.8 mL    Ketamine: 28.5 mL    Norepinephrine: 32.4 mL    Norepinephrine: 17.3 mL    Propofol: 536 mL    Vital1.0: 1080 mL  Total IN: 2852.4 mL    OUT:    Indwelling Catheter - Urethral (mL): 495 mL    Voided (mL): 550 mL  Total OUT: 1045 mL    Total NET: 1807.4 mL        Vent settings  Mode: AC/ CMV (Assist Control/ Continuous Mandatory Ventilation)  RR (machine): 34  FiO2: 90  PEEP: 10    CAPILLARY BLOOD GLUCOSE  POCT Blood Glucose.: 254 mg/dL (12 Apr 2021 05:08)  POCT Blood Glucose.: 269 mg/dL (11 Apr 2021 23:50)  POCT Blood Glucose.: 253 mg/dL (11 Apr 2021 21:50)  POCT Blood Glucose.: 297 mg/dL (11 Apr 2021 17:46)        LABS             4/12                      7.4    10.51 )-----------( 258      ( 12 Apr 2021 04:42 )             25.7     04-12    135  |  103  |  29<H>  ----------------------------<  242<H>  5.5<H>   |  29  |  0.60    Ca    8.0<L>      12 Apr 2021 04:42  Phos  3.2     04-12  Mg     2.7     04-12    TPro  5.6<L>  /  Alb  1.4<L>  /  TBili  0.6  /  DBili  x   /  AST  52<H>  /  ALT  141<H>  /  AlkPhos  244<H>  04-12               ABG - ( 11 Apr 2021 22:52 )  pH, Arterial: x     pH, Blood: 7.27  /  pCO2: 64    /  pO2: 81    / HCO3: 28    / Base Excess: 1.0   /  SaO2: 94          Culture - Blood in AM (04.06.21 @ 12:15)    Specimen Source: .Blood Blood-Peripheral    Culture Results: No Growth Final  Culture - Blood in AM (04.06.21 @ 12:15)    Specimen Source: .Blood Blood    Culture Results: No Growth Final                     INTERVAL HPI/OVERNIGHT EVENTS:   HPI:  66yo F w/ HTN (on losartan) and DM2 (on metformin) presents with progressively worsening SOB associated with productive cough for past several days much worse the past 3 days.  Started on abx (levaquin, prednisone and azithromycin 3/19) outpatient with no improvement. Denies any chest pain, fevers, dizziness, syncope, abd pain, back pain, N/V/D, recent sick contact, recent travel.     In ED: CTA chest done to r/o PE findings consistent w/ COVID-19 PNA. Labs remarkable for WBC 18.55, Na 130, K 3.2, COs 10, AG 29, Glucose 416, alb 2.5.  Placed on High flow for dyspnea and hypoxemia. Admitted to ICU for COVID pna and DKA.   (23 Mar 2021 05:08)    3/31 readmitted to ICU, intubated  4/12 overnight, 2 doses 20mg midodrine given and restarted levophed for low BP. Hyperkalemic (5.5) and given 40 mg Lasix      Central line in R IJ  Ponce: Y    PAST MEDICAL & SURGICAL HISTORY:  HTN (hypertension)  DM (diabetes mellitus)  HLD (hyperlipidemia)  No significant past surgical history    MEDICATIONS  (STANDING):  chlorhexidine 0.12% Liquid 15 milliLiter(s) Oral Mucosa every 12 hours  chlorhexidine 2% Cloths 1 Application(s) Topical <User Schedule>  cisatracurium Infusion 3 MICROgram(s)/kG/Min (13.9 mL/Hr) IV Continuous <Continuous>  dextrose 40% Gel 15 Gram(s) Oral once  dextrose 5%. 1000 milliLiter(s) (50 mL/Hr) IV Continuous <Continuous>  dextrose 5%. 1000 milliLiter(s) (100 mL/Hr) IV Continuous <Continuous>  dextrose 50% Injectable 25 Gram(s) IV Push once  dextrose 50% Injectable 12.5 Gram(s) IV Push once  dextrose 50% Injectable 25 Gram(s) IV Push once  enoxaparin Injectable 40 milliGRAM(s) SubCutaneous two times a day  fentaNYL   Infusion. 0.501 MICROgram(s)/kG/Hr (3.86 mL/Hr) IV Continuous <Continuous>  glucagon  Injectable 1 milliGRAM(s) IntraMuscular once  insulin glargine Injectable (LANTUS) 20 Unit(s) SubCutaneous at bedtime  insulin lispro (ADMELOG) corrective regimen sliding scale   SubCutaneous every 6 hours  insulin regular  human recombinant 6 Unit(s) SubCutaneous every 6 hours  ketamine Infusion. 0.25 mG/kG/Hr (1.93 mL/Hr) IV Continuous <Continuous>  norepinephrine Infusion 0.05 MICROgram(s)/kG/Min (3.61 mL/Hr) IV Continuous <Continuous>  pantoprazole  Injectable 40 milliGRAM(s) IV Push daily  polyethylene glycol 3350 17 Gram(s) Oral daily  propofol Infusion 10.009 MICROgram(s)/kG/Min (4.63 mL/Hr) IV Continuous <Continuous>  senna Syrup 10 milliLiter(s) Oral at bedtime  zinc oxide 40% Ointment 1 Application(s) Topical three times a day      OBJECTIVE  ICU Vital Signs Last 24 Hrs  T(C): 37.6 (11 Apr 2021 19:45), Max: 37.6 (11 Apr 2021 17:30)  T(F): 99.6 (11 Apr 2021 19:45), Max: 99.7 (11 Apr 2021 17:30)  HR: 85 (12 Apr 2021 07:00) (85 - 116)  ABP: 120/53 (12 Apr 2021 07:00) (77/43 - 258/99)  ABP(mean): 73 (12 Apr 2021 07:00) (53 - 149)  RR: 34 (12 Apr 2021 07:00) (20 - 34)  SpO2: 99% (12 Apr 2021 07:00) (88% - 100%)    I&O's Detail    11 Apr 2021 07:01  -  12 Apr 2021 07:00  --------------------------------------------------------  IN:    Cisatracurium: 434.4 mL    FentaNYL: 723.8 mL    Ketamine: 28.5 mL    Norepinephrine: 32.4 mL    Norepinephrine: 17.3 mL    Propofol: 536 mL    Vital1.0: 1080 mL  Total IN: 2852.4 mL    OUT:    Indwelling Catheter - Urethral (mL): 495 mL    Voided (mL): 550 mL  Total OUT: 1045 mL    Total NET: 1807.4 mL        Vent settings  Mode: AC/ CMV (Assist Control/ Continuous Mandatory Ventilation)  RR (machine): 34  FiO2: 90  PEEP: 10    CAPILLARY BLOOD GLUCOSE  POCT Blood Glucose.: 254 mg/dL (12 Apr 2021 05:08)  POCT Blood Glucose.: 269 mg/dL (11 Apr 2021 23:50)  POCT Blood Glucose.: 253 mg/dL (11 Apr 2021 21:50)  POCT Blood Glucose.: 297 mg/dL (11 Apr 2021 17:46)        LABS             4/12                      7.4    10.51 )-----------( 258      ( 12 Apr 2021 04:42 )             25.7     04-12    135  |  103  |  29<H>  ----------------------------<  242<H>  5.5<H>   |  29  |  0.60    Ca    8.0<L>      12 Apr 2021 04:42  Phos  3.2     04-12  Mg     2.7     04-12    TPro  5.6<L>  /  Alb  1.4<L>  /  TBili  0.6  /  DBili  x   /  AST  52<H>  /  ALT  141<H>  /  AlkPhos  244<H>  04-12               ABG - ( 11 Apr 2021 22:52 )  pH, Arterial: x     pH, Blood: 7.27  /  pCO2: 64    /  pO2: 81    / HCO3: 28    / Base Excess: 1.0   /  SaO2: 94          Culture - Blood in AM (04.06.21 @ 12:15)    Specimen Source: .Blood Blood-Peripheral    Culture Results: No Growth Final  Culture - Blood in AM (04.06.21 @ 12:15)    Specimen Source: .Blood Blood    Culture Results: No Growth Final

## 2021-04-13 NOTE — PROCEDURE NOTE - NSPOSTPRCRAD_GEN_A_CORE
Femoral vein/central line located in the
post-procedure radiography performed
no pneumothorax/post-procedure radiography performed
post-procedure radiography performed

## 2021-04-13 NOTE — PROGRESS NOTE ADULT - ASSESSMENT
64yo F w/ HTN (on losartan) and DM2 (on metformin) p/w COVID PNA and DKA, now in ARDS and intubated.    Respiratory + ID  - COVID pneumonia in ARDS, intubated 3/31  - Vent: 32/10/90%    - decrease FiO2 as tolerated  - s/p ceftriaxone and azithromycin  - s/p remdesivir and dexamethasone  - s/p 8 days vancomycin; MRSE+ (likely contaminant) w/ f/u Blood cx negative; fevers resolved  - ID following    Neuro  - c/w Propofol, Fentanyl, Nimbex and Ketamine     Cardio  - weaned off pressors 4/11  - 4/12 overnight, levophed restarted and midodrine given for MAP support    - wean levophed as tolerated    Heme ppx  - c/w lovenox    GI ppx  - c/w protonix    Endo  - POCT glucose 253-309 last 24 hr  - Endo following, increased insulin  - Increase Lantus to 30 units qd, regular insulin 6 units q6h + Lispro ISS   66yo F w/ HTN (on losartan) and DM2 (on metformin) p/w COVID PNA and DKA, now in ARDS and intubated.    Respiratory + ID  - COVID pneumonia in ARDS, intubated 3/31  - Vent: 32/10/90%    - decrease FiO2 as tolerated  - s/p ceftriaxone and azithromycin  - s/p remdesivir and dexamethasone  - s/p 8 days vancomycin; MRSE+ (likely contaminant) w/ f/u Blood cx negative; fevers resolved  - ID following  - exchange central line today (placed 4/1)    Neuro  - c/w Propofol, Fentanyl, Nimbex, Precedex; wean Nimbex as tolerated    Cardio  - hypertensive to 252/102 overnight, held levo, d/c ketamine; levo restarted in AM due to soft BP  - restart midodrine at 5mg q8 for baseline MAP control  - c/w levophed    Heme ppx  - c/w lovenox    GI ppx  - c/w protonix    Endo  - improved glycemic control today; POCT glucose 162-260 last 24 hr  - Endo following, increased insulin  - c/w Lantus 30 units qd, regular insulin 6 units q6h + Lispro ISS

## 2021-04-13 NOTE — CHART NOTE - NSCHARTNOTEFT_GEN_A_CORE
Assessment:  Pt seen on vent, on COVID-19 isolation.  Pt adm c hypoxia, DKA w/o coma, being followed by Endocrinologist, c acute respirator failure, COVID-19 pneumonia   PMH include HLD, DM(insulin use PTA was reported by pt on adm to RD, as per home meds metformin use noted), HTN.     Factors impacting intake: [ ] none [ ] nausea  [ ] vomiting [ ] diarrhea [ ] constipation  [ ]chewing problems [ ] swallowing issues  [x ] other: acute illness, on NGT feeding      Diet Prescription: Diet, NPO with Tube Feed:   Tube Feeding Modality: Gastrostomy  Glucerna 1.2 Long  Total Volume for 24 Hours (mL): 1080  Continuous  Starting Tube Feed Rate {mL per Hour}: 20  Increase Tube Feed Rate by (mL): 5     Every 8 hours  Until Goal Tube Feed Rate (mL per Hour): 45  Tube Feed Duration (in Hours): 24  Tube Feed Start Time: 16:30 (04-11-21 @ 09:08)    Intake: Glucerna 1.2 @ 45 ml/zn=0468 ml, 1296 calories, 65 grams protein, 869 ml fluid, 86% RDIs +578.6 propofol calories(04/12)    Current Weight: current wt. not available, 04/08, 92.4 kg, 03/23, 82.6 kg, wt. gain of 9.8 kg  (? wt. of 77.1kg 03/23 on pt information wt.)  % Weight Change: 11.9%  04/13, 2+ generalized edema noted   I/O: 3032/1400(+ 1632)    Pertinent Medications: MEDICATIONS  (STANDING):  chlorhexidine 0.12% Liquid 15 milliLiter(s) Oral Mucosa every 12 hours  chlorhexidine 2% Cloths 1 Application(s) Topical <User Schedule>  cisatracurium Infusion 3 MICROgram(s)/kG/Min (13.9 mL/Hr) IV Continuous <Continuous>  dexMEDEtomidine Infusion 0.2 MICROgram(s)/kG/Hr (3.86 mL/Hr) IV Continuous <Continuous>  dextrose 40% Gel 15 Gram(s) Oral once  dextrose 5%. 1000 milliLiter(s) (50 mL/Hr) IV Continuous <Continuous>  dextrose 5%. 1000 milliLiter(s) (100 mL/Hr) IV Continuous <Continuous>  dextrose 50% Injectable 25 Gram(s) IV Push once  dextrose 50% Injectable 12.5 Gram(s) IV Push once  dextrose 50% Injectable 25 Gram(s) IV Push once  enoxaparin Injectable 40 milliGRAM(s) SubCutaneous two times a day  fentaNYL   Infusion. 0.501 MICROgram(s)/kG/Hr (3.86 mL/Hr) IV Continuous <Continuous>  glucagon  Injectable 1 milliGRAM(s) IntraMuscular once  insulin glargine Injectable (LANTUS) 30 Unit(s) SubCutaneous at bedtime  insulin lispro (ADMELOG) corrective regimen sliding scale   SubCutaneous every 6 hours  insulin regular  human recombinant 6 Unit(s) SubCutaneous every 6 hours  midodrine 5 milliGRAM(s) Oral every 8 hours  norepinephrine Infusion 0.05 MICROgram(s)/kG/Min (3.61 mL/Hr) IV Continuous <Continuous>  pantoprazole  Injectable 40 milliGRAM(s) IV Push daily  polyethylene glycol 3350 17 Gram(s) Oral daily  propofol Infusion 10.009 MICROgram(s)/kG/Min (4.63 mL/Hr) IV Continuous <Continuous>=526 ml(04/12)=578.6 calories   senna Syrup 10 milliLiter(s) Oral at bedtime  sodium zirconium cyclosilicate 10 Gram(s) Oral every 8 hours  zinc oxide 40% Ointment 1 Application(s) Topical three times a day    MEDICATIONS  (PRN):  sodium chloride 0.9% lock flush 10 milliLiter(s) IV Push every 1 hour PRN Pre/post blood products, medications, blood draw, and to maintain line patency    Pertinent Labs: 04-13 Na134 mmol/L<L> Glu 185 mg/dL<H> K+ 4.9 mmol/L Cr  0.45 mg/dL<L> BUN 29 mg/dL<H> 04-13 Phos 3.2 mg/dL 04-13 Alb 1.5 g/dL<L> 03-24 Chol 173 mg/dL LDL --    HDL 43 mg/dL<L> Trig 104 mg/dL04-13  U/L<H> AST 47 U/L<H> Alkaline Phosphatase 232 U/L<H>  03-26-21 @ 08:56 a1c 12.2<H>  03-24-21 @ 09:30 a1c 12.5<H>  03-23-21 @ 19:22 a1c 12.2<H>   CAPILLARY BLOOD GLUCOSE      POCT Blood Glucose.: 166 mg/dL (13 Apr 2021 11:49)  POCT Blood Glucose.: 260 mg/dL (13 Apr 2021 05:19)  POCT Blood Glucose.: 191 mg/dL (13 Apr 2021 04:21)  POCT Blood Glucose.: 187 mg/dL (13 Apr 2021 00:14)  POCT Blood Glucose.: 182 mg/dL (12 Apr 2021 17:11)  POCT Blood Glucose.: 223 mg/dL (12 Apr 2021 13:29)    Skin:   04/10; right buttock; stage III noted    Estimated Needs:   [x ] no change since previous assessment(03/25)  [ ] recalculated:     Previous Nutrition Diagnosis: (04/01)  Malnutrition; severe malnutrition in context of acute illness     Related to: inadequate protein-energy intake in setting of COVID-19 illness, acute respiratory failure, DKA  addendum; pressure ulcer     As evidenced by: <50% nutrition needs > 5 days, >2% wt. loss in 1 week(6.17%)    Goal: pt to meet >75% protein-energy needs via enteral feeding; met     Nutrition Diagnosis is [x ] ongoing, improving   [ ] resolved [ ] not applicable       Interventions:   Recommend  [ ] Change Diet To:  [ ] Nutrition Supplement  [ x] Nutrition Support; continue c Glucerna 1.2 @ 45 ml/hr+ add No Carb Prosource 1 pkg daily=60 calories, 15 grams protein per pkg   [x ] Other: + liquid multivitamin 5 ml via NGT    Monitoring and Evaluation:   [ ] PO intake [ x ] Tolerance to diet prescription [ x ] weights [ x ] labs; [ x ] follow up per protocol  [ ] other: Assessment:  Pt seen on vent, on COVID-19 isolation.  Pt adm c hypoxia, DKA w/o coma, being followed by Endocrinologist, c acute respirator failure, COVID-19 pneumonia   PMH include HLD, DM(insulin use PTA was reported by pt on adm to RD, as per home meds metformin use noted), HTN.     Factors impacting intake: [ ] none [ ] nausea  [ ] vomiting [ ] diarrhea [ ] constipation  [ ]chewing problems [ ] swallowing issues  [x ] other: acute illness, on NGT feeding      Diet Prescription: Diet, NPO with Tube Feed:   Tube Feeding Modality: via OGT  Glucerna 1.2 Long  Total Volume for 24 Hours (mL): 1080  Continuous  Starting Tube Feed Rate {mL per Hour}: 20  Increase Tube Feed Rate by (mL): 5     Every 8 hours  Until Goal Tube Feed Rate (mL per Hour): 45  Tube Feed Duration (in Hours): 24  Tube Feed Start Time: 16:30 (04-11-21 @ 09:08)    Intake: Glucerna 1.2 @ 45 ml/jw=5053 ml, 1296 calories, 65 grams protein, 869 ml fluid, 86% RDIs +578.6 propofol calories(04/12)    Current Weight: current wt. not available, 04/08, 92.4 kg, 03/23, 82.6 kg, wt. gain of 9.8 kg  (? wt. of 77.1kg 03/23 on pt information wt.)  % Weight Change: 11.9%  04/13, 2+ generalized edema noted   I/O: 3032/1400(+ 1632)    Pertinent Medications: MEDICATIONS  (STANDING):  chlorhexidine 0.12% Liquid 15 milliLiter(s) Oral Mucosa every 12 hours  chlorhexidine 2% Cloths 1 Application(s) Topical <User Schedule>  cisatracurium Infusion 3 MICROgram(s)/kG/Min (13.9 mL/Hr) IV Continuous <Continuous>  dexMEDEtomidine Infusion 0.2 MICROgram(s)/kG/Hr (3.86 mL/Hr) IV Continuous <Continuous>  dextrose 40% Gel 15 Gram(s) Oral once  dextrose 5%. 1000 milliLiter(s) (50 mL/Hr) IV Continuous <Continuous>  dextrose 5%. 1000 milliLiter(s) (100 mL/Hr) IV Continuous <Continuous>  dextrose 50% Injectable 25 Gram(s) IV Push once  dextrose 50% Injectable 12.5 Gram(s) IV Push once  dextrose 50% Injectable 25 Gram(s) IV Push once  enoxaparin Injectable 40 milliGRAM(s) SubCutaneous two times a day  fentaNYL   Infusion. 0.501 MICROgram(s)/kG/Hr (3.86 mL/Hr) IV Continuous <Continuous>  glucagon  Injectable 1 milliGRAM(s) IntraMuscular once  insulin glargine Injectable (LANTUS) 30 Unit(s) SubCutaneous at bedtime  insulin lispro (ADMELOG) corrective regimen sliding scale   SubCutaneous every 6 hours  insulin regular  human recombinant 6 Unit(s) SubCutaneous every 6 hours  midodrine 5 milliGRAM(s) Oral every 8 hours  norepinephrine Infusion 0.05 MICROgram(s)/kG/Min (3.61 mL/Hr) IV Continuous <Continuous>  pantoprazole  Injectable 40 milliGRAM(s) IV Push daily  polyethylene glycol 3350 17 Gram(s) Oral daily  propofol Infusion 10.009 MICROgram(s)/kG/Min (4.63 mL/Hr) IV Continuous <Continuous>=526 ml(04/12)=578.6 calories   senna Syrup 10 milliLiter(s) Oral at bedtime  sodium zirconium cyclosilicate 10 Gram(s) Oral every 8 hours  zinc oxide 40% Ointment 1 Application(s) Topical three times a day    MEDICATIONS  (PRN):  sodium chloride 0.9% lock flush 10 milliLiter(s) IV Push every 1 hour PRN Pre/post blood products, medications, blood draw, and to maintain line patency    Pertinent Labs: 04-13 Na134 mmol/L<L> Glu 185 mg/dL<H> K+ 4.9 mmol/L Cr  0.45 mg/dL<L> BUN 29 mg/dL<H> 04-13 Phos 3.2 mg/dL 04-13 Alb 1.5 g/dL<L> 03-24 Chol 173 mg/dL LDL --    HDL 43 mg/dL<L> Trig 104 mg/dL04-13  U/L<H> AST 47 U/L<H> Alkaline Phosphatase 232 U/L<H>  03-26-21 @ 08:56 a1c 12.2<H>  03-24-21 @ 09:30 a1c 12.5<H>  03-23-21 @ 19:22 a1c 12.2<H>   CAPILLARY BLOOD GLUCOSE      POCT Blood Glucose.: 166 mg/dL (13 Apr 2021 11:49)  POCT Blood Glucose.: 260 mg/dL (13 Apr 2021 05:19)  POCT Blood Glucose.: 191 mg/dL (13 Apr 2021 04:21)  POCT Blood Glucose.: 187 mg/dL (13 Apr 2021 00:14)  POCT Blood Glucose.: 182 mg/dL (12 Apr 2021 17:11)  POCT Blood Glucose.: 223 mg/dL (12 Apr 2021 13:29)    Skin:   04/10; right buttock; stage III noted    Estimated Needs:   [x ] no change since previous assessment(03/25)  [ ] recalculated:     Previous Nutrition Diagnosis: (04/01)  Malnutrition; severe malnutrition in context of acute illness     Related to: inadequate protein-energy intake in setting of COVID-19 illness, acute respiratory failure, DKA  addendum; pressure ulcer     As evidenced by: <50% nutrition needs > 5 days, >2% wt. loss in 1 week(6.17%)    Goal: pt to meet >75% protein-energy needs via enteral feeding; met     Nutrition Diagnosis is [x ] ongoing, improving   [ ] resolved [ ] not applicable       Interventions:   Recommend  [ ] Change Diet To:  [ ] Nutrition Supplement  [ x] Nutrition Support; continue c Glucerna 1.2 @ 45 ml/hr+ add No Carb Prosource 1 pkg daily=60 calories, 15 grams protein per pkg   [x ] Other: + liquid multivitamin 5 ml via OGT    Monitoring and Evaluation:   [ ] PO intake [ x ] Tolerance to diet prescription [ x ] weights [ x ] labs; glucose, Triglyceride  [ x ] follow up per protocol  [ ] other:

## 2021-04-13 NOTE — PROGRESS NOTE ADULT - SUBJECTIVE AND OBJECTIVE BOX
RERE FINESSE    [facility] CCU 6    Patient is a 65y old  Female who presents with a chief complaint of covid pna and dka (12 Apr 2021 23:28)       Allergies    Carafate (Rash)  Levaquin (Rash)  Percocet 5/325 (Other)    Intolerances    lactose (Flatulence)      HPI:  64yo F w/ HTN (on losartan) and DM2 (on metformin) presents with progressively worsening SOB associated with productive cough for past several days much worse the past 3 days.  Started on abx (levaquin, prednisone and azithromycin 3/19) outpatient with no improvement. Denies any chest pain, fevers, dizziness, syncope, abd pain, back pain, N/V/D, recent sick contact, recent travel.     In ED: CTA chest done to r/o PE findings consistent w/ COVID-19 PNA. Labs remarkable for WBC 18.55, Na 130, K 3.2, COs 10, AG 29, Glucose 416, alb 2.5.  Placed on High flow for dyspnea and hypoxemia. Admitted to ICU for COVID pna and DKA.   (23 Mar 2021 05:08)      PAST MEDICAL & SURGICAL HISTORY:  HTN (hypertension)    DM (diabetes mellitus)    HLD (hyperlipidemia)    No significant past surgical history        FAMILY HISTORY:  No pertinent family history in first degree relatives          MEDICATIONS   chlorhexidine 0.12% Liquid 15 milliLiter(s) Oral Mucosa every 12 hours  chlorhexidine 2% Cloths 1 Application(s) Topical <User Schedule>  cisatracurium Infusion 3 MICROgram(s)/kG/Min IV Continuous <Continuous>  dexMEDEtomidine Infusion 0.2 MICROgram(s)/kG/Hr IV Continuous <Continuous>  dextrose 40% Gel 15 Gram(s) Oral once  dextrose 5%. 1000 milliLiter(s) IV Continuous <Continuous>  dextrose 5%. 1000 milliLiter(s) IV Continuous <Continuous>  dextrose 50% Injectable 25 Gram(s) IV Push once  dextrose 50% Injectable 12.5 Gram(s) IV Push once  dextrose 50% Injectable 25 Gram(s) IV Push once  enoxaparin Injectable 40 milliGRAM(s) SubCutaneous two times a day  fentaNYL   Infusion. 0.501 MICROgram(s)/kG/Hr IV Continuous <Continuous>  glucagon  Injectable 1 milliGRAM(s) IntraMuscular once  insulin glargine Injectable (LANTUS) 30 Unit(s) SubCutaneous at bedtime  insulin lispro (ADMELOG) corrective regimen sliding scale   SubCutaneous every 6 hours  insulin regular  human recombinant 6 Unit(s) SubCutaneous every 6 hours  norepinephrine Infusion 0.05 MICROgram(s)/kG/Min IV Continuous <Continuous>  pantoprazole  Injectable 40 milliGRAM(s) IV Push daily  polyethylene glycol 3350 17 Gram(s) Oral daily  propofol Infusion 10.009 MICROgram(s)/kG/Min IV Continuous <Continuous>  senna Syrup 10 milliLiter(s) Oral at bedtime  sodium chloride 0.9% lock flush 10 milliLiter(s) IV Push every 1 hour PRN  sodium zirconium cyclosilicate 10 Gram(s) Oral every 8 hours  zinc oxide 40% Ointment 1 Application(s) Topical three times a day      Vital Signs Last 24 Hrs  T(C): 39.2 (13 Apr 2021 08:00), Max: 39.2 (13 Apr 2021 08:00)  T(F): 102.5 (13 Apr 2021 08:00), Max: 102.5 (13 Apr 2021 08:00)  HR: 90 (13 Apr 2021 08:00) (76 - 122)  BP: --  BP(mean): --  RR: 34 (13 Apr 2021 08:00) (18 - 34)  SpO2: 95% (13 Apr 2021 07:55) (91% - 100%)      04-12-21 @ 07:01  -  04-13-21 @ 07:00  --------------------------------------------------------  IN: 3032.4 mL / OUT: 1400 mL / NET: 1632.4 mL    04-13-21 @ 07:01  -  04-13-21 @ 09:20  --------------------------------------------------------  IN: 162 mL / OUT: 50 mL / NET: 112 mL        Mode: AC/ CMV (Assist Control/ Continuous Mandatory Ventilation), RR (machine): 34, FiO2: 100, PEEP: 10, ITime: 1, MAP: 21, PC: 32, PIP: 41    LABS:                        7.5    8.10  )-----------( 250      ( 13 Apr 2021 02:50 )             26.2     04-13    134<L>  |  101  |  29<H>  ----------------------------<  185<H>  4.9   |  29  |  0.45<L>    Ca    8.3<L>      13 Apr 2021 08:47  Phos  3.2     04-13  Mg     2.6     04-13    TPro  5.9<L>  /  Alb  1.5<L>  /  TBili  0.6  /  DBili  x   /  AST  47<H>  /  ALT  109<H>  /  AlkPhos  232<H>  04-13    PTT - ( 12 Apr 2021 04:42 )  PTT:36.6 sec      ABG - ( 11 Apr 2021 22:52 )  pH, Arterial: x     pH, Blood: 7.27  /  pCO2: 64    /  pO2: 81    / HCO3: 28    / Base Excess: 1.0   /  SaO2: 94                  WBC:  WBC Count: 8.10 K/uL (04-13 @ 02:50)  WBC Count: 10.60 K/uL (04-12 @ 11:43)  WBC Count: 10.51 K/uL (04-12 @ 04:42)  WBC Count: 11.05 K/uL (04-11 @ 03:10)  WBC Count: 10.28 K/uL (04-10 @ 02:34)      MICROBIOLOGY:  RECENT CULTURES:  04-06 .Blood Blood XXXX XXXX   No Growth Final                PTT - ( 12 Apr 2021 04:42 )  PTT:36.6 sec    Sodium:  Sodium, Serum: 134 mmol/L (04-13 @ 08:47)  Sodium, Serum: 134 mmol/L (04-13 @ 02:50)  Sodium, Serum: 137 mmol/L (04-12 @ 11:43)  Sodium, Serum: 135 mmol/L (04-12 @ 04:42)  Sodium, Serum: 137 mmol/L (04-11 @ 11:51)  Sodium, Serum: 136 mmol/L (04-11 @ 03:10)  Sodium, Serum: 139 mmol/L (04-10 @ 02:34)      0.45 mg/dL 04-13 @ 08:47  0.40 mg/dL 04-13 @ 02:50  0.66 mg/dL 04-12 @ 11:43  0.60 mg/dL 04-12 @ 04:42  0.52 mg/dL 04-11 @ 11:51  0.54 mg/dL 04-11 @ 03:10  0.45 mg/dL 04-10 @ 02:34      Hemoglobin:  Hemoglobin: 7.5 g/dL (04-13 @ 02:50)  Hemoglobin: 8.0 g/dL (04-12 @ 11:43)  Hemoglobin: 7.4 g/dL (04-12 @ 04:42)  Hemoglobin: 8.0 g/dL (04-11 @ 03:10)  Hemoglobin: 8.0 g/dL (04-10 @ 02:34)      Platelets: Platelet Count - Automated: 250 K/uL (04-13 @ 02:50)  Platelet Count - Automated: 279 K/uL (04-12 @ 11:43)  Platelet Count - Automated: 258 K/uL (04-12 @ 04:42)  Platelet Count - Automated: 251 K/uL (04-11 @ 03:10)  Platelet Count - Automated: 208 K/uL (04-10 @ 02:34)      LIVER FUNCTIONS - ( 13 Apr 2021 02:50 )  Alb: 1.5 g/dL / Pro: 5.9 gm/dL / ALK PHOS: 232 U/L / ALT: 109 U/L / AST: 47 U/L / GGT: x                 RADIOLOGY & ADDITIONAL STUDIES:

## 2021-04-13 NOTE — PROCEDURE NOTE - NSPROCNAME_GEN_A_CORE
Arterial Puncture/Cannulation
Arterial Puncture/Cannulation
Central Line Insertion
Arterial Puncture/Cannulation
Gastric Intubation/Gastric Lavage
Tracheal Intubation
Central Line Insertion
Central Line Insertion

## 2021-04-13 NOTE — PROCEDURE NOTE - NSSITEPREP_SKIN_A_CORE
chlorhexidine
chlorhexidine
chlorhexidine/Adherence to aseptic technique: hand hygiene prior to donning barriers (gown, gloves), don cap and mask, sterile drape over patient
chlorhexidine

## 2021-04-13 NOTE — PROCEDURE NOTE - NSINDICATIONS_GEN_A_CORE
critical patient
critical illness/emergency venous access/venous access
critical patient/monitoring purposes
feeds
critical illness/venous access
critical patient/respiratory distress/respiratory failure
critical patient/monitoring purposes
critical illness/emergency venous access/hypertonic/irritant infusion

## 2021-04-13 NOTE — PROGRESS NOTE ADULT - SUBJECTIVE AND OBJECTIVE BOX
INTERVAL HPI/OVERNIGHT EVENTS:   HPI:  64yo F w/ HTN (on losartan) and DM2 (on metformin) presents with progressively worsening SOB associated with productive cough for past several days much worse the past 3 days.  Started on abx (levaquin, prednisone and azithromycin 3/19) outpatient with no improvement. Denies any chest pain, fevers, dizziness, syncope, abd pain, back pain, N/V/D, recent sick contact, recent travel.     In ED: CTA chest done to r/o PE findings consistent w/ COVID-19 PNA. Labs remarkable for WBC 18.55, Na 130, K 3.2, COs 10, AG 29, Glucose 416, alb 2.5.  Placed on High flow for dyspnea and hypoxemia. Admitted to ICU for COVID pna and DKA.   (23 Mar 2021 05:08)    ICU course: 3/31 readmitted to ICU and intubated. Had fevers, leukocytosis, MRSE+ in one blood sample, received vancomycin (4/2 - 4/8); MRSE likely contaminant, as repeat cx were negative. Labile BP throughout ICU course. Was temporarily weaned off of paralytics and pressors but had to restart them.    4/13: Hypertensive to 252/102 overnight, levophed was held, ketamine was discontinued (started precedex). Levophed restarted early AM due to soft BP. Hyperthemic to 102.5 this AM, given acetaminophen and cooling blanket.    Central line in R IJ (placed 4/1)  Ponce: Y    PAST MEDICAL & SURGICAL HISTORY:  HTN (hypertension)  DM (diabetes mellitus)  HLD (hyperlipidemia)  No significant past surgical history    MEDICATIONS  (STANDING):  chlorhexidine 0.12% Liquid 15 milliLiter(s) Oral Mucosa every 12 hours  chlorhexidine 2% Cloths 1 Application(s) Topical <User Schedule>  cisatracurium Infusion 3 MICROgram(s)/kG/Min (13.9 mL/Hr) IV Continuous <Continuous>  dexMEDEtomidine Infusion 0.2 MICROgram(s)/kG/Hr (3.86 mL/Hr) IV Continuous <Continuous>  dextrose 40% Gel 15 Gram(s) Oral once  dextrose 5%. 1000 milliLiter(s) (50 mL/Hr) IV Continuous <Continuous>  dextrose 5%. 1000 milliLiter(s) (100 mL/Hr) IV Continuous <Continuous>  dextrose 50% Injectable 25 Gram(s) IV Push once  dextrose 50% Injectable 12.5 Gram(s) IV Push once  dextrose 50% Injectable 25 Gram(s) IV Push once  enoxaparin Injectable 40 milliGRAM(s) SubCutaneous two times a day  fentaNYL   Infusion. 0.501 MICROgram(s)/kG/Hr (3.86 mL/Hr) IV Continuous <Continuous>  glucagon  Injectable 1 milliGRAM(s) IntraMuscular once  insulin glargine Injectable (LANTUS) 30 Unit(s) SubCutaneous at bedtime  insulin lispro (ADMELOG) corrective regimen sliding scale   SubCutaneous every 6 hours  insulin regular  human recombinant 6 Unit(s) SubCutaneous every 6 hours  midodrine 5 milliGRAM(s) Oral every 8 hours  norepinephrine Infusion 0.05 MICROgram(s)/kG/Min (3.61 mL/Hr) IV Continuous <Continuous>  pantoprazole  Injectable 40 milliGRAM(s) IV Push daily  polyethylene glycol 3350 17 Gram(s) Oral daily  propofol Infusion 10.009 MICROgram(s)/kG/Min (4.63 mL/Hr) IV Continuous <Continuous>  senna Syrup 10 milliLiter(s) Oral at bedtime  sodium zirconium cyclosilicate 10 Gram(s) Oral every 8 hours  zinc oxide 40% Ointment 1 Application(s) Topical three times a day        OBJECTIVE  ICU Vital Signs Last 24 Hrs  T(C): 37.5 (13 Apr 2021 12:00), Max: 39.2 (13 Apr 2021 08:00)  T(F): 99.5 (13 Apr 2021 12:00), Max: 102.5 (13 Apr 2021 08:00)  HR: 68 (13 Apr 2021 12:30) (68 - 122)  ABP: 92/44 (13 Apr 2021 12:30) (81/39 - 252/102)  ABP(mean): 57 (13 Apr 2021 12:30) (48 - 159)  RR: 34 (13 Apr 2021 12:30) (18 - 34)  SpO2: 97% (13 Apr 2021 12:30) (90% - 100%)    I&O's Detail    12 Apr 2021 07:01  -  13 Apr 2021 07:00  --------------------------------------------------------  IN:    Cisatracurium: 530.2 mL    Dexmedetomidine: 67.2 mL    FentaNYL: 771 mL    Glucerna: 945 mL    Ketamine: 20.9 mL    Norepinephrine: 81.8 mL    Propofol: 526.3 mL    Vital1.0: 90 mL  Total IN: 3032.4 mL    OUT:    Indwelling Catheter - Urethral (mL): 1400 mL  Total OUT: 1400 mL    Total NET: 1632.4 mL      13 Apr 2021 07:01  -  13 Apr 2021 13:43  --------------------------------------------------------  IN:    Cisatracurium: 131.5 mL    Dexmedetomidine: 48 mL    Enteral Tube Flush: 30 mL    FentaNYL: 154 mL    Glucerna: 225 mL    IV PiggyBack: 100 mL    Norepinephrine: 12.6 mL    Propofol: 92.5 mL  Total IN: 793.6 mL    OUT:    Indwelling Catheter - Urethral (mL): 260 mL  Total OUT: 260 mL    Total NET: 533.6 mL        Vent settings  Mode: AC/ CMV (Assist Control/ Continuous Mandatory Ventilation)  RR (machine): 34  FiO2: 100%  PEEP: 10    CAPILLARY BLOOD GLUCOSE  POCT Blood Glucose.: 166 mg/dL (13 Apr 2021 11:49)  POCT Blood Glucose.: 260 mg/dL (13 Apr 2021 05:19)  POCT Blood Glucose.: 191 mg/dL (13 Apr 2021 04:21)  POCT Blood Glucose.: 187 mg/dL (13 Apr 2021 00:14)  POCT Blood Glucose.: 182 mg/dL (12 Apr 2021 17:11)        LABS             4/13                        7.5    8.10  )-----------( 250      ( 13 Apr 2021 02:50 )             26.2       04-13    134<L>  |  101  |  29<H>  ----------------------------<  185<H>  4.9   |  29  |  0.45<L>    Ca    8.3<L>      13 Apr 2021 08:47  Phos  3.2     04-13  Mg     2.6     04-13    TPro  5.9<L>  /  Alb  1.5<L>  /  TBili  0.6  /  DBili  x   /  AST  47<H>  /  ALT  109<H>  /  AlkPhos  232<H>  04-13                 ABG - ( 11 Apr 2021 22:52 )  pH, Arterial: x     pH, Blood: 7.27  /  pCO2: 64    /  pO2: 81    / HCO3: 28    / Base Excess: 1.0   /  SaO2: 94          Culture - Blood in AM (04.06.21 @ 12:15)    Specimen Source: .Blood Blood-Peripheral    Culture Results: No Growth Final  Culture - Blood in AM (04.06.21 @ 12:15)    Specimen Source: .Blood Blood    Culture Results: No Growth Final                     INTERVAL HPI/OVERNIGHT EVENTS:   HPI:  66yo F w/ HTN (on losartan) and DM2 (on metformin) presents with progressively worsening SOB associated with productive cough for past several days much worse the past 3 days.  Started on abx (levaquin, prednisone and azithromycin 3/19) outpatient with no improvement. Denies any chest pain, fevers, dizziness, syncope, abd pain, back pain, N/V/D, recent sick contact, recent travel.     In ED: CTA chest done to r/o PE findings consistent w/ COVID-19 PNA. Labs remarkable for WBC 18.55, Na 130, K 3.2, COs 10, AG 29, Glucose 416, alb 2.5.  Placed on High flow for dyspnea and hypoxemia. Admitted to ICU for COVID pna and DKA.   (23 Mar 2021 05:08)    ICU course: 3/31 readmitted to ICU and intubated. Had fevers, leukocytosis, MRSE+ in one blood sample, received vancomycin (4/2 - 4/8); MRSE likely contaminant, as repeat cx were negative. Labile BP throughout ICU course. Was temporarily weaned off of paralytics and pressors but had to restart them.    4/13: Hypertensive to 252/102 overnight, levophed was held, ketamine was discontinued (started precedex). Levophed restarted early AM due to soft BP. Hyperthemic to 102.5 this AM, given acetaminophen and cooling blanket.    Central line in R IJ (placed 4/1)  Ponce: Y    PAST MEDICAL & SURGICAL HISTORY:  HTN (hypertension)  DM (diabetes mellitus)  HLD (hyperlipidemia)  No significant past surgical history    MEDICATIONS  (STANDING):  chlorhexidine 0.12% Liquid 15 milliLiter(s) Oral Mucosa every 12 hours  chlorhexidine 2% Cloths 1 Application(s) Topical <User Schedule>  cisatracurium Infusion 3 MICROgram(s)/kG/Min (13.9 mL/Hr) IV Continuous <Continuous>  dexMEDEtomidine Infusion 0.2 MICROgram(s)/kG/Hr (3.86 mL/Hr) IV Continuous <Continuous>  dextrose 40% Gel 15 Gram(s) Oral once  dextrose 5%. 1000 milliLiter(s) (50 mL/Hr) IV Continuous <Continuous>  dextrose 5%. 1000 milliLiter(s) (100 mL/Hr) IV Continuous <Continuous>  dextrose 50% Injectable 25 Gram(s) IV Push once  dextrose 50% Injectable 12.5 Gram(s) IV Push once  dextrose 50% Injectable 25 Gram(s) IV Push once  enoxaparin Injectable 40 milliGRAM(s) SubCutaneous two times a day  fentaNYL   Infusion. 0.501 MICROgram(s)/kG/Hr (3.86 mL/Hr) IV Continuous <Continuous>  glucagon  Injectable 1 milliGRAM(s) IntraMuscular once  insulin glargine Injectable (LANTUS) 30 Unit(s) SubCutaneous at bedtime  insulin lispro (ADMELOG) corrective regimen sliding scale   SubCutaneous every 6 hours  insulin regular  human recombinant 6 Unit(s) SubCutaneous every 6 hours  midodrine 5 milliGRAM(s) Oral every 8 hours  norepinephrine Infusion 0.05 MICROgram(s)/kG/Min (3.61 mL/Hr) IV Continuous <Continuous>  pantoprazole  Injectable 40 milliGRAM(s) IV Push daily  polyethylene glycol 3350 17 Gram(s) Oral daily  propofol Infusion 10.009 MICROgram(s)/kG/Min (4.63 mL/Hr) IV Continuous <Continuous>  senna Syrup 10 milliLiter(s) Oral at bedtime  sodium zirconium cyclosilicate 10 Gram(s) Oral every 8 hours  zinc oxide 40% Ointment 1 Application(s) Topical three times a day        OBJECTIVE  ICU Vital Signs Last 24 Hrs  T(C): 37.5 (13 Apr 2021 12:00), Max: 39.2 (13 Apr 2021 08:00)  T(F): 99.5 (13 Apr 2021 12:00), Max: 102.5 (13 Apr 2021 08:00)  HR: 68 (13 Apr 2021 12:30) (68 - 122)  ABP: 92/44 (13 Apr 2021 12:30) (81/39 - 252/102)  ABP(mean): 57 (13 Apr 2021 12:30) (48 - 159)  RR: 34 (13 Apr 2021 12:30) (18 - 34)  SpO2: 97% (13 Apr 2021 12:30) (90% - 100%)    I&O's Detail    12 Apr 2021 07:01  -  13 Apr 2021 07:00  --------------------------------------------------------  IN:    Cisatracurium: 530.2 mL    Dexmedetomidine: 67.2 mL    FentaNYL: 771 mL    Glucerna: 945 mL    Ketamine: 20.9 mL    Norepinephrine: 81.8 mL    Propofol: 526.3 mL    Vital1.0: 90 mL  Total IN: 3032.4 mL    OUT:    Indwelling Catheter - Urethral (mL): 1400 mL  Total OUT: 1400 mL    Total NET: 1632.4 mL      13 Apr 2021 07:01  -  13 Apr 2021 13:43  --------------------------------------------------------  IN:    Cisatracurium: 131.5 mL    Dexmedetomidine: 48 mL    Enteral Tube Flush: 30 mL    FentaNYL: 154 mL    Glucerna: 225 mL    IV PiggyBack: 100 mL    Norepinephrine: 12.6 mL    Propofol: 92.5 mL  Total IN: 793.6 mL    OUT:    Indwelling Catheter - Urethral (mL): 260 mL  Total OUT: 260 mL    Total NET: 533.6 mL        Vent settings  Mode: AC/ CMV (Assist Control/ Continuous Mandatory Ventilation)  RR (machine): 34  FiO2: 100%  PEEP: 10    CAPILLARY BLOOD GLUCOSE  POCT Blood Glucose.: 166 mg/dL (13 Apr 2021 11:49)  POCT Blood Glucose.: 260 mg/dL (13 Apr 2021 05:19)  POCT Blood Glucose.: 191 mg/dL (13 Apr 2021 04:21)  POCT Blood Glucose.: 187 mg/dL (13 Apr 2021 00:14)  POCT Blood Glucose.: 182 mg/dL (12 Apr 2021 17:11)        LABS             4/13                        7.5    8.10  )-----------( 250      ( 13 Apr 2021 02:50 )             26.2       04-13    134<L>  |  101  |  29<H>  ----------------------------<  185<H>  4.9   |  29  |  0.45<L>    Ca    8.3<L>      13 Apr 2021 08:47  Phos  3.2     04-13  Mg     2.6     04-13    TPro  5.9<L>  /  Alb  1.5<L>  /  TBili  0.6  /  DBili  x   /  AST  47<H>  /  ALT  109<H>  /  AlkPhos  232<H>  04-13                  ABG - ( 11 Apr 2021 22:52 )  pH, Arterial: x     pH, Blood: 7.27  /  pCO2: 64    /  pO2: 81    / HCO3: 28    / Base Excess: 1.0   /  SaO2: 94          Culture - Blood in AM (04.06.21 @ 12:15)    Specimen Source: .Blood Blood-Peripheral    Culture Results: No Growth Final  Culture - Blood in AM (04.06.21 @ 12:15)    Specimen Source: .Blood Blood    Culture Results: No Growth Final

## 2021-04-13 NOTE — PROCEDURE NOTE - NSPROCDETAILS_GEN_ALL_CORE
guidewire recovered/lumen(s) aspirated and flushed/sterile dressing applied/sterile technique, catheter placed/ultrasound guidance with use of sterile gel and probe cove
guidewire recovered/lumen(s) aspirated and flushed/sterile dressing applied/sterile technique, catheter placed/ultrasound guidance with use of sterile gel and probe cove
location identified, draped/prepped, sterile technique used, needle inserted/introduced/positive blood return obtained via catheter/connected to a pressurized flush line/sutured in place/Seldinger technique/all materials/supplies accounted for at end of procedure
patient pre-oxygenated, tube inserted, placement confirmed
location identified, draped/prepped, sterile technique used, needle inserted/introduced/Seldinger technique
placement confirmed by auscultation/orogastric/bowel sounds present to 4 quadrants
location identified, draped/prepped, sterile technique used, needle inserted/introduced/positive blood return obtained via catheter/connected to a pressurized flush line/sutured in place/hemostasis with direct pressure, dressing applied/Seldinger technique/all materials/supplies accounted for at end of procedure
guidewire recovered/lumen(s) aspirated and flushed/sterile dressing applied/sterile technique, catheter placed/ultrasound guidance with use of sterile gel and probe cove

## 2021-04-13 NOTE — PROGRESS NOTE ADULT - ASSESSMENT
FINESSE JERNIGAN 65 F 1955 3/23/2021 DR YANN UNDERWOOD     REVIEW OF SYMPTOMS      Able to give (reliable) ROS  NO     PHYSICAL EXAM    HEENT Unremarkable  atraumatic   RESP Fair air entry EXP prolonged    Harsh breath sound Resp distres mild   CARDIAC S1 S2 No S3     NO JVD    ABDOMEN SOFT BS PRESENT NOT DISTENDED No hepatosplenomegaly   PEDAL EDEMA present No calf tenderness  NO rash       PATIENT SUMMARY  64 yo F with h/o HTN, HLD and DM2 who presented with progressively worsening SOB  Patient ruled in for  COVID-19 PNA.  ICU admitting dx : 1) Acute hypoxic resp failure 2 to Covid-19 PNA with improvement in oxygenation  2) s/p DKA.  Pt was admitted 3/23 transferred out of ICU 3/26  Pulm consulted 3/29/2021   Transferred to ICU 3/31   INTUBATED 3/31   NG TBE 3/31    PROBLEMS   COVID PNEUMONIA   AC HYPOXIC RESP FAILURE   INTUBATED 3/31   SHOCK    VITALS/IO/VENT/DRIPS.    4/13/2021 99f 80 160/90   4/13/2021 6p cisa 3.5 m/k/m  4/13/2021 7a dexm .5 m/k/h   4/13/2021 7a fent 4 m/k/h  4/13/2021 7a prop 40 m/k/m   4/13/2021 ac 34 100%     GLOBAL AND BEST PRACTICE ISSUES                     ALLERGY. carafate levaquin percocet      WEIGHT.      3/29/2021 77                           BMI.                 3/29/2021 29          ADVANCED DIRECTIVE.                               HEAD OF BED ELEVATION. Yes  DVT PROPHYLAXIS.   hpsc (3/23)   --> lvnx 40 93/30)                  MANUEL PROPHYLAXIS. PROTONIX 40 (3/23)   APA.                                                                    DYSPHAGIA RECOMM.   DIET.  CONS CARB (3/25) --> vital 1080 (4/5)      INFECTION PROPHYLAXIS.   chlorhexidine .12% (4/1/20210  chlorhexidine 2% (4/1/2021)     ASSESSMENT/RECOMMENDATIONS.    D-DIMR ELEVATED  Trending down   cta ch negv 3/23  is on dvt ppl  SHOCK   On vasopressors  Target MAP 65   MECHANICAL VENT   Target PO2 60 (+) PPlat 35 (-) pH 730 (+)   Remains on 100% ox    COVID PNEUMONIA AND HYPOXIC RESP FAILURE MANAGEMENT.   REMDESEVIR (3/23)   DEXA (3/23)   PRONE (3/26)   Monitor pulse oximetry Target PO 92-96%  Monitor inflammatory and thrombotic biomarkers  Monitor for  for progressively  worsening oxygenation failure   O2 to keep po 90-95%  Intubated 3/31  Cont supp care   On fent cisa prop nore     TIME SPENT   Over 25 minutes aggregate care time spent on encounter; activities included   direct patient care, counseling and/or coordinating care reviewing notes, lab data/ imaging , discussion with multidisciplinary team/ patient  /family and explaining in detail risks, benefits, alternatives  of the recommendations     FINESSE JERNIGAN 65 F 1955 3/23/2021 DR YANN UNDERWOOD

## 2021-04-13 NOTE — PROGRESS NOTE ADULT - PROBLEM SELECTOR PLAN 1
Continue with the same regimen while inpatient   stable finger sticks   prognosis is guarded as patient is clinically deteriorating

## 2021-04-13 NOTE — PROGRESS NOTE ADULT - SUBJECTIVE AND OBJECTIVE BOX
Patient is a 65y old  Female who presents with a chief complaint of covid pna and dka (13 Apr 2021 13:33)      Interval History: currently on 6 units of Regular Insulin Every six hours and 30 units of Lantus and sliding scale lispro every 6 hours   Nutrition same, clinical status same    MEDICATIONS  (STANDING):  chlorhexidine 0.12% Liquid 15 milliLiter(s) Oral Mucosa every 12 hours  chlorhexidine 2% Cloths 1 Application(s) Topical <User Schedule>  cisatracurium Infusion 3 MICROgram(s)/kG/Min (13.9 mL/Hr) IV Continuous <Continuous>  dexMEDEtomidine Infusion 0.2 MICROgram(s)/kG/Hr (3.86 mL/Hr) IV Continuous <Continuous>  dextrose 40% Gel 15 Gram(s) Oral once  dextrose 5%. 1000 milliLiter(s) (50 mL/Hr) IV Continuous <Continuous>  dextrose 5%. 1000 milliLiter(s) (100 mL/Hr) IV Continuous <Continuous>  dextrose 50% Injectable 25 Gram(s) IV Push once  dextrose 50% Injectable 12.5 Gram(s) IV Push once  dextrose 50% Injectable 25 Gram(s) IV Push once  enoxaparin Injectable 40 milliGRAM(s) SubCutaneous two times a day  fentaNYL   Infusion. 0.501 MICROgram(s)/kG/Hr (3.86 mL/Hr) IV Continuous <Continuous>  glucagon  Injectable 1 milliGRAM(s) IntraMuscular once  insulin glargine Injectable (LANTUS) 30 Unit(s) SubCutaneous at bedtime  insulin lispro (ADMELOG) corrective regimen sliding scale   SubCutaneous every 6 hours  insulin regular  human recombinant 6 Unit(s) SubCutaneous every 6 hours  midodrine 5 milliGRAM(s) Oral every 8 hours  norepinephrine Infusion 0.05 MICROgram(s)/kG/Min (3.61 mL/Hr) IV Continuous <Continuous>  pantoprazole  Injectable 40 milliGRAM(s) IV Push daily  polyethylene glycol 3350 17 Gram(s) Oral daily  propofol Infusion 10.009 MICROgram(s)/kG/Min (4.63 mL/Hr) IV Continuous <Continuous>  senna Syrup 10 milliLiter(s) Oral at bedtime  sodium zirconium cyclosilicate 10 Gram(s) Oral every 8 hours  zinc oxide 40% Ointment 1 Application(s) Topical three times a day    MEDICATIONS  (PRN):  sodium chloride 0.9% lock flush 10 milliLiter(s) IV Push every 1 hour PRN Pre/post blood products, medications, blood draw, and to maintain line patency      Allergies    Carafate (Rash)  Levaquin (Rash)  Percocet 5/325 (Other)    Intolerances    lactose (Flatulence)      REVIEW OF SYSTEMS:  CONSTITUTIONAL: no changes  Vital Signs Last 24 Hrs  T(C): 36.9 (13 Apr 2021 19:38), Max: 39.2 (13 Apr 2021 08:00)  T(F): 98.5 (13 Apr 2021 19:38), Max: 102.5 (13 Apr 2021 08:00)  HR: 75 (13 Apr 2021 22:00) (66 - 116)  BP: --  BP(mean): --  RR: 34 (13 Apr 2021 22:00) (19 - 34)  SpO2: 98% (13 Apr 2021 22:00) (86% - 100%)    PHYSICAL EXAM:  GENERAL: intubated sedated  HEAD: Atraumatic, Normocephalic      LABS:    PTT - ( 12 Apr 2021 04:42 )  PTT:36.6 sec    CAPILLARY BLOOD GLUCOSE      POCT Blood Glucose.: 121 mg/dL (13 Apr 2021 21:12)  POCT Blood Glucose.: 138 mg/dL (13 Apr 2021 17:31)  POCT Blood Glucose.: 166 mg/dL (13 Apr 2021 11:49)  POCT Blood Glucose.: 260 mg/dL (13 Apr 2021 05:19)  POCT Blood Glucose.: 191 mg/dL (13 Apr 2021 04:21)  POCT Blood Glucose.: 187 mg/dL (13 Apr 2021 00:14)    Lipid panel:       ABG - ( 11 Apr 2021 22:52 )  pH, Arterial: x     pH, Blood: 7.27  /  pCO2: 64    /  pO2: 81    / HCO3: 28    / Base Excess: 1.0   /  SaO2: 94                Mode: AC/ CMV (Assist Control/ Continuous Mandatory Ventilation)  RR (machine): 34  FiO2: 100  PEEP: 10  ITime: 0.7  MAP: 22  PC: 32  PIP: 42    Thyroid:  Diabetes Tests:  Parathyroid Panel:  Adrenals:  RADIOLOGY & ADDITIONAL TESTS:    Imaging Personally Reviewed:  [ ] YES  [ ] NO    Consultant(s) Notes Reviewed:  [ ] YES  [ ] NO    Care Discussed with Consultants/Other Providers [ ] YES  [ ] NO

## 2021-04-14 NOTE — GOALS OF CARE CONVERSATION - ADVANCED CARE PLANNING - CONVERSATION DETAILS
COVID PNA with acute hypoxic respiratory failure, ARDS, septic shock due to COVID    intubated    now with hypoxia, hypercarbia on mechanical ventilation    spoke with children regarding guarded prognosis and worsening lung disease despite mechanical vent support, vasopressor requirements    advanced directives readdressed with family and we discussed there is a possibility that pt may not survive if her hypoxia or hypercarbia worsens while being mechanically support    family aware that prognosis is poor overall    children insists that pt remains full code and are not amendable for DNR

## 2021-04-14 NOTE — PROGRESS NOTE ADULT - ASSESSMENT
64yo F w/ HTN (on losartan) and DM2 (on metformin) p/w COVID PNA and DKA, now in ARDS and intubated.    Respiratory + ID  - COVID pneumonia in ARDS, intubated 3/31  - increasingly hypoxic (SpO2 dropped to 85, ~90 this AM), increasingly hypercapnic (PCO2 81)  - Vent: 34/415/10/100%  - s/p ceftriaxone and azithromycin  - s/p remdesivir and dexamethasone  - s/p 8 days vancomycin; MRSE+ (likely contaminant) w/ f/u Blood cx negative; fevers resolved  - ID following  - exchanged central line 4/13    Neuro  - c/w Propofol, Fentanyl, Nimbex, Precedex    Cardio  - labile BPs, hypertensive to 238/89 overnight, held levo and given Versed  - restarted midodrine on 4/13 at 5mg q8 for baseline MAP control    Heme ppx  - c/w lovenox    GI ppx  - c/w protonix    Endo  - improved glycemic control today; POCT glucose  last 24 hr  - Endo following, recommended to continue w/ regimen    - Lantus 30 units qd, regular insulin 6 units q6h + Lispro ISS   66yo F w/ HTN (on losartan) and DM2 (on metformin) p/w COVID PNA and DKA, now in ARDS and intubated (day 15 of intubation). Labile BP and increasing ventilator requirements; prognosis is guarded.    Respiratory + ID  - COVID pneumonia in ARDS, intubated 3/31  - increasingly hypoxic (SpO2 dropped to 85, ~90 this AM), increasingly hypercapnic (PCO2 81)  - Vent: 34/415/12/100%    - increased PEEP to 12 today  - s/p ceftriaxone and azithromycin  - s/p remdesivir and dexamethasone  - s/p 8 days vancomycin; MRSE+ (likely contaminant) w/ f/u Blood cx negative; fevers resolved  - ID following  - exchanged central line 4/13    Neuro  - c/w Propofol, Fentanyl, Nimbex, Precedex    Cardio  - labile BPs, hypertensive to 238/89 overnight, held levophed and given Versed  - d/c levophed today  - restarted midodrine on 4/13 at 5mg q8 for baseline MAP control; hold if SBP>130    Heme ppx  - c/w lovenox    GI   - c/w protonix ppx  - c/w miralax, senna    Endo  - improved glycemic control today; POCT glucose  last 24 hr  - Endo following, recommended to continue w/ regimen    - Lantus 30 units qd, regular insulin 6 units q6h + Lispro ISS

## 2021-04-14 NOTE — PROGRESS NOTE ADULT - SUBJECTIVE AND OBJECTIVE BOX
INTERVAL HPI/OVERNIGHT EVENTS:   HPI:  66yo F w/ HTN (on losartan) and DM2 (on metformin) presents with progressively worsening SOB associated with productive cough for past several days much worse the past 3 days.  Started on abx (levaquin, prednisone and azithromycin 3/19) outpatient with no improvement. Denies any chest pain, fevers, dizziness, syncope, abd pain, back pain, N/V/D, recent sick contact, recent travel.   In ED: CTA chest done to r/o PE findings consistent w/ COVID-19 PNA. Labs remarkable for WBC 18.55, Na 130, K 3.2, COs 10, AG 29, Glucose 416, alb 2.5.  Placed on High flow for dyspnea and hypoxemia. Admitted to ICU for COVID pna and DKA.   (23 Mar 2021 05:08)    ICU course: 3/31 readmitted to ICU and intubated. Had fevers, leukocytosis, MRSE+ in one blood sample, received vancomycin (4/2 - 4/8); MRSE likely contaminant, as repeat cx were negative. Labile BP throughout ICU course. Was temporarily weaned off of paralytics and pressors but had to restart them.    4/14: Hypertensive in AM to 238/89 this AM s/p bed bath, levophed was held, increased propofol, given versed. O2 sat dropped intermittently down to 85%; already on , RR 34, , FiO2 100%, PEEP increased to 12(?).    Central line in R IJ (exchanged 4/13)  A line in L arm (exchanged 4/13)  Ponce: Y    PAST MEDICAL & SURGICAL HISTORY:  HTN (hypertension)  DM (diabetes mellitus)  HLD (hyperlipidemia)  No significant past surgical history    MEDICATIONS  (STANDING):  chlorhexidine 0.12% Liquid 15 milliLiter(s) Oral Mucosa every 12 hours  chlorhexidine 2% Cloths 1 Application(s) Topical <User Schedule>  cisatracurium Infusion 3 MICROgram(s)/kG/Min (13.9 mL/Hr) IV Continuous <Continuous>  dexMEDEtomidine Infusion 0.2 MICROgram(s)/kG/Hr (3.86 mL/Hr) IV Continuous <Continuous>  dextrose 40% Gel 15 Gram(s) Oral once  dextrose 5%. 1000 milliLiter(s) (50 mL/Hr) IV Continuous <Continuous>  dextrose 5%. 1000 milliLiter(s) (100 mL/Hr) IV Continuous <Continuous>  dextrose 50% Injectable 25 Gram(s) IV Push once  dextrose 50% Injectable 12.5 Gram(s) IV Push once  dextrose 50% Injectable 25 Gram(s) IV Push once  enoxaparin Injectable 40 milliGRAM(s) SubCutaneous two times a day  fentaNYL   Infusion. 0.501 MICROgram(s)/kG/Hr (3.86 mL/Hr) IV Continuous <Continuous>  glucagon  Injectable 1 milliGRAM(s) IntraMuscular once  insulin glargine Injectable (LANTUS) 30 Unit(s) SubCutaneous at bedtime  insulin lispro (ADMELOG) corrective regimen sliding scale   SubCutaneous every 6 hours  insulin regular  human recombinant 6 Unit(s) SubCutaneous every 6 hours  midodrine 5 milliGRAM(s) Oral every 8 hours  norepinephrine Infusion 0.05 MICROgram(s)/kG/Min (3.61 mL/Hr) IV Continuous <Continuous>  pantoprazole  Injectable 40 milliGRAM(s) IV Push daily  polyethylene glycol 3350 17 Gram(s) Oral daily  propofol Infusion 10.009 MICROgram(s)/kG/Min (4.63 mL/Hr) IV Continuous <Continuous>  senna Syrup 10 milliLiter(s) Oral at bedtime  sodium zirconium cyclosilicate 10 Gram(s) Oral every 8 hours  zinc oxide 40% Ointment 1 Application(s) Topical three times a day        OBJECTIVE  ICU Vital Signs Last 24 Hrs  T(C): 37.7 (14 Apr 2021 08:00), Max: 37.9 (13 Apr 2021 23:00)  T(F): 99.9 (14 Apr 2021 08:00), Max: 100.2 (13 Apr 2021 23:00)  HR: 126 (14 Apr 2021 09:00) (66 - 130)  ABP: 201/80 (14 Apr 2021 09:00) (67/32 - 238/89)  ABP(mean): 118 (14 Apr 2021 09:00) (42 - 146)  RR: 34 (14 Apr 2021 09:00) (19 - 34)  SpO2: 90% (14 Apr 2021 09:00) (85% - 99%)    I&O's Detail    13 Apr 2021 07:01  -  14 Apr 2021 07:00  --------------------------------------------------------  IN:    Cisatracurium: 492.2 mL    Dexmedetomidine: 230.4 mL    Enteral Tube Flush: 160 mL    FentaNYL: 739.2 mL    Glucerna: 1080 mL    IV PiggyBack: 100 mL    Norepinephrine: 71.7 mL    Propofol: 448.6 mL  Total IN: 3322.1 mL    OUT:    Indwelling Catheter - Urethral (mL): 1695 mL  Total OUT: 1695 mL    Total NET: 1627.1 mL      14 Apr 2021 07:01  -  14 Apr 2021 09:11  --------------------------------------------------------  IN:    Cisatracurium: 18.5 mL    Dexmedetomidine: 9.6 mL    FentaNYL: 30.8 mL    Propofol: 23.1 mL  Total IN: 82 mL    OUT:    Indwelling Catheter - Urethral (mL): 100 mL    Norepinephrine: 0 mL  Total OUT: 100 mL    Total NET: -18 mL      Vent settings  Mode: AC/ CMV (Assist Control/ Continuous Mandatory Ventilation)  RR (machine): 34  TV: 415  PEEP: 10  FiO2: 100%    CAPILLARY BLOOD GLUCOSE  POCT Blood Glucose.: 98 mg/dL (14 Apr 2021 05:02)  POCT Blood Glucose.: 123 mg/dL (13 Apr 2021 23:21)  POCT Blood Glucose.: 121 mg/dL (13 Apr 2021 21:12)  POCT Blood Glucose.: 138 mg/dL (13 Apr 2021 17:31)  POCT Blood Glucose.: 166 mg/dL (13 Apr 2021 11:49)        LABS    4/14                        7.5    7.08  )-----------( 247      ( 14 Apr 2021 03:30 )             26.1              04-14    136  |  103  |  25<H>  ----------------------------<  93  4.8   |  31  |  0.31<L>    Ca    8.3<L>      14 Apr 2021 03:30  Phos  3.5     04-14  Mg     2.5     04-14    TPro  5.6<L>  /  Alb  1.4<L>  /  TBili  0.6  /  DBili  x   /  AST  41<H>  /  ALT  88<H>  /  AlkPhos  225<H>  04-14        ABG - ( 14 Apr 2021 08:07 )  pH, Arterial: x     pH, Blood: 7.21  /  pCO2: 81    /  pO2: 58    / HCO3: 31    / Base Excess: 2.4   /  SaO2: 81                    Culture - Blood in AM (04.06.21 @ 12:15)    Specimen Source: .Blood Blood-Peripheral    Culture Results: No Growth Final  Culture - Blood in AM (04.06.21 @ 12:15)    Specimen Source: .Blood Blood    Culture Results: No Growth Final    Culture - Blood (04.01.21 @ 08:25)    Gram Stain:   Growth in anaerobic bottle: Gram Positive Cocci in Clusters  Growth in aerobic bottle: Gram Positive Cocci in Clusters    -  Staphylococcus epidermidis, Methicillin resistant: Detec    Specimen Source: .Blood Blood-Peripheral                       INTERVAL HPI/OVERNIGHT EVENTS:   HPI:  64yo F w/ HTN (on losartan) and DM2 (on metformin) presents with progressively worsening SOB associated with productive cough for past several days much worse the past 3 days.  Started on abx (levaquin, prednisone and azithromycin 3/19) outpatient with no improvement. Denies any chest pain, fevers, dizziness, syncope, abd pain, back pain, N/V/D, recent sick contact, recent travel.   In ED: CTA chest done to r/o PE findings consistent w/ COVID-19 PNA. Labs remarkable for WBC 18.55, Na 130, K 3.2, COs 10, AG 29, Glucose 416, alb 2.5.  Placed on High flow for dyspnea and hypoxemia. Admitted to ICU for COVID pna and DKA.   (23 Mar 2021 05:08)    ICU course: 3/31 readmitted to ICU and intubated. Had fevers, leukocytosis, MRSE+ in one blood sample, received vancomycin (4/2 - 4/8); MRSE likely contaminant, as repeat cx were negative. Labile BP throughout ICU course. Was temporarily weaned off of paralytics and pressors but had to restart them.    4/14: Hypertensive in AM to 238/89 this AM s/p bed bath, levophed was held, increased propofol, given versed. O2 sat dropped intermittently down to 85%; already on , RR 34, , FiO2 100%, PEEP increased to 12.    Central line in R IJ (exchanged 4/13)  A line in L arm (exchanged 4/13)  Ponce: Y    PAST MEDICAL & SURGICAL HISTORY:  HTN (hypertension)  DM (diabetes mellitus)  HLD (hyperlipidemia)  No significant past surgical history    MEDICATIONS  (STANDING):  chlorhexidine 0.12% Liquid 15 milliLiter(s) Oral Mucosa every 12 hours  chlorhexidine 2% Cloths 1 Application(s) Topical <User Schedule>  cisatracurium Infusion 3 MICROgram(s)/kG/Min (13.9 mL/Hr) IV Continuous <Continuous>  dexMEDEtomidine Infusion 0.2 MICROgram(s)/kG/Hr (3.86 mL/Hr) IV Continuous <Continuous>  dextrose 40% Gel 15 Gram(s) Oral once  dextrose 5%. 1000 milliLiter(s) (50 mL/Hr) IV Continuous <Continuous>  dextrose 5%. 1000 milliLiter(s) (100 mL/Hr) IV Continuous <Continuous>  dextrose 50% Injectable 25 Gram(s) IV Push once  dextrose 50% Injectable 12.5 Gram(s) IV Push once  dextrose 50% Injectable 25 Gram(s) IV Push once  enoxaparin Injectable 40 milliGRAM(s) SubCutaneous two times a day  fentaNYL   Infusion. 0.501 MICROgram(s)/kG/Hr (3.86 mL/Hr) IV Continuous <Continuous>  glucagon  Injectable 1 milliGRAM(s) IntraMuscular once  insulin glargine Injectable (LANTUS) 30 Unit(s) SubCutaneous at bedtime  insulin lispro (ADMELOG) corrective regimen sliding scale   SubCutaneous every 6 hours  insulin regular  human recombinant 6 Unit(s) SubCutaneous every 6 hours  midodrine 5 milliGRAM(s) Oral every 8 hours  norepinephrine Infusion 0.05 MICROgram(s)/kG/Min (3.61 mL/Hr) IV Continuous <Continuous>  pantoprazole  Injectable 40 milliGRAM(s) IV Push daily  polyethylene glycol 3350 17 Gram(s) Oral daily  propofol Infusion 10.009 MICROgram(s)/kG/Min (4.63 mL/Hr) IV Continuous <Continuous>  senna Syrup 10 milliLiter(s) Oral at bedtime  sodium zirconium cyclosilicate 10 Gram(s) Oral every 8 hours  zinc oxide 40% Ointment 1 Application(s) Topical three times a day        OBJECTIVE  ICU Vital Signs Last 24 Hrs  T(C): 37.7 (14 Apr 2021 08:00), Max: 37.9 (13 Apr 2021 23:00)  T(F): 99.9 (14 Apr 2021 08:00), Max: 100.2 (13 Apr 2021 23:00)  HR: 126 (14 Apr 2021 09:00) (66 - 130)  ABP: 201/80 (14 Apr 2021 09:00) (67/32 - 238/89)  ABP(mean): 118 (14 Apr 2021 09:00) (42 - 146)  RR: 34 (14 Apr 2021 09:00) (19 - 34)  SpO2: 90% (14 Apr 2021 09:00) (85% - 99%)    I&O's Detail    13 Apr 2021 07:01  -  14 Apr 2021 07:00  --------------------------------------------------------  IN:    Cisatracurium: 492.2 mL    Dexmedetomidine: 230.4 mL    Enteral Tube Flush: 160 mL    FentaNYL: 739.2 mL    Glucerna: 1080 mL    IV PiggyBack: 100 mL    Norepinephrine: 71.7 mL    Propofol: 448.6 mL  Total IN: 3322.1 mL    OUT:    Indwelling Catheter - Urethral (mL): 1695 mL  Total OUT: 1695 mL    Total NET: 1627.1 mL      14 Apr 2021 07:01  -  14 Apr 2021 09:11  --------------------------------------------------------  IN:    Cisatracurium: 18.5 mL    Dexmedetomidine: 9.6 mL    FentaNYL: 30.8 mL    Propofol: 23.1 mL  Total IN: 82 mL    OUT:    Indwelling Catheter - Urethral (mL): 100 mL    Norepinephrine: 0 mL  Total OUT: 100 mL    Total NET: -18 mL      Vent settings  Mode: AC/ CMV (Assist Control/ Continuous Mandatory Ventilation)  RR (machine): 34  TV: 415  PEEP: 12  FiO2: 100%    CAPILLARY BLOOD GLUCOSE  POCT Blood Glucose.: 98 mg/dL (14 Apr 2021 05:02)  POCT Blood Glucose.: 123 mg/dL (13 Apr 2021 23:21)  POCT Blood Glucose.: 121 mg/dL (13 Apr 2021 21:12)  POCT Blood Glucose.: 138 mg/dL (13 Apr 2021 17:31)  POCT Blood Glucose.: 166 mg/dL (13 Apr 2021 11:49)        LABS    4/14                        7.5    7.08  )-----------( 247      ( 14 Apr 2021 03:30 )             26.1              04-14    136  |  103  |  25<H>  ----------------------------<  93  4.8   |  31  |  0.31<L>    Ca    8.3<L>      14 Apr 2021 03:30  Phos  3.5     04-14  Mg     2.5     04-14    TPro  5.6<L>  /  Alb  1.4<L>  /  TBili  0.6  /  DBili  x   /  AST  41<H>  /  ALT  88<H>  /  AlkPhos  225<H>  04-14        ABG - ( 14 Apr 2021 08:07 )  pH, Arterial: x     pH, Blood: 7.21  /  pCO2: 81    /  pO2: 58    / HCO3: 31    / Base Excess: 2.4   /  SaO2: 81                    Culture - Blood in AM (04.06.21 @ 12:15)    Specimen Source: .Blood Blood-Peripheral    Culture Results: No Growth Final  Culture - Blood in AM (04.06.21 @ 12:15)    Specimen Source: .Blood Blood    Culture Results: No Growth Final    Culture - Blood (04.01.21 @ 08:25)    Gram Stain:   Growth in anaerobic bottle: Gram Positive Cocci in Clusters  Growth in aerobic bottle: Gram Positive Cocci in Clusters    -  Staphylococcus epidermidis, Methicillin resistant: Detec    Specimen Source: .Blood Blood-Peripheral

## 2021-04-14 NOTE — PROGRESS NOTE ADULT - SUBJECTIVE AND OBJECTIVE BOX
Patient is a 65y old  Female who presents with a chief complaint of covid pna and dka (14 Apr 2021 09:05)      Interval History: doing poorly   finger sticks are in high 100's   on 30 units Lantus and Regular Insulin 6 units Every six hours   Nutrition same       MEDICATIONS  (STANDING):  chlorhexidine 0.12% Liquid 15 milliLiter(s) Oral Mucosa two times a day  chlorhexidine 2% Cloths 1 Application(s) Topical <User Schedule>  cisatracurium Infusion 3 MICROgram(s)/kG/Min (13.9 mL/Hr) IV Continuous <Continuous>  dexMEDEtomidine Infusion 0.2 MICROgram(s)/kG/Hr (3.86 mL/Hr) IV Continuous <Continuous>  dextrose 40% Gel 15 Gram(s) Oral once  dextrose 5%. 1000 milliLiter(s) (50 mL/Hr) IV Continuous <Continuous>  dextrose 5%. 1000 milliLiter(s) (100 mL/Hr) IV Continuous <Continuous>  dextrose 50% Injectable 12.5 Gram(s) IV Push once  dextrose 50% Injectable 25 Gram(s) IV Push once  dextrose 50% Injectable 25 Gram(s) IV Push once  enoxaparin Injectable 40 milliGRAM(s) SubCutaneous two times a day  fentaNYL   Infusion. 0.501 MICROgram(s)/kG/Hr (3.86 mL/Hr) IV Continuous <Continuous>  glucagon  Injectable 1 milliGRAM(s) IntraMuscular once  insulin glargine Injectable (LANTUS) 30 Unit(s) SubCutaneous at bedtime  insulin lispro (ADMELOG) corrective regimen sliding scale   SubCutaneous every 6 hours  insulin regular  human recombinant 6 Unit(s) SubCutaneous every 6 hours  midodrine 5 milliGRAM(s) Oral every 8 hours  phenylephrine    Infusion 0.2 MICROgram(s)/kG/Min (2.89 mL/Hr) IV Continuous <Continuous>  polyethylene glycol 3350 17 Gram(s) Oral daily  propofol Infusion 10.009 MICROgram(s)/kG/Min (4.63 mL/Hr) IV Continuous <Continuous>  senna Syrup 10 milliLiter(s) Oral at bedtime  zinc oxide 40% Ointment 1 Application(s) Topical three times a day    MEDICATIONS  (PRN):  sodium chloride 0.9% lock flush 10 milliLiter(s) IV Push every 1 hour PRN Pre/post blood products, medications, blood draw, and to maintain line patency      Allergies    Carafate (Rash)  Levaquin (Rash)  Percocet 5/325 (Other)    Intolerances    lactose (Flatulence)      REVIEW OF SYSTEMS:  CONSTITUTIONAL: no changes  Vital Signs Last 24 Hrs  T(C): 38.9 (14 Apr 2021 23:00), Max: 39.3 (14 Apr 2021 20:04)  T(F): 102.1 (14 Apr 2021 23:00), Max: 102.7 (14 Apr 2021 20:04)  HR: 83 (14 Apr 2021 23:00) (68 - 130)  BP: --  BP(mean): --  RR: 36 (14 Apr 2021 23:00) (34 - 36)  SpO2: 95% (14 Apr 2021 23:00) (85% - 100%)    PHYSICAL EXAM:  GENERAL: as per the progress notes of Primary Team   LABS:        CAPILLARY BLOOD GLUCOSE      POCT Blood Glucose.: 145 mg/dL (14 Apr 2021 21:53)  POCT Blood Glucose.: 184 mg/dL (14 Apr 2021 17:05)  POCT Blood Glucose.: 188 mg/dL (14 Apr 2021 11:17)  POCT Blood Glucose.: 98 mg/dL (14 Apr 2021 05:02)  POCT Blood Glucose.: 123 mg/dL (13 Apr 2021 23:21)    Lipid panel:       ABG - ( 14 Apr 2021 22:12 )  pH, Arterial: x     pH, Blood: 7.38  /  pCO2: 50    /  pO2: 83    / HCO3: 29    / Base Excess: 3.7   /  SaO2: 96                Mode: AC/ CMV (Assist Control/ Continuous Mandatory Ventilation)  RR (machine): 36  TV (machine): 400  FiO2: 100  PEEP: 12  ITime: 0.6  MAP: 23  PC: 32  PIP: 51    Thyroid:  Diabetes Tests:  Parathyroid Panel:  Adrenals:  RADIOLOGY & ADDITIONAL TESTS:    Imaging Personally Reviewed:  [ ] YES  [ ] NO    Consultant(s) Notes Reviewed:  [ ] YES  [ ] NO    Care Discussed with Consultants/Other Providers [ ] YES  [ ] NO

## 2021-04-14 NOTE — PROGRESS NOTE ADULT - ASSESSMENT
FINESSE JERNIGAN 65 F 1955 3/23/2021 DR YANN UNDERWOOD     REVIEW OF SYMPTOMS      Able to give (reliable) ROS  NO     PHYSICAL EXAM    HEENT Unremarkable  atraumatic   RESP Fair air entry EXP prolonged    Harsh breath sound Resp distres mild   CARDIAC S1 S2 No S3     NO JVD    ABDOMEN SOFT BS PRESENT NOT DISTENDED No hepatosplenomegaly   PEDAL EDEMA present No calf tenderness  NO rash       PATIENT SUMMARY  64 yo F with h/o HTN, HLD and DM2 who presented with progressively worsening SOB  Patient ruled in for  COVID-19 PNA.  ICU admitting dx : 1) Acute hypoxic resp failure 2 to Covid-19 PNA with improvement in oxygenation  2) s/p DKA.  Pt was admitted 3/23 transferred out of ICU 3/26  Pulm consulted 3/29/2021   Transferred to ICU 3/31   INTUBATED 3/31   NG TBE 3/31    PROBLEMS   COVID PNEUMONIA   AC HYPOXIC RESP FAILURE   INTUBATED 3/31   SHOCK      PATIENT DATA   ABG.     4/14/2021 vcv ac 36 vt 400 fio2 100 peep 12 731/62/54    VITALS/IO/VENT/DRIPS.  4/14/2021 100f 112 140/70   4/14/2021 7 cisa 4 m/k/m   4/14/2021 4p dexm .8 m/k/h   4/14/2021 7a fent 4 m/k/h   4/14/2021 7a prop 50 m/k/m   4/14/2021 ac 36/100% /12       GLOBAL AND BEST PRACTICE ISSUES                     ALLERGY. carafate levaquin percocet      WEIGHT.      3/29/2021 77                           BMI.                 3/29/2021 29          ADVANCED DIRECTIVE.                               HEAD OF BED ELEVATION. Yes  DVT PROPHYLAXIS.   hpsc (3/23)   --> lvnx 40 93/30)                  MANUEL PROPHYLAXIS. PROTONIX 40 (3/23)   APA.                                                                    DYSPHAGIA RECOMM.   DIET.  CONS CARB (3/25) --> vital 1080 (4/5)      INFECTION PROPHYLAXIS.   chlorhexidine .12% (4/1/20210  chlorhexidine 2% (4/1/2021)     ASSESSMENT/RECOMMENDATIONS.    D-DIMR ELEVATED  Trending down   cta ch negv 3/23  is on dvt ppl  SHOCK   On vasopressors  Target MAP 65   MECHANICAL VENT   Target PO2 60 (+) PPlat 35 (-) pH 730 (+)   Remains on 100% ox      COVID PNEUMONIA AND HYPOXIC RESP FAILURE MANAGEMENT.   REMDESEVIR (3/23)   DEXA (3/23)   PRONE (3/26)   Monitor pulse oximetry Target PO 92-96%  Monitor inflammatory and thrombotic biomarkers  Monitor for  for progressively  worsening oxygenation failure   O2 to keep po 90-95%  Intubated 3/31  Cont supp care   On fent cisa prop nore     TIME SPENT   Over 25 minutes aggregate care time spent on encounter; activities included   direct patient care, counseling and/or coordinating care reviewing notes, lab data/ imaging , discussion with multidisciplinary team/ patient  /family and explaining in detail risks, benefits, alternatives  of the recommendations     FINESSE JERNIGAN 65 F 1955 3/23/2021 DR YANN UNDERWOOD

## 2021-04-14 NOTE — PHARMACOTHERAPY INTERVENTION NOTE - COMMENTS
Per policy, pantoprazole 40 mg q24h IVP changed to enteral route as patient is receiving other enteral medications/feeding.

## 2021-04-14 NOTE — PROGRESS NOTE ADULT - SUBJECTIVE AND OBJECTIVE BOX
RERE FINESSE    [facility] CCU 6    Patient is a 65y old  Female who presents with a chief complaint of covid pna and dka (13 Apr 2021 22:20)       Allergies    Carafate (Rash)  Levaquin (Rash)  Percocet 5/325 (Other)    Intolerances    lactose (Flatulence)      HPI:  66yo F w/ HTN (on losartan) and DM2 (on metformin) presents with progressively worsening SOB associated with productive cough for past several days much worse the past 3 days.  Started on abx (levaquin, prednisone and azithromycin 3/19) outpatient with no improvement. Denies any chest pain, fevers, dizziness, syncope, abd pain, back pain, N/V/D, recent sick contact, recent travel.     In ED: CTA chest done to r/o PE findings consistent w/ COVID-19 PNA. Labs remarkable for WBC 18.55, Na 130, K 3.2, COs 10, AG 29, Glucose 416, alb 2.5.  Placed on High flow for dyspnea and hypoxemia. Admitted to ICU for COVID pna and DKA.   (23 Mar 2021 05:08)      PAST MEDICAL & SURGICAL HISTORY:  HTN (hypertension)    DM (diabetes mellitus)    HLD (hyperlipidemia)    No significant past surgical history        FAMILY HISTORY:  No pertinent family history in first degree relatives          MEDICATIONS   chlorhexidine 0.12% Liquid 15 milliLiter(s) Oral Mucosa every 12 hours  chlorhexidine 2% Cloths 1 Application(s) Topical <User Schedule>  cisatracurium Infusion 3 MICROgram(s)/kG/Min IV Continuous <Continuous>  dexMEDEtomidine Infusion 0.2 MICROgram(s)/kG/Hr IV Continuous <Continuous>  dextrose 40% Gel 15 Gram(s) Oral once  dextrose 5%. 1000 milliLiter(s) IV Continuous <Continuous>  dextrose 5%. 1000 milliLiter(s) IV Continuous <Continuous>  dextrose 50% Injectable 25 Gram(s) IV Push once  dextrose 50% Injectable 12.5 Gram(s) IV Push once  dextrose 50% Injectable 25 Gram(s) IV Push once  enoxaparin Injectable 40 milliGRAM(s) SubCutaneous two times a day  fentaNYL   Infusion. 0.501 MICROgram(s)/kG/Hr IV Continuous <Continuous>  glucagon  Injectable 1 milliGRAM(s) IntraMuscular once  insulin glargine Injectable (LANTUS) 30 Unit(s) SubCutaneous at bedtime  insulin lispro (ADMELOG) corrective regimen sliding scale   SubCutaneous every 6 hours  insulin regular  human recombinant 6 Unit(s) SubCutaneous every 6 hours  midodrine 5 milliGRAM(s) Oral every 8 hours  norepinephrine Infusion 0.05 MICROgram(s)/kG/Min IV Continuous <Continuous>  pantoprazole  Injectable 40 milliGRAM(s) IV Push daily  polyethylene glycol 3350 17 Gram(s) Oral daily  propofol Infusion 10.009 MICROgram(s)/kG/Min IV Continuous <Continuous>  senna Syrup 10 milliLiter(s) Oral at bedtime  sodium chloride 0.9% lock flush 10 milliLiter(s) IV Push every 1 hour PRN  sodium zirconium cyclosilicate 10 Gram(s) Oral every 8 hours  zinc oxide 40% Ointment 1 Application(s) Topical three times a day      Vital Signs Last 24 Hrs  T(C): 37.8 (14 Apr 2021 06:00), Max: 39.2 (13 Apr 2021 08:00)  T(F): 100.1 (14 Apr 2021 06:00), Max: 102.5 (13 Apr 2021 08:00)  HR: 130 (14 Apr 2021 06:30) (66 - 130)  BP: --  BP(mean): --  RR: 34 (14 Apr 2021 06:30) (19 - 34)  SpO2: 93% (14 Apr 2021 06:30) (86% - 99%)      04-13-21 @ 07:01  -  04-14-21 @ 07:00  --------------------------------------------------------  IN: 3322.1 mL / OUT: 1695 mL / NET: 1627.1 mL        Mode: AC/ CMV (Assist Control/ Continuous Mandatory Ventilation), RR (machine): 34, FiO2: 10, PEEP: 10, ITime: 0.7, MAP: 21, PC: 32, PIP: 42    LABS:                        7.5    7.08  )-----------( 247      ( 14 Apr 2021 03:30 )             26.1     04-14    136  |  103  |  25<H>  ----------------------------<  93  4.8   |  31  |  0.31<L>    Ca    8.3<L>      14 Apr 2021 03:30  Phos  3.5     04-14  Mg     2.5     04-14    TPro  5.6<L>  /  Alb  1.4<L>  /  TBili  0.6  /  DBili  x   /  AST  41<H>  /  ALT  88<H>  /  AlkPhos  225<H>  04-14              WBC:  WBC Count: 7.08 K/uL (04-14 @ 03:30)  WBC Count: 8.10 K/uL (04-13 @ 02:50)  WBC Count: 10.60 K/uL (04-12 @ 11:43)  WBC Count: 10.51 K/uL (04-12 @ 04:42)  WBC Count: 11.05 K/uL (04-11 @ 03:10)      MICROBIOLOGY:  RECENT CULTURES:                  Sodium:  Sodium, Serum: 136 mmol/L (04-14 @ 03:30)  Sodium, Serum: 134 mmol/L (04-13 @ 08:47)  Sodium, Serum: 134 mmol/L (04-13 @ 02:50)  Sodium, Serum: 137 mmol/L (04-12 @ 11:43)  Sodium, Serum: 135 mmol/L (04-12 @ 04:42)  Sodium, Serum: 137 mmol/L (04-11 @ 11:51)  Sodium, Serum: 136 mmol/L (04-11 @ 03:10)      0.31 mg/dL 04-14 @ 03:30  0.45 mg/dL 04-13 @ 08:47  0.40 mg/dL 04-13 @ 02:50  0.66 mg/dL 04-12 @ 11:43  0.60 mg/dL 04-12 @ 04:42  0.52 mg/dL 04-11 @ 11:51  0.54 mg/dL 04-11 @ 03:10      Hemoglobin:  Hemoglobin: 7.5 g/dL (04-14 @ 03:30)  Hemoglobin: 7.5 g/dL (04-13 @ 02:50)  Hemoglobin: 8.0 g/dL (04-12 @ 11:43)  Hemoglobin: 7.4 g/dL (04-12 @ 04:42)  Hemoglobin: 8.0 g/dL (04-11 @ 03:10)      Platelets: Platelet Count - Automated: 247 K/uL (04-14 @ 03:30)  Platelet Count - Automated: 250 K/uL (04-13 @ 02:50)  Platelet Count - Automated: 279 K/uL (04-12 @ 11:43)  Platelet Count - Automated: 258 K/uL (04-12 @ 04:42)  Platelet Count - Automated: 251 K/uL (04-11 @ 03:10)      LIVER FUNCTIONS - ( 14 Apr 2021 03:30 )  Alb: 1.4 g/dL / Pro: 5.6 gm/dL / ALK PHOS: 225 U/L / ALT: 88 U/L / AST: 41 U/L / GGT: x                 RADIOLOGY & ADDITIONAL STUDIES:

## 2021-04-14 NOTE — PROGRESS NOTE ADULT - SUBJECTIVE AND OBJECTIVE BOX
RERE KILPATRICK  MRN-[id number]    Follow Up:  COVID, MRSE in blood culture     Interval History: seen and examined, FiO2 100%, febrile though normal WBC, lines changed to left neck and left hand, remains full code    ROS:    [ X] Unobtainable because: intubated/sedated   [ ] All other systems negative          Allergies  Carafate (Rash)  Levaquin (Rash)  Percocet 5/325 (Other)          MEDICATIONS  (STANDING):  cisatracurium Infusion 3 <Continuous>  dexMEDEtomidine Infusion 0.2 <Continuous>  dextrose 40% Gel 15 once  dextrose 50% Injectable 25 once  dextrose 50% Injectable 12.5 once  dextrose 50% Injectable 25 once  enoxaparin Injectable 40 two times a day  fentaNYL   Infusion. 0.501 <Continuous>  glucagon  Injectable 1 once  insulin glargine Injectable (LANTUS) 30 at bedtime  insulin lispro (ADMELOG) corrective regimen sliding scale  every 6 hours  insulin regular  human recombinant 6 every 6 hours  midodrine 5 every 8 hours  phenylephrine    Infusion 0.2 <Continuous>  polyethylene glycol 3350 17 daily  propofol Infusion 10.009 <Continuous>  senna Syrup 10 at bedtime      PRN      Physical Exam:  Vital Signs Last 24 Hrs  T(F): 102.1 (04-14-21 @ 23:00), Max: 102.7 (04-14-21 @ 20:04)  HR: 79 (04-14-21 @ 23:30)  BP: --  RR: 36 (04-14-21 @ 23:30)  SpO2: 97% (04-14-21 @ 23:30) (85% - 100%)  Wt(kg): --  ANTIMICROBIALS:          Constitutional: ill appearing, intubated and sedated   HEAD/EYES: anicteric, no conjunctival injection  ENT:   +ETT and OGT  Cardiovascular:   normal S1, S2, no murmur, trace edema b/l in LE   Respiratory:  ventilatory BS bilaterally, no wheezes, no rales  GI:  soft, distended,  normal bowel sounds, hypoactive bowel sounds   : + montiel  Musculoskeletal:  no synovitis  Neurologic: intubated and sedated   Skin:  no rash, no erythema, no phlebitis  Heme/Onc: no lymphadenopathy   Psychiatric:  unable  Lines: new left +sam c/d/i, new +left IJ c/d/i       WBC Count: 7.08 K/uL (04-14 @ 03:30)  WBC Count: 8.10 K/uL (04-13 @ 02:50)  WBC Count: 10.60 K/uL (04-12 @ 11:43)  WBC Count: 10.51 K/uL (04-12 @ 04:42)  WBC Count: 11.05 K/uL (04-11 @ 03:10)  WBC Count: 10.28 K/uL (04-10 @ 02:34)  WBC Count: 9.06 K/uL (04-09 @ 03:30)                            7.5    7.08  )-----------( 247      ( 14 Apr 2021 03:30 )             26.1       04-14    136  |  103  |  25<H>  ----------------------------<  93  4.8   |  31  |  0.31<L>    Ca    8.3<L>      14 Apr 2021 03:30  Phos  3.5     04-14  Mg     2.5     04-14    TPro  5.6<L>  /  Alb  1.4<L>  /  TBili  0.6  /  DBili  x   /  AST  41<H>  /  ALT  88<H>  /  AlkPhos  225<H>  04-14      Creatinine Trend: 0.31<--, 0.45<--, 0.40<--, 0.66<--, 0.60<--, 0.52<--        MICROBIOLOGY:    .Blood Blood  04-06-21   No growth to date.  --  --      .Blood Blood-Peripheral  04-01-21   Growth in aerobic and anaerobic bottles: Staphylococcus epidermidis  Coag Negative Staphylococcus  Single set isolate, possible contaminant. Contact  Microbiology if susceptibility testing clinically  indicated.  --  Blood Culture PCR      .Urine Clean Catch (Midstream)  03-24-21   No growth  --  --      .Blood Blood-Peripheral  03-23-21   No Growth Final  --  --      RADIOLOGY:  Xray Chest 1 View- PORTABLE-Urgent (Xray Chest 1 View- PORTABLE-Urgent .) (04.13.21 @ 16:06) >  IMPRESSION:  Partial clearing of the lungs. No pneumothorax.

## 2021-04-14 NOTE — PROGRESS NOTE ADULT - ASSESSMENT
64yo F w/ HTN (on losartan) and DM2 (on metformin) admitted with acute repiratory failure due to covid now in ARDS.   Imaging personally reviewed and shows extensive infiltrates   Blood culture from 4/1 (one set only was sent) grew FAN  She has had intermittent fevers the last several days   Repeat blood cultures have been sent and awaiting results   Her initial presentation was with DKA though her glucose levels are better controlled and no longer in DKA   Suffered shock liver likely from initial intubation/septic shock from covid though her AST/ALT are improving   Staph epi is often a contaminant. The ideal situation is to draw 2 sets of blood cultures from 2 separate sticks to ensure that if in fact a contaminant it would not grow in the additional set. In this case only one set was sent and she has a central line, a-line, ETT, NGT, montiel  and has been having intermittent fevers. Still suspect that this was a procurement contaminant but will await for the repeat blood cultures.   DDx regarding her fevers are quite broad: covid vs DVT/PE (dvt study negative), medication induced ( vanco on rare occasions causes fevers), subacute thyroiditis, bacterial pneumonia etc     4/8: on pressors and intubated, Fio2 100%, day 7 of vanco, repeat blood culture negative   4/9: off vanco, afebrile, FiO2 still 100%, on pressors, cultures negative  4/10: slowly reducing FiO2, afebrile, lfts getting better, WBC normal   4/11: FiO2 rising, able to be weaned off pressors, wbc steady, making very little progress unfortunately  4/12:  not doing well, off antibiotics, WBC remains near normal, remains in severe ARDS  4/14: FiO2 100%, pressors, febrile, normal WBC not making forward progress     Antibiotics this hospitalization:   Vanco 4/2->4/8  RDV 3/23-3/27  ceftriaxone 3/23  Azithro 3/23-3/24    Overall, 65F septic shock due to covid, shock liver, leukocytosis, fever, ARDS, anemia, +CONS     Suggest-  monitor off antibiotics   Send 2 sets of blood culture   send respiratory cultures  send UA  Send TSH and T4 to see if thyroiditis   Has new lines   trend WBC, LFTs   trend fever curve   pressors per ICU   Ventilator management per ICU   Good but not too tight glycemic control->endocrine recommendations appreciated   Remains critically ill with poor prognosis        Rosales Mccollum DO  Infectious Disease Attending  Pager [phone]   After 5pm/weekends please call [phone] for all inquiries and new consults

## 2021-04-15 NOTE — PROGRESS NOTE ADULT - PROBLEM SELECTOR PLAN 1
Continue with the same regimen while inpatient   currently blood sugar is well controlled but patient is otherwise doing very poorly.  Supportive care

## 2021-04-15 NOTE — PROGRESS NOTE ADULT - SUBJECTIVE AND OBJECTIVE BOX
RERE KILPATRICK  MRN-[id number]    Follow Up:  COVID, MRSE in blood culture     Interval History: seen and examined, FiO2 100%, febrile, lines exchanged to left neck and left hand couple days ago, blood gases worse today,  remains full code    ROS:    [ X] Unobtainable because: intubated/sedated   [ ] All other systems negative    Allergies  Carafate (Rash)  Levaquin (Rash)  Percocet 5/325 (Other)          MEDICATIONS  (STANDING):  cisatracurium Infusion 3 <Continuous>  dexMEDEtomidine Infusion 0.2 <Continuous>  dextrose 40% Gel 15 once  dextrose 50% Injectable 25 once  dextrose 50% Injectable 12.5 once  dextrose 50% Injectable 25 once  enoxaparin Injectable 40 two times a day  fentaNYL   Infusion. 0.501 <Continuous>  glucagon  Injectable 1 once  insulin glargine Injectable (LANTUS) 30 at bedtime  insulin lispro (ADMELOG) corrective regimen sliding scale  every 6 hours  insulin regular  human recombinant 6 every 6 hours  midodrine 5 every 8 hours  pantoprazole   Suspension 40 daily  phenylephrine    Infusion 0.2 <Continuous>  polyethylene glycol 3350 17 daily  propofol Infusion 10.009 <Continuous>  senna Syrup 10 at bedtime      PRN      Physical Exam:  Vital Signs Last 24 Hrs  T(F): 101.3 (04-15-21 @ 20:00), Max: 102.6 (04-15-21 @ 06:00)  HR: 82 (04-15-21 @ 20:30)  BP: --  RR: 30 (04-15-21 @ 20:30)  SpO2: 91% (04-15-21 @ 20:30) (84% - 97%)  Wt(kg): --        Constitutional: ill appearing, intubated and sedated   HEAD/EYES: anicteric, no conjunctival injection  ENT:   +ETT and OGT  Cardiovascular:   normal S1, S2, no murmur, trace edema b/l in LE   Respiratory:  ventilatory BS bilaterally, no wheezes, no rales  GI:  soft, distended,  normal bowel sounds, +bowel sounds   : + montiel  Musculoskeletal:  no synovitis  Neurologic: intubated and sedated   Skin:  no rash, no erythema, no phlebitis  Heme/Onc: no lymphadenopathy   Psychiatric:  unable  Lines: new left +sam c/d/i, new +left IJ c/d/i       WBC Count: 7.57 K/uL (04-15 @ 03:32)  WBC Count: 7.08 K/uL (04-14 @ 03:30)  WBC Count: 8.10 K/uL (04-13 @ 02:50)  WBC Count: 10.60 K/uL (04-12 @ 11:43)  WBC Count: 10.51 K/uL (04-12 @ 04:42)  WBC Count: 11.05 K/uL (04-11 @ 03:10)  WBC Count: 10.28 K/uL (04-10 @ 02:34)                            7.6    7.57  )-----------( 283      ( 15 Apr 2021 03:32 )             26.4       04-15    135  |  101  |  26<H>  ----------------------------<  126<H>  5.1   |  28  |  0.35<L>    Ca    8.2<L>      15 Apr 2021 03:32  Phos  3.3     04-15  Mg     2.7     04-15    TPro  5.7<L>  /  Alb  1.4<L>  /  TBili  0.6  /  DBili  x   /  AST  65<H>  /  ALT  91<H>  /  AlkPhos  272<H>  04-15      Creatinine Trend: 0.35<--, 0.31<--, 0.45<--, 0.40<--, 0.66<--, 0.60<--          MICROBIOLOGY:      .Blood Blood  04-06-21   No growth to date.  --  --      .Blood Blood-Peripheral  04-01-21   Growth in aerobic and anaerobic bottles: Staphylococcus epidermidis  Coag Negative Staphylococcus  Single set isolate, possible contaminant. Contact  Microbiology if susceptibility testing clinically  indicated.  --  Blood Culture PCR      .Urine Clean Catch (Midstream)  03-24-21   No growth  --  --      .Blood Blood-Peripheral  03-23-21   No Growth Final  --  --      RADIOLOGY:  Xray Chest 1 View- PORTABLE-Urgent (Xray Chest 1 View- PORTABLE-Urgent .) (04.13.21 @ 16:06) >  IMPRESSION:  Partial clearing of the lungs. No pneumothorax.

## 2021-04-15 NOTE — PROGRESS NOTE ADULT - SUBJECTIVE AND OBJECTIVE BOX
Patient is a 65y old  Female who presents with a chief complaint of covid pna and dka (15 Apr 2021 21:21)      Interval History: continued on Glucerna as do streets and 30 UNITS OF LANTUS AS WELL AS 6 UNITS OF REGULAR INSULIN AFTER 6 HOURS.  FINGERSTICKS IN THE MID 100S  Mental status remains same    MEDICATIONS  (STANDING):  chlorhexidine 0.12% Liquid 15 milliLiter(s) Oral Mucosa two times a day  chlorhexidine 2% Cloths 1 Application(s) Topical <User Schedule>  cisatracurium Infusion 3 MICROgram(s)/kG/Min (13.9 mL/Hr) IV Continuous <Continuous>  dexMEDEtomidine Infusion 0.2 MICROgram(s)/kG/Hr (3.86 mL/Hr) IV Continuous <Continuous>  dextrose 40% Gel 15 Gram(s) Oral once  dextrose 5%. 1000 milliLiter(s) (50 mL/Hr) IV Continuous <Continuous>  dextrose 5%. 1000 milliLiter(s) (100 mL/Hr) IV Continuous <Continuous>  dextrose 50% Injectable 25 Gram(s) IV Push once  dextrose 50% Injectable 12.5 Gram(s) IV Push once  dextrose 50% Injectable 25 Gram(s) IV Push once  enoxaparin Injectable 40 milliGRAM(s) SubCutaneous two times a day  fentaNYL   Infusion. 0.501 MICROgram(s)/kG/Hr (3.86 mL/Hr) IV Continuous <Continuous>  glucagon  Injectable 1 milliGRAM(s) IntraMuscular once  insulin glargine Injectable (LANTUS) 30 Unit(s) SubCutaneous at bedtime  insulin lispro (ADMELOG) corrective regimen sliding scale   SubCutaneous every 6 hours  insulin regular  human recombinant 6 Unit(s) SubCutaneous every 6 hours  midodrine 5 milliGRAM(s) Oral every 8 hours  pantoprazole   Suspension 40 milliGRAM(s) Enteral Tube daily  phenylephrine    Infusion 0.2 MICROgram(s)/kG/Min (2.89 mL/Hr) IV Continuous <Continuous>  polyethylene glycol 3350 17 Gram(s) Oral daily  propofol Infusion 10.009 MICROgram(s)/kG/Min (4.63 mL/Hr) IV Continuous <Continuous>  senna Syrup 10 milliLiter(s) Oral at bedtime  zinc oxide 40% Ointment 1 Application(s) Topical three times a day    MEDICATIONS  (PRN):  sodium chloride 0.9% lock flush 10 milliLiter(s) IV Push every 1 hour PRN Pre/post blood products, medications, blood draw, and to maintain line patency      Allergies    Carafate (Rash)  Levaquin (Rash)  Percocet 5/325 (Other)    Intolerances    lactose (Flatulence)      REVIEW OF SYSTEMS:    Vital Signs Last 24 Hrs  T(C): 38.3 (15 Apr 2021 23:05), Max: 39.2 (15 Apr 2021 06:00)  T(F): 100.9 (15 Apr 2021 23:05), Max: 102.6 (15 Apr 2021 06:00)  HR: 98 (15 Apr 2021 23:30) (75 - 118)  BP: --  BP(mean): --  RR: 32 (15 Apr 2021 23:30) (21 - 37)  SpO2: 89% (15 Apr 2021 23:30) (84% - 96%)    PHYSICAL EXAM:  GENERAL: no changes, intubated and poorly responsive  HEAD: Atraumatic, Normocephalic      LABS:        CAPILLARY BLOOD GLUCOSE      POCT Blood Glucose.: 166 mg/dL (15 Apr 2021 23:11)  POCT Blood Glucose.: 158 mg/dL (15 Apr 2021 20:58)  POCT Blood Glucose.: 196 mg/dL (15 Apr 2021 17:25)  POCT Blood Glucose.: 250 mg/dL (15 Apr 2021 12:16)  POCT Blood Glucose.: 129 mg/dL (15 Apr 2021 05:20)    Lipid panel:       ABG - ( 14 Apr 2021 22:12 )  pH, Arterial: x     pH, Blood: 7.38  /  pCO2: 50    /  pO2: 83    / HCO3: 29    / Base Excess: 3.7   /  SaO2: 96                Mode: AC/ CMV (Assist Control/ Continuous Mandatory Ventilation)  RR (machine): 36  TV (machine): 400  FiO2: 100  PEEP: 10  ITime: 0.6  MAP: 22  PIP: 52    Thyroid:  Diabetes Tests:  Parathyroid Panel:  Adrenals:  RADIOLOGY & ADDITIONAL TESTS:    Imaging Personally Reviewed:  [ ] YES  [ ] NO    Consultant(s) Notes Reviewed:  [ ] YES  [ ] NO    Care Discussed with Consultants/Other Providers [ ] YES  [ ] NO

## 2021-04-15 NOTE — PROGRESS NOTE ADULT - ASSESSMENT
64yo F w/ HTN (on losartan) and DM2 (on metformin) admitted with acute repiratory failure due to covid now in ARDS.   Imaging personally reviewed and shows extensive infiltrates   Blood culture from 4/1 (one set only was sent) grew FAN  She has had intermittent fevers the last several days   Repeat blood cultures have been sent and awaiting results   Her initial presentation was with DKA though her glucose levels are better controlled and no longer in DKA   Suffered shock liver likely from initial intubation/septic shock from covid though her AST/ALT are improving   Staph epi is often a contaminant. The ideal situation is to draw 2 sets of blood cultures from 2 separate sticks to ensure that if in fact a contaminant it would not grow in the additional set. In this case only one set was sent and she has a central line, a-line, ETT, NGT, montiel  and has been having intermittent fevers. Still suspect that this was a procurement contaminant but will await for the repeat blood cultures.   DDx regarding her fevers are quite broad: covid vs DVT/PE (dvt study negative), medication induced ( vanco on rare occasions causes fevers), subacute thyroiditis, bacterial pneumonia etc     4/8: on pressors and intubated, Fio2 100%, day 7 of vanco, repeat blood culture negative   4/9: off vanco, afebrile, FiO2 still 100%, on pressors, cultures negative  4/10: slowly reducing FiO2, afebrile, lfts getting better, WBC normal   4/11: FiO2 rising, able to be weaned off pressors, wbc steady, making very little progress unfortunately  4/12:  not doing well, off antibiotics, WBC remains near normal, remains in severe ARDS  4/14: FiO2 100%, pressors, febrile, normal WBC not making forward progress  4/15: continues to remain febrile with a normal WBC, at this point it would be advisable to obtain blood cultures, respiratory cultures and urine cultures (despite there being central lines and montiel catheter) to ensure no bacteremia. Ideally would benefit from imaging but to ill and unstable to be moved out of the CCU. Please send MRSA PCR and start vancomycin and meropenem after cultures are obtain       Antibiotics this hospitalization:   Vanco 4/2->4/8  RDV 3/23-3/27  ceftriaxone 3/23  Azithro 3/23-3/24    Overall, 65F septic shock due to covid, shock liver, leukocytosis, fever, ARDS, anemia, +CONS     Suggest-  Send 2 sets of blood culture please  send respiratory cultures please  send UA please  Send TSH and T4 to see if thyroiditis   After cultures are sent, start Vancomycin and meropenem and monitor vanco troughs   Has new lines   trend WBC, LFTs   trend fever curve   pressors per ICU   Ventilator management per ICU   Good but not too tight glycemic control->endocrine recommendations appreciated   Remains critically ill with poor prognosis      Rosales Mccollum DO  Infectious Disease Attending  Pager [phone]   After 5pm/weekends please call [phone] for all inquiries and new consults

## 2021-04-15 NOTE — PROGRESS NOTE ADULT - ASSESSMENT
64yo F w/ HTN (on losartan) and DM2 (on metformin) p/w COVID PNA and DKA, now in ARDS and intubated (day 15 of intubation). Labile BP and increasing ventilator requirements; prognosis is guarded.    Respiratory + ID  - COVID pneumonia in ARDS, intubated 3/31  - increasingly hypoxic (SpO2 85-90 this AM), increasingly hypercarbic (PCO2 50-90)  - Vent: 34/415/12/100%    - increased PEEP to 12 today  - s/p ceftriaxone and azithromycin  - s/p remdesivir and dexamethasone  - s/p 8 days vancomycin; MRSE+ (likely contaminant) w/ f/u Blood cx negative; fevers resolved  - ID following  - exchanged central line 4/13    Neuro  - c/w Propofol, Fentanyl, Nimbex, Precedex    Cardio  - labile BPs, titrating phenylephrine to maintain MAP; levophed d/c'd  - restarted midodrine on 4/13 at 5mg q8 for baseline MAP control; hold if SBP>130    Heme ppx  - c/w lovenox    GI   - c/w protonix ppx  - c/w miralax, senna    Endo  - improved glycemic control; POCT glucose 129-184 last 24 hr  - Endo following, recommended to continue w/ regimen    - Lantus 30 units qd, regular insulin 6 units q6h + Lispro ISS    Goals of Care  - Dr. Nogueira discussed w/ family 4/14, family wishes patient to remain full code   66yo F w/ HTN (on losartan) and DM2 (on metformin) p/w COVID PNA and DKA, now in ARDS and intubated (day 15 of intubation). Labile BP and increasing ventilator requirements; prognosis is guarded.    Respiratory + ID  - COVID pneumonia in ARDS, intubated 3/31  - increasingly hypoxic (SpO2 85-90 this AM), increasingly hypercarbic (PCO2 50-90)  - Vent: 34/415/12/100%    - increased PEEP to 12 today  - s/p ceftriaxone and azithromycin  - s/p remdesivir and dexamethasone  - s/p 8 days vancomycin; MRSE+ (likely contaminant) w/ f/u Blood cx negative; fevers resolved  - ID following  - exchanged central line 4/13    Neuro  - c/w Propofol, Fentanyl, Nimbex, Precedex    Cardio  - labile BPs, titrating phenylephrine to maintain MAP; levophed d/c'd  - restarted midodrine on 4/13 at 5mg q8 for baseline MAP control; hold if SBP>130    Heme ppx  - c/w lovenox    GI   - c/w protonix ppx  - c/w miralax, senna    Endo  - improved glycemic control; POCT glucose 129-184 last 24 hr  - Endo following, recommended to continue w/ regimen    - Lantus 30 units qd, regular insulin 6 units q6h + Lispro ISS    Goals of Care  - Dr. Nogueira discussed w/ family 4/14; family wishes patient to remain full code

## 2021-04-15 NOTE — PROGRESS NOTE ADULT - ASSESSMENT
FINESSE JERNIGAN 65 F 1955 3/23/2021 DR YANN UNDERWOOD     REVIEW OF SYMPTOMS      Able to give (reliable) ROS  NO     PHYSICAL EXAM    HEENT Unremarkable  atraumatic   RESP Fair air entry EXP prolonged    Harsh breath sound Resp distres mild   CARDIAC S1 S2 No S3     NO JVD    ABDOMEN SOFT BS PRESENT NOT DISTENDED No hepatosplenomegaly   PEDAL EDEMA present No calf tenderness  NO rash       PATIENT SUMMARY  64 yo F with h/o HTN, HLD and DM2 who presented with progressively worsening SOB  Patient ruled in for  COVID-19 PNA.  ICU admitting dx : 1) Acute hypoxic resp failure 2 to Covid-19 PNA with improvement in oxygenation  2) s/p DKA.  Pt was admitted 3/23 transferred out of ICU 3/26  Pulm consulted 3/29/2021   Transferred to ICU 3/31   INTUBATED 3/31   NG TBE 3/31    PROBLEMS   COVID PNEUMONIA   AC HYPOXIC RESP FAILURE   INTUBATED 3/31   SHOCK      PATIENT DATA   ABG.     4/14/2021 vcv ac 36 vt 400 fio2 100 peep 12 731/62/54       VITALS/IO/VENT/DRIPS.  4/15/2021 100f 78 100/50   4/15/2021 9a cisa 6 m/k/m  4/15/2021 8a dexm .8 m/k/h   4/15/2021 8a fent 4 m/k/h   4/15/2021 8a prop 50 m/k/m   4/15/2021 ac 400/36/12/100%      GLOBAL AND BEST PRACTICE ISSUES                     ALLERGY. carafate levaquin percocet      WEIGHT.      3/29/2021 77                           BMI.                 3/29/2021 29          ADVANCED DIRECTIVE.                               HEAD OF BED ELEVATION. Yes  DVT PROPHYLAXIS.   hpsc (3/23)   --> lvnx 40 93/30)                  MANUEL PROPHYLAXIS. PROTONIX 40 (3/23)   APA.                                                                    DYSPHAGIA RECOMM.   DIET.  CONS CARB (3/25) --> vital 1080 (4/5)      INFECTION PROPHYLAXIS.   chlorhexidine .12% (4/1/20210  chlorhexidine 2% (4/1/2021)     ASSESSMENT/RECOMMENDATIONS.    D-DIMR ELEVATED  Trending down   cta ch negv 3/23  is on dvt ppl  SHOCK   On vasopressors  Target MAP 65   MECHANICAL VENT   Target PO2 60 (+) PPlat 35 (-) pH 730 (+)   Remains on 100% ox  NEMIA   4/15/2021 Hb 7.6  target hb 7 (+)         TIME SPENT   Over 25 minutes aggregate care time spent on encounter; activities included   direct patient care, counseling and/or coordinating care reviewing notes, lab data/ imaging , discussion with multidisciplinary team/ patient  /family and explaining in detail risks, benefits, alternatives  of the recommendations     FINESSE JERNIGAN 65 F 1955 3/23/2021 DR YANN UNDERWOOD

## 2021-04-15 NOTE — PROGRESS NOTE ADULT - SUBJECTIVE AND OBJECTIVE BOX
INTERVAL HPI/OVERNIGHT EVENTS:   HPI:  64yo F w/ HTN (on losartan) and DM2 (on metformin) presents with progressively worsening SOB associated with productive cough for past several days much worse the past 3 days.  Started on abx (levaquin, prednisone and azithromycin 3/19) outpatient with no improvement. Denies any chest pain, fevers, dizziness, syncope, abd pain, back pain, N/V/D, recent sick contact, recent travel.   In ED: CTA chest done to r/o PE findings consistent w/ COVID-19 PNA. Labs remarkable for WBC 18.55, Na 130, K 3.2, COs 10, AG 29, Glucose 416, alb 2.5.  Placed on High flow for dyspnea and hypoxemia. Admitted to ICU for COVID pna and DKA.   (23 Mar 2021 05:08)    ICU course: 3/31 readmitted to ICU and intubated. Had fevers, leukocytosis, MRSE+ in one blood sample, received vancomycin (4/2 - 4/8); MRSE likely contaminant, as repeat cx were negative. Labile BP throughout ICU course. Was temporarily weaned off of paralytics and pressors but had to restart them.    4/14: Labile BPs in past 24h (73/40 - 189/78). O2 sat drops to 85%; already on RR 34, , FiO2 100%, PEEP increased to 12.    Central line in R IJ (exchanged 4/13)  A line in L arm (exchanged 4/13)  Ponce: Y    PAST MEDICAL & SURGICAL HISTORY:  HTN (hypertension)  DM (diabetes mellitus)  HLD (hyperlipidemia)  No significant past surgical history      MEDICATIONS  (STANDING):  chlorhexidine 0.12% Liquid 15 milliLiter(s) Oral Mucosa two times a day  chlorhexidine 2% Cloths 1 Application(s) Topical <User Schedule>  cisatracurium Infusion 3 MICROgram(s)/kG/Min (13.9 mL/Hr) IV Continuous <Continuous>  dexMEDEtomidine Infusion 0.2 MICROgram(s)/kG/Hr (3.86 mL/Hr) IV Continuous <Continuous>  dextrose 40% Gel 15 Gram(s) Oral once  dextrose 5%. 1000 milliLiter(s) (50 mL/Hr) IV Continuous <Continuous>  dextrose 5%. 1000 milliLiter(s) (100 mL/Hr) IV Continuous <Continuous>  dextrose 50% Injectable 12.5 Gram(s) IV Push once  dextrose 50% Injectable 25 Gram(s) IV Push once  dextrose 50% Injectable 25 Gram(s) IV Push once  enoxaparin Injectable 40 milliGRAM(s) SubCutaneous two times a day  fentaNYL   Infusion. 0.501 MICROgram(s)/kG/Hr (3.86 mL/Hr) IV Continuous <Continuous>  glucagon  Injectable 1 milliGRAM(s) IntraMuscular once  insulin glargine Injectable (LANTUS) 30 Unit(s) SubCutaneous at bedtime  insulin lispro (ADMELOG) corrective regimen sliding scale   SubCutaneous every 6 hours  insulin regular  human recombinant 6 Unit(s) SubCutaneous every 6 hours  midodrine 5 milliGRAM(s) Oral every 8 hours  pantoprazole   Suspension 40 milliGRAM(s) Enteral Tube daily  phenylephrine    Infusion 0.2 MICROgram(s)/kG/Min (2.89 mL/Hr) IV Continuous <Continuous>  polyethylene glycol 3350 17 Gram(s) Oral daily  propofol Infusion 10.009 MICROgram(s)/kG/Min (4.63 mL/Hr) IV Continuous <Continuous>  senna Syrup 10 milliLiter(s) Oral at bedtime  zinc oxide 40% Ointment 1 Application(s) Topical three times a day        OBJECTIVE  ICU Vital Signs Last 24 Hrs  T(C): 38.7 (15 Apr 2021 08:09), Max: 39.3 (14 Apr 2021 20:04)  T(F): 101.6 (15 Apr 2021 08:09), Max: 102.7 (14 Apr 2021 20:04)  HR: 112 (15 Apr 2021 08:30) (75 - 126)  ABP: 188/72 (15 Apr 2021 08:30) (73/40 - 190/72)  ABP(mean): 108 (15 Apr 2021 08:30) (48 - 111)  RR: 36 (15 Apr 2021 08:30) (34 - 37)  SpO2: 85% (15 Apr 2021 08:30) (85% - 100%)    I&O's Detail  14 Apr 2021 07:01  -  15 Apr 2021 07:00  --------------------------------------------------------  IN:    Cisatracurium: 430.1 mL    Dexmedetomidine: 302 mL    Enteral Tube Flush: 200 mL    FentaNYL: 708.4 mL    Glucerna: 1035 mL    Phenylephrine: 163 mL    Propofol: 531.3 mL  Total IN: 3369.8 mL    OUT:    Indwelling Catheter - Urethral (mL): 1070 mL    Norepinephrine: 0 mL  Total OUT: 1070 mL    Total NET: 2299.8 mL      15 Apr 2021 07:01  -  15 Apr 2021 09:22  --------------------------------------------------------  IN:    Cisatracurium: 23.1 mL    Dexmedetomidine: 15.4 mL    FentaNYL: 30.8 mL    Glucerna: 45 mL    Propofol: 23.1 mL  Total IN: 137.4 mL    OUT:    Phenylephrine: 0 mL  Total OUT: 0 mL    Total NET: 137.4 mL        Vent settings  Mode: AC/ CMV (Assist Control/ Continuous Mandatory Ventilation)  RR (machine): 34  TV: 415  PEEP: 12  FiO2: 100%    CAPILLARY BLOOD GLUCOSE  POCT Blood Glucose.: 129 mg/dL (15 Apr 2021 05:20)  POCT Blood Glucose.: 144 mg/dL (14 Apr 2021 23:18)  POCT Blood Glucose.: 145 mg/dL (14 Apr 2021 21:53)  POCT Blood Glucose.: 184 mg/dL (14 Apr 2021 17:05)  POCT Blood Glucose.: 188 mg/dL (14 Apr 2021 11:17)      LABS                          7.6    7.57  )-----------( 283      ( 15 Apr 2021 03:32 )             26.4     04-15    135  |  101  |  26<H>  ----------------------------<  126<H>  5.1   |  28  |  0.35<L>    Ca    8.2<L>      15 Apr 2021 03:32  Phos  3.3     04-15  Mg     2.7     04-15    TPro  5.7<L>  /  Alb  1.4<L>  /  TBili  0.6  /  DBili  x   /  AST  65<H>  /  ALT  91<H>  /  AlkPhos  272<H>  04-15      ABG - ( 14 Apr 2021 22:12 )  pH, Arterial: x     pH, Blood: 7.38  /  pCO2: 50    /  pO2: 83    / HCO3: 29    / Base Excess: 3.7   /  SaO2: 96      ABG - ( 14 Apr 2021 08:07 )  pH, Arterial: x     pH, Blood: 7.21  /  pCO2: 81    /  pO2: 58    / HCO3: 31    / Base Excess: 2.4   /  SaO2: 81                  Culture - Blood in AM (04.06.21 @ 12:15)    Specimen Source: .Blood Blood-Peripheral    Culture Results: No Growth Final  Culture - Blood in AM (04.06.21 @ 12:15)    Specimen Source: .Blood Blood    Culture Results: No Growth Final      Culture - Blood (04.01.21 @ 08:25)    Gram Stain:   Growth in anaerobic bottle: Gram Positive Cocci in Clusters  Growth in aerobic bottle: Gram Positive Cocci in Clusters    -  Staphylococcus epidermidis, Methicillin resistant: Detec    Specimen Source: .Blood Blood-Peripheral

## 2021-04-15 NOTE — PROGRESS NOTE ADULT - ATTENDING COMMENTS
Pt is a 66 yo F with h/o HTN, HLD and DM2 who presented with progressively worsening SOB associated with productive cough for past several days much worse the past 3 days.  Pt was started on Levaquin, Prednisone and Azithromycin  on 3/19 outpatient with no improvement.  In the ER CTA chest done to r/o PE findings consistent with COVID-19 PNA. Labs remarkable for WBC 18.55, Na 130, K 3.2, COs 10, AG 29, Glucose 416, alb 2.5.  Placed on High flow for dyspnea and hypoxemia. ICU admitting dx : 1) Acute hypoxic resp failure 2 to Covid-19 PNA  2) DKA. Pt with clinical improvement and was transferred to [facility] on 3/25. On 3/31 s/p RRT 2 to acute hypoxic resp failure requiring  intubation. ICU dx: 1) Acute hypoxic resp failure 2 to Covid-19 PNA 2 to ARDS 2) Septic shock 2 to Covid-19    Resp: On last CXR ETT appeared low; repeat CXR/ Requiring FiO2 100 PEEP 12  ID:  s/p  Remdesivir + Decadron/ Off Abx/ ID f/u prn  CVS: Currently off IV pressor and cont Midodrine to maintain MAP >65  Heme: DVT prophylaxis with Lovenox  FEN: Cont enteral feeds  Endo: Adjust Lantus + Lispro to FS  Renal: Follow BUN/Cr and UO  Social: Pt is full code/ Pt with overall poor prognosis
65F PMH HTN (on losartan) and DM2 (on metformin) presents on 3/23 for progressive worsening SOB with productive cough s/p outpatient treatment with levaquin, prednisone and azithromycin without improvement. Found to be with AG 29, glucose 416, HCO3: 10 and COVID + with hypoxia admitted to ICU for DKA on insulin drip and with acute hypoxic respiratory failure requiring high flow oxygen supplementation due to COVID-PNA. Transferred to medical service 3/25. Course with worsening hypoxemia requiring biPAP s/p RRT 3/31 for respiratory distress, worsening acute hypoxic respiratory failure with O2 sat in the 70s, RR 40-60s with accessory muscle use, ARDS requiring urgent intubation, sepsis with septic shock due to COVID. Intubated 3/31. s/p proning 4/1-4/3. Course with MRSE in blood but likely a contaminant.     - remains heavily sedated and now back on paralytics after being weaned off due to labored breathing, increased work of breathing, vent dyssynchrony  - remains on mechanical ventilation, unable to wean due to high FIo2 requirements  - will cont attempts at weaning down FIO2  - on pressure control as pt with elevated peak and plateau pressures  - s/p remdesivir  - s/p dexamethasone  - pt with labile BP: intermittently on/off vasopressor support, currently back on levophed which was initiated overnight  - maintain MAP ~ 65  - cont NGT feeds  - monitor electrolytes, hyperkalemia: if K remains elevated start lokelma  - hyperglycemia: increase lantus from 20 units to 30 units, cont with humulin coverage and sliding scale coverage  - monitor off antibx  - will attempt to wean off pressors again, if not successful will change out central line  - family updated: both daughters and son updated over the phone today. questions answered for family. children made aware that prognosis is still guarded and that pt remains vent dependent
pt seen and examined with ICU team and student on 4/14    still with labile BP. titrating up/down on/off pressor support (levophed)  oxygen desaturation today now on 100% FIO2 with increased PEEP 12  unable to wean off paralytics and sedation  worsening blood gas hypercarbic pCO2: 90, pH 7.1 on pressure control mode    65F PMH HTN (on losartan) and DM2 (on metformin) presents on 3/23 for progressive worsening SOB with productive cough s/p outpatient treatment with levaquin, prednisone and azithromycin without improvement. Found to be with AG 29, glucose 416, HCO3: 10 and COVID + with hypoxia admitted to ICU for DKA on insulin drip and with acute hypoxic respiratory failure requiring high flow oxygen supplementation due to COVID-PNA. Transferred to medical service 3/25. Course with worsening hypoxemia requiring biPAP s/p RRT 3/31 for respiratory distress, worsening acute hypoxic respiratory failure with O2 sat in the 70s, RR 40-60s with accessory muscle use, ARDS requiring urgent intubation, sepsis with septic shock due to COVID. Intubated 3/31. s/p proning 4/1-4/3. Course with MRSE in blood but likely a contaminant. now with acute hypercarbic respiratory failure and hypoxic respiratory failure despite mechanical ventilation    - remains heavily sedated and on paralytics with nimbex   - unable to wean off as pt desaturates  - remains on mechanical ventilation, unable to wean from vent due to high FIo2 and PEEP requirements  - will cont attempts at weaning down FIO2, however have been unsuccessful thuse far due to recurrent hypoxia  - on pressure control pt developing worsening hypercarbia, changed back to volume control with improvement in pCO2 and pH however now with increasing peak and plateau pressure indicative of worsening lung disease  - s/p remdesivir  - s/p dexamethasone  - labile BP: intermittently on/off vasopressor support, pt very sensitivie to levophed. midodrine held with HTN SBP 200s today. will change levophed to phenylephrine and see if pt has less labile BP with alternative vasopressor use  - maintain MAP ~ 65  - cont NGT feeds  - monitor electrolytes, hyperkalemia improved with lokelma  - family updated: both daughters and son updated over phone today. questions answered for family. children made aware that prognosis is still guarded and overall poor with worsening lung function/hypercarbia/hypoxia despite mechanical vent support. GOC readdressed with family and children insists pt remains full code    critical care time: 60 min
Gong: I have seen and examined the patient face to face, have reviewed and addended the HPI, PE and a/p as necessary.     64yo F w/ HTN (on losartan) and DM2 (on metformin) a/w COVID PNA and DKA, now in ARDS and intubated.  Noted overnight to have desaturation to 70s; restarted on ketamine this AM for overbreathing vent and vent dyssynchrony with plateau pressures noted to be in the 40s on 32/380/10/100.    Neuro: Intubated and sedated on propofol, fentanyl and restarted ketamine this AM.  Aim for RASS -3 to -4 for vent synchrony. May require paralytics.   CV: Shock state possible component of vasoplegia given sedation; c/w levophed, wean as tolerated; c/w midodrine.    Pulm: COVID PNA and ARDS - c/w vent, intubated 3/31 Vent: 32/380/10/100%, decrease FiO2 as tolerated,  s/p remdesivir and dexamethasone  GI: Protonix, Tube feeds  Renal/Metabolic: No acute issues  ID: MRSE+ (likely contaminant), s/p 8 days vancomycin; leukocytosis and fevers resolved  - ID following  Heme: Lovenox BID  Endo: Lantus/ISS  Dispo: Remains critically ill with COVID ARDS.
pt seen and examined on morning rounds with ICU team and student on 4/13    febrile  lines changed today: old lines removed and new central line placed  BP fluctuating: BP as high as 200s - weaned off levophed and taken off ketamine for concerns of hypertension  levo restarted as pt became hypotensive during the day  precedex added for increased sedation    65F PMH HTN (on losartan) and DM2 (on metformin) presents on 3/23 for progressive worsening SOB with productive cough s/p outpatient treatment with levaquin, prednisone and azithromycin without improvement. Found to be with AG 29, glucose 416, HCO3: 10 and COVID + with hypoxia admitted to ICU for DKA on insulin drip and with acute hypoxic respiratory failure requiring high flow oxygen supplementation due to COVID-PNA. Transferred to medical service 3/25. Course with worsening hypoxemia requiring biPAP s/p RRT 3/31 for respiratory distress, worsening acute hypoxic respiratory failure with O2 sat in the 70s, RR 40-60s with accessory muscle use, ARDS requiring urgent intubation, sepsis with septic shock due to COVID. Intubated 3/31. s/p proning 4/1-4/3. Course with MRSE in blood but likely a contaminant. now with acute hypercarbic respiratory failure and hypoxic respiratory failure despite mechanical ventilation    - remains heavily sedated and on paralytics with nimbex   - attempts at weaning off paralytics and sedation unsuccessful due to episodes of oxygen desaturation  - remains on nimbex  - dependent on mechanical ventilation, unable to wean from vent due to high FIo2 requirements  - will cont attempts at weaning down FIO2, goal to maintain O2 sat > 90%  - pt was changed to pressure control and remains on pressure control due to high peak and plateau pressure on volume control  - monitor blood gas  - s/p remdesivir and dexamethasone  - febrile: lines changed today  - BP remains labile: intermittently on/off vasopressor support. midodrine added in the hopes to decrease need to tirate IV vasopressor as much as pt seems to be very sensitive to levophed  - maintain MAP ~ 65  - cont NGT feeds, tolerating tube feeds at present time  - monitor electrolytes, hyperkalemia cont with lokelma  - unfortunately unstable for transport for CT head to evaluate if there is any neuro cause for labile BP  - cont on increased lantus dose for better glycemic control with sliding scale coverage  - prognosis is guarded

## 2021-04-15 NOTE — PROGRESS NOTE ADULT - SUBJECTIVE AND OBJECTIVE BOX
RERE FINESSE    [facility] CCU 6    Patient is a 65y old  Female who presents with a chief complaint of covid pna and dka (15 Apr 2021 09:09)       Allergies    Carafate (Rash)  Levaquin (Rash)  Percocet 5/325 (Other)    Intolerances    lactose (Flatulence)      HPI:  64yo F w/ HTN (on losartan) and DM2 (on metformin) presents with progressively worsening SOB associated with productive cough for past several days much worse the past 3 days.  Started on abx (levaquin, prednisone and azithromycin 3/19) outpatient with no improvement. Denies any chest pain, fevers, dizziness, syncope, abd pain, back pain, N/V/D, recent sick contact, recent travel.     In ED: CTA chest done to r/o PE findings consistent w/ COVID-19 PNA. Labs remarkable for WBC 18.55, Na 130, K 3.2, COs 10, AG 29, Glucose 416, alb 2.5.  Placed on High flow for dyspnea and hypoxemia. Admitted to ICU for COVID pna and DKA.   (23 Mar 2021 05:08)      PAST MEDICAL & SURGICAL HISTORY:  HTN (hypertension)    DM (diabetes mellitus)    HLD (hyperlipidemia)    No significant past surgical history        FAMILY HISTORY:  No pertinent family history in first degree relatives          MEDICATIONS   chlorhexidine 0.12% Liquid 15 milliLiter(s) Oral Mucosa two times a day  chlorhexidine 2% Cloths 1 Application(s) Topical <User Schedule>  cisatracurium Infusion 3 MICROgram(s)/kG/Min IV Continuous <Continuous>  dexMEDEtomidine Infusion 0.2 MICROgram(s)/kG/Hr IV Continuous <Continuous>  dextrose 40% Gel 15 Gram(s) Oral once  dextrose 5%. 1000 milliLiter(s) IV Continuous <Continuous>  dextrose 5%. 1000 milliLiter(s) IV Continuous <Continuous>  dextrose 50% Injectable 25 Gram(s) IV Push once  dextrose 50% Injectable 12.5 Gram(s) IV Push once  dextrose 50% Injectable 25 Gram(s) IV Push once  enoxaparin Injectable 40 milliGRAM(s) SubCutaneous two times a day  fentaNYL   Infusion. 0.501 MICROgram(s)/kG/Hr IV Continuous <Continuous>  glucagon  Injectable 1 milliGRAM(s) IntraMuscular once  insulin glargine Injectable (LANTUS) 30 Unit(s) SubCutaneous at bedtime  insulin lispro (ADMELOG) corrective regimen sliding scale   SubCutaneous every 6 hours  insulin regular  human recombinant 6 Unit(s) SubCutaneous every 6 hours  midodrine 5 milliGRAM(s) Oral every 8 hours  pantoprazole   Suspension 40 milliGRAM(s) Enteral Tube daily  phenylephrine    Infusion 0.2 MICROgram(s)/kG/Min IV Continuous <Continuous>  polyethylene glycol 3350 17 Gram(s) Oral daily  propofol Infusion 10.009 MICROgram(s)/kG/Min IV Continuous <Continuous>  senna Syrup 10 milliLiter(s) Oral at bedtime  sodium chloride 0.9% lock flush 10 milliLiter(s) IV Push every 1 hour PRN  zinc oxide 40% Ointment 1 Application(s) Topical three times a day      Vital Signs Last 24 Hrs  T(C): 38.7 (15 Apr 2021 08:09), Max: 39.3 (14 Apr 2021 20:04)  T(F): 101.6 (15 Apr 2021 08:09), Max: 102.7 (14 Apr 2021 20:04)  HR: 112 (15 Apr 2021 08:30) (75 - 122)  BP: --  BP(mean): --  RR: 36 (15 Apr 2021 08:30) (34 - 37)  SpO2: 85% (15 Apr 2021 08:30) (85% - 100%)      04-14-21 @ 07:01  -  04-15-21 @ 07:00  --------------------------------------------------------  IN: 3369.8 mL / OUT: 1070 mL / NET: 2299.8 mL    04-15-21 @ 07:01  -  04-15-21 @ 09:38  --------------------------------------------------------  IN: 137.4 mL / OUT: 0 mL / NET: 137.4 mL        Mode: AC/ CMV (Assist Control/ Continuous Mandatory Ventilation), RR (machine): 36, TV (machine): 400, FiO2: 100, PEEP: 12, ITime: 0.6, MAP: 22, PIP: 56    LABS:                        7.6    7.57  )-----------( 283      ( 15 Apr 2021 03:32 )             26.4     04-15    135  |  101  |  26<H>  ----------------------------<  126<H>  5.1   |  28  |  0.35<L>    Ca    8.2<L>      15 Apr 2021 03:32  Phos  3.3     04-15  Mg     2.7     04-15    TPro  5.7<L>  /  Alb  1.4<L>  /  TBili  0.6  /  DBili  x   /  AST  65<H>  /  ALT  91<H>  /  AlkPhos  272<H>  04-15          ABG - ( 14 Apr 2021 22:12 )  pH, Arterial: x     pH, Blood: 7.38  /  pCO2: 50    /  pO2: 83    / HCO3: 29    / Base Excess: 3.7   /  SaO2: 96                  WBC:  WBC Count: 7.57 K/uL (04-15 @ 03:32)  WBC Count: 7.08 K/uL (04-14 @ 03:30)  WBC Count: 8.10 K/uL (04-13 @ 02:50)  WBC Count: 10.60 K/uL (04-12 @ 11:43)  WBC Count: 10.51 K/uL (04-12 @ 04:42)      MICROBIOLOGY:  RECENT CULTURES:                  Sodium:  Sodium, Serum: 135 mmol/L (04-15 @ 03:32)  Sodium, Serum: 136 mmol/L (04-14 @ 03:30)  Sodium, Serum: 134 mmol/L (04-13 @ 08:47)  Sodium, Serum: 134 mmol/L (04-13 @ 02:50)  Sodium, Serum: 137 mmol/L (04-12 @ 11:43)  Sodium, Serum: 135 mmol/L (04-12 @ 04:42)  Sodium, Serum: 137 mmol/L (04-11 @ 11:51)      0.35 mg/dL 04-15 @ 03:32  0.31 mg/dL 04-14 @ 03:30  0.45 mg/dL 04-13 @ 08:47  0.40 mg/dL 04-13 @ 02:50  0.66 mg/dL 04-12 @ 11:43  0.60 mg/dL 04-12 @ 04:42  0.52 mg/dL 04-11 @ 11:51      Hemoglobin:  Hemoglobin: 7.6 g/dL (04-15 @ 03:32)  Hemoglobin: 7.5 g/dL (04-14 @ 03:30)  Hemoglobin: 7.5 g/dL (04-13 @ 02:50)  Hemoglobin: 8.0 g/dL (04-12 @ 11:43)  Hemoglobin: 7.4 g/dL (04-12 @ 04:42)      Platelets: Platelet Count - Automated: 283 K/uL (04-15 @ 03:32)  Platelet Count - Automated: 247 K/uL (04-14 @ 03:30)  Platelet Count - Automated: 250 K/uL (04-13 @ 02:50)  Platelet Count - Automated: 279 K/uL (04-12 @ 11:43)  Platelet Count - Automated: 258 K/uL (04-12 @ 04:42)      LIVER FUNCTIONS - ( 15 Apr 2021 03:32 )  Alb: 1.4 g/dL / Pro: 5.7 gm/dL / ALK PHOS: 272 U/L / ALT: 91 U/L / AST: 65 U/L / GGT: x                 RADIOLOGY & ADDITIONAL STUDIES:

## 2021-04-16 NOTE — CHART NOTE - NSCHARTNOTEFT_GEN_A_CORE
Assessment:  Pt on vent, on COVID-19 isolation.  Pt adm c hypoxia, DKA w/o coma, being followed by Endocrinologist, c acute respirator failure, COVID-19 pneumonia   PMH include HLD, DM(insulin use PTA was reported by pt on adm to RD, as per home meds metformin use noted), HTN.        Factors impacting intake: [ ] none [ ] nausea  [ ] vomiting [ ] diarrhea [ ] constipation  [ ]chewing problems [ ] swallowing issues  [x ] other: on NGT feeding     Diet Prescription: Diet, NPO with Tube Feed:   Tube Feeding Modality: Orogastric  Glucerna 1.2 Long  Total Volume for 24 Hours (mL): 1080  Continuous  Until Goal Tube Feed Rate (mL per Hour): 45  Tube Feed Duration (in Hours): 24  Tube Feed Start Time: 13:00  No Carb Prosource (1pkg = 15gms Protein)     Qty per Day:  1 (04-13-21 @ 12:52)    Intake: Glucerna 1.2 @ 45 ml/hr= 1080 ml, 1296 calories, 65 grams protein, 869 ml free water     Current Weight: 04/15, 101.7 kg, 03/23, 82.6 kg, c wt. gain of 19.1 kg   % Weight Change: 23.1%  04/16, 2+ generalized edema noted     Pertinent Medications: MEDICATIONS  (STANDING):  chlorhexidine 0.12% Liquid 15 milliLiter(s) Oral Mucosa two times a day  chlorhexidine 2% Cloths 1 Application(s) Topical <User Schedule>  cisatracurium Infusion 3 MICROgram(s)/kG/Min (13.9 mL/Hr) IV Continuous <Continuous>  dexMEDEtomidine Infusion 0.2 MICROgram(s)/kG/Hr (3.86 mL/Hr) IV Continuous <Continuous>  dextrose 40% Gel 15 Gram(s) Oral once  dextrose 5%. 1000 milliLiter(s) (50 mL/Hr) IV Continuous <Continuous>  dextrose 5%. 1000 milliLiter(s) (100 mL/Hr) IV Continuous <Continuous>  dextrose 50% Injectable 25 Gram(s) IV Push once  dextrose 50% Injectable 12.5 Gram(s) IV Push once  dextrose 50% Injectable 25 Gram(s) IV Push once  enoxaparin Injectable 40 milliGRAM(s) SubCutaneous two times a day  fentaNYL   Infusion. 0.501 MICROgram(s)/kG/Hr (3.86 mL/Hr) IV Continuous <Continuous>  glucagon  Injectable 1 milliGRAM(s) IntraMuscular once  insulin glargine Injectable (LANTUS) 30 Unit(s) SubCutaneous at bedtime  insulin lispro (ADMELOG) corrective regimen sliding scale   SubCutaneous every 6 hours  insulin regular  human recombinant 6 Unit(s) SubCutaneous every 6 hours  meropenem  IVPB 1000 milliGRAM(s) IV Intermittent every 8 hours  meropenem  IVPB      midodrine 5 milliGRAM(s) Oral every 8 hours  pantoprazole   Suspension 40 milliGRAM(s) Enteral Tube daily  phenylephrine    Infusion 0.2 MICROgram(s)/kG/Min (2.89 mL/Hr) IV Continuous <Continuous>  polyethylene glycol 3350 17 Gram(s) Oral daily  propofol Infusion 10.009 MICROgram(s)/kG/Min (4.63 mL/Hr) IV Continuous <Continuous>  senna Syrup 10 milliLiter(s) Oral at bedtime  vancomycin  IVPB 1000 milliGRAM(s) IV Intermittent every 12 hours  zinc oxide 40% Ointment 1 Application(s) Topical three times a day    MEDICATIONS  (PRN):  sodium chloride 0.9% lock flush 10 milliLiter(s) IV Push every 1 hour PRN Pre/post blood products, medications, blood draw, and to maintain line patency    Pertinent Labs: 04-16 Na136 mmol/L Glu 178 mg/dL<H> K+ 5.2 mmol/L Cr  0.41 mg/dL<L> BUN 31 mg/dL<H> 04-16 Phos 3.9 mg/dL 04-16 Alb 1.4 g/dL<L> 03-24 Chol 173 mg/dL LDL --    HDL 43 mg/dL<L> Trig 104 mg/dL04-16 ALT 91 U/L<H> AST 61 U/L<H> Alkaline Phosphatase 306 U/L<H>   CAPILLARY BLOOD GLUCOSE      POCT Blood Glucose.: 138 mg/dL (16 Apr 2021 11:09)  POCT Blood Glucose.: 157 mg/dL (16 Apr 2021 05:13)  POCT Blood Glucose.: 166 mg/dL (15 Apr 2021 23:11)  POCT Blood Glucose.: 158 mg/dL (15 Apr 2021 20:58)  POCT Blood Glucose.: 196 mg/dL (15 Apr 2021 17:25)    Skin:   wound; tongue  Pressure injury x 2   1. Right  buttocks; stage III  2. Left sacrum; stage II    Estimated Needs:   [x ] no change since previous assessment(03/25)  [ ] recalculated:     Previous Nutrition Diagnosis:   Malnutrition; severe malnutrition in context of acute illness     Related to: inadequate protein-energy intake in setting of COVID-19 illness, acute respiratory failure, DKA  addendum; pressure ulcer     As evidenced by: <50% nutrition needs > 5 days, >2% wt. loss in 1 week(6.17%)    Goal: pt to meet >75% protein-energy needs via enteral feeding; met     Nutrition Diagnosis is [ x] ongoing, improving  [ ] resolved [ ] not applicable       New Nutrition Diagnosis: [ ] not applicable     Interventions:   Recommend  [ ] Change Diet To:  [ x] Nutrition Supplement; Continue c current enteral feeding regimen   [ ] Nutrition Support  [ ] Other:     Monitoring and Evaluation:   [ ] PO intake [ x ] Tolerance to diet prescription [ x ] weights [ x ] labs[ x ] follow up per protocol  [ ] other:

## 2021-04-16 NOTE — CHART NOTE - NSCHARTNOTESELECT_GEN_ALL_CORE
Nutrition Services
Nutrition Services
Rapid Response
Nutrition Services
Transfer Note
Transfer Note

## 2021-04-16 NOTE — PROGRESS NOTE ADULT - ASSESSMENT
66yo F w/ HTN (on losartan) and DM2 (on metformin) p/w COVID PNA and DKA, now in ARDS and intubated (day 15 of intubation). Labile BP and worsening respiratory function. Prognosis is poor now.     Respiratory  - COVID pneumonia/ ARDS, intubated 3/31  - cont LTV for ARDS  - titrate peep and fio2 to maintain sat>90%  - s/p remdesivir and dexamethasone  - start broad spectrum abx given fevers  - f/u ID reccs       Neuro  - c/w Propofol, Fentanyl, Nimbex, Precedex for adequate paralysis and sedation    Cardio  - titrate pressors to map>65    Heme   dvt ppx - c/w lovenox    GI   - c/w protonix ppx  - c/w miralax, senna    Endo  - improved glycemic control; POCT glucose 129-184 last 24 hr  - Endo following, f/u reccs    - Lantus 30 units qd, regular insulin 6 units q6h + Lispro ISS    Goals of Care  - Dr. Nogueira discussed w/ family 4/14; family wishes patient to remain full code

## 2021-04-16 NOTE — CHART NOTE - NSCHARTNOTEFT_GEN_A_CORE
Call placed to patient family. Informed that patient oxygenation now low despite being on 100% fio2. Family to come to bedside.     Joelle Michelle, NP-C  critical care

## 2021-04-16 NOTE — PROGRESS NOTE ADULT - SUBJECTIVE AND OBJECTIVE BOX
RERE KILPATRICK  MRN-[id number]    Follow Up:  COVID, MRSE in blood culture     Interval History: seen and examined, FiO2 100%, febrile, lines exchanged to left neck and left hand couple days ago, blood gases worse today,  remains full code    ROS:    [ X] Unobtainable because: intubated/sedated   [ ] All other systems negative    Allergies  Carafate (Rash)  Levaquin (Rash)  Percocet 5/325 (Other)      ABX:   meropenem  IVPB 1000 every 8 hours  meropenem  IVPB    vancomycin  IVPB 1000 every 12 hours      MEDICATIONS  (STANDING):  cisatracurium Infusion 3 <Continuous>  dexMEDEtomidine Infusion 0.2 <Continuous>  dextrose 40% Gel 15 once  dextrose 50% Injectable 25 once  dextrose 50% Injectable 12.5 once  dextrose 50% Injectable 25 once  enoxaparin Injectable 40 two times a day  fentaNYL   Infusion. 0.501 <Continuous>  glucagon  Injectable 1 once  insulin glargine Injectable (LANTUS) 30 at bedtime  insulin lispro (ADMELOG) corrective regimen sliding scale  every 6 hours  insulin regular  human recombinant 6 every 6 hours  midodrine 5 every 8 hours  pantoprazole   Suspension 40 daily  phenylephrine    Infusion 0.2 <Continuous>  polyethylene glycol 3350 17 daily  propofol Infusion 10.009 <Continuous>  senna Syrup 10 at bedtime      PRN      Physical Exam:  Vital Signs Last 24 Hrs  T(F): 97.8 (04-16-21 @ 07:31), Max: 101.6 (04-15-21 @ 19:10)  HR: 75 (04-16-21 @ 14:00)  BP: --  RR: 36 (04-16-21 @ 14:00)  SpO2: 88% (04-16-21 @ 14:00) (79% - 94%)  Wt(kg): --        Constitutional: ill appearing, intubated and sedated   HEAD/EYES: anicteric, no conjunctival injection  ENT:   +ETT and OGT  Cardiovascular:   normal S1, S2, no murmur, trace edema b/l in LE   Respiratory:  ventilatory BS bilaterally, no wheezes, no rales  GI:  soft, distended,  normal bowel sounds, +bowel sounds   : + montiel  Musculoskeletal:  no synovitis  Neurologic: intubated and sedated   Skin:  no rash, no erythema, no phlebitis  Heme/Onc: no lymphadenopathy   Psychiatric:  unable  Lines: left +sam c/d/i,  +left IJ c/d/i       WBC Count: 8.72 K/uL (04-16 @ 02:08)  WBC Count: 7.57 K/uL (04-15 @ 03:32)  WBC Count: 7.08 K/uL (04-14 @ 03:30)  WBC Count: 8.10 K/uL (04-13 @ 02:50)  WBC Count: 10.60 K/uL (04-12 @ 11:43)  WBC Count: 10.51 K/uL (04-12 @ 04:42)  WBC Count: 11.05 K/uL (04-11 @ 03:10)                            7.8    8.72  )-----------( 292      ( 16 Apr 2021 02:08 )             26.0       04-16    136  |  102  |  31<H>  ----------------------------<  178<H>  5.2   |  30  |  0.41<L>    Ca    8.1<L>      16 Apr 2021 02:08  Phos  3.9     04-16  Mg     2.4     04-16    TPro  6.2  /  Alb  1.4<L>  /  TBili  0.8  /  DBili  x   /  AST  61<H>  /  ALT  91<H>  /  AlkPhos  306<H>  04-16      Creatinine Trend: 0.41<--, 0.35<--, 0.31<--, 0.45<--, 0.40<--, 0.66<--        C-Reactive Protein, Serum: 113 (04-14)  C-Reactive Protein, Serum: 110 (04-12)  C-Reactive Protein, Serum: 114 (04-11)  C-Reactive Protein, Serum: 194 (04-07)    Ferritin, Serum: 1738 (04-14)  Ferritin, Serum: 5050 (04-11)  Ferritin, Serum: 45283 (04-07)  Ferritin, Serum: 11970 (04-05)      D-Dimer Assay, Quantitative: 1026 (04-14)  D-Dimer Assay, Quantitative: 1096 (04-11)  D-Dimer Assay, Quantitative: 2091 (04-07)  D-Dimer Assay, Quantitative: 1498 (04-04)    Procalcitonin, Serum: 0.96 (04-12-21 @ 08:26)          MICROBIOLOGY:      .Blood Blood  04-06-21   No growth to date.  --  --      .Blood Blood-Peripheral  04-01-21   Growth in aerobic and anaerobic bottles: Staphylococcus epidermidis  Coag Negative Staphylococcus  Single set isolate, possible contaminant. Contact  Microbiology if susceptibility testing clinically  indicated.  --  Blood Culture PCR      .Urine Clean Catch (Midstream)  03-24-21   No growth  --  --      .Blood Blood-Peripheral  03-23-21   No Growth Final  --  --      RADIOLOGY:  Xray Chest 1 View- PORTABLE-Urgent (Xray Chest 1 View- PORTABLE-Urgent .) (04.13.21 @ 16:06) >  IMPRESSION:  Partial clearing of the lungs. No pneumothorax.

## 2021-04-16 NOTE — PROVIDER CONTACT NOTE (EICU) - ACTION/TREATMENT ORDERED:
E-alerted by bedside team for hypoxia o2 around 85% dispite vent support, bedside team reports b/l breath sounds, will obtain stat CXR to eval ETT position and r/o PTX and obtain ABG, plan d/w bedside provider
Ketamine/Propofol ordered as discussed with Dr. Nogueira for sedation.
Discussed with JERMAIN RICHEY

## 2021-04-16 NOTE — PROGRESS NOTE ADULT - ASSESSMENT
FINESSE JERNIGAN 65 F 1955 3/23/2021 DR YANN UNDERWOOD     REVIEW OF SYMPTOMS      Able to give (reliable) ROS  NO     PHYSICAL EXAM    HEENT Unremarkable  atraumatic   RESP Fair air entry EXP prolonged    Harsh breath sound Resp distres mild   CARDIAC S1 S2 No S3     NO JVD    ABDOMEN SOFT BS PRESENT NOT DISTENDED No hepatosplenomegaly   PEDAL EDEMA present No calf tenderness  NO rash       PATIENT SUMMARY  66 yo F with h/o HTN, HLD and DM2 who presented with progressively worsening SOB  Patient ruled in for  COVID-19 PNA.  ICU admitting dx : 1) Acute hypoxic resp failure 2 to Covid-19 PNA with improvement in oxygenation  2) s/p DKA.  Pt was admitted 3/23 transferred out of ICU 3/26  Pulm consulted 3/29/2021   Transferred to ICU 3/31   INTUBATED 3/31   NG TBE 3/31    PROBLEMS   COVID PNEUMONIA   AC HYPOXIC RESP FAILURE   INTUBATED 3/31   SHOCK      VITALS/IO/VENT/DRIPS.  4/16/2021 80 117/47   4/16/2021 8a cisa 6.5 m/k/m  4/16/2021 8a dexm 0.7 m/k/h   4/16/2021 8a fent 3.5 m/k/h   4/16/2021 8a prop 50 m/k/m   4/16/2021 ac 36/400/10/100%     GLOBAL AND BEST PRACTICE ISSUES                     ALLERGY. carafate levaquin percocet      WEIGHT.      3/29/2021 77                           BMI.                 3/29/2021 29          ADVANCED DIRECTIVE.                               HEAD OF BED ELEVATION. Yes  DVT PROPHYLAXIS.   hpsc (3/23)   --> lvnx 40 93/30)                  MANUEL PROPHYLAXIS. PROTONIX 40 (3/23)   APA.                                                                    DYSPHAGIA RECOMM.   DIET.  CONS CARB (3/25) --> vital 1080 (4/5)      INFECTION PROPHYLAXIS.   chlorhexidine .12% (4/1/20210  chlorhexidine 2% (4/1/2021)   FREE WATER.      ASSESSMENT/RECOMMENDATIONS.    D-DIMR ELEVATED  Trending down   cta ch negv 3/23  is on dvt ppl  SHOCK   On vasopressors  Target MAP 65   MECHANICAL VENT   Target PO2 60 (+) PPlat 35 (-) pH 730 (+)   Remains on 100% ox  NEMIA   4/15/2021 Hb 7.6  target hb 7 (+)       COVID PNEUMONIA AND HYPOXIC RESP FAILURE MANAGEMENT.   REMDESEVIR (3/23)   DEXA (3/23)   PRONE (3/26)   Monitor pulse oximetry Target PO 92-96%  Monitor inflammatory and thrombotic biomarkers  Monitor for  for progressively  worsening oxygenation failure   O2 to keep po 90-95%  Intubated 3/31  Cont supp care   On fent cisa prop nore     TIME SPENT   Over 25 minutes aggregate care time spent on encounter; activities included   direct patient care, counseling and/or coordinating care reviewing notes, lab data/ imaging , discussion with multidisciplinary team/ patient  /family and explaining in detail risks, benefits, alternatives  of the recommendations     FINESSE JERNIGAN 65 F 1955 3/23/2021 DR YANN UNDERWOOD

## 2021-04-16 NOTE — PROGRESS NOTE ADULT - SUBJECTIVE AND OBJECTIVE BOX
Patient is a 65y old  Female who presents with a chief complaint of covid pna and dka (16 Apr 2021 07:37)      INTERVAL HPI/OVERNIGHT EVENTS:  pt on vent  NAD    MEDICATIONS  (STANDING):  chlorhexidine 0.12% Liquid 15 milliLiter(s) Oral Mucosa two times a day  chlorhexidine 2% Cloths 1 Application(s) Topical <User Schedule>  cisatracurium Infusion 3 MICROgram(s)/kG/Min (13.9 mL/Hr) IV Continuous <Continuous>  dexMEDEtomidine Infusion 0.2 MICROgram(s)/kG/Hr (3.86 mL/Hr) IV Continuous <Continuous>  dextrose 40% Gel 15 Gram(s) Oral once  dextrose 5%. 1000 milliLiter(s) (50 mL/Hr) IV Continuous <Continuous>  dextrose 5%. 1000 milliLiter(s) (100 mL/Hr) IV Continuous <Continuous>  dextrose 50% Injectable 12.5 Gram(s) IV Push once  dextrose 50% Injectable 25 Gram(s) IV Push once  dextrose 50% Injectable 25 Gram(s) IV Push once  enoxaparin Injectable 40 milliGRAM(s) SubCutaneous two times a day  fentaNYL   Infusion. 0.501 MICROgram(s)/kG/Hr (3.86 mL/Hr) IV Continuous <Continuous>  glucagon  Injectable 1 milliGRAM(s) IntraMuscular once  insulin glargine Injectable (LANTUS) 30 Unit(s) SubCutaneous at bedtime  insulin lispro (ADMELOG) corrective regimen sliding scale   SubCutaneous every 6 hours  insulin regular  human recombinant 6 Unit(s) SubCutaneous every 6 hours  midodrine 5 milliGRAM(s) Oral every 8 hours  pantoprazole   Suspension 40 milliGRAM(s) Enteral Tube daily  phenylephrine    Infusion 0.2 MICROgram(s)/kG/Min (2.89 mL/Hr) IV Continuous <Continuous>  polyethylene glycol 3350 17 Gram(s) Oral daily  propofol Infusion 10.009 MICROgram(s)/kG/Min (4.63 mL/Hr) IV Continuous <Continuous>  senna Syrup 10 milliLiter(s) Oral at bedtime  zinc oxide 40% Ointment 1 Application(s) Topical three times a day    MEDICATIONS  (PRN):  sodium chloride 0.9% lock flush 10 milliLiter(s) IV Push every 1 hour PRN Pre/post blood products, medications, blood draw, and to maintain line patency      REVIEW OF SYSTEMS:  unobtainable      Vital Signs Last 24 Hrs  T(C): 36.6 (16 Apr 2021 07:31), Max: 38.7 (15 Apr 2021 19:10)  T(F): 97.8 (16 Apr 2021 07:31), Max: 101.6 (15 Apr 2021 19:10)  HR: 76 (16 Apr 2021 08:34) (74 - 118)  BP: --  BP(mean): --  RR: 36 (16 Apr 2021 08:00) (19 - 36)  SpO2: 89% (16 Apr 2021 08:34) (79% - 94%)    PHYSICAL EXAM:  GENERAL: NAD        LABS:                        7.8    8.72  )-----------( 292      ( 16 Apr 2021 02:08 )             26.0     04-16    136  |  102  |  31<H>  ----------------------------<  178<H>  5.2   |  30  |  0.41<L>    Ca    8.1<L>      16 Apr 2021 02:08  Phos  3.9     04-16  Mg     2.4     04-16    TPro  6.2  /  Alb  1.4<L>  /  TBili  0.8  /  DBili  x   /  AST  61<H>  /  ALT  91<H>  /  AlkPhos  306<H>  04-16        CAPILLARY BLOOD GLUCOSE      POCT Blood Glucose.: 157 mg/dL (16 Apr 2021 05:13)  POCT Blood Glucose.: 166 mg/dL (15 Apr 2021 23:11)  POCT Blood Glucose.: 158 mg/dL (15 Apr 2021 20:58)  POCT Blood Glucose.: 196 mg/dL (15 Apr 2021 17:25)  POCT Blood Glucose.: 250 mg/dL (15 Apr 2021 12:16)    Lipid panel:       ABG - ( 14 Apr 2021 22:12 )  pH, Arterial: x     pH, Blood: 7.38  /  pCO2: 50    /  pO2: 83    / HCO3: 29    / Base Excess: 3.7   /  SaO2: 96                Mode: AC/ CMV (Assist Control/ Continuous Mandatory Ventilation)  RR (machine): 36  TV (machine): 400  FiO2: 100  PEEP: 10  ITime: 0.6  MAP: 20  PIP: 50        RADIOLOGY & ADDITIONAL TESTS:

## 2021-04-16 NOTE — PROGRESS NOTE ADULT - CRITICAL CARE SERVICES PROVIDED
Critical care services provided

## 2021-04-16 NOTE — PROGRESS NOTE ADULT - ASSESSMENT
64yo F w/ HTN (on losartan) and DM2 (on metformin) admitted with acute repiratory failure due to covid now in ARDS.   Imaging personally reviewed and shows extensive infiltrates   Blood culture from 4/1 (one set only was sent) grew FAN  She has had intermittent fevers the last several days   Repeat blood cultures have been sent and awaiting results   Her initial presentation was with DKA though her glucose levels are better controlled and no longer in DKA   Suffered shock liver likely from initial intubation/septic shock from covid though her AST/ALT are improving   Staph epi is often a contaminant. The ideal situation is to draw 2 sets of blood cultures from 2 separate sticks to ensure that if in fact a contaminant it would not grow in the additional set. In this case only one set was sent and she has a central line, a-line, ETT, NGT, montiel  and has been having intermittent fevers. Still suspect that this was a procurement contaminant but will await for the repeat blood cultures.   DDx regarding her fevers are quite broad: covid vs DVT/PE (dvt study negative), medication induced ( vanco on rare occasions causes fevers), subacute thyroiditis, bacterial pneumonia etc     4/8: on pressors and intubated, Fio2 100%, day 7 of vanco, repeat blood culture negative   4/9: off vanco, afebrile, FiO2 still 100%, on pressors, cultures negative  4/10: slowly reducing FiO2, afebrile, lfts getting better, WBC normal   4/11: FiO2 rising, able to be weaned off pressors, wbc steady, making very little progress unfortunately  4/12:  not doing well, off antibiotics, WBC remains near normal, remains in severe ARDS  4/14: FiO2 100%, pressors, febrile, normal WBC not making forward progress  4/15: continues to remain febrile with a normal WBC, at this point it would be advisable to obtain blood cultures, respiratory cultures and urine cultures (despite there being central lines and montiel catheter) to ensure no bacteremia. Ideally would benefit from imaging but to ill and unstable to be moved out of the CCU. Please send MRSA PCR and start vancomycin and meropenem after cultures are obtain    4/16: last fever 2am, wbc normal, still high FIO2, will start antibiotics      Antibiotics this hospitalization:   Vanco 4/2->4/8; 4/16->  Meropenem 4/16->  RDV 3/23-3/27  ceftriaxone 3/23  Azithro 3/23-3/24    Overall, 65F septic shock due to covid, shock liver, leukocytosis, fever, ARDS, anemia, +CONS     Suggest-  Send 2 sets of blood culture please  send respiratory cultures please  send UA please  Send TSH and T4 to see if thyroiditis   Vancomycin and meropenem and monitor vanco troughs   Has new lines   trend WBC, LFTs   trend fever curve   pressors per ICU   Ventilator management per ICU   Good but not too tight glycemic control->endocrine recommendations appreciated   Remains critically ill with poor prognosis    Dr. Irving Marino will be covering for the weekend.  Please call [phone]     Rosales Mccollum DO  Infectious Disease Attending  Pager [phone]   After 5pm/weekends please call [phone] for all inquiries and new consults

## 2021-04-16 NOTE — PROGRESS NOTE ADULT - SUBJECTIVE AND OBJECTIVE BOX
RERE FINESSE    [facility] CCU 6    Patient is a 65y old  Female who presents with a chief complaint of covid pna and dka (15 Apr 2021 23:52)       Allergies    Carafate (Rash)  Levaquin (Rash)  Percocet 5/325 (Other)    Intolerances    lactose (Flatulence)      HPI:  66yo F w/ HTN (on losartan) and DM2 (on metformin) presents with progressively worsening SOB associated with productive cough for past several days much worse the past 3 days.  Started on abx (levaquin, prednisone and azithromycin 3/19) outpatient with no improvement. Denies any chest pain, fevers, dizziness, syncope, abd pain, back pain, N/V/D, recent sick contact, recent travel.     In ED: CTA chest done to r/o PE findings consistent w/ COVID-19 PNA. Labs remarkable for WBC 18.55, Na 130, K 3.2, COs 10, AG 29, Glucose 416, alb 2.5.  Placed on High flow for dyspnea and hypoxemia. Admitted to ICU for COVID pna and DKA.   (23 Mar 2021 05:08)      PAST MEDICAL & SURGICAL HISTORY:  HTN (hypertension)    DM (diabetes mellitus)    HLD (hyperlipidemia)    No significant past surgical history        FAMILY HISTORY:  No pertinent family history in first degree relatives          MEDICATIONS   chlorhexidine 0.12% Liquid 15 milliLiter(s) Oral Mucosa two times a day  chlorhexidine 2% Cloths 1 Application(s) Topical <User Schedule>  cisatracurium Infusion 3 MICROgram(s)/kG/Min IV Continuous <Continuous>  dexMEDEtomidine Infusion 0.2 MICROgram(s)/kG/Hr IV Continuous <Continuous>  dextrose 40% Gel 15 Gram(s) Oral once  dextrose 5%. 1000 milliLiter(s) IV Continuous <Continuous>  dextrose 5%. 1000 milliLiter(s) IV Continuous <Continuous>  dextrose 50% Injectable 25 Gram(s) IV Push once  dextrose 50% Injectable 12.5 Gram(s) IV Push once  dextrose 50% Injectable 25 Gram(s) IV Push once  enoxaparin Injectable 40 milliGRAM(s) SubCutaneous two times a day  fentaNYL   Infusion. 0.501 MICROgram(s)/kG/Hr IV Continuous <Continuous>  glucagon  Injectable 1 milliGRAM(s) IntraMuscular once  insulin glargine Injectable (LANTUS) 30 Unit(s) SubCutaneous at bedtime  insulin lispro (ADMELOG) corrective regimen sliding scale   SubCutaneous every 6 hours  insulin regular  human recombinant 6 Unit(s) SubCutaneous every 6 hours  midodrine 5 milliGRAM(s) Oral every 8 hours  pantoprazole   Suspension 40 milliGRAM(s) Enteral Tube daily  phenylephrine    Infusion 0.2 MICROgram(s)/kG/Min IV Continuous <Continuous>  polyethylene glycol 3350 17 Gram(s) Oral daily  propofol Infusion 10.009 MICROgram(s)/kG/Min IV Continuous <Continuous>  senna Syrup 10 milliLiter(s) Oral at bedtime  sodium chloride 0.9% lock flush 10 milliLiter(s) IV Push every 1 hour PRN  zinc oxide 40% Ointment 1 Application(s) Topical three times a day      Vital Signs Last 24 Hrs  T(C): 36.6 (16 Apr 2021 07:31), Max: 38.7 (15 Apr 2021 08:09)  T(F): 97.8 (16 Apr 2021 07:31), Max: 101.6 (15 Apr 2021 08:09)  HR: 75 (16 Apr 2021 07:00) (74 - 118)  BP: --  BP(mean): --  RR: 36 (16 Apr 2021 07:00) (19 - 36)  SpO2: 86% (16 Apr 2021 07:00) (79% - 94%)      04-15-21 @ 07:01  -  04-16-21 @ 07:00  --------------------------------------------------------  IN: 3837.3 mL / OUT: 1090 mL / NET: 2747.3 mL        Mode: AC/ CMV (Assist Control/ Continuous Mandatory Ventilation), RR (machine): 36, TV (machine): 400, FiO2: 100, PEEP: 10, ITime: 0.6, MAP: 22, PIP: 53    LABS:                        7.8    8.72  )-----------( 292      ( 16 Apr 2021 02:08 )             26.0     04-16    136  |  102  |  31<H>  ----------------------------<  178<H>  5.2   |  30  |  0.41<L>    Ca    8.1<L>      16 Apr 2021 02:08  Phos  3.9     04-16  Mg     2.4     04-16    TPro  6.2  /  Alb  1.4<L>  /  TBili  0.8  /  DBili  x   /  AST  61<H>  /  ALT  91<H>  /  AlkPhos  306<H>  04-16          ABG - ( 14 Apr 2021 22:12 )  pH, Arterial: x     pH, Blood: 7.38  /  pCO2: 50    /  pO2: 83    / HCO3: 29    / Base Excess: 3.7   /  SaO2: 96                  WBC:  WBC Count: 8.72 K/uL (04-16 @ 02:08)  WBC Count: 7.57 K/uL (04-15 @ 03:32)  WBC Count: 7.08 K/uL (04-14 @ 03:30)  WBC Count: 8.10 K/uL (04-13 @ 02:50)  WBC Count: 10.60 K/uL (04-12 @ 11:43)      MICROBIOLOGY:  RECENT CULTURES:                  Sodium:  Sodium, Serum: 136 mmol/L (04-16 @ 02:08)  Sodium, Serum: 135 mmol/L (04-15 @ 03:32)  Sodium, Serum: 136 mmol/L (04-14 @ 03:30)  Sodium, Serum: 134 mmol/L (04-13 @ 08:47)  Sodium, Serum: 134 mmol/L (04-13 @ 02:50)  Sodium, Serum: 137 mmol/L (04-12 @ 11:43)      0.41 mg/dL 04-16 @ 02:08  0.35 mg/dL 04-15 @ 03:32  0.31 mg/dL 04-14 @ 03:30  0.45 mg/dL 04-13 @ 08:47  0.40 mg/dL 04-13 @ 02:50  0.66 mg/dL 04-12 @ 11:43      Hemoglobin:  Hemoglobin: 7.8 g/dL (04-16 @ 02:08)  Hemoglobin: 7.6 g/dL (04-15 @ 03:32)  Hemoglobin: 7.5 g/dL (04-14 @ 03:30)  Hemoglobin: 7.5 g/dL (04-13 @ 02:50)  Hemoglobin: 8.0 g/dL (04-12 @ 11:43)      Platelets: Platelet Count - Automated: 292 K/uL (04-16 @ 02:08)  Platelet Count - Automated: 283 K/uL (04-15 @ 03:32)  Platelet Count - Automated: 247 K/uL (04-14 @ 03:30)  Platelet Count - Automated: 250 K/uL (04-13 @ 02:50)  Platelet Count - Automated: 279 K/uL (04-12 @ 11:43)      LIVER FUNCTIONS - ( 16 Apr 2021 02:08 )  Alb: 1.4 g/dL / Pro: 6.2 gm/dL / ALK PHOS: 306 U/L / ALT: 91 U/L / AST: 61 U/L / GGT: x                 RADIOLOGY & ADDITIONAL STUDIES:

## 2021-04-17 NOTE — PROGRESS NOTE ADULT - SUBJECTIVE AND OBJECTIVE BOX
INTERVAL HPI/OVERNIGHT EVENTS:   HPI:  64yo F w/ HTN (on losartan) and DM2 (on metformin) presents with progressively worsening SOB associated with productive cough for past several days much worse the past 3 days.  Started on abx (levaquin, prednisone and azithromycin 3/19) outpatient with no improvement. Denies any chest pain, fevers, dizziness, syncope, abd pain, back pain, N/V/D, recent sick contact, recent travel.   In ED: CTA chest done to r/o PE findings consistent w/ COVID-19 PNA. Labs remarkable for WBC 18.55, Na 130, K 3.2, COs 10, AG 29, Glucose 416, alb 2.5.  Placed on High flow for dyspnea and hypoxemia. Admitted to ICU for COVID pna and DKA.   (23 Mar 2021 05:08)    ICU course: 3/31 readmitted to ICU and intubated. Had fevers, leukocytosis, MRSE+ in one blood sample, received vancomycin (4/2 - 4/8); MRSE likely contaminant, as repeat cx were negative. Labile BP throughout ICU course. Was temporarily weaned off of paralytics and pressors but had to restart them.              PAST MEDICAL & SURGICAL HISTORY:  HTN (hypertension)  DM (diabetes mellitus)  HLD (hyperlipidemia)  No significant past surgical history       MEDICATIONS  (STANDING):  calcium acetate 667 milliGRAM(s) Oral every 8 hours  chlorhexidine 0.12% Liquid 15 milliLiter(s) Oral Mucosa two times a day  chlorhexidine 2% Cloths 1 Application(s) Topical <User Schedule>  cisatracurium Infusion 3 MICROgram(s)/kG/Min (13.9 mL/Hr) IV Continuous <Continuous>  dexMEDEtomidine Infusion 0.2 MICROgram(s)/kG/Hr (3.86 mL/Hr) IV Continuous <Continuous>  dextrose 40% Gel 15 Gram(s) Oral once  dextrose 5%. 1000 milliLiter(s) (50 mL/Hr) IV Continuous <Continuous>  dextrose 5%. 1000 milliLiter(s) (100 mL/Hr) IV Continuous <Continuous>  dextrose 50% Injectable 12.5 Gram(s) IV Push once  dextrose 50% Injectable 25 Gram(s) IV Push once  dextrose 50% Injectable 25 Gram(s) IV Push once  enoxaparin Injectable 40 milliGRAM(s) SubCutaneous two times a day  fentaNYL   Infusion. 0.501 MICROgram(s)/kG/Hr (3.86 mL/Hr) IV Continuous <Continuous>  glucagon  Injectable 1 milliGRAM(s) IntraMuscular once  insulin glargine Injectable (LANTUS) 30 Unit(s) SubCutaneous at bedtime  insulin lispro (ADMELOG) corrective regimen sliding scale   SubCutaneous every 6 hours  insulin regular  human recombinant 6 Unit(s) SubCutaneous every 6 hours  meropenem  IVPB 1000 milliGRAM(s) IV Intermittent every 8 hours  meropenem  IVPB      midodrine 5 milliGRAM(s) Oral every 8 hours  pantoprazole   Suspension 40 milliGRAM(s) Enteral Tube daily  phenylephrine    Infusion 0.2 MICROgram(s)/kG/Min (2.89 mL/Hr) IV Continuous <Continuous>  polyethylene glycol 3350 17 Gram(s) Oral daily  propofol Infusion 10.009 MICROgram(s)/kG/Min (4.63 mL/Hr) IV Continuous <Continuous>  senna Syrup 10 milliLiter(s) Oral at bedtime  sodium bicarbonate  Infusion 0.146 mEq/kG/Hr (75 mL/Hr) IV Continuous <Continuous>  vancomycin  IVPB 1000 milliGRAM(s) IV Intermittent every 12 hours  zinc oxide 40% Ointment 1 Application(s) Topical three times a day        OBJECTIVE   ICU Vital Signs Last 24 Hrs  T(C): 36.6 (17 Apr 2021 16:00), Max: 38 (16 Apr 2021 22:30)  T(F): 97.8 (17 Apr 2021 16:00), Max: 100.4 (16 Apr 2021 22:30)  HR: 75 (17 Apr 2021 16:00) (70 - 105)  BP: --  BP(mean): --  ABP: 91/58 (17 Apr 2021 16:00) (64/37 - 187/116)  ABP(mean): 71 (17 Apr 2021 16:00) (44 - 134)  RR: 36 (17 Apr 2021 16:00) (17 - 36)  SpO2: 90% (17 Apr 2021 15:00) (72% - 91%)      LABS                            7.8    11.65 )-----------( 338      ( 17 Apr 2021 02:41 )             28.0     04-17    131<L>  |  98  |  38<H>  ----------------------------<  141<H>  5.1   |  19<L>  |  0.54    Ca    7.9<L>      17 Apr 2021 08:38  Phos  5.2     04-17  Mg     2.8     04-17    TPro  6.2  /  Alb  1.3<L>  /  TBili  1.3<H>  /  DBili  x   /  AST  1955<H>  /  ALT  964<H>  /  AlkPhos  330<H>  04-17      < from: Xray Chest 1 View-PORTABLE IMMEDIATE (Xray Chest 1 View-PORTABLE IMMEDIATE .) (04.16.21 @ 20:37) >  EXAM:  XR CHEST PORTABLE IMMED 1V                            PROCEDURE DATE:  04/16/2021          INTERPRETATION:  XR CHEST IMMEDIATE    Single AP view    HISTORY:  hypoxia on vent    Comparison: Chest x-ray one day prior    The tip of the ET tube is above the addie.    The NG tube is in the stomach.    Left IJ line tip in the SVC.    The cardiac silhouette is within normal limits. Diffuse bilateral airspace disease. Small bilateral pleural effusions.    IMPRESSION: No change in diffuse bilateral airspace disease            ALEXBHAVNA SMITH MD; Attending Radiologist  This document has been electronically signed. Apr 17 2021  8:36AM    < end of copied text >

## 2021-04-17 NOTE — PROGRESS NOTE ADULT - NUTRITIONAL ASSESSMENT
This patient has been assessed with a concern for Malnutrition and has been determined to have a diagnosis/diagnoses of Severe protein-calorie malnutrition.    This patient is being managed with:   Diet NPO with Tube Feed-  Tube Feeding Modality: Gastrostomy  Vital AF  Total Volume for 24 Hours (mL): 1080  Continuous  Starting Tube Feed Rate {mL per Hour}: 20  Increase Tube Feed Rate by (mL): 5     Every 8 hours  Until Goal Tube Feed Rate (mL per Hour): 45  Tube Feed Duration (in Hours): 24  Tube Feed Start Time: 16:30  Entered: Apr 5 2021  4:32PM    
This patient has been assessed with a concern for Malnutrition and has been determined to have a diagnosis/diagnoses of Severe protein-calorie malnutrition.    This patient is being managed with:   Diet NPO with Tube Feed-  Tube Feeding Modality: Gastrostomy  Vital AF  Total Volume for 24 Hours (mL): 1080  Continuous  Starting Tube Feed Rate {mL per Hour}: 20  Increase Tube Feed Rate by (mL): 5     Every 8 hours  Until Goal Tube Feed Rate (mL per Hour): 45  Tube Feed Duration (in Hours): 24  Tube Feed Start Time: 16:30  Entered: Apr 5 2021  4:32PM    
This patient has been assessed with a concern for Malnutrition and has been determined to have a diagnosis/diagnoses of Severe protein-calorie malnutrition.    This patient is being managed with:   Diet NPO with Tube Feed-  Tube Feeding Modality: Orogastric  Glucerna 1.2 Long  Total Volume for 24 Hours (mL): 1080  Continuous  Until Goal Tube Feed Rate (mL per Hour): 45  Tube Feed Duration (in Hours): 24  Tube Feed Start Time: 13:00  No Carb Prosource (1pkg = 15gms Protein)     Qty per Day:  1  Entered: Apr 13 2021 12:52PM    
This patient has been assessed with a concern for Malnutrition and has been determined to have a diagnosis/diagnoses of Severe protein-calorie malnutrition.    This patient is being managed with:   Diet NPO with Tube Feed-  Tube Feeding Modality: Gastrostomy  Glucerna 1.2 Long  Total Volume for 24 Hours (mL): 1080  Continuous  Starting Tube Feed Rate {mL per Hour}: 20  Increase Tube Feed Rate by (mL): 5     Every 8 hours  Until Goal Tube Feed Rate (mL per Hour): 45  Tube Feed Duration (in Hours): 24  Tube Feed Start Time: 16:30  Entered: Apr 11 2021  9:09AM    
This patient has been assessed with a concern for Malnutrition and has been determined to have a diagnosis/diagnoses of Severe protein-calorie malnutrition.    This patient is being managed with:   Diet NPO with Tube Feed-  Tube Feeding Modality: Gastrostomy  Vital AF  Total Volume for 24 Hours (mL): 240  Continuous  Starting Tube Feed Rate {mL per Hour}: 10  Increase Tube Feed Rate by (mL): 0     Every 24 hours  Until Goal Tube Feed Rate (mL per Hour): 10  Tube Feed Duration (in Hours): 24  Tube Feed Start Time: 05:00  Entered: Apr  3 2021  4:35AM    
This patient has been assessed with a concern for Malnutrition and has been determined to have a diagnosis/diagnoses of Severe protein-calorie malnutrition.    This patient is being managed with:   Diet NPO with Tube Feed-  Tube Feeding Modality: Gastrostomy  Vital AF  Total Volume for 24 Hours (mL): 240  Continuous  Starting Tube Feed Rate {mL per Hour}: 10  Increase Tube Feed Rate by (mL): 0     Every 24 hours  Until Goal Tube Feed Rate (mL per Hour): 10  Tube Feed Duration (in Hours): 24  Tube Feed Start Time: 05:00  Entered: Apr  3 2021  4:35AM    
This patient has been assessed with a concern for Malnutrition and has been determined to have a diagnosis/diagnoses of Severe protein-calorie malnutrition.    This patient is being managed with:   Diet NPO with Tube Feed-  Tube Feeding Modality: Orogastric  Glucerna 1.2 Long  Total Volume for 24 Hours (mL): 1080  Continuous  Until Goal Tube Feed Rate (mL per Hour): 45  Tube Feed Duration (in Hours): 24  Tube Feed Start Time: 13:00  No Carb Prosource (1pkg = 15gms Protein)     Qty per Day:  1  Entered: Apr 13 2021 12:52PM    
This patient has been assessed with a concern for Malnutrition and has been determined to have a diagnosis/diagnoses of Severe protein-calorie malnutrition.    This patient is being managed with:   Diet NPO-  Entered: Apr 1 2021  4:22PM    
This patient has been assessed with a concern for Malnutrition and has been determined to have a diagnosis/diagnoses of Severe protein-calorie malnutrition.    This patient is being managed with:   Diet NPO with Tube Feed-  Tube Feeding Modality: Gastrostomy  Glucerna 1.2 Long  Total Volume for 24 Hours (mL): 1080  Continuous  Starting Tube Feed Rate {mL per Hour}: 20  Increase Tube Feed Rate by (mL): 5     Every 8 hours  Until Goal Tube Feed Rate (mL per Hour): 45  Tube Feed Duration (in Hours): 24  Tube Feed Start Time: 16:30  Entered: Apr 11 2021  9:09AM    
This patient has been assessed with a concern for Malnutrition and has been determined to have a diagnosis/diagnoses of Severe protein-calorie malnutrition.    This patient is being managed with:   Diet NPO-  Entered: Apr 1 2021  4:22PM    
This patient has been assessed with a concern for Malnutrition and has been determined to have a diagnosis/diagnoses of Severe protein-calorie malnutrition.    This patient is being managed with:   Diet NPO with Tube Feed-  Tube Feeding Modality: Gastrostomy  Vital AF  Total Volume for 24 Hours (mL): 1080  Continuous  Starting Tube Feed Rate {mL per Hour}: 20  Increase Tube Feed Rate by (mL): 5     Every 8 hours  Until Goal Tube Feed Rate (mL per Hour): 45  Tube Feed Duration (in Hours): 24  Tube Feed Start Time: 16:30  Entered: Apr 5 2021  4:32PM

## 2021-04-17 NOTE — PROGRESS NOTE ADULT - ASSESSMENT
64yo F w/ HTN (on losartan) and DM2 (on metformin) p/w COVID PNA and DKA, now in ARDS and intubated (day 15 of intubation). Labile BP and worsening respiratory function. Prognosis is poor.     Family came yesterday to see patient as they were informed that patient was deteriorating further. Family was given pt personal belongings    Respiratory  - COVID pneumonia/ ARDS, intubated 3/31  - cont LTV for ARDS  - titrate peep and fio2 to maintain sat>90%  - inability to increase peep further due to no improvement in oxygenation and severely high peak/plateau pres when performed  - s/p remdesivir and dexamethasone  - start broad spectrum abx given fevers  - f/u ID reccs       Neuro  - c/w Propofol, Fentanyl, Nimbex, Precedex for adequate paralysis and sedation    Cardio  - titrate pressors to map>65    Heme   dvt ppx - c/w lovenox    GI   - c/w protonix ppx  - c/w miralax, senna    Endo  - improved glycemic control; POCT glucose 129-184 last 24 hr  - Endo following, f/u reccs    - Lantus 30 units qd, regular insulin 6 units q6h + Lispro ISS    Skin  cont wound care sacral skin lesion as per nursing and wound care

## 2021-04-17 NOTE — PROGRESS NOTE ADULT - ASSESSMENT
FINESSE JERNIGAN 65 F 1955 3/23/2021 DR YANN UNDERWOOD     REVIEW OF SYMPTOMS      Able to give (reliable) ROS  NO     PHYSICAL EXAM    HEENT Unremarkable  atraumatic   RESP Fair air entry EXP prolonged    Harsh breath sound Resp distres mild   CARDIAC S1 S2 No S3     NO JVD    ABDOMEN SOFT BS PRESENT NOT DISTENDED No hepatosplenomegaly   PEDAL EDEMA present No calf tenderness  NO rash       PATIENT SUMMARY  66 yo F with h/o HTN, HLD and DM2 who presented with progressively worsening SOB  Patient ruled in for  COVID-19 PNA.  ICU admitting dx : 1) Acute hypoxic resp failure 2 to Covid-19 PNA with improvement in oxygenation  2) s/p DKA.  Pt was admitted 3/23 transferred out of ICU 3/26  Pulm consulted 3/29/2021   Transferred to ICU 3/31   INTUBATED 3/31   NG TBE 3/31    PROBLEMS   COVID PNEUMONIA   AC HYPOXIC RESP FAILURE   INTUBATED 3/31   SHOCK  ELEVATED LFTS 4/17/2021   SEVERE META RESP ACIDOSIS 4/17/2021  MEROPENEM 4/17/2021   VANCO 4/16  BICARB DRIP 4/17      PATIENT DATA   ABG.     4/17/2021 715/51/65 PRVC 36/420/100%/10     VITALS/IO/VENT/DRIPS.  4/17/2021 afeb 77 95/60   4/17/2021 11a cisa 6 m/k/m  4/17/2021 7a dexm .7 m/k/h   4/17/2021 7a fent 3.5 m/k/h   4/17/2021 7a pheny 3.5 m/k/m   4/17/2021 7a prop 50 mk/m   4/17/2021 ac 36/420/10/100%       GLOBAL AND BEST PRACTICE ISSUES                     ALLERGY. carafate levaquin percocet      WEIGHT.      3/29/2021 77                           BMI.                 3/29/2021 29          ADVANCED DIRECTIVE.                               HEAD OF BED ELEVATION. Yes  DVT PROPHYLAXIS.   hpsc (3/23)   --> lvnx 40 93/30)                  MANUEL PROPHYLAXIS. PROTONIX 40 (3/23)   APA.                                                                    DYSPHAGIA RECOMM.   DIET.  CONS CARB (3/25) --> vital 1080 (4/5)      INFECTION PROPHYLAXIS.   chlorhexidine .12% (4/1/20210  chlorhexidine 2% (4/1/2021)   FREE WATER.      ASSESSMENT/RECOMMENDATIONS.    D-DIMR ELEVATED  Trending down   cta ch negv 3/23  is on dvt ppl  SHOCK   On vasopressors  Target MAP 65   MECHANICAL VENT   Target PO2 60 (+) PPlat 35 (-) pH 730 (+)   Remains on 100% ox  NEMIA   4/15/2021 Hb 7.6  target hb 7 (+)   BACT PNEUM   Empric abio started 4/16)   SEVERE ACIDOSIS   Bicarb drip started 4/17/2021     COVID PNEUMONIA AND HYPOXIC RESP FAILURE MANAGEMENT.   REMDESEVIR (3/23)   DEXA (3/23)   PRONE (3/26)   Monitor pulse oximetry Target PO 92-96%  Monitor inflammatory and thrombotic biomarkers  Monitor for  for progressively  worsening oxygenation failure   O2 to keep po 90-95%  Intubated 3/31  Cont supp care   On fent cisa prop nore     TIME SPENT   Over 25 minutes aggregate care time spent on encounter; activities included   direct patient care, counseling and/or coordinating care reviewing notes, lab data/ imaging , discussion with multidisciplinary team/ patient  /family and explaining in detail risks, benefits, alternatives  of the recommendations     FINESSE JERNIGAN 65 F 1955 3/23/2021 DR YANN UNDERWOOD

## 2021-04-17 NOTE — PROGRESS NOTE ADULT - SUBJECTIVE AND OBJECTIVE BOX
RERE KILPATRICK  MRN-[id number]    Follow Up:  COVID, MRSE in blood culture     Interval History: seen and examined, No new changes, has a low grade fever. On FIO2 100% hypoxic and acidotic.     ROS:    [ X] Unobtainable because: intubated/sedated   [ ] All other systems negative    Allergies  Carafate (Rash)  Levaquin (Rash)  Percocet 5/325 (Other)    ABX:   meropenem  IVPB 1000 every 8 hours  meropenem  IVPB    vancomycin  IVPB 1000 every 12 hours    Physical Exam: no changes in exam as below  Vital Signs Last 24 Hrs  T(C): 36.6 (17 Apr 2021 16:00), Max: 38 (16 Apr 2021 22:30)  T(F): 97.8 (17 Apr 2021 16:00), Max: 100.4 (16 Apr 2021 22:30)  HR: 72 (17 Apr 2021 17:00) (70 - 105)  RR: 36 (17 Apr 2021 16:00) (17 - 36)  SpO2: 90% (17 Apr 2021 17:00) (72% - 91%)  Constitutional: intubated and sedated   HEAD/EYES: anicteric, no conjunctival injection  ENT:  +ETT and OGT  Cardiovascular:   normal S1, S2, no murmur, trace edema b/l in LE   Respiratory:  ventilatory BS bilaterally, no wheezes, no rales  GI:  soft, distended,  normal bowel sounds, +bowel sounds   : + montiel  Musculoskeletal:  no synovitis  Neurologic: intubated and sedated   Skin:  no rash, no erythema, no phlebitis  Heme/Onc: no lymphadenopathy   Psychiatric:  unable  Lines: left +sam c/d/i,  +left IJ c/d/i       Labs:                        7.8    11.65 )-----------( 338      ( 17 Apr 2021 02:41 )             28.0     04-17    131<L>  |  98  |  38<H>  ----------------------------<  141<H>  5.1   |  19<L>  |  0.54    Ca    7.9<L>      17 Apr 2021 08:38  Phos  5.2     04-17  Mg     2.8     04-17    TPro  6.2  /  Alb  1.3<L>  /  TBili  1.3<H>  /  DBili  x   /  AST  1955<H>  /  ALT  964<H>  /  AlkPhos  330<H>  04-17    Culture - Blood (collected 04-06-21 @ 12:15)  Source: .Blood Blood-Peripheral  Final Report (04-11-21 @ 13:00):    No Growth Final    Culture - Blood (collected 04-06-21 @ 12:15)  Source: .Blood Blood  Final Report (04-11-21 @ 13:00):    No Growth Final    WBC Count: 11.65 K/uL (04-17-21 @ 02:41)  WBC Count: 8.72 K/uL (04-16-21 @ 02:08)  WBC Count: 7.57 K/uL (04-15-21 @ 03:32)  WBC Count: 7.08 K/uL (04-14-21 @ 03:30)  WBC Count: 8.10 K/uL (04-13-21 @ 02:50)    Creatinine, Serum: 0.54 mg/dL (04-17-21 @ 08:38)  Creatinine, Serum: 0.58 mg/dL (04-17-21 @ 02:41)  Creatinine, Serum: 0.41 mg/dL (04-16-21 @ 02:08)  Creatinine, Serum: 0.35 mg/dL (04-15-21 @ 03:32)  Creatinine, Serum: 0.31 mg/dL (04-14-21 @ 03:30)  Creatinine, Serum: 0.45 mg/dL (04-13-21 @ 08:47)  Creatinine, Serum: 0.40 mg/dL (04-13-21 @ 02:50)    C-Reactive Protein, Serum: 113 mg/L (04-14-21 @ 09:42)  C-Reactive Protein, Serum: 110 mg/L (04-12-21 @ 08:26)  C-Reactive Protein, Serum: 114 mg/L (04-11-21 @ 10:46)  C-Reactive Protein, Serum: 194 mg/L (04-07-21 @ 09:35)    Ferritin, Serum: 1738 ng/mL (04-14-21 @ 09:41)  Ferritin, Serum: 5050 ng/mL (04-11-21 @ 10:44)  Ferritin, Serum: 13895 ng/mL (04-07-21 @ 09:35)  Ferritin, Serum: 51531 ng/mL (04-05-21 @ 08:31)  Ferritin, Serum: 2873 ng/mL (04-01-21 @ 09:21)  Ferritin, Serum: 1426 ng/mL (03-30-21 @ 10:53)  Ferritin, Serum: 1558 ng/mL (03-29-21 @ 10:59)  Ferritin, Serum: 2181 ng/mL (03-26-21 @ 12:17)    Procalcitonin, Serum: 0.96 ng/mL (04-12-21 @ 08:26)    RADIOLOGY:  Xray Chest 1 View- PORTABLE-Urgent (Xray Chest 1 View- PORTABLE-Urgent .) (04.13.21 @ 16:06) >  IMPRESSION:  Partial clearing of the lungs. No pneumothorax.    Assessment and Plan:   64yo F w/ HTN (on losartan) and DM2 (on metformin) admitted with acute repiratory failure due to covid now in ARDS.   Imaging personally reviewed and shows extensive infiltrates   Blood culture from 4/1 (one set only was sent) grew FAN  She has had intermittent fevers the last several days   Repeat blood cultures have been sent and awaiting results   Her initial presentation was with DKA though her glucose levels are better controlled and no longer in DKA   Suffered shock liver likely from initial intubation/septic shock from covid though her AST/ALT are improving   Staph epi is often a contaminant. The ideal situation is to draw 2 sets of blood cultures from 2 separate sticks to ensure that if in fact a contaminant it would not grow in the additional set. In this case only one set was sent and she has a central line, a-line, ETT, NGT, montiel  and has been having intermittent fevers. Still suspect that this was a procurement contaminant but will await for the repeat blood cultures.   DDx regarding her fevers are quite broad: covid vs DVT/PE (dvt study negative), medication induced ( vanco on rare occasions causes fevers), subacute thyroiditis, bacterial pneumonia etc     4/8: on pressors and intubated, Fio2 100%, day 7 of vanco, repeat blood culture negative   4/9: off vanco, afebrile, FiO2 still 100%, on pressors, cultures negative  4/10: slowly reducing FiO2, afebrile, lfts getting better, WBC normal   4/11: FiO2 rising, able to be weaned off pressors, wbc steady, making very little progress unfortunately  4/12:  not doing well, off antibiotics, WBC remains near normal, remains in severe ARDS  4/14: FiO2 100%, pressors, febrile, normal WBC not making forward progress  4/15: continues to remain febrile with a normal WBC, at this point it would be advisable to obtain blood cultures, respiratory cultures and urine cultures (despite there being central lines and montiel catheter) to ensure no bacteremia. Ideally would benefit from imaging but to ill and unstable to be moved out of the CCU. Please send MRSA PCR and start vancomycin and meropenem after cultures are obtain    4/16: last fever 2am, wbc normal, still high FIO2, will start antibiotics   4/17: Low grade fever, WBC slightly higher, FIO2 100% hypoxic. Cultures are still pending.      Antibiotics this hospitalization:   Vanco 4/2->4/8; 4/16->  Meropenem 4/16->  RDV 3/23-3/27  ceftriaxone 3/23  Azithro 3/23-3/24    Overall, 65F septic shock due to covid, shock liver, leukocytosis, fever, ARDS, anemia, +CONS     Suggest-  Follow up blood cultures from 4/16  Followu p Sputum culture from yesterday   Send TSH and T4 to see if thyroiditis   Vancomycin and meropenem and monitor vanco troughs   Has new lines   trend WBC, LFTs   trend fever curve   pressors per ICU   Ventilator management per ICU   Good but not too tight glycemic control->endocrine recommendations appreciated   Remains critically ill with poor prognosis    Will follow.     Irving Marino MD   Infectious Disease Attending  Please call [phone] for all inquiries and new consults

## 2021-04-17 NOTE — PROGRESS NOTE ADULT - SUBJECTIVE AND OBJECTIVE BOX
Patient is a 65y old  Female who presents with a chief complaint of covid pna and dka (16 Apr 2021 15:12)      INTERVAL HPI/OVERNIGHT EVENTS:  pt remains critically ill on vent  NAD    MEDICATIONS  (STANDING):  calcium acetate 667 milliGRAM(s) Oral every 8 hours  chlorhexidine 0.12% Liquid 15 milliLiter(s) Oral Mucosa two times a day  chlorhexidine 2% Cloths 1 Application(s) Topical <User Schedule>  cisatracurium Infusion 3 MICROgram(s)/kG/Min (13.9 mL/Hr) IV Continuous <Continuous>  dexMEDEtomidine Infusion 0.2 MICROgram(s)/kG/Hr (3.86 mL/Hr) IV Continuous <Continuous>  dextrose 40% Gel 15 Gram(s) Oral once  dextrose 5%. 1000 milliLiter(s) (50 mL/Hr) IV Continuous <Continuous>  dextrose 5%. 1000 milliLiter(s) (100 mL/Hr) IV Continuous <Continuous>  dextrose 50% Injectable 25 Gram(s) IV Push once  dextrose 50% Injectable 12.5 Gram(s) IV Push once  dextrose 50% Injectable 25 Gram(s) IV Push once  enoxaparin Injectable 40 milliGRAM(s) SubCutaneous two times a day  fentaNYL   Infusion. 0.501 MICROgram(s)/kG/Hr (3.86 mL/Hr) IV Continuous <Continuous>  glucagon  Injectable 1 milliGRAM(s) IntraMuscular once  insulin glargine Injectable (LANTUS) 30 Unit(s) SubCutaneous at bedtime  insulin lispro (ADMELOG) corrective regimen sliding scale   SubCutaneous every 6 hours  insulin regular  human recombinant 6 Unit(s) SubCutaneous every 6 hours  meropenem  IVPB 1000 milliGRAM(s) IV Intermittent every 8 hours  meropenem  IVPB      midodrine 5 milliGRAM(s) Oral every 8 hours  pantoprazole   Suspension 40 milliGRAM(s) Enteral Tube daily  phenylephrine    Infusion 0.2 MICROgram(s)/kG/Min (2.89 mL/Hr) IV Continuous <Continuous>  polyethylene glycol 3350 17 Gram(s) Oral daily  propofol Infusion 10.009 MICROgram(s)/kG/Min (4.63 mL/Hr) IV Continuous <Continuous>  senna Syrup 10 milliLiter(s) Oral at bedtime  sodium chloride 0.9%. 1000 milliLiter(s) (75 mL/Hr) IV Continuous <Continuous>  vancomycin  IVPB 1000 milliGRAM(s) IV Intermittent every 12 hours  zinc oxide 40% Ointment 1 Application(s) Topical three times a day    MEDICATIONS  (PRN):  sodium chloride 0.9% lock flush 10 milliLiter(s) IV Push every 1 hour PRN Pre/post blood products, medications, blood draw, and to maintain line patency      REVIEW OF SYSTEMS:  unobtain able    Vital Signs Last 24 Hrs  T(C): 35.9 (17 Apr 2021 06:00), Max: 38 (16 Apr 2021 22:30)  T(F): 96.6 (17 Apr 2021 06:00), Max: 100.4 (16 Apr 2021 22:30)  HR: 76 (17 Apr 2021 06:30) (70 - 105)  BP: --  BP(mean): --  RR: 36 (17 Apr 2021 06:30) (17 - 36)  SpO2: 85% (17 Apr 2021 06:30) (72% - 90%)    PHYSICAL EXAM:  GENERAL: NAD, well-groomed, well-developed        LABS:                        7.8    11.65 )-----------( 338      ( 17 Apr 2021 02:41 )             28.0     04-17    129<L>  |  96  |  35<H>  ----------------------------<  249<H>  5.6<H>   |  22  |  0.58    Ca    8.1<L>      17 Apr 2021 02:41  Phos  5.2     04-17  Mg     2.8     04-17    TPro  6.1  /  Alb  1.3<L>  /  TBili  1.3<H>  /  DBili  x   /  AST  290<H>  /  ALT  212<H>  /  AlkPhos  312<H>  04-17        CAPILLARY BLOOD GLUCOSE      POCT Blood Glucose.: 180 mg/dL (17 Apr 2021 05:12)  POCT Blood Glucose.: 166 mg/dL (17 Apr 2021 00:08)  POCT Blood Glucose.: 150 mg/dL (16 Apr 2021 21:31)  POCT Blood Glucose.: 163 mg/dL (16 Apr 2021 17:30)  POCT Blood Glucose.: 138 mg/dL (16 Apr 2021 11:09)    Lipid panel:       ABG - ( 17 Apr 2021 06:12 )  pH, Arterial: x     pH, Blood: 7.17  /  pCO2: 57    /  pO2: 57    / HCO3: 20    / Base Excess: -8.0  /  SaO2: 79                Mode: AC/ CMV (Assist Control/ Continuous Mandatory Ventilation)  RR (machine): 36  TV (machine): 420  FiO2: 100  PEEP: 10  ITime: 1  MAP: 26  PIP: 52        RADIOLOGY & ADDITIONAL TESTS:

## 2021-04-17 NOTE — PROGRESS NOTE ADULT - SUBJECTIVE AND OBJECTIVE BOX
RERE FINESSE    [facility] CCU 6    Patient is a 65y old  Female who presents with a chief complaint of covid pna and dka (17 Apr 2021 07:59)       Allergies    Carafate (Rash)  Levaquin (Rash)  Percocet 5/325 (Other)    Intolerances    lactose (Flatulence)      HPI:  64yo F w/ HTN (on losartan) and DM2 (on metformin) presents with progressively worsening SOB associated with productive cough for past several days much worse the past 3 days.  Started on abx (levaquin, prednisone and azithromycin 3/19) outpatient with no improvement. Denies any chest pain, fevers, dizziness, syncope, abd pain, back pain, N/V/D, recent sick contact, recent travel.     In ED: CTA chest done to r/o PE findings consistent w/ COVID-19 PNA. Labs remarkable for WBC 18.55, Na 130, K 3.2, COs 10, AG 29, Glucose 416, alb 2.5.  Placed on High flow for dyspnea and hypoxemia. Admitted to ICU for COVID pna and DKA.   (23 Mar 2021 05:08)      PAST MEDICAL & SURGICAL HISTORY:  HTN (hypertension)    DM (diabetes mellitus)    HLD (hyperlipidemia)    No significant past surgical history        FAMILY HISTORY:  No pertinent family history in first degree relatives          MEDICATIONS   calcium acetate 667 milliGRAM(s) Oral every 8 hours  chlorhexidine 0.12% Liquid 15 milliLiter(s) Oral Mucosa two times a day  chlorhexidine 2% Cloths 1 Application(s) Topical <User Schedule>  cisatracurium Infusion 3 MICROgram(s)/kG/Min IV Continuous <Continuous>  dexMEDEtomidine Infusion 0.2 MICROgram(s)/kG/Hr IV Continuous <Continuous>  dextrose 40% Gel 15 Gram(s) Oral once  dextrose 5%. 1000 milliLiter(s) IV Continuous <Continuous>  dextrose 5%. 1000 milliLiter(s) IV Continuous <Continuous>  dextrose 50% Injectable 12.5 Gram(s) IV Push once  dextrose 50% Injectable 25 Gram(s) IV Push once  dextrose 50% Injectable 25 Gram(s) IV Push once  enoxaparin Injectable 40 milliGRAM(s) SubCutaneous two times a day  fentaNYL   Infusion. 0.501 MICROgram(s)/kG/Hr IV Continuous <Continuous>  glucagon  Injectable 1 milliGRAM(s) IntraMuscular once  insulin glargine Injectable (LANTUS) 30 Unit(s) SubCutaneous at bedtime  insulin lispro (ADMELOG) corrective regimen sliding scale   SubCutaneous every 6 hours  insulin regular  human recombinant 6 Unit(s) SubCutaneous every 6 hours  meropenem  IVPB      meropenem  IVPB 1000 milliGRAM(s) IV Intermittent every 8 hours  midodrine 5 milliGRAM(s) Oral every 8 hours  pantoprazole   Suspension 40 milliGRAM(s) Enteral Tube daily  phenylephrine    Infusion 0.2 MICROgram(s)/kG/Min IV Continuous <Continuous>  polyethylene glycol 3350 17 Gram(s) Oral daily  propofol Infusion 10.009 MICROgram(s)/kG/Min IV Continuous <Continuous>  senna Syrup 10 milliLiter(s) Oral at bedtime  sodium bicarbonate  Infusion 0.146 mEq/kG/Hr IV Continuous <Continuous>  sodium chloride 0.9% lock flush 10 milliLiter(s) IV Push every 1 hour PRN  sodium chloride 0.9%. 1000 milliLiter(s) IV Continuous <Continuous>  vancomycin  IVPB 1000 milliGRAM(s) IV Intermittent every 12 hours  zinc oxide 40% Ointment 1 Application(s) Topical three times a day      Vital Signs Last 24 Hrs  T(C): 36.3 (17 Apr 2021 11:00), Max: 38 (16 Apr 2021 22:30)  T(F): 97.4 (17 Apr 2021 11:00), Max: 100.4 (16 Apr 2021 22:30)  HR: 77 (17 Apr 2021 11:00) (70 - 105)  BP: --  BP(mean): --  RR: 36 (17 Apr 2021 11:00) (17 - 36)  SpO2: 91% (17 Apr 2021 11:00) (72% - 91%)      04-16-21 @ 07:01  -  04-17-21 @ 07:00  --------------------------------------------------------  IN: 4552 mL / OUT: 830 mL / NET: 3722 mL    04-17-21 @ 07:01  -  04-17-21 @ 11:17  --------------------------------------------------------  IN: 975.1 mL / OUT: 110 mL / NET: 865.1 mL        Mode: AC/ CMV (Assist Control/ Continuous Mandatory Ventilation), RR (machine): 36, TV (machine): 420, FiO2: 100, PEEP: 10, ITime: 0.7, MAP: 23, PIP: 49    LABS:                        7.8    11.65 )-----------( 338      ( 17 Apr 2021 02:41 )             28.0     04-17    131<L>  |  98  |  38<H>  ----------------------------<  141<H>  5.1   |  19<L>  |  0.54    Ca    7.9<L>      17 Apr 2021 08:38  Phos  5.2     04-17  Mg     2.8     04-17    TPro  6.2  /  Alb  1.3<L>  /  TBili  1.3<H>  /  DBili  x   /  AST  1955<H>  /  ALT  964<H>  /  AlkPhos  330<H>  04-17          ABG - ( 17 Apr 2021 10:59 )  pH, Arterial: x     pH, Blood: 7.15  /  pCO2: 51    /  pO2: 65    / HCO3: 17    / Base Excess: -11.0 /  SaO2: 86                  WBC:  WBC Count: 11.65 K/uL (04-17 @ 02:41)  WBC Count: 8.72 K/uL (04-16 @ 02:08)  WBC Count: 7.57 K/uL (04-15 @ 03:32)  WBC Count: 7.08 K/uL (04-14 @ 03:30)      MICROBIOLOGY:  RECENT CULTURES:                  Sodium:  Sodium, Serum: 131 mmol/L (04-17 @ 08:38)  Sodium, Serum: 129 mmol/L (04-17 @ 02:41)  Sodium, Serum: 136 mmol/L (04-16 @ 02:08)  Sodium, Serum: 135 mmol/L (04-15 @ 03:32)  Sodium, Serum: 136 mmol/L (04-14 @ 03:30)      0.54 mg/dL 04-17 @ 08:38  0.58 mg/dL 04-17 @ 02:41  0.41 mg/dL 04-16 @ 02:08  0.35 mg/dL 04-15 @ 03:32  0.31 mg/dL 04-14 @ 03:30      Hemoglobin:  Hemoglobin: 7.8 g/dL (04-17 @ 02:41)  Hemoglobin: 7.8 g/dL (04-16 @ 02:08)  Hemoglobin: 7.6 g/dL (04-15 @ 03:32)  Hemoglobin: 7.5 g/dL (04-14 @ 03:30)      Platelets: Platelet Count - Automated: 338 K/uL (04-17 @ 02:41)  Platelet Count - Automated: 292 K/uL (04-16 @ 02:08)  Platelet Count - Automated: 283 K/uL (04-15 @ 03:32)  Platelet Count - Automated: 247 K/uL (04-14 @ 03:30)      LIVER FUNCTIONS - ( 17 Apr 2021 08:38 )  Alb: 1.3 g/dL / Pro: 6.2 gm/dL / ALK PHOS: 330 U/L / ALT: 964 U/L / AST: 1955 U/L / GGT: x                 RADIOLOGY & ADDITIONAL STUDIES:

## 2021-04-18 NOTE — PROGRESS NOTE ADULT - SUBJECTIVE AND OBJECTIVE BOX
RERE KILPATRICK    [facility] CCU 6    Patient is a 65y old  Female who presents with a chief complaint of covid pna and dka (18 Apr 2021 09:33)       Allergies    Carafate (Rash)  Levaquin (Rash)  Percocet 5/325 (Other)    Intolerances    lactose (Flatulence)      HPI:  64yo F w/ HTN (on losartan) and DM2 (on metformin) presents with progressively worsening SOB associated with productive cough for past several days much worse the past 3 days.  Started on abx (levaquin, prednisone and azithromycin 3/19) outpatient with no improvement. Denies any chest pain, fevers, dizziness, syncope, abd pain, back pain, N/V/D, recent sick contact, recent travel.     In ED: CTA chest done to r/o PE findings consistent w/ COVID-19 PNA. Labs remarkable for WBC 18.55, Na 130, K 3.2, COs 10, AG 29, Glucose 416, alb 2.5.  Placed on High flow for dyspnea and hypoxemia. Admitted to ICU for COVID pna and DKA.   (23 Mar 2021 05:08)      PAST MEDICAL & SURGICAL HISTORY:  HTN (hypertension)    DM (diabetes mellitus)    HLD (hyperlipidemia)    No significant past surgical history        FAMILY HISTORY:  No pertinent family history in first degree relatives          MEDICATIONS   calcium acetate 667 milliGRAM(s) Oral every 8 hours  chlorhexidine 0.12% Liquid 15 milliLiter(s) Oral Mucosa two times a day  chlorhexidine 2% Cloths 1 Application(s) Topical <User Schedule>  cisatracurium Infusion 3 MICROgram(s)/kG/Min IV Continuous <Continuous>  dexMEDEtomidine Infusion 0.2 MICROgram(s)/kG/Hr IV Continuous <Continuous>  dextrose 40% Gel 15 Gram(s) Oral once  dextrose 5%. 1000 milliLiter(s) IV Continuous <Continuous>  dextrose 5%. 1000 milliLiter(s) IV Continuous <Continuous>  dextrose 50% Injectable 25 Gram(s) IV Push once  dextrose 50% Injectable 12.5 Gram(s) IV Push once  dextrose 50% Injectable 25 Gram(s) IV Push once  enoxaparin Injectable 40 milliGRAM(s) SubCutaneous two times a day  fentaNYL   Infusion. 0.501 MICROgram(s)/kG/Hr IV Continuous <Continuous>  glucagon  Injectable 1 milliGRAM(s) IntraMuscular once  insulin glargine Injectable (LANTUS) 30 Unit(s) SubCutaneous at bedtime  insulin lispro (ADMELOG) corrective regimen sliding scale   SubCutaneous every 6 hours  insulin regular  human recombinant 6 Unit(s) SubCutaneous every 6 hours  meropenem  IVPB 1000 milliGRAM(s) IV Intermittent every 8 hours  meropenem  IVPB      midodrine 5 milliGRAM(s) Oral every 8 hours  norepinephrine Infusion 0.05 MICROgram(s)/kG/Min IV Continuous <Continuous>  pantoprazole   Suspension 40 milliGRAM(s) Enteral Tube daily  phenylephrine    Infusion 0.2 MICROgram(s)/kG/Min IV Continuous <Continuous>  polyethylene glycol 3350 17 Gram(s) Oral daily  propofol Infusion 10.009 MICROgram(s)/kG/Min IV Continuous <Continuous>  senna Syrup 10 milliLiter(s) Oral at bedtime  sodium bicarbonate  Infusion 0.292 mEq/kG/Hr IV Continuous <Continuous>  sodium chloride 0.9% lock flush 10 milliLiter(s) IV Push every 1 hour PRN  sodium zirconium cyclosilicate 10 Gram(s) Oral daily  vancomycin  IVPB 1000 milliGRAM(s) IV Intermittent every 12 hours  vasopressin Infusion 0.04 Unit(s)/Min IV Continuous <Continuous>  zinc oxide 40% Ointment 1 Application(s) Topical three times a day      Vital Signs Last 24 Hrs  T(C): 35.3 (18 Apr 2021 07:30), Max: 36.7 (18 Apr 2021 00:00)  T(F): 95.6 (18 Apr 2021 07:30), Max: 98.1 (18 Apr 2021 00:00)  HR: 0 (18 Apr 2021 08:44) (0 - 148)  BP: --  BP(mean): --  RR: 0 (18 Apr 2021 08:44) (0 - 39)  SpO2: 94% (18 Apr 2021 08:44) (85% - 100%)      04-17-21 @ 07:01  -  04-18-21 @ 07:00  --------------------------------------------------------  IN: 8184 mL / OUT: 195 mL / NET: 7989 mL    04-18-21 @ 07:01  -  04-18-21 @ 09:48  --------------------------------------------------------  IN: 311.1 mL / OUT: 0 mL / NET: 311.1 mL        Mode: AC/ CMV (Assist Control/ Continuous Mandatory Ventilation), RR (machine): 36, TV (machine): 450, FiO2: 100, PEEP: 12, ITime: 0.6, MAP: 25, PIP: 54    LABS:                        7.8    17.54 )-----------( 210      ( 18 Apr 2021 02:28 )             28.2     04-18    129<L>  |  90<L>  |  42<H>  ----------------------------<  144<H>  6.3<HH>   |  13<L>  |  1.05    Ca    7.2<L>      18 Apr 2021 02:28  Phos  8.4     04-18  Mg     2.8     04-18    TPro  5.0<L>  /  Alb  1.0<L>  /  TBili  1.9<H>  /  DBili  x   /  AST  <3<L>  /  ALT  6711<H>  /  AlkPhos  417<H>  04-18          ABG - ( 18 Apr 2021 08:22 )  pH, Arterial: x     pH, Blood: <6.80 /  pCO2: 61    /  pO2: <47   / HCO3: x     / Base Excess: x     /  SaO2: 42                  WBC:  WBC Count: 17.54 K/uL (04-18 @ 02:28)  WBC Count: 11.65 K/uL (04-17 @ 02:41)  WBC Count: 8.72 K/uL (04-16 @ 02:08)  WBC Count: 7.57 K/uL (04-15 @ 03:32)      MICROBIOLOGY:  RECENT CULTURES:                  Sodium:  Sodium, Serum: 129 mmol/L (04-18 @ 02:28)  Sodium, Serum: 131 mmol/L (04-17 @ 08:38)  Sodium, Serum: 129 mmol/L (04-17 @ 02:41)  Sodium, Serum: 136 mmol/L (04-16 @ 02:08)  Sodium, Serum: 135 mmol/L (04-15 @ 03:32)      1.05 mg/dL 04-18 @ 02:28  0.54 mg/dL 04-17 @ 08:38  0.58 mg/dL 04-17 @ 02:41  0.41 mg/dL 04-16 @ 02:08  0.35 mg/dL 04-15 @ 03:32      Hemoglobin:  Hemoglobin: 7.8 g/dL (04-18 @ 02:28)  Hemoglobin: 7.8 g/dL (04-17 @ 02:41)  Hemoglobin: 7.8 g/dL (04-16 @ 02:08)  Hemoglobin: 7.6 g/dL (04-15 @ 03:32)      Platelets: Platelet Count - Automated: 210 K/uL (04-18 @ 02:28)  Platelet Count - Automated: 338 K/uL (04-17 @ 02:41)  Platelet Count - Automated: 292 K/uL (04-16 @ 02:08)  Platelet Count - Automated: 283 K/uL (04-15 @ 03:32)      LIVER FUNCTIONS - ( 18 Apr 2021 02:28 )  Alb: 1.0 g/dL / Pro: 5.0 gm/dL / ALK PHOS: 417 U/L / ALT: 6711 U/L / AST: <3 U/L / GGT: x                 RADIOLOGY & ADDITIONAL STUDIES:

## 2021-04-18 NOTE — PROGRESS NOTE ADULT - PROBLEM SELECTOR PROBLEM 2
Pneumonia due to COVID-19 virus
Acute respiratory failure, unspecified whether with hypoxia or hypercapnia
Pneumonia due to COVID-19 virus
Acute respiratory failure, unspecified whether with hypoxia or hypercapnia

## 2021-04-18 NOTE — DISCHARGE NOTE FOR THE EXPIRED PATIENT - HOSPITAL COURSE
64 yo F with PMH of HTN, HLD and DM2 who presented on 3/23/21 with progressively worsening SOB associated with productive cough for past several days. Pt was started on Levaquin, Prednisone and Azithromycin  on 3/19 outpatient with no improvement. In the ER CTA chest done to r/o PE findings consistent with COVID-19 PNA. Labs remarkable for WBC 18.55, Na 130, K 3.2, COs 10, AG 29, glucose 416, alb 2.5.  Placed on HFNC for dyspnea and hypoxemia. Initially admitted to ICU with admitting dx of acute hypoxic resp failure 2 to Covid-19 PNA and DKA. Pt was treated in ICU for DKA and downgraded to medicine on 3/25. On 3/31/21 RRT called for hypoxia. Pt being transferred back to ICU for intubation and invasive mechanical ventilation for worsening hypoxemic respiratory failure on 4/1/21.     In the ICU, patient continued to have worsening hypoxia despite max ventilator management. Patient also began to develop signs of end organ failure secondary to ischemia with worsening shock state requiring multiple vasopressors. Attempts to maximize ventilation with positioning and proning were not successful. Discussed with family regarding patients worsening status and they agreed that they would want to continue to have the patient as FULL CODE status. 4/18/21 AM patient noted to be in asystole. High quality CPR initiated, however attempts at resuscitation were unsuccessful. Time of death documented @ 8:44AM 4/18/21. Family was present at the bedside. Condolences offered. Case discussed with attending.

## 2021-04-18 NOTE — PROGRESS NOTE ADULT - REASON FOR ADMISSION
covid pna and dka

## 2021-04-18 NOTE — PROGRESS NOTE ADULT - PROBLEM SELECTOR PROBLEM 1
Acute respiratory failure, unspecified whether with hypoxia or hypercapnia
Acute respiratory failure, unspecified whether with hypoxia or hypercapnia
Type 2 diabetes mellitus with hyperglycemia, without long-term current use of insulin
Acute respiratory failure, unspecified whether with hypoxia or hypercapnia
Type 2 diabetes mellitus with hyperglycemia, without long-term current use of insulin
Acute respiratory failure, unspecified whether with hypoxia or hypercapnia
Acute respiratory failure, unspecified whether with hypoxia or hypercapnia
Pneumonia due to COVID-19 virus
Pneumonia due to COVID-19 virus
Type 2 diabetes mellitus with hyperglycemia, without long-term current use of insulin
Acute respiratory failure, unspecified whether with hypoxia or hypercapnia
Pneumonia due to COVID-19 virus
Type 2 diabetes mellitus with hyperglycemia, without long-term current use of insulin
Type 2 diabetes mellitus with hyperglycemia, without long-term current use of insulin
Acute respiratory failure, unspecified whether with hypoxia or hypercapnia
Type 2 diabetes mellitus with hyperglycemia, without long-term current use of insulin
Acute respiratory failure, unspecified whether with hypoxia or hypercapnia
Acute respiratory failure, unspecified whether with hypoxia or hypercapnia
Pneumonia due to COVID-19 virus
Type 2 diabetes mellitus with hyperglycemia, without long-term current use of insulin
Type 2 diabetes mellitus with hyperglycemia, without long-term current use of insulin
Acute respiratory failure, unspecified whether with hypoxia or hypercapnia
Pneumonia due to COVID-19 virus

## 2021-04-18 NOTE — PROGRESS NOTE ADULT - PROVIDER SPECIALTY LIST ADULT
Critical Care
Critical Care
Endocrinology
Hospitalist
Pulmonology
CCU
Critical Care
Endocrinology
Hospitalist
Infectious Disease
Infectious Disease
Pulmonology
Critical Care
Endocrinology
Hospitalist
Hospitalist
Infectious Disease
Pulmonology
Critical Care
Endocrinology
Endocrinology
Hospitalist
Hospitalist
Infectious Disease
Pulmonology
CCU
Critical Care
Endocrinology
Infectious Disease
Pulmonology
Pulmonology
CCU
CCU
Critical Care
Endocrinology
Infectious Disease
Critical Care
Endocrinology
Pulmonology
Critical Care
Endocrinology
Pulmonology

## 2021-04-18 NOTE — PROGRESS NOTE ADULT - NSICDXPILOT_GEN_ALL_CORE
[place]

## 2021-04-18 NOTE — PROGRESS NOTE ADULT - PROBLEM SELECTOR PLAN 2
See above

## 2021-04-23 DIAGNOSIS — E43 UNSPECIFIED SEVERE PROTEIN-CALORIE MALNUTRITION: ICD-10-CM

## 2021-04-23 DIAGNOSIS — E87.2 ACIDOSIS: ICD-10-CM

## 2021-04-23 DIAGNOSIS — U07.1 COVID-19: ICD-10-CM

## 2021-04-23 DIAGNOSIS — J80 ACUTE RESPIRATORY DISTRESS SYNDROME: ICD-10-CM

## 2021-04-23 DIAGNOSIS — Z79.84 LONG TERM (CURRENT) USE OF ORAL HYPOGLYCEMIC DRUGS: ICD-10-CM

## 2021-04-23 DIAGNOSIS — I50.20 UNSPECIFIED SYSTOLIC (CONGESTIVE) HEART FAILURE: ICD-10-CM

## 2021-04-23 DIAGNOSIS — E11.10 TYPE 2 DIABETES MELLITUS WITH KETOACIDOSIS WITHOUT COMA: ICD-10-CM

## 2021-04-23 DIAGNOSIS — Z88.1 ALLERGY STATUS TO OTHER ANTIBIOTIC AGENTS STATUS: ICD-10-CM

## 2021-04-23 DIAGNOSIS — E83.39 OTHER DISORDERS OF PHOSPHORUS METABOLISM: ICD-10-CM

## 2021-04-23 DIAGNOSIS — E87.1 HYPO-OSMOLALITY AND HYPONATREMIA: ICD-10-CM

## 2021-04-23 DIAGNOSIS — K72.00 ACUTE AND SUBACUTE HEPATIC FAILURE WITHOUT COMA: ICD-10-CM

## 2021-04-23 DIAGNOSIS — D64.9 ANEMIA, UNSPECIFIED: ICD-10-CM

## 2021-04-23 DIAGNOSIS — J15.9 UNSPECIFIED BACTERIAL PNEUMONIA: ICD-10-CM

## 2021-04-23 DIAGNOSIS — A41.89 OTHER SPECIFIED SEPSIS: ICD-10-CM

## 2021-04-23 DIAGNOSIS — E78.5 HYPERLIPIDEMIA, UNSPECIFIED: ICD-10-CM

## 2021-04-23 DIAGNOSIS — R65.21 SEVERE SEPSIS WITH SEPTIC SHOCK: ICD-10-CM

## 2021-04-23 DIAGNOSIS — E87.5 HYPERKALEMIA: ICD-10-CM

## 2021-04-23 DIAGNOSIS — E66.3 OVERWEIGHT: ICD-10-CM
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
## 2021-04-23 DIAGNOSIS — T38.0X5A ADVERSE EFFECT OF GLUCOCORTICOIDS AND SYNTHETIC ANALOGUES, INITIAL ENCOUNTER: ICD-10-CM

## 2021-04-23 DIAGNOSIS — J12.82 PNEUMONIA DUE TO CORONAVIRUS DISEASE 2019: ICD-10-CM

## 2021-04-23 DIAGNOSIS — Z88.5 ALLERGY STATUS TO NARCOTIC AGENT: ICD-10-CM

## 2021-04-23 DIAGNOSIS — I11.0 HYPERTENSIVE HEART DISEASE WITH HEART FAILURE: ICD-10-CM

## 2022-11-10 NOTE — PROCEDURE NOTE - NSINFORMCONSENT_GEN_A_CORE
This was an emergent procedure.
Benefits, risks, and possible complications of procedure explained to patient/caregiver who verbalized understanding and gave verbal consent.
yes
This was an emergent procedure.
from wife/Benefits, risks, and possible complications of procedure explained to patient/caregiver who verbalized understanding and gave verbal consent.
Benefits, risks, and possible complications of procedure explained to patient/caregiver who verbalized understanding and gave written consent.
This was an emergent procedure.

## 2023-04-06 NOTE — H&P ADULT - RS GEN HX ROS MEA POS PC
INR at goal. Medications and chart reviewed. No changes noted to necessitate adjustment of warfarin or follow-up plan. See calendar.Patient was re-educated on situations that would require placing a call to the coumadin clinic, including bleeding or unusual bruising issues, changes in health, diet or medications, upcoming procedures that require warfarin interruption, and missed coumadin dose(s). Patient expressed understanding that avoidance of consistency with these parameters could cause fluctuations in INR, leading to more frequent visits and increase risk of adverse events.      dyspnea/cough

## 2023-06-13 NOTE — PROVIDER CONTACT NOTE (CRITICAL VALUE NOTIFICATION) - TEST AND RESULT REPORTED:
[facility] Care Coordination Contact    [facility] Home Visit Assessment     Home visit for Health Risk Assessment with Tevin Guidry completed on June 13, 2023    Type of residence:: Private home - stairs  Current living arrangement:: I live in a private home     Assessment completed with:: Patient    Current Care Plan  Member currently receiving the following home care services:     Member currently receiving the following community resources: None      Medication Review  Medication reconciliation completed in Epic: Yes  Medication set-up & administration: Independent and typically sets up on own weekly.  Self-administers medications.  Medication Risk Assessment Medication (1 or more, place referral to MTM): Hola is already working with an MTM pharmacist.  MTM Referral Placed: No: Member is already followed by MTM. Will follow up with current MTM.    Mental/Behavioral Health   Depression Screening:   PHQ-2 Total Score (Adult) - Positive if 3 or more points; Administer PHQ-9 if positive: 0       Mental health DX:: Yes (F40.10 social phobia)   Mental health DX how managed:: Medication    Falls Assessment:   Fallen 2 or more times in the past year?: No   Any fall with injury in the past year?: No    ADL/IADL Dependencies:   Dependent ADLs:: Independent  Dependent IADLs:: Independent    [facility] sponsored benefits: Shared information re: Silver Sneakers/gym memberships, ASA, Calcium +D.    PCA Assessment completed at visit: Not Applicable     Elderly Waiver Eligibility: No-does not meet criteria    Care Plan & Recommendations: Hola reports he is doing well and he appears stable in his current living situation with his brother and sister-in-law. He does not need any formal services at this time, as he reports being independent with all ADLs & IADLs.  Recommend that Hola start walking again and he set a goal today that he wants to walk 3 times per week for 30 mins. Also advised Hola to continue 
P02 less than 47, C02 40, 02 saturation 82%
following up with his providers as directed. He wants to be healthy and he seems to comply with his doctors' recommendations.  Hola agrees to call this care coordinator if he has a change in his health, has a fall or if he feels like he needs any services or supplies.     See [facility] for detailed assessment information.    Follow-Up Plan: Member informed of future contact, plan to f/u with member with a 6 month telephone assessment.  Contact information shared with member and family, encouraged member to call with any questions or concerns at any time.    [facility] care continuum providers: Please see Snapshot and Care Management Flowsheets for Specific details of care plan.    This CC note routed to PCP.  Elizabeth Martinez RN  [facility]   [phone]    
lactate 24.5
Blood culture from 4/1/21 Anaerobic bottle shows gram positive cocci in clusters
Potassium 6.3